# Patient Record
Sex: MALE | Race: WHITE | NOT HISPANIC OR LATINO | Employment: OTHER | URBAN - NONMETROPOLITAN AREA
[De-identification: names, ages, dates, MRNs, and addresses within clinical notes are randomized per-mention and may not be internally consistent; named-entity substitution may affect disease eponyms.]

---

## 2017-01-09 ENCOUNTER — COMMUNICATION - GICH (OUTPATIENT)
Dept: FAMILY MEDICINE | Facility: OTHER | Age: 82
End: 2017-01-09

## 2017-01-09 DIAGNOSIS — Z86.711 PERSONAL HISTORY OF PULMONARY EMBOLISM: ICD-10-CM

## 2017-01-09 DIAGNOSIS — Z79.01 LONG TERM CURRENT USE OF ANTICOAGULANT: ICD-10-CM

## 2017-02-06 ENCOUNTER — TELEPHONE (OUTPATIENT)
Dept: ANTICOAGULATION | Facility: CLINIC | Age: 82
End: 2017-02-06

## 2017-02-06 NOTE — TELEPHONE ENCOUNTER
Pt overdue for INR check. Attempted to contract him, no answer. Unable to leave . Will need to try again to contact him to get him in for INR check  Ashtyn Ashby RN

## 2017-03-22 ENCOUNTER — ANTICOAGULATION THERAPY VISIT (OUTPATIENT)
Dept: ANTICOAGULATION | Facility: CLINIC | Age: 82
End: 2017-03-22

## 2017-03-22 DIAGNOSIS — Z86.718 PERSONAL HISTORY OF DVT (DEEP VEIN THROMBOSIS): ICD-10-CM

## 2017-03-22 DIAGNOSIS — Z79.01 LONG-TERM (CURRENT) USE OF ANTICOAGULANTS: ICD-10-CM

## 2017-03-22 DIAGNOSIS — Z79.01 ANTICOAGULATION MONITORING, INR RANGE 2-3: ICD-10-CM

## 2017-03-22 DIAGNOSIS — Z86.711 HISTORY OF PULMONARY EMBOLISM: ICD-10-CM

## 2017-04-03 ENCOUNTER — ANTICOAGULATION - GICH (OUTPATIENT)
Dept: INTERNAL MEDICINE | Facility: OTHER | Age: 82
End: 2017-04-03

## 2017-04-03 DIAGNOSIS — Z86.711 PERSONAL HISTORY OF PULMONARY EMBOLISM: ICD-10-CM

## 2017-04-03 DIAGNOSIS — Z79.01 LONG TERM CURRENT USE OF ANTICOAGULANT: ICD-10-CM

## 2017-04-06 ENCOUNTER — AMBULATORY - GICH (OUTPATIENT)
Dept: UROLOGY | Facility: OTHER | Age: 82
End: 2017-04-06

## 2017-04-06 ENCOUNTER — HISTORY (OUTPATIENT)
Dept: UROLOGY | Facility: OTHER | Age: 82
End: 2017-04-06

## 2017-04-06 DIAGNOSIS — R97.20 ELEVATED PROSTATE SPECIFIC ANTIGEN (PSA): ICD-10-CM

## 2017-04-06 DIAGNOSIS — R31.9 HEMATURIA: ICD-10-CM

## 2017-04-06 LAB
BACTERIA URINE: NORMAL BACTERIA/HPF
BILIRUB UR QL: NEGATIVE
CLARITY, URINE: CLEAR CLARITY
COLOR UR: YELLOW COLOR
EPITHELIAL CELLS: NORMAL EPI/HPF
GLUCOSE URINE: NEGATIVE MG/DL
KETONES UR QL: NEGATIVE MG/DL
LEUKOCYTE ESTERASE URINE: NEGATIVE
NITRITE UR QL STRIP: NEGATIVE
OCCULT BLOOD,URINE - HISTORICAL: ABNORMAL
PH UR: 5 [PH]
PROTEIN QUALITATIVE,URINE - HISTORICAL: NEGATIVE MG/DL
PSA TOTAL (DIAGNOSTIC) - HISTORICAL: 6.07 NG/ML
RBC - HISTORICAL: NORMAL /HPF
SP GR UR STRIP: 1.01
UROBILINOGEN,QUALITATIVE - HISTORICAL: NORMAL EU/DL
WBC - HISTORICAL: NORMAL /HPF

## 2017-04-17 ENCOUNTER — AMBULATORY - GICH (OUTPATIENT)
Dept: LAB | Facility: OTHER | Age: 82
End: 2017-04-17

## 2017-04-17 ENCOUNTER — ANTICOAGULATION - GICH (OUTPATIENT)
Dept: INTERNAL MEDICINE | Facility: OTHER | Age: 82
End: 2017-04-17

## 2017-04-17 ENCOUNTER — HOSPITAL ENCOUNTER (OUTPATIENT)
Dept: RADIOLOGY | Facility: OTHER | Age: 82
End: 2017-04-17
Attending: UROLOGY

## 2017-04-17 ENCOUNTER — AMBULATORY - GICH (OUTPATIENT)
Dept: UROLOGY | Facility: OTHER | Age: 82
End: 2017-04-17

## 2017-04-17 ENCOUNTER — HISTORY (OUTPATIENT)
Dept: UROLOGY | Facility: OTHER | Age: 82
End: 2017-04-17

## 2017-04-17 DIAGNOSIS — R31.9 HEMATURIA: ICD-10-CM

## 2017-04-17 DIAGNOSIS — Z79.01 LONG TERM CURRENT USE OF ANTICOAGULANT: ICD-10-CM

## 2017-04-17 DIAGNOSIS — M79.605 PAIN OF LEFT LEG: ICD-10-CM

## 2017-04-17 DIAGNOSIS — G61.0 GUILLAIN-BARRE SYNDROME (H): ICD-10-CM

## 2017-04-17 DIAGNOSIS — M79.604 PAIN OF RIGHT LEG: ICD-10-CM

## 2017-04-17 LAB
A/G RATIO - HISTORICAL: 1.1 (ref 1–2)
ABSOLUTE BASOPHILS - HISTORICAL: 0.1 THOU/CU MM
ABSOLUTE EOSINOPHILS - HISTORICAL: 0.2 THOU/CU MM
ABSOLUTE LYMPHOCYTES - HISTORICAL: 1.8 THOU/CU MM (ref 0.9–2.9)
ABSOLUTE MONOCYTES - HISTORICAL: 0.6 THOU/CU MM
ABSOLUTE NEUTROPHILS - HISTORICAL: 3.4 THOU/CU MM (ref 1.7–7)
ALBUMIN SERPL-MCNC: 3.7 G/DL (ref 3.5–5.7)
ALP SERPL-CCNC: 69 IU/L (ref 34–104)
ALT (SGPT) - HISTORICAL: 9 IU/L (ref 7–52)
ANION GAP - HISTORICAL: 5 (ref 5–18)
AST SERPL-CCNC: 16 IU/L (ref 13–39)
BASOPHILS # BLD AUTO: 1.9 %
BILIRUB SERPL-MCNC: 0.9 MG/DL (ref 0.3–1)
BUN SERPL-MCNC: 15 MG/DL (ref 7–25)
BUN/CREAT RATIO - HISTORICAL: 14
CALCIUM SERPL-MCNC: 9.2 MG/DL (ref 8.6–10.3)
CHLORIDE SERPLBLD-SCNC: 107 MMOL/L (ref 98–107)
CK SERPL-CCNC: 64 IU/L (ref 30–223)
CO2 SERPL-SCNC: 24 MMOL/L (ref 21–31)
CREAT SERPL-MCNC: 1.05 MG/DL (ref 0.7–1.3)
EOSINOPHIL NFR BLD AUTO: 3.3 %
ERYTHROCYTE [DISTWIDTH] IN BLOOD BY AUTOMATED COUNT: 14 % (ref 11.5–15.5)
ERYTHROCYTE [SEDIMENTATION RATE] IN BLOOD: 12 MM/HR
GFR IF NOT AFRICAN AMERICAN - HISTORICAL: >60 ML/MIN/1.73M2
GLOBULIN - HISTORICAL: 3.3 G/DL (ref 2–3.7)
GLUCOSE SERPL-MCNC: 114 MG/DL (ref 70–105)
HCT VFR BLD AUTO: 46.2 % (ref 37–53)
HEMOGLOBIN: 14.9 G/DL (ref 13.5–17.5)
INR - HISTORICAL: 2.3
LYMPHOCYTES NFR BLD AUTO: 29.6 % (ref 20–44)
MCH RBC QN AUTO: 28.6 PG (ref 26–34)
MCHC RBC AUTO-ENTMCNC: 32.2 G/DL (ref 32–36)
MCV RBC AUTO: 89 FL (ref 80–100)
MONOCYTES NFR BLD AUTO: 9.7 %
NEUTROPHILS NFR BLD AUTO: 55.4 % (ref 42–72)
PLATELET # BLD AUTO: 244 THOU/CU MM (ref 140–440)
PMV BLD: 6.7 FL (ref 6.5–11)
POTASSIUM SERPL-SCNC: 3.9 MMOL/L (ref 3.5–5.1)
PROT SERPL-MCNC: 7 G/DL (ref 6.4–8.9)
RED BLOOD COUNT - HISTORICAL: 5.21 MIL/CU MM (ref 4.3–5.9)
SODIUM SERPL-SCNC: 136 MMOL/L (ref 133–143)
WHITE BLOOD COUNT - HISTORICAL: 6.1 THOU/CU MM (ref 4.5–11)

## 2017-04-24 ENCOUNTER — AMBULATORY - GICH (OUTPATIENT)
Dept: SCHEDULING | Facility: OTHER | Age: 82
End: 2017-04-24

## 2017-04-25 ENCOUNTER — OFFICE VISIT - GICH (OUTPATIENT)
Dept: PEDIATRICS | Facility: OTHER | Age: 82
End: 2017-04-25

## 2017-04-25 ENCOUNTER — HISTORY (OUTPATIENT)
Dept: PEDIATRICS | Facility: OTHER | Age: 82
End: 2017-04-25

## 2017-04-25 DIAGNOSIS — M51.9 THORACIC, THORACOLUMBAR AND LUMBOSACRAL INTERVERTEBRAL DISC DISORDER: ICD-10-CM

## 2017-04-25 DIAGNOSIS — Z86.69 PERSONAL HISTORY OF OTHER DISEASES OF THE NERVOUS SYSTEM AND SENSE ORGANS: ICD-10-CM

## 2017-04-25 DIAGNOSIS — M19.041 PRIMARY OSTEOARTHRITIS OF RIGHT HAND: ICD-10-CM

## 2017-04-25 DIAGNOSIS — R49.0 DYSPHONIA: ICD-10-CM

## 2017-04-25 DIAGNOSIS — M48.061 SPINAL STENOSIS OF LUMBAR REGION: ICD-10-CM

## 2017-04-25 DIAGNOSIS — K21.9 GASTRO-ESOPHAGEAL REFLUX DISEASE WITHOUT ESOPHAGITIS: ICD-10-CM

## 2017-05-02 ENCOUNTER — HOSPITAL ENCOUNTER (OUTPATIENT)
Dept: PHYSICAL THERAPY | Facility: OTHER | Age: 82
Setting detail: THERAPIES SERIES
End: 2017-05-02
Attending: INTERNAL MEDICINE

## 2017-05-02 DIAGNOSIS — M48.061 SPINAL STENOSIS OF LUMBAR REGION: ICD-10-CM

## 2017-05-02 DIAGNOSIS — M51.9 THORACIC, THORACOLUMBAR AND LUMBOSACRAL INTERVERTEBRAL DISC DISORDER: ICD-10-CM

## 2017-05-08 ENCOUNTER — AMBULATORY - GICH (OUTPATIENT)
Dept: SCHEDULING | Facility: OTHER | Age: 82
End: 2017-05-08

## 2017-05-09 ENCOUNTER — COMMUNICATION - GICH (OUTPATIENT)
Dept: FAMILY MEDICINE | Facility: OTHER | Age: 82
End: 2017-05-09

## 2017-05-09 DIAGNOSIS — Z86.711 PERSONAL HISTORY OF PULMONARY EMBOLISM: ICD-10-CM

## 2017-05-09 DIAGNOSIS — Z79.01 LONG TERM CURRENT USE OF ANTICOAGULANT: ICD-10-CM

## 2017-05-18 ENCOUNTER — ANTICOAGULATION - GICH (OUTPATIENT)
Dept: INTERNAL MEDICINE | Facility: OTHER | Age: 82
End: 2017-05-18

## 2017-05-18 DIAGNOSIS — Z79.01 LONG TERM CURRENT USE OF ANTICOAGULANT: ICD-10-CM

## 2017-05-18 DIAGNOSIS — Z86.711 PERSONAL HISTORY OF PULMONARY EMBOLISM: ICD-10-CM

## 2017-05-18 LAB — INR - HISTORICAL: 3.3

## 2017-06-08 ENCOUNTER — ANTICOAGULATION - GICH (OUTPATIENT)
Dept: INTERNAL MEDICINE | Facility: OTHER | Age: 82
End: 2017-06-08

## 2017-06-08 DIAGNOSIS — Z79.01 LONG TERM CURRENT USE OF ANTICOAGULANT: ICD-10-CM

## 2017-06-08 LAB — INR - HISTORICAL: 2.2

## 2017-06-28 ENCOUNTER — AMBULATORY - GICH (OUTPATIENT)
Dept: SCHEDULING | Facility: OTHER | Age: 82
End: 2017-06-28

## 2017-07-11 ENCOUNTER — ANTICOAGULATION - GICH (OUTPATIENT)
Dept: INTERNAL MEDICINE | Facility: OTHER | Age: 82
End: 2017-07-11

## 2017-07-11 DIAGNOSIS — Z79.01 LONG TERM CURRENT USE OF ANTICOAGULANT: ICD-10-CM

## 2017-07-11 LAB — INR - HISTORICAL: 3

## 2017-07-19 ENCOUNTER — COMMUNICATION - GICH (OUTPATIENT)
Dept: FAMILY MEDICINE | Facility: OTHER | Age: 82
End: 2017-07-19

## 2017-07-19 DIAGNOSIS — Z79.01 LONG TERM CURRENT USE OF ANTICOAGULANT: ICD-10-CM

## 2017-07-19 DIAGNOSIS — Z86.711 PERSONAL HISTORY OF PULMONARY EMBOLISM: ICD-10-CM

## 2017-08-07 ENCOUNTER — HISTORY (OUTPATIENT)
Dept: FAMILY MEDICINE | Facility: OTHER | Age: 82
End: 2017-08-07

## 2017-08-07 ENCOUNTER — OFFICE VISIT - GICH (OUTPATIENT)
Dept: FAMILY MEDICINE | Facility: OTHER | Age: 82
End: 2017-08-07

## 2017-08-07 ENCOUNTER — ANTICOAGULATION - GICH (OUTPATIENT)
Dept: INTERNAL MEDICINE | Facility: OTHER | Age: 82
End: 2017-08-07

## 2017-08-07 DIAGNOSIS — H26.9 CATARACT: ICD-10-CM

## 2017-08-07 DIAGNOSIS — G61.0 GUILLAIN-BARRE SYNDROME (H): ICD-10-CM

## 2017-08-07 DIAGNOSIS — Z95.1 PRESENCE OF AORTOCORONARY BYPASS GRAFT: ICD-10-CM

## 2017-08-07 DIAGNOSIS — M48.061 SPINAL STENOSIS OF LUMBAR REGION: ICD-10-CM

## 2017-08-07 DIAGNOSIS — Z79.01 LONG TERM CURRENT USE OF ANTICOAGULANT: ICD-10-CM

## 2017-08-07 DIAGNOSIS — E78.2 MIXED HYPERLIPIDEMIA: ICD-10-CM

## 2017-08-07 LAB
ABSOLUTE BASOPHILS - HISTORICAL: 0.1 THOU/CU MM
ABSOLUTE EOSINOPHILS - HISTORICAL: 0.2 THOU/CU MM
ABSOLUTE IMMATURE GRANULOCYTES(METAS,MYELOS,PROS) - HISTORICAL: 0 THOU/CU MM
ABSOLUTE LYMPHOCYTES - HISTORICAL: 1.2 THOU/CU MM (ref 0.9–2.9)
ABSOLUTE MONOCYTES - HISTORICAL: 0.5 THOU/CU MM
ABSOLUTE NEUTROPHILS - HISTORICAL: 2.7 THOU/CU MM (ref 1.7–7)
ANION GAP - HISTORICAL: 9 (ref 5–18)
BASOPHILS # BLD AUTO: 1.1 %
BUN SERPL-MCNC: 15 MG/DL (ref 7–25)
BUN/CREAT RATIO - HISTORICAL: 16
CALCIUM SERPL-MCNC: 9.4 MG/DL (ref 8.6–10.3)
CHLORIDE SERPLBLD-SCNC: 102 MMOL/L (ref 98–107)
CHOL/HDL RATIO - HISTORICAL: 5.73
CHOLESTEROL TOTAL: 229 MG/DL
CO2 SERPL-SCNC: 25 MMOL/L (ref 21–31)
CREAT SERPL-MCNC: 0.94 MG/DL (ref 0.7–1.3)
EOSINOPHIL NFR BLD AUTO: 3.7 %
ERYTHROCYTE [DISTWIDTH] IN BLOOD BY AUTOMATED COUNT: 14.2 % (ref 11.5–15.5)
GFR IF NOT AFRICAN AMERICAN - HISTORICAL: >60 ML/MIN/1.73M2
GLUCOSE SERPL-MCNC: 117 MG/DL (ref 70–105)
HCT VFR BLD AUTO: 45.3 % (ref 37–53)
HDLC SERPL-MCNC: 40 MG/DL (ref 23–92)
HEMOGLOBIN: 15.4 G/DL (ref 13.5–17.5)
IMMATURE GRANULOCYTES(METAS,MYELOS,PROS) - HISTORICAL: 0.4 %
INR - HISTORICAL: 2.2
LDLC SERPL CALC-MCNC: 150 MG/DL
LYMPHOCYTES NFR BLD AUTO: 26.5 % (ref 20–44)
MCH RBC QN AUTO: 29.9 PG (ref 26–34)
MCHC RBC AUTO-ENTMCNC: 34 G/DL (ref 32–36)
MCV RBC AUTO: 88 FL (ref 80–100)
MONOCYTES NFR BLD AUTO: 10.2 %
NEUTROPHILS NFR BLD AUTO: 58.1 % (ref 42–72)
NON-HDL CHOLESTEROL - HISTORICAL: 189 MG/DL
PATIENT STATUS - HISTORICAL: ABNORMAL
PLATELET # BLD AUTO: 226 THOU/CU MM (ref 140–440)
PMV BLD: 9.2 FL (ref 6.5–11)
POTASSIUM SERPL-SCNC: 4.1 MMOL/L (ref 3.5–5.1)
RED BLOOD COUNT - HISTORICAL: 5.15 MIL/CU MM (ref 4.3–5.9)
SODIUM SERPL-SCNC: 136 MMOL/L (ref 133–143)
TRIGL SERPL-MCNC: 193 MG/DL
WHITE BLOOD COUNT - HISTORICAL: 4.6 THOU/CU MM (ref 4.5–11)

## 2017-08-14 ENCOUNTER — COMMUNICATION - GICH (OUTPATIENT)
Dept: FAMILY MEDICINE | Facility: OTHER | Age: 82
End: 2017-08-14

## 2017-08-14 DIAGNOSIS — E78.2 MIXED HYPERLIPIDEMIA: ICD-10-CM

## 2017-08-22 ENCOUNTER — COMMUNICATION - GICH (OUTPATIENT)
Dept: FAMILY MEDICINE | Facility: OTHER | Age: 82
End: 2017-08-22

## 2017-08-24 ENCOUNTER — COMMUNICATION - GICH (OUTPATIENT)
Dept: FAMILY MEDICINE | Facility: OTHER | Age: 82
End: 2017-08-24

## 2017-08-24 DIAGNOSIS — Z95.1 PRESENCE OF AORTOCORONARY BYPASS GRAFT: ICD-10-CM

## 2017-09-11 ENCOUNTER — ANTICOAGULATION - GICH (OUTPATIENT)
Dept: INTERNAL MEDICINE | Facility: OTHER | Age: 82
End: 2017-09-11

## 2017-09-11 DIAGNOSIS — Z79.01 LONG TERM CURRENT USE OF ANTICOAGULANT: ICD-10-CM

## 2017-09-11 LAB — INR - HISTORICAL: 2.8

## 2017-09-15 ENCOUNTER — COMMUNICATION - GICH (OUTPATIENT)
Dept: FAMILY MEDICINE | Facility: OTHER | Age: 82
End: 2017-09-15

## 2017-09-15 DIAGNOSIS — Z86.711 PERSONAL HISTORY OF PULMONARY EMBOLISM: ICD-10-CM

## 2017-09-15 DIAGNOSIS — Z79.01 LONG TERM CURRENT USE OF ANTICOAGULANT: ICD-10-CM

## 2017-09-25 ENCOUNTER — COMMUNICATION - GICH (OUTPATIENT)
Dept: INTERNAL MEDICINE | Facility: OTHER | Age: 82
End: 2017-09-25

## 2017-09-25 DIAGNOSIS — R49.0 DYSPHONIA: ICD-10-CM

## 2017-09-25 DIAGNOSIS — K21.9 GASTRO-ESOPHAGEAL REFLUX DISEASE WITHOUT ESOPHAGITIS: ICD-10-CM

## 2017-10-09 ENCOUNTER — ANTICOAGULATION - GICH (OUTPATIENT)
Dept: INTERNAL MEDICINE | Facility: OTHER | Age: 82
End: 2017-10-09

## 2017-10-09 DIAGNOSIS — Z79.01 LONG TERM CURRENT USE OF ANTICOAGULANT: ICD-10-CM

## 2017-10-09 LAB — INR - HISTORICAL: 2.5

## 2017-11-30 ENCOUNTER — COMMUNICATION - GICH (OUTPATIENT)
Dept: FAMILY MEDICINE | Facility: OTHER | Age: 82
End: 2017-11-30

## 2017-11-30 DIAGNOSIS — Z79.01 LONG TERM CURRENT USE OF ANTICOAGULANT: ICD-10-CM

## 2017-11-30 DIAGNOSIS — Z86.711 PERSONAL HISTORY OF PULMONARY EMBOLISM: ICD-10-CM

## 2017-12-27 NOTE — PROGRESS NOTES
Patient Information     Patient Name MRN Sex Michael Pope [id number] Male 1935      Progress Notes signed by Shaq Del Castillo MD at 2017  1:48 PM      Author:  Shaq Del Castillo MD Service:  (none) Author Type:  Physician     Filed:  2017  1:48 PM Encounter Date:  2017 Status:  Signed     :  Shaq Del Castillo MD (Physician)            -  2017        MICHAEL MARTINEZ  [address] [place] [address]      RE:  MICHAEL MARTINEZ  MR#:  [id number]  :  1935    Dear Mr. Martinez:    I am enclosing a copy of your cholesterol profile. As you can see, the cholesterol is elevated. Of note is that the LDL cholesterol, which is the bad component, is also elevated at 150.     I would encourage you to consider going back on the statin. I see that when you were discharged from the hospital last fall, you were discharged on pravastatin, which I think would be a good idea to try again. If you would like to do this, please let me know, and I will send in a new prescription for you.     Otherwise, your lab work was normal.     Sincerely yours,       SHAQ DEL CASTILLO MD    WR/cp  Voice Job ID: [id number]  Text Job ID:  [id number]    cc:Enclosure: Lipid panel done 2017.     2017  RE:  MICHAEL MARTINEZ  MR#:  [id number]  Page 1

## 2017-12-28 NOTE — TELEPHONE ENCOUNTER
Patient Information     Patient Name MRN Michael Camacho [id number] Male 1935      Telephone Encounter by Donal Koch at 2017 10:25 AM     Author:  Donal Koch Service:  (none) Author Type:  (none)     Filed:  2017 10:31 AM Encounter Date:  2017 Status:  Signed     :  Donal Koch            After birth date was verified, spoke with Michael and he stated that he does not think the Gabapentin 800 mg, twice per day is helping him. He stated that he would like to go off the medication and will talk to his Doctor in [place] when he returns in November. For now, patient stated he will continue to take the Gabapentin as prescribed. No further questions or concerns.    Donal Koch ....................  2017   10:30 AM

## 2017-12-28 NOTE — TELEPHONE ENCOUNTER
Patient Information     Patient Name MRN Michael Camacho [id number] Male 1935      Telephone Encounter by Charlene Barlow at 2017  2:56 PM     Author:  Charlene Barlow Service:  (none) Author Type:  (none)     Filed:  2017  2:56 PM Encounter Date:  2017 Status:  Signed     :  Charlene Barlow            Patient notifed of below.   Charlene Barlow ....................  2017   2:56 PM

## 2017-12-28 NOTE — TELEPHONE ENCOUNTER
Patient Information     Patient Name MRN Sex Michael Pope [id number] Male 1935      Telephone Encounter by Maria Del Rosario Trujillo RN at 2017  9:59 AM     Author:  Maria Del Rosario Trujillo RN Service:  (none) Author Type:  NURS- Registered Nurse     Filed:  2017 10:05 AM Encounter Date:  2017 Status:  Signed     :  Maria Del Rosario Trujillo RN (NURS- Registered Nurse)            Anticoagulant    Office visit in the past 12 months.    Last visit with Dr. Martinez was 17.  Lab tests:  PT/INR at least monthly    INR (no units)    Date Value   2017 3.0 (H)     HEMOGLOBIN                (g/dL)    Date Value   2017 14.9     Prescription refilled per RN Medication Refill Policy.................... MARIA DEL ROSARIO TRUJILLO RN ....................  2017   10:01 AM

## 2017-12-28 NOTE — TELEPHONE ENCOUNTER
Patient Information     Patient Name MRN Michael Camacho [id number] Male 1935      Telephone Encounter by Charlene Barlow at 2017  2:19 PM     Author:  Charlene Barlow Service:  (none) Author Type:  (none)     Filed:  2017  2:23 PM Encounter Date:  2017 Status:  Signed     :  Charlene Barlow            Patient is having cataract surgery on . He is wondering if should hold his coumadin at all? He also received letter would like to start a Statin.    Charlene Barlow ....................  2017   2:20 PM

## 2017-12-28 NOTE — TELEPHONE ENCOUNTER
Patient Information     Patient Name MRN Michael Camacho [id number] Male 1935      Telephone Encounter by Dc Vargas MD at 2017  2:49 PM     Author:  Dc Vargas MD Service:  (none) Author Type:  Physician     Filed:  2017  2:52 PM Encounter Date:  2017 Status:  Signed     :  Dc Vargas MD (Physician)            He can stop the gabapentin, no need to taper. He can break the Toprol in half which would be 12.5 mg once a day. We should see him 3 or 4 weeks after he decreases the dose, to be sure blood pressure is adequately controlled.  Dc Vargas MD ....................  2017   2:52 PM

## 2017-12-28 NOTE — PATIENT INSTRUCTIONS
Patient Information     Patient Name MRN Michael Camacho [id number] Male 1935      Patient Instructions by Naya Diallo RN at 2017  9:45 AM     Author:  Naya Diallo RN Service:  (none) Author Type:  NURS- Registered Nurse     Filed:  2017  9:30 AM Encounter Date:  2017 Status:  Signed     :  Naya Diallo RN (NURS- Registered Nurse)            2017 Details    Sun Mon Tue Wed Thu Fri Sat           1                 2               3               4               5               6               7               8                 9               10               11      7.5 mg   See details      12      7.5 mg         13      5 mg         14      7.5 mg         15      7.5 mg           16      7.5 mg         17      7.5 mg         18      7.5 mg         19      7.5 mg         20      5 mg         21      7.5 mg         22      7.5 mg           23      7.5 mg         24      7.5 mg         25      7.5 mg         26      7.5 mg         27      5 mg         28      7.5 mg         29      7.5 mg           30      7.5 mg         31      7.5 mg               Date Details    This INR check               How to take your warfarin dose     To take:  5 mg Take one of the 5 mg tablets.    To take:  7.5 mg Take one of the 7.5 mg tablets.           2017 Details    Sun Mon Tue Wed Thu Fri Sat       1      7.5 mg         2      7.5 mg         3      5 mg         4      7.5 mg         5      7.5 mg           6      7.5 mg         7      7.5 mg         8      7.5 mg         9               10               11               12                 13               14               15               16               17               18               19                 20               21               22               23               24               25               26                 27               28               29               30               31                   Date Details   No additional details    Date of next INR:  8/8/2017         How to take your warfarin dose     To take:  5 mg Take one of the 5 mg tablets.    To take:  7.5 mg Take one of the 7.5 mg tablets.             Description          Continue same Warfarin dose and recheck in 1 month.  Naya Diallo RN    7/11/2017  9:30 AM                    Anticoagulation Summary as of 7/11/2017     INR goal 2.0-3.0    Today's INR 3.0    Next INR check 8/8/2017          Call your Anticoagulation Clinic at Dept: [phone]   if:   1. Any medications are started, stopped, or there is a change in dose.  2. You experience any bleeding that is not easily stopped or if it is recurrent.  3. You notice an increase in bruising or any bruising that does not heal.  4. You are scheduled for surgery, colonoscopy, dental extraction or any other procedure where you may need to stop your Coumadin (warfarin).

## 2017-12-28 NOTE — TELEPHONE ENCOUNTER
Patient Information     Patient Name MRN Michael Camacho [id number] Male 1935      Telephone Encounter by Paula Velasquez at 2017  3:21 PM     Author:  Paula Velasquez Service:  (none) Author Type:  (none)     Filed:  2017  3:21 PM Encounter Date:  2017 Status:  Signed     :  Paula Velasquez            Read [facility] message to him,  Paula Velasquez ....................  2017   3:21 PM

## 2017-12-28 NOTE — TELEPHONE ENCOUNTER
Patient Information     Patient Name MRN Michael Camacho [id number] Male 1935      Telephone Encounter by Adore Baker RN at 2017 12:20 PM     Author:  Adore Baker RN Service:  (none) Author Type:  NURS- Registered Nurse     Filed:  2017 12:39 PM Encounter Date:  2017 Status:  Signed     :  Adore Baker RN (NURS- Registered Nurse)            Depression-in adults 18 and over  Serotonin/Norepinephrine Reuptake Inhibitors    Office visit in the past 12 months or as indicated in chart.  Should have clinic visit 1-2 months after initial prescription.    Last visit with  was on: 17 with Dc Vargas MD  Next visit with DEEDEE YUNG is on: No future appointment listed with this provider  Next visit with Internal Medicine is on: 10/09/2017 in [facility]    Max refills 12 months from last office visit or per providers notes.    Prescription refilled per RN Medication Refill Policy.................... Adore Baker RN ....................  2017   12:33 PM

## 2017-12-28 NOTE — TELEPHONE ENCOUNTER
Patient Information     Patient Name MRN Michael Camacho [id number] Male 1935      Telephone Encounter by Cindy Powell RN at 2017  1:59 PM     Author:  Cindy Powell RN Service:  (none) Author Type:  NURS- Registered Nurse     Filed:  2017  2:04 PM Encounter Date:  2017 Status:  Signed     :  Cindy Powell RN (NURS- Registered Nurse)            Tried to call pt again.  Phone number [phone] has been disconnected.  Called [phone], it just kept ringing.  Cindy Powell RN ....................  2017   2:04 PM

## 2017-12-28 NOTE — TELEPHONE ENCOUNTER
Patient Information     Patient Name MRN Michael Camacho [id number] Male 1935      Telephone Encounter by Dc Vargas MD at 2017  2:50 PM     Author:  Dc Vargas MD Service:  (none) Author Type:  Physician     Filed:  2017  2:53 PM Encounter Date:  2017 Status:  Signed     :  Dc Vargas MD (Physician)            Generally there is no need to hold Coumadin for cataract surgery but he should check with his eye doctor to be sure. Statin will be ordered.  Dc Vargas MD ....................  2017   2:51 PM

## 2017-12-28 NOTE — TELEPHONE ENCOUNTER
Patient Information     Patient Name MRN Sex Michael Pope [id number] Male 1935      Telephone Encounter by Kandy Ford RN at 2017  3:04 PM     Author:  Kandy Ford RN Service:  (none) Author Type:  NURS- Registered Nurse     Filed:  2017  3:06 PM Encounter Date:  2017 Status:  Signed     :  Kandy Ford RN (NURS- Registered Nurse)            Anticoagulant    Office visit in the past 12 months.    Last visit with SHAQ DEL CASTILLO was on: 2017 in [facility]  Next visit with SHAQ DEL CASTILLO is on: No future appointment listed with this provider  Next visit with Family Practice is on: No future appointment listed in this department    Lab tests:  PT/INR at least monthly    INR (no units)    Date Value   10/09/2017 2.5 (H)     HEMOGLOBIN                (g/dL)    Date Value   2017 15.4     No components found for: CBC      Max refills 6 months.    Prescription refilled per RN Medication Refill Policy.................... Kandy Ford RN ....................  2017   3:05 PM

## 2017-12-28 NOTE — TELEPHONE ENCOUNTER
Patient Information     Patient Name MRN Michael Camacho [id number] Male 1935      Telephone Encounter by Teresa Stephens at 2017  1:47 PM     Author:  Teresa Stephens Service:  (none) Author Type:  (none)     Filed:  2017  1:48 PM Encounter Date:  2017 Status:  Signed     :  Teresa Stephens            [facility]  Wants to start on statin.  Also calling re coumadin.

## 2017-12-28 NOTE — PROGRESS NOTES
Patient Information     Patient Name MRN Sex Michael Pope [id number] Male 1935      Progress Notes by Dc Vargas MD at 2017  8:45 AM     Author:  Dc Vargas MD Service:  (none) Author Type:  Physician     Filed:  2017  1:27 PM Encounter Date:  2017 Status:  Signed     :  Dc Vargas MD (Physician)            .

## 2017-12-28 NOTE — PATIENT INSTRUCTIONS
Patient Information     Patient Name MRN Michael Camacho [id number] Male 1935      Patient Instructions by Naya Diallo RN at 10/9/2017  9:00 AM     Author:  Naya Diallo RN Service:  (none) Author Type:  NURS- Registered Nurse     Filed:  10/9/2017  9:03 AM Encounter Date:  10/9/2017 Status:  Signed     :  Naya Diallo RN (NURS- Registered Nurse)            2017 Details    Sun Mon Tue Wed Thu Fri Sat     1               2               3               4               5               6               7                 8               9      7.5 mg   See details      10      7.5 mg         11      5 mg         12      7.5 mg         13      7.5 mg         14      7.5 mg           15      7.5 mg         16      7.5 mg         17      7.5 mg         18      5 mg         19      7.5 mg         20      7.5 mg         21      7.5 mg           22      7.5 mg         23      7.5 mg         24      7.5 mg         25      5 mg         26      7.5 mg         27      7.5 mg         28      7.5 mg           29      7.5 mg         30      7.5 mg         31      7.5 mg              Date Details   10/09 This INR check               How to take your warfarin dose     To take:  5 mg Take one of the 5 mg tablets.    To take:  7.5 mg Take one of the 7.5 mg tablets.           2017 Details    Sun Mon Tue Wed Thu Fri Sat        1      5 mg         2      7.5 mg         3      7.5 mg         4      7.5 mg           5      7.5 mg         6      7.5 mg         7               8               9               10               11                 12               13               14               15               16               17               18                 19               20               21               22               23               24               25                 26               27               28               29               30                  Date Details   No  additional details    Date of next INR:  11/6/2017         How to take your warfarin dose     To take:  5 mg Take one of the 5 mg tablets.    To take:  7.5 mg Take one of the 7.5 mg tablets.             Description          Continue same Warfarin dose and recheck in 1 month.  Naya Diallo RN    10/9/2017  9:02 AM                          Anticoagulation Summary as of 10/9/2017     INR goal 2.0-3.0    Today's INR 2.5    Next INR check 11/6/2017          Call your Anticoagulation Clinic at Dept: [phone]   if:   1. Any medications are started, stopped, or there is a change in dose.  2. You experience any bleeding that is not easily stopped or if it is recurrent.  3. You notice an increase in bruising or any bruising that does not heal.  4. You are scheduled for surgery, colonoscopy, dental extraction or any other procedure where you may need to stop your Coumadin (warfarin).

## 2017-12-28 NOTE — TELEPHONE ENCOUNTER
Patient Information     Patient Name MRN Sex Michael Pope [id number] Male 1935      Telephone Encounter by Naya Diallo RN at 2017  3:42 PM     Author:  Naya Diallo RN Service:  (none) Author Type:  NURS- Registered Nurse     Filed:  2017  3:51 PM Encounter Date:  9/15/2017 Status:  Signed     :  Naya Diallo RN (NURS- Registered Nurse)            Anticoagulant    Office visit in the past 12 months.    Last visit with SHAQ DEL CASTILLO was on: 2017 in [facility]  Next visit with SHAQ DEL CASTILLO is on: No future appointment listed with this provider  Next visit with Family Practice is on: No future appointment listed in this department    Lab tests:  PT/INR at least monthly    INR (no units)    Date Value   2017 2.8 (H)     HEMOGLOBIN                (g/dL)    Date Value   2017 15.4         Prescription refilled per RN Medication Refill Policy.................... Naya Diallo RN ....................  2017   3:42 PM

## 2017-12-28 NOTE — TELEPHONE ENCOUNTER
Patient Information     Patient Name MRN Michael Camacho [id number] Male 1935      Telephone Encounter by Charlene Barlow at 2017  2:07 PM     Author:  Charlene Barlow Service:  (none) Author Type:  (none)     Filed:  2017  2:12 PM Encounter Date:  2017 Status:  Signed     :  Charlene Barlow            Patient states at [facility], they put him on gabapentin 800 mg BID. He states they are not doing anything for him. He wondering if this could be lowered, or stopped? He states he also talked to [facility] about lower his metoprolol 25 mg daily, to 1/2 tablet daily, he is wondering if this change could be made as well?    Charlene Barlow ....................  2017   2:11 PM

## 2017-12-29 NOTE — DISCHARGE SUMMARY
Patient Information     Patient Name MRN Sex Michael Pope [id number] Male 1935      Discharge Summaries by Edwin Waldrop PT at 2017  1:48 PM     Author:  Edwin Waldrop PT Service:  (none) Author Type:  PT- Physical Therapist     Filed:  2017  1:51 PM Date of Service:  2017  1:48 PM Status:  Signed     :  Edwin Waldrop PT (PT- Physical Therapist)            [facility]  Outpatient PT - Discharge Summary    Patient Name: Michael Martinez   YOB: 1935   Referring MD/Provider: Dr. Isma Martinez  Medical and Treatment Diagnosis: Lumbar spinal stenosis, lumbar disc disease  PT Treatment Diagnosis: Pain, core weakness, lumbar radiculopathy,   Insurance: [employer]  Start of Service: 17  Certification Dates: Start of Service: 17                     Medicare/[place] Re-Cert Due: 17    Date of discharge: 2017     Dates of Service: 17  to  17  Number of visits: 1          Reason for Discharge:  No additional visits were scheduled after initial evaluation.  Patient was able to proceed with an independent HEP.     Progress from Initial to Discharge (if applicable):    Comments:  See initial evaluation on 17 for status at the time of final visit.     Discharge G codes not completed due to an unplanned discharge.     Further Recommendations:    Patient will continue with established home exercise program      Patient is discharged from Physical Therapy    Thank you for your referral to [facility].  Please call with any questions, concerns or comments.  [phone]

## 2017-12-29 NOTE — H&P
Patient Information     Patient Name MRN Michael Camacho [id number] Male 1935      H&P by Dc Vargas MD at 2017  8:45 AM     Author:  Dc Vargas MD  Service:  (none) Author Type:  Physician     Filed:  2017  1:29 PM  Encounter Date:  2017 Status:  Addendum     :  Dc Vargas MD (Physician)        Related Notes: Original Note by Dc Vargas MD (Physician) filed at 2017  1:27 PM            ----------------- PREOPERATIVE EXAM ------------------  2017    SUBJECTIVE:  Michael Martinez is a 81 y.o. male here for preoperative optimization.    I was asked to see Michael Martinez by Dr. Leslie for  preoperative evaluation due to  History of coronary disease, and a recent history of Guillain-Barré syndrome    Date of Surgery: 2017  Type of Surgery: right eye cataract  Surgeon: Dr. Leslie  Hospital:  [facility]    HPI:  Patient is an 81-year-old man who is undergoing cataract surgery later this month. He has had his left cataract done in the past.    He has a history of coronary disease and also suffered a pulmonary embolus after his surgery. He is on lifelong warfarin. His INRs have been stable.    He developed Guillain-Barré syndrome last fall presumably after a flu vaccine. He spent 2 months in the hospital in the [facility] and at a rehabilitation [place]. He returned home, went to physical therapy, and now is doing exercises at home 3 times a week which take him about an hour. He is regaining the strength in his legs and his arms, but it's very slow going. He still has some difficulty with walking.    He's had no cardiopulmonary problems or any other associated problems. He is a former smoker, no longer smokes.      Fever/Chills or other infectious symptoms in past month:  (NO)   >10lb weight loss in past two months:  (NO)     Health Care Directive/Code status: Full code status  Hx of blood transfusions:   (NO)  "unsure  History of VRE/MRSA:  (NO)    Preoperative Evaluation: Obstructive Sleep Apnea screening    S: Snore -  Do you snore loudly? (louder than talking or loud enough to be heard through closed doors)(NO)  T: Tired - Do you often feel tired, fatigued, or sleepy during the daytime?(YES)  O: Observed - Has anyone ever observed you stop breathing during your sleep?(NO)  P: Pressure - Do you have or are you being treated for high blood pressure?(NO)  B: BMI - BMI greater than 35kg/m2?(NO)  A: Age - Age over 50 years old?(YES)  N: Neck - Neck circumference greater than 40 cm?(NO)  G: Gender - Gender: Male?(YES)    Total number of \"YES\" responses:  4    Scoring: Low risk of BRINA 0-2  At Risk of BRINA: >3 High Risk of BRINA: 5-8        Patient Active Problem List       Diagnosis  Date Noted     Spinal stenosis, lumbar  04/25/2017     Lumbar disc disease  04/25/2017     Primary osteoarthritis of right hand  04/25/2017     Arthralgia       Guillain Barré syndrome (HC)       History of pulmonary embolism  10/14/2016     Abdominal aortic aneurysm (AAA) without rupture (HC)  10/05/2016     Last imaged 11/15: 3.3 cm (when discussed with radiology, not reflected in the report). Brandy Hill MD         DJD (degenerative joint disease) of cervical spine  07/22/2015     Anticoagulation monitoring, INR range 2-3  04/21/2015     S/P CABG x 4  04/22/2014     S/P TURP  04/22/2014     ULNAR NEUROPATHY, LEFT  05/21/2012     HERNIA - HIATAL WITH REFLUX  01/06/2004     OSTEOARTHRITIS, LEFT KNEE, SEVERE  10/15/2003     CATARACT  08/19/2002     Hyperlipidemia         Past Medical History:     Diagnosis  Date     Acute deep vein thrombosis (DVT) of tibial vein of right lower extremity (HC) 10/29/2016     LINDSAY LOC HYPERPLASIA PROS W/UR OBST \T\ OTH LUTS 8/4/2011     CVA (cerebral infarction) 6/2014    left cerebellum--seen on MRI      Edema 11/4/2003    Edema & cramps legs, ? secondary to Norvasc(cnk540.3)      Edema 11/4/2003    Edema & cramps " legs, ? secondary to Norvasc(ufc558.3) symptom, edema-pedal (icd 782.3)      Ischemic cardiomyopathy 7/9/2014     OSTEOARTHROSIS NOS, UNSPECIFIED SITE 9/5/2001     Other ill-defined and unknown causes of morbidity and mortality     see prob list      UTI (urinary tract infection)        Past Surgical History:      Procedure  Laterality Date     (IA) AK INJ PROC SELECTIVE CORONARY ANGIOGRAPHY  December 2013    LAD-95% rdcgytti-70-35% distal stenosis.  Ramus-80% stenosis.  Right coronary-high-grade 90% stenosis.  EF-45%       BIOPSY PROSTATE       COLONOSCOPY SCREENING  10/31/2013    diverticulosis-no fu needed d/t age       CORONARY ARTERY BYPASS GRAFT  December of 2013    four-vessel       ROTATOR CUFF REPAIR  1996     TURP  January 2014    done in [place] -- excellent result         Current Outpatient Prescriptions       Medication  Sig Dispense Refill     acetaminophen (TYLENOL) 325 mg tablet Take 2 tablets by mouth 4 times daily. Max acetaminophen dose: 4000mg in 24 hrs.  0     aspirin (ECOTRIN) 81 mg enteric coated tablet Take 81 mg by mouth once daily with a meal.       gabapentin (NEURONTIN) 800 mg tablet Take 1 tablet by mouth 2 times daily.       metoprolol succinate (TOPROL XL) 25 mg Sustained-Release tablet Take 1 tablet by mouth once daily. 90 tablet 3     ranitidine (ZANTAC) 150 mg tablet Take 1 tablet by mouth 2 times daily. 180 tablet 1     warfarin (COUMADIN) 5 mg tablet Take 7.5 mg x six days/week and 5 mg x one day/week (Thursdays). 120 tablet 0     warfarin (COUMADIN) 7.5 mg tablet Take 7.5 mg x 6 days and 5 mg x 1 day/week 100 tablet 0     No current facility-administered medications for this visit.      Medications have been reviewed by me and are current to the best of my knowledge and ability.    Recent use of: Aspirin and warfarin which she takes on a daily basis.    Allergies:  No Known Allergies  Latex allergy  no  Immunizations:  Immunization History     Administered  Date(s) Administered  "    Influenza Virus, Unspecified 2008, 10/21/2010     Influenza, IIV3 (Age >=3 years) 1997, 10/13/1999, 10/14/1999, 10/18/2002, 10/15/2003, 10/09/2013, 2016     Influenza, IIV4 (Age >= 3 Years) 10/14/2015     Pneumococcal Poly,23-Valent (Pneumovax) 1999, 10/18/2002     Pneumococcal conj 13-Valent (Prevnar 13) 2016     Td (Age >=7 Years) 1999     Td, Preservative Free (age >= 7 Years) 2008       Family History       Problem   Relation Age of Onset     Heart Disease  Mother      No Known Problems  Father      Cancer-breast  Sister      Otherwise healthy       Other  Brother      BPH otherwise healthy at 72       Arthritis  Brother      Osteoarthritis, otherwise healthy at 66       Cancer-colon  Brother      Cancer-breast  Sister 59              Denies family hx of bleeding tendencies, anesthesia complications, or other problems with surgery.    Social History        Substance Use Topics          Smoking status:   Former Smoker      Packs/day:  0.90      Years:  9.00      Types:  Cigarettes      Smokeless tobacco:   Never Used      Alcohol use   0.0 oz/week     0 Standard drinks or equivalent per week        Comment: Alcoholic Drinks/day: 1 beer every 2 weeks         ROS:    Surgical:  patient denies previous complications from prior surgeries including but not limited to prolonged bleeding, anesthesia complications, dysrhythmias, surgical wound infections, or prolonged hospital stay.  Cardiorespiratory: denies chest pain, palpitations, shortness of breath, cough. Most exertion in the past 2 weeks was fairly minimal because of his muscle weakness from Guillain-Barré syndrome  Complete ROS otherwise negative except as noted in HPI.     -------------------------------------------------------------    PHYSICAL EXAM:  /80  Pulse 58  Temp 97.7  F (36.5  C) (Tympanic)  Ht 1.778 m (5' 10\")  Wt 79.8 kg (176 lb)  SpO2 98%  BMI 25.25 kg/m2    EXAM:  General " Appearance: Pleasant, alert, appropriate appearance for age. No acute distress  Head Exam: Normal. Normocephalic, atraumatic.  Eyes: PERRL, EOMI  Ears: Normal TM's bilaterally.   OroPharynx: Mucosa pink and moist. Dentition in good repair.  Neck: Supple, no masses or nodes, no lymphadenopathy.  No thyromegaly.  Lungs: Normal chest wall and respirations. Clear to auscultation, no wheezes or crackles.  Cardiovascular: Regular rate and rhythm. S1, S2, no murmurs.  Gastrointestinal: Soft, nontender, no abnormal masses or organomegaly. BS normal   Musculoskeletal: No edema. No warm or erythematous joints. Mild bilateral lower extremity and upper extremity weakness but no atrophy  Skin: no concerning or new rashes.  Neurologic Exam: CN 2-12 grossly intact.  Normal gait.  Symmetric DTRs, normal gross motor movement, tone, and coordination. No tremor.  Psychiatric Exam: Alert and oriented, appropriate affect.      EKG:  Sinus bradycardia, first-degree AV block, small Q waves in leads 3 and aVF from his previous infarct.  CBC and basic metabolic panel are normal. Lipids are pending  ---------------------------------------------------------------    ASSESSEMENT AND PLAN: Patient appears quite stable and there are no contraindications found to proceeding with his surgery for his cataract.    Michael was seen today for preoperative exam.    Diagnoses and all orders for this visit:    Guillain Barré syndrome (HC)  -     LIPID PANEL; Future  -     CBC WITH DIFFERENTIAL; Future  -     BASIC METABOLIC PANEL; Future  -     LIPID PANEL  -     CBC WITH DIFFERENTIAL  -     BASIC METABOLIC PANEL  -     CBC WITH AUTO DIFFERENTIAL    S/P CABG x 4  -     metoprolol succinate (TOPROL XL) 25 mg Sustained-Release tablet; Take 1 tablet by mouth once daily.  -     EKG 12 LEAD UNIT PERFORMED    Spinal stenosis, lumbar    Cataract, unspecified cataract type, unspecified laterality    Mixed hyperlipidemia  -     EKG 12 LEAD UNIT PERFORMED  -      LIPID PANEL; Future  -     CBC WITH DIFFERENTIAL; Future  -     BASIC METABOLIC PANEL; Future  -     LIPID PANEL  -     CBC WITH DIFFERENTIAL  -     BASIC METABOLIC PANEL  -     CBC WITH AUTO DIFFERENTIAL        PRE OP RECOMMENDATIONS:    No one family history of problems with bleeding or anesthesia. Patient is able to tolerate greater than 4 METs of activity without any cardiopulmonary symptoms. ASA PS class 1 . No cardiopulmonary workup is neccessary for the current procedure. Please contact the office with any questions or concerns.    Patient is on chronic pain medications (NO);   Patient is on antiplatlet/anticoagulation (YES)  Other medications that need adjustment perioperatively (NO)    Other:  Patient was advised to call our office and the surgical services with any change in condition or new symptoms if they were to develop between today and their surgical date; especially any cardiopulmonary symptoms or symptoms concerning for an infection.    Recommendations: Patient may proceed with cataract surgery as scheduled.  I discussed with him his sinus bradycardia, which is asymptomatic, but if he comes lightheaded will reevaluate and consider cutting back on his beta blocker.  Dc Vargas MD ....................  8/7/2017   1:24 PM

## 2017-12-29 NOTE — PATIENT INSTRUCTIONS
Patient Information     Patient Name MRN Michael Camacho [id number] Male 1935      Patient Instructions by Naya Diallo RN at 2017  3:00 PM     Author:  Naya Diallo RN Service:  (none) Author Type:  NURS- Registered Nurse     Filed:  2017  2:47 PM Encounter Date:  2017 Status:  Signed     :  Naya Diallo RN (NURS- Registered Nurse)            2017 Details    Sun Mon Tue Wed Thu Fri Sat          1               2                 3               4               5               6               7               8               9                 10               11      7.5 mg   See details      12      7.5 mg         13      5 mg         14      7.5 mg         15      7.5 mg         16      7.5 mg           17      7.5 mg         18      7.5 mg         19      7.5 mg         20      5 mg         21      7.5 mg         22      7.5 mg         23      7.5 mg           24      7.5 mg         25      7.5 mg         26      7.5 mg         27      5 mg         28      7.5 mg         29      7.5 mg         30      7.5 mg          Date Details    This INR check               How to take your warfarin dose     To take:  5 mg Take one of the 5 mg tablets.    To take:  7.5 mg Take one of the 7.5 mg tablets.           2017 Details    Sun Mon Tue Wed Thu Fri Sat     1      7.5 mg         2      7.5 mg         3      7.5 mg         4      5 mg         5      7.5 mg         6      7.5 mg         7      7.5 mg           8      7.5 mg         9      7.5 mg         10               11               12               13               14                 15               16               17               18               19               20               21                 22               23               24               25               26               27               28                 29               30               31                    Date Details   No  additional details    Date of next INR:  10/9/2017         How to take your warfarin dose     To take:  5 mg Take one of the 5 mg tablets.    To take:  7.5 mg Take one of the 7.5 mg tablets.             Description          Continue same Warfarin dose and recheck in 1 month.  Naya Diallo RN    9/11/2017  2:46 PM                        Anticoagulation Summary as of 9/11/2017     INR goal 2.0-3.0    Today's INR 2.8    Next INR check 10/9/2017          Call your Anticoagulation Clinic at Dept: [phone]   if:   1. Any medications are started, stopped, or there is a change in dose.  2. You experience any bleeding that is not easily stopped or if it is recurrent.  3. You notice an increase in bruising or any bruising that does not heal.  4. You are scheduled for surgery, colonoscopy, dental extraction or any other procedure where you may need to stop your Coumadin (warfarin).

## 2017-12-29 NOTE — PATIENT INSTRUCTIONS
Patient Information     Patient Name MRN Michael Camacho [id number] Male 1935      Patient Instructions by Naya Diallo RN at 2017 11:30 AM     Author:  Naya Diallo RN Service:  (none) Author Type:  NURS- Registered Nurse     Filed:  2017 10:48 AM Encounter Date:  2017 Status:  Signed     :  Naya Diallo RN (NURS- Registered Nurse)            2017 Details    Sun Mon Tue Wed Thu Fri Sat       1               2               3               4               5                 6               7      7.5 mg   See details      8      7.5 mg         9      5 mg         10      7.5 mg         11      7.5 mg         12      7.5 mg           13      7.5 mg         14      7.5 mg         15      7.5 mg         16      5 mg         17      7.5 mg         18      7.5 mg         19      7.5 mg           20      7.5 mg         21      7.5 mg         22      7.5 mg         23      5 mg         24      7.5 mg         25      7.5 mg         26      7.5 mg           27      7.5 mg         28      7.5 mg         29      7.5 mg         30      5 mg         31      7.5 mg            Date Details    This INR check               How to take your warfarin dose     To take:  5 mg Take one of the 5 mg tablets.    To take:  7.5 mg Take one of the 7.5 mg tablets.           2017 Details    Sun Mon Tue Wed Thu Fri Sat          1      7.5 mg         2      7.5 mg           3      7.5 mg         4      7.5 mg         5      7.5 mg         6      5 mg         7      7.5 mg         8      7.5 mg         9      7.5 mg           10      7.5 mg         11      7.5 mg         12               13               14               15               16                 17               18               19               20               21               22               23                 24               25               26               27               28               29               30                 Date Details   No additional details    Date of next INR:  9/11/2017         How to take your warfarin dose     To take:  5 mg Take one of the 5 mg tablets.    To take:  7.5 mg Take one of the 7.5 mg tablets.             Description          Continue same Warfarin dose and recheck in 1 month.  Naya Diallo RN    8/7/2017  10:48 AM                      Anticoagulation Summary as of 8/7/2017     INR goal 2.0-3.0    Today's INR 2.2    Next INR check 9/11/2017          Call your Anticoagulation Clinic at Dept: [phone]   if:   1. Any medications are started, stopped, or there is a change in dose.  2. You experience any bleeding that is not easily stopped or if it is recurrent.  3. You notice an increase in bruising or any bruising that does not heal.  4. You are scheduled for surgery, colonoscopy, dental extraction or any other procedure where you may need to stop your Coumadin (warfarin).

## 2017-12-29 NOTE — PATIENT INSTRUCTIONS
Patient Information     Patient Name MRN Michael Camacho [id number] Male 1935      Patient Instructions by Maria Del Rosario Trujillo RN at 2017  9:15 AM     Author:  Maria Del Rosario Trujillo RN  Service:  (none) Author Type:  NURS- Registered Nurse     Filed:  2017  9:22 AM  Encounter Date:  2017 Status:  Addendum     :  Maria Del Rosario Trujillo RN (NURS- Registered Nurse)        Related Notes: Original Note by Maria Del Rosario Trujillo RN (NURS- Registered Nurse) filed at 2017  9:22 AM            2017 Details    Sun  Fri Sat         1               2               3                 4               5               6               7               8      5 mg   See details      9      7.5 mg         10      7.5 mg           11      7.5 mg         12      7.5 mg         13      7.5 mg         14      7.5 mg         15      5 mg         16      7.5 mg         17      7.5 mg           18      7.5 mg         19      7.5 mg         20      7.5 mg         21      7.5 mg         22      5 mg         23      7.5 mg         24      7.5 mg           25      7.5 mg         26      7.5 mg         27      7.5 mg         28      7.5 mg         29      5 mg         30      7.5 mg           Date Details    This INR check               How to take your warfarin dose     To take:  5 mg Take one of the 5 mg tablets.    To take:  7.5 mg Take one of the 7.5 mg tablets.           2017 Details    Sun  Fri Sat           1      7.5 mg           2      7.5 mg         3      7.5 mg         4      7.5 mg         5      7.5 mg         6      5 mg         7      7.5 mg         8      7.5 mg           9      7.5 mg         10      7.5 mg         11      7.5 mg         12      7.5 mg         13      5 mg         14               15                 16               17               18               19               20               21               22                 23               24                25               26               27               28               29                 30               31                     Date Details   No additional details    Date of next INR:  7/13/2017         How to take your warfarin dose     To take:  5 mg Take one of the 5 mg tablets.    To take:  7.5 mg Take one of the 7.5 mg tablets.             Description          Decrease dose and recheck in 3 weeks.  ..............Naya Diallo RN.............  5/18/2017    1:17 PM                  Anticoagulation Summary as of 6/8/2017     INR goal 2.0-3.0    Today's INR 2.2    Next INR check 7/13/2017          Call your Anticoagulation Clinic at Dept: [phone]   if:   1. Any medications are started, stopped, or there is a change in dose.  2. You experience any bleeding that is not easily stopped or if it is recurrent.  3. You notice an increase in bruising or any bruising that does not heal.  4. You are scheduled for surgery, colonoscopy, dental extraction or any other procedure where you may need to stop your Coumadin (warfarin).

## 2017-12-30 NOTE — NURSING NOTE
Patient Information     Patient Name MRN Michael Camacho [id number] Male 1935      Nursing Note by Charlene Barlow at 2017  8:45 AM     Author:  Charlene Barlow Service:  (none) Author Type:  (none)     Filed:  2017  9:23 AM Encounter Date:  2017 Status:  Signed     :  Charlene Barlow            This patient presents today for a Preoperative exam for this procedure: Right cataract   Date of Surgery:    Surgeon:  Anuj  Facility:  [facility]    Charlene Barlow ....................  2017   9:01 AM

## 2018-01-02 NOTE — TELEPHONE ENCOUNTER
Patient Information     Patient Name MRN Michael Camacho [id number] Male 1935      Telephone Encounter by Mable Howard RN at 1/10/2017  9:40 AM     Author:  Mable Howard RN Service:  (none) Author Type:  (none)     Filed:  1/10/2017  9:43 AM Encounter Date:  2017 Status:  Signed     :  Mable Howard RN (NURS- Registered Nurse)            Depression-in adults 18 and over  SSRI    Office visit in the past 12 months or as indicated in chart.  Should have clinic visit 1-2 months after initial prescription.    Last visit with [facility] DEL CASTILLO was on: 2016 in [facility]  Next visit with SHAQ DEL CASTILLO is on: No future appointment listed with this provider  Next visit with Family Practice is on: No future appointment listed in this department    Max refills 12 months from last office visit or per providers notes.      PHQ Depression Screening 10/14/2016 10/18/2016   Date of PHQ exam (doc flow) 10/14/2016 10/18/2016   1. Lack of interest/pleasure 3 - Nearly every day -   2. Feeling down/depressed 2 - More than half the days -   PHQ-2 TOTAL SCORE 5 -   3. Trouble sleeping 3 - Nearly every day -   4. Decreased energy 3 - Nearly every day -   5. Appetite change 1 - Several days -   6. Feelings of failure 1 - Several days -   7. Trouble concentrating 3 - Nearly every day -   8. Activity level 3 - Nearly every day -   9. Hurting yourself 0 - Not at all -   PHQ-9 TOTAL SCORE 19 -   PHQ-9 Severity Level moderately severe -   Functional Impairment somewhat difficult -       Statins    Office visit in the past 12 months.    Last visit with [facility] DEL CASTILLO was on: 2016 in [facility]  Next visit with SHAQ DEL CASTILLO is on: No future appointment listed with this provider  Next visit with Family Practice is on: No future appointment listed in this department      Last Lipids:  Chol: 263    2016  T    2016  HDL:    42    9/28/2016  LDL:  185    9/28/2016  LDL DIRECT:  No results found in past 5 years    .      Max refills 12 months from last office visit.      Both prescriptions refused - not currently on medication list. Must be seen to discuss restarting medications.    Celexa was discontinued 9/28/2015. Lipitor was discontinued 9/27/2016.    Unable to complete prescription refill per RN Medication Refill Policy.................... Mable Howard ....................  1/10/2017   9:42 AM

## 2018-01-03 NOTE — TELEPHONE ENCOUNTER
Patient Information     Patient Name MRN Michael Camacho [id number] Male 1935      Telephone Encounter by Constance Chandler RN at 1/10/2017  9:57 AM     Author:  Constance Chandler RN Service:  (none) Author Type:  NURS- Registered Nurse     Filed:  1/10/2017 10:00 AM Encounter Date:  2017 Status:  Signed     :  Constance Chandler RN (NURS- Registered Nurse)            Anticoagulant    Office visit in the past 12 months.    Last visit with SHAQ DEL CASTILLO was on: 2016 in [facility]  Next visit with SHAQ DEL CASTILLO is on: No future appointment listed with this provider  Next visit with Family Practice is on: No future appointment listed in this department    Lab tests:  PT/INR at least monthly    INR (no units)    Date Value   2016 2.1 (H)     HEMOGLOBIN                (g/dL)    Date Value   2016 10.3 (L)     No components found for: CBC      Max refills 6 months.    Prescription refilled per RN Medication Refill Policy.................... CONSTANCE CHANDLER RN ....................  1/10/2017   9:57 AM

## 2018-01-04 NOTE — NURSING NOTE
Patient Information     Patient Name MRN Michael Camacho [id number] Male 1935      Nursing Note by Lucille Collins at 2017 10:00 AM     Author:  Lucille Collins Service:  (none) Author Type:  (none)     Filed:  2017  9:59 AM Encounter Date:  2017 Status:  Signed     :  Lucille Collins            Here for Hematuria one time.  Review of Systems:    Weight loss:    No     Recent fever/chills:  No   Night sweats:   No  Current skin rash:  No   Recent hair loss:  Yes  Heat intolerance:  No   Cold intolerance:  No  Chest pain:   No   Palpitations:   No  Shortness of breath:  No   Wheezing:   No  Constipation:    No   Diarrhea:   No   Nausea:   No   Vomiting:   No   Kidney/side pain:  No   Back pain:   Yes  Frequent headaches:  No   Dizziness:     Yes  Leg swelling:   No   Calf pain:    Yes    Lucille Collins LPN  2017  9:55 AM

## 2018-01-04 NOTE — PATIENT INSTRUCTIONS
Patient Information     Patient Name MRN Michael Camacho [id number] Male 1935      Patient Instructions by Maria Del Rosario Trujillo RN at 4/3/2017 10:45 AM     Author:  Maria Del Rosario Trujillo RN Service:  (none) Author Type:  NURS- Registered Nurse     Filed:  4/3/2017 10:37 AM Encounter Date:  4/3/2017 Status:  Signed     :  Maria Del Rosario Trujillo RN (NURS- Registered Nurse)            2017 Details    Sun Mon e Wed Thu Fri Sat           1                 2               3      7.5 mg   See details      4      7.5 mg         5      7.5 mg         6      7.5 mg         7      7.5 mg         8      7.5 mg           9      7.5 mg         10      7.5 mg         11      7.5 mg         12      7.5 mg         13      7.5 mg         14      7.5 mg         15      7.5 mg           16      7.5 mg         17      7.5 mg         18               19               20               21               22                 23               24               25               26               27               28               29                 30                      Date Details    This INR check       Date of next INR:  2017         How to take your warfarin dose     To take:  7.5 mg Take one of the 7.5 mg tablets.             Description          Returned from [place], had been taking 10.5 mg x two days, 7.5 mg five days/week.  Decrease Warfarin/Coumadin and recheck INR in 2 weeks.  MARIA DEL ROSARIO TRUJILLO RN ....................  4/3/2017   10:36 AM              Anticoagulation Summary as of 4/3/2017     INR goal 2.0-3.0    Today's INR     Next INR check 2017          Call your Anticoagulation Clinic at Dept: [phone]   if:   1. Any medications are started, stopped, or there is a change in dose.  2. You experience any bleeding that is not easily stopped or if it is recurrent.  3. You notice an increase in bruising or any bruising that does not heal.  You are scheduled for surgery, colonoscopy, dental  extraction or any other procedure where you may need to stop your Coumadin (warfarin).

## 2018-01-04 NOTE — PROGRESS NOTES
Patient Information     Patient Name MRN Sex Michael Pope [id number] Male 1935      Progress Notes by Isma Martinez MD at 2017  3:00 PM     Author:  Isma Martinez MD Service:  (none) Author Type:  Physician     Filed:  2017  3:58 PM Encounter Date:  2017 Status:  Signed     :  Isma Martinez MD (Physician)            Subjective  Nursing Notes:   Yeison Albarran LPN  2017  2:44 PM  Signed  Patient presents to the clinic for his 6 month follow up from the neurologist.  Yeison Albarran LPN ..............2017 2:44 PM      Michael Martinez is a 81 y.o. male who presents for multiple questions. 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 Plascencia at the [facility]. He had been having increasing pain on the front of his hips radiating down towards the knees. 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 Worse with walking and standing up straight better with rest or lying down. He had imaging of the back as well as laboratory data that were obtained given the potential concern for inflammatory diseases. Ultimately the lab work was normal, see below but CT scan revealed spinal stenosis and he was referred to physical therapy. He's not yet started physical therapy because his therapist Ira Winston is on jury duty. He did have some pain in the shoulders bilaterally when he was in [place] but the shoulder pain has improved. He has chronic pain in the right wrist. His hand was very swollen when he was in the hospital last year. He saw Elmo Mckeon at [facility], those notes are not available for my review. He's been wearing a wrist brace which has been helping significantly to keep the swelling down. He was told no surgery or injection will help this. Specific diagnosis is unclear but may be arthritis. He's had more of a raspy voice. He heard Guillain-Barré syndrome can move into your  "voice and wants to know if that's the cause of this. He doesn't cough but sometimes he feels like he needs to drink some liquids to swallow better. He has some heartburn and takes Tums every other night or so.    Problem List/PMH: reviewed in EMR, and made relevant updates today.  Medications: reviewed in EMR, and made relevant updates today.  Allergies: reviewed in EMR, and made relevant updates today.    Social Hx:  Social History        Substance Use Topics          Smoking status:   Former Smoker      Packs/day:  0.90      Years:  9.00      Types:  Cigarettes      Smokeless tobacco:   Never Used      Alcohol use   0.0 oz/week     0 Standard drinks or equivalent per week        Comment: Alcoholic Drinks/day: 1 beer every 2 weeks       Social History Narrative     2007 (sudden cardiac death). Three children, retired in  as a .  Spends his donis in [place]      I reviewed social history and made relevant updates today.    Family Hx:   Family History       Problem   Relation Age of Onset     Heart Disease  Mother      No Known Problems  Father      Cancer-breast  Sister      Otherwise healthy       Other  Brother      BPH otherwise healthy at 72       Arthritis  Brother      Osteoarthritis, otherwise healthy at 66       Cancer-breast  Sister 59              Objective  Vitals: reviewed in EMR.  /70  Pulse 76  Ht 1.778 m (5' 10\")  Wt 81.1 kg (178 lb 12.8 oz)  BMI 25.66 kg/m2    Gen: Pleasant male, NAD.  HEENT: MMM  Neck: Supple  Pulm: Breathing easily  Neuro: Grossly intact  Skin: No concerning lesions.  Psychiatric: Normal affect and insight. Does not appear anxious or depressed.    External records reviewed from [facility] including CBC with white count 6.1, hemoglobin 14.9, platelets 244, sedimentation rate 12, lumbar MRI revealing severe spinal stenosis at L2-3.      Assessment    ICD-10-CM    1. Spinal stenosis, lumbar M48.06 AMB CONSULT TO PHYSICAL THERAPY "   2. Lumbar disc disease M51.9 AMB CONSULT TO PHYSICAL THERAPY   3. 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 Gastroesophageal reflux disease, esophagitis presence not specified K21.9 ranitidine (ZANTAC) 150 mg tablet   6. Hoarseness R49.0 ranitidine (ZANTAC) 150 mg tablet       I think his hoarseness is likely secondary to acid reflux and would be highly unusual to be isolated for Guillain-Barré syndrome. Differential diagnosis also includes vocal cord polyp, dysphasia, oral cancer, others. If his symptoms persist despite treating for heartburn would recommend repeat evaluation as he may warrant a laryngoscopy. Strongly encouraged physical therapy to try and improve his low back and thigh pain related to severe spinal stenosis. An injection is unlikely to be effective. If physical therapy is not helping his symptoms would consider expert consultation.    Plan   -- Expected clinical course discussed   -- Medications and their side effects discussed  Patient Instructions    -- Start PT with Ira Winston or other   -- If low back is not improving, would refer to [facility] vs  [facility]     -- If wrist is not improving, would recommend back to Elmo Mckeon vs Dr. Duarte     -- Start ranitidine (Zantac) twice daily for raspy voice and heart burn   -- Okay to use tums too   -- Avoid triggers, eg caffeine, alcohol, spicy foods   -- Return if not improving         Index   Lumbar Stenosis   ________________________________________________________________________  KEY POINTS    Lumbar stenosis is a narrowing in the lower part of your spine that surrounds and protects your spinal cord. The narrowing can irritate or damage the spinal cord.    Treatment may include physical therapy, medicines, or surgery.    To put less strain on your back, you can exercise daily, practice good posture, lift properly, and lose weight if you are  overweight.  ________________________________________________________________________  What is lumbar stenosis?   Lumbar stenosis is a narrowing in the lower part of the spinal canal. The spinal canal is the hollow space in the bones of your spine that surrounds and protects your spinal cord. Your spinal cord is a bundle of nerves that sends messages back and forth between your brain and the rest of your body. The narrowing can squeeze your spinal cord, causing irritation or damage to the spinal cord.  What is the cause?  The most common cause of lumbar stenosis is arthritis of the spine. Over time, the disks that cushion the bones of the spine dry out, causing part of the disk to push out of place and press on the spinal cord. The bones respond to the stress by building extra bone called spurs. The bones and ligaments of the spine shift out of place, which makes the space for the spinal cord smaller. This puts pressure on the nerves that branch off the spinal cord.  Other possible causes are:    Other bone diseases    Cancer in the spine    Fracture of the spine    A very narrow spinal canal at birth  What are the symptoms?   Symptoms may include:    Back pain    Pain in one or both legs    Numbness or tingling in the legs or feet    Weakness when you stand or walk    Problems with bowel or bladder control  How is it diagnosed?   Your healthcare provider will ask about your symptoms and medical history and examine you. Tests of the spine may include:    X-rays    CT scan, which uses X-rays and a computer to show detailed pictures of the spinal cord and the tissues around it    MRI, which uses a strong magnetic field and radio waves to show detailed pictures of the spinal cord and tissues around it  How is it treated?  Depending on the severity of the lumbar stenosis, there are different treatment options. Physical therapy is one of the first choices for treatment. A program of exercises can help strengthen the  muscles of your lower back. Your provider may prescribe:     Medicine    A shot of steroid medicine to lessen swelling and irritation in your back    A brace  If these treatments are not helpful, you may need surgery. The most common surgery is called a lumbar laminectomy. The surgery removes tissue that is causing the narrowing. This relieves pressure on the nerves.  How can I take care of myself?   Follow the full course of treatment prescribed by your healthcare provider. In addition:    Try sleeping on a firmer mattress.    Put an ice pack, gel pack, or package of frozen vegetables, wrapped in a cloth on the painful area every 3 to 4 hours for up to 20 minutes at a time.    Take an anti-inflammatory medicine, such as ibuprofen, or other medicine as directed by your healthcare provider. Nonsteroidal anti-inflammatory medicines (NSAIDs), such as ibuprofen, may cause stomach bleeding and other problems. These risks increase with age. Read the label and take as directed. Unless recommended by your healthcare provider, you should not take this medicine for more than 10 days.    Put moist heat on the sore area for 10 to 15 minutes before you do warm-up and stretching exercises. Moist heat may help relax your muscles. Moist heat includes heat patches or moist heating pads that you can buy at most drugstores, a warm wet washcloth, or a hot shower. Put a hot water bottle or electric heating pad on your back. Cover the hot water bottle with a towel or set the heating pad on low. To prevent burns to your skin, follow directions on the package and do not lie on any type of hot pad. Don t use heat if you have swelling.  Ask your healthcare provider:    How and when you will get your test results    How long it will take to recover from this condition    If there are activities you should avoid and when you can return to your normal activities    How to take care of yourself at home    What symptoms or problems you should  watch for and what to do if you have them  Make sure you know when you should come back for a checkup. Keep all appointments for provider visits or tests.  How can I help prevent lumbar stenosis?   Here are some of the things you can do so there is less strain on your back:    Keep your abdominal and back muscles strong. Get some exercise every day and include stretching and warm-up exercises suggested by your provider or physical therapist. Practice good posture.    Stand with your head up, shoulders straight, chest forward, weight balanced evenly on both feet, and pelvis tucked in.    Whenever you sit, sit in a straight-backed chair and hold your spine against the back of the chair.    Use a footrest for one foot when you stand or sit in one spot for a long time. This keeps your back straight.    Protect your back.    When you need to move a heavy object, don't face the object and push with your arms. Turn around and use your back to push backwards so the strain is taken by your legs.    When you lift a heavy object, bend your knees and hips and keep your back straight. If you do a lot of heavy lifting, wear a belt designed to support your back. Avoid lifting heavy objects higher than your waist.    Carry packages close to your body, with your arms bent.    Lie on your side with your knees bent when you sleep or rest. It may help to put a pillow between your knees. Put a pillow under your knees when you sleep on your back. You may need to avoid sleeping on your stomach.    Lose weight if you are overweight. This takes pressure off your spine.  Developed by [employer]  Adult Advisor 2016.3 published by [employer]  Last modified: 2016-07-15  Last reviewed: 2016-07-15  This content is reviewed periodically and is subject to change as new health information becomes available. The information is intended to inform and educate and is not a replacement for medical evaluation, advice, diagnosis or treatment by a  healthcare professional.  References   Adult Advisor 2016.3 Index    Copyright   2016 [employer], a division of [employer] All rights reserved.           No Follow-up on file.    Signed, Isma Martinez MD  Internal Medicine & Pediatrics

## 2018-01-04 NOTE — PATIENT INSTRUCTIONS
Patient Information     Patient Name MRN Michael Camacho [id number] Male 1935      Patient Instructions by Alyssia Sahni RN at 2017 10:15 AM     Author:  Alyssia Sahni RN Service:  (none) Author Type:  NURS- Registered Nurse     Filed:  2017 10:27 AM Encounter Date:  2017 Status:  Signed     :  Alyssia Sahni RN (NURS- Registered Nurse)            Home Care after Cystoscopy  Follow these guidelines for your care after your procedure.    Activity  No limitations    Bathing or showering  No limitations    Symptoms  You may notice some burning with urination but this usually resolves after 1-2 days.  You may also notice small amounts of blood in your urine.  Please increase water intake for the next few days to help with these symptoms.    Contacts  General Questions: [phone]  Appointments:  [phone]  Emergencies:  [phone]    When to call the clinic  If you develop any of the following symptoms please call the clinic immediately.  If the clinic is closed please be seen at an urgent care clinic or the Emergency Department.  - Burning with urination that worsens after 2 days  - Unable to urinate causing severe pelvic pain  - Fevers of greater than 101 degrees F  - Flank pain that is not responding to pain medication    Follow up  Please follow up as discussed at the appointment.

## 2018-01-04 NOTE — PATIENT INSTRUCTIONS
Patient Information     Patient Name MRN Michael Camacho [id number] Male 1935      Patient Instructions by Naya Diallo RN at 2017 10:00 AM     Author:  Naya Diallo RN Service:  (none) Author Type:  NURS- Registered Nurse     Filed:  2017  9:00 AM Encounter Date:  2017 Status:  Signed     :  Naya Diallo RN (NURS- Registered Nurse)            2017 Details    Sun Mon Tue Wed Thu Fri Sat           1                 2               3               4               5               6               7               8                 9               10               11               12               13               14               15                 16               17      7.5 mg   See details      18      7.5 mg         19      7.5 mg         20      7.5 mg         21      7.5 mg         22      7.5 mg           23      7.5 mg         24      7.5 mg         25      7.5 mg         26      7.5 mg         27      7.5 mg         28      7.5 mg         29      7.5 mg           30      7.5 mg                Date Details    This INR check               How to take your warfarin dose     To take:  7.5 mg Take one of the 7.5 mg tablets.           May 2017 Details    Sun Mon Tue Wed Thu Fri Sat      1      7.5 mg         2      7.5 mg         3      7.5 mg         4      7.5 mg         5      7.5 mg         6      7.5 mg           7      7.5 mg         8      7.5 mg         9      7.5 mg         10      7.5 mg         11      7.5 mg         12      7.5 mg         13      7.5 mg           14      7.5 mg         15      7.5 mg         16               17               18               19               20                 21               22               23               24               25               26               27                 28               29               30               31                   Date Details   No additional details    Date of next INR:   5/15/2017         How to take your warfarin dose     To take:  7.5 mg Take one of the 7.5 mg tablets.             Description          Continue same Warfarin dose and recheck in 1 month.  Naya Diallo RN   4/17/2017    8:59 AM                Anticoagulation Summary as of 4/17/2017     INR goal 2.0-3.0    Today's INR 2.3    Next INR check 5/15/2017          Call your Anticoagulation Clinic at Dept: [phone]   if:   1. Any medications are started, stopped, or there is a change in dose.  2. You experience any bleeding that is not easily stopped or if it is recurrent.  3. You notice an increase in bruising or any bruising that does not heal.  4. You are scheduled for surgery, colonoscopy, dental extraction or any other procedure where you may need to stop your Coumadin (warfarin).

## 2018-01-04 NOTE — INITIAL ASSESSMENTS
Patient Information     Patient Name MRN Sex Michael Pope [id number] Male 1935      Initial Assessments by Edwin Waldrop PT at 2017  8:56 AM     Author:  Edwin Waldrop PT Service:  (none) Author Type:  PT- Physical Therapist     Filed:  2017 11:20 AM Date of Service:  2017  8:56 AM Status:  Signed     :  Edwin Waldrop PT (PT- Physical Therapist)            [facility]  Outpatient PT - Initial Evaluation  Spine Eval    Date of Service: 2017     Visit 1/10    Patient Name: Michael Martinez   YOB: 1935   Referring MD/Provider: Dr. Isma Martinez  Medical and Treatment Diagnosis: Lumbar spinal stenosis, lumbar disc disease  PT Treatment Diagnosis: Pain, core weakness, lumbar radiculopathy,   Insurance: [employer]  Start of Service: 17  Certification Dates: Start of Service: 17  Medicare/MA Re-Cert Due: 17    Precautions:  History of Gullian-Galva  Cognition:  Oriented to Person, Place, and Time.     Were cultural / age or other special adaptations needed? No      Patient is a vulnerable adult: No      Patient is aware of diagnosis: Yes      Risks and benefits explained: Yes      Subjective      History:  Patient reports experiencing an onset of bilateral anterior thigh pain the first week of 2017.  He was initially concerned that he was having an exacerbation of Gullian-Galva symptoms. When he returned from [place], in March, he scheduled a follow up with his Neurologist in [place].  Lab work was negative for an exacerbation of Gullian-Galva, but an MRI of the lumbar spine revealed spinal stenosis.      Patient has a recent history of Gullian-Galva which started in 2016.  He was discharged for [facility] rehab in early 2016.  During his rehab at [facility] he received exercises for the low back from his treating PT.   He started working in these exercises again about 2 weeks ago.  States  symptoms have improved 60% over the past  2 weeks.  He has been performing the exercises daily for the lumbar region.  He also rides a stationary bike.  He is pleased with his current progress.     Rates pain: pain at rest is 4-5/10.  Pain was 8/10 a week ago  Describes pain: dull pain  Locates pain: anterior thigh region     Prior Level:  Minimal to no difficulty completing the above functional activities.     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 secondary to Norvasc(sei340.3)      Edema 11/4/2003    Edema & cramps legs, ? secondary to Norvasc(fom131.3) symptom, edema-pedal (icd 782.3)      OSTEOARTHROSIS NOS, UNSPECIFIED SITE 9/5/2001     Other ill-defined and unknown causes of morbidity and mortality     see prob list      UTI (urinary tract infection)      Past Surgical History:      Procedure  Laterality Date     (IA) VA INJ PROC SELECTIVE CORONARY ANGIOGRAPHY  December 2013    LAD-95% gplokxwg-19-30% distal stenosis.  Ramus-80% stenosis.  Right coronary-high-grade 90% stenosis.  EF-45%       BIOPSY PROSTATE       COLONOSCOPY SCREENING  10/31/2013    diverticulosis-no fu needed d/t age       CORONARY ARTERY BYPASS GRAFT  December of 2013    four-vessel       ROTATOR CUFF REPAIR  1996     TURP  January 2014    done in [place] -- excellent result           Occupation:  Retired     Previous Treatment:    Pain Meds / Anti-inflammatory Meds - Yes   Physical Therapy - he is performing the HEP develop by the PT at [facility]   Injections - No  Surgery - No   Pain Clinic - No    Diagnostics:  Reviewed (see chart)   Current Medications:  Reviewed (see chart)    Drug Allergies:  Reviewed (see chart)  ?   Latex Allergy:  No        Objective    Items left blank indicate that the test was inappropriate or not meaningful at the time of  evaluation and therefore not performed.    Fall Risk Screening: No risk factors identified    ROM/Strength:     Cerv Thor Lumbar Myotomes    L    R     L    R   Flexion    C4 Shoulder Elev.   L2 Hip Flexion 4 5   Extension    C5 Shoulder Abd.   L3 Knee Ext.      4+ 4+   Left Lat. Flex    C6 Elbow Flexion   L4 Ankle DF 5 5   Right Lat. Flex    C7 Elbow Ext.   L5 1st MTP Ext.     Left Rotation    C8 Finger Flex   S1 Ankle P.F. 5 5   Right Rotation    T1 Finger Abd.   S2 Knee Flexion 4- 4+          Hip Abduction            Ext. Rotation       Observation:  Standing with slightly forward flexed posture.     Gait:  Slightly for flexed posture with gait.       Today's Intervention:    Reviewed and performed HEP:  -SLR  -Single knee to chest  -Supine lumbar rotation   -Supine HS stretching in 90/90 position  -Supine shoulder flexion  -Standing wall slide    Discussed current symptoms and the recent progress he has made with his HEP.  Patient will continue with these exercises.  If additional PT visits are required, he will schedule once insurance authorization received.       Home Exercise Program:    -SLR  -Single knee to chest  -Supine lumbar rotation   -Supine HS stretching in 90/90 position  -Supine shoulder flexion  -Standing wall slide          Assessment    Therapist Assessment / Clinical Impression:  Signs and symptoms consistent with lumbar spinal stenosis and lumbar disc disease.  The patient is appropriate for physical therapy to address their pain, functional limitations, and physical impairments.      Functional Impairment(s): See subjective on initial evaluation and Functional Assessment / Summary Report from [facility].    Physical Impairment(s):        G codes and Modifier based on patient's presentation and clinical judgement:   Current Primary G Code and Modifier:    Per the Patient's intake and/or assessment the Primary G Code is: Mobility .   The Patient's Impairment, Limitation or Restriction Modifier  would be best described as: CJ - 20% - 40% Impairment.     Goal Primary G Code and Modifier:    The Patient's G Code Goal would be: Mobility    The Patient's Impairment, Limitation or Restriction Modifier goal would be best described as: CI - 1% - 20% Impairment.         Goals:  Functional Short Term Goals (4 weeks):   Report 75% reduction in low back and leg symptoms at rest  Report minimal difficulty performing shopping tasks    Functional Long Term Goals (8 weeks):   Develop independent management.  Report 90% or greater reduction in lumbar and leg symptoms at rest.  Report no difficulty completing shopping tasks        Patient participated in goal selection and understand(s) the plan of care: Yes   Patient Potential for Achieving Desired Outcome: Good      Response to Intervention:  Good response to treatment.    Plan    Treatment Plan:      Frequency:   8 visits    Duration of Treatment: 8 weeks    Planned Interventions:    Home Exercise Program development  Therapeutic Exercise (ROM & Strengthening)  Therapeutic Activities  Neuromuscular Re-education  Manual Therapy  Gait Training  Hot Packs / Cold Pack Modalities  Vasopneumatic Compression with Cold Therapy ( Game Ready )  Mechanical Traction    Plan for next visit:  Progress exercises as symptoms allowed and as indicated.  Manual therapy and modalities as indicated.     Thank you for your referral to [facility].  Please call with any questions, concerns or comments.  [phone]    The signature, of the referring medical provider, on this document indicates certification of the above prescribed plan of care and is medically necessary.

## 2018-01-04 NOTE — PATIENT INSTRUCTIONS
Patient Information     Patient Name MRN Michael Camacho [id number] Male 1935      Patient Instructions by Isma Martinez MD at 2017  3:00 PM     Author:  Isma Martinez MD  Service:  (none) Author Type:  Physician     Filed:  2017  3:17 PM  Encounter Date:  2017 Status:  Addendum     :  Isma Martinez MD (Physician)        Related Notes: Original Note by Isma Martinez MD (Physician) filed at 2017  3:14 PM             -- Start PT with Ira Winston or other   -- If low back is not improving, would refer to PM&R vs  Spine Center     -- If wrist is not improving, would recommend back to [facility] vs Dr. Duarte     -- Start ranitidine (Zantac) twice daily for raspy voice and heart burn   -- Okay to use tums too   -- Avoid triggers, eg caffeine, alcohol, spicy foods   -- Return if not improving         Index   Lumbar Stenosis   ________________________________________________________________________  KEY POINTS    Lumbar stenosis is a narrowing in the lower part of your spine that surrounds and protects your spinal cord. The narrowing can irritate or damage the spinal cord.    Treatment may include physical therapy, medicines, or surgery.    To put less strain on your back, you can exercise daily, practice good posture, lift properly, and lose weight if you are overweight.  ________________________________________________________________________  What is lumbar stenosis?   Lumbar stenosis is a narrowing in the lower part of the spinal canal. The spinal canal is the hollow space in the bones of your spine that surrounds and protects your spinal cord. Your spinal cord is a bundle of nerves that sends messages back and forth between your brain and the rest of your body. The narrowing can squeeze your spinal cord, causing irritation or damage to the spinal cord.  What is the cause?  The most common cause of lumbar stenosis is arthritis of the spine. Over time, the  disks that cushion the bones of the spine dry out, causing part of the disk to push out of place and press on the spinal cord. The bones respond to the stress by building extra bone called spurs. The bones and ligaments of the spine shift out of place, which makes the space for the spinal cord smaller. This puts pressure on the nerves that branch off the spinal cord.  Other possible causes are:    Other bone diseases    Cancer in the spine    Fracture of the spine    A very narrow spinal canal at birth  What are the symptoms?   Symptoms may include:    Back pain    Pain in one or both legs    Numbness or tingling in the legs or feet    Weakness when you stand or walk    Problems with bowel or bladder control  How is it diagnosed?   Your healthcare provider will ask about your symptoms and medical history and examine you. Tests of the spine may include:    X-rays    CT scan, which uses X-rays and a computer to show detailed pictures of the spinal cord and the tissues around it    MRI, which uses a strong magnetic field and radio waves to show detailed pictures of the spinal cord and tissues around it  How is it treated?  Depending on the severity of the lumbar stenosis, there are different treatment options. Physical therapy is one of the first choices for treatment. A program of exercises can help strengthen the muscles of your lower back. Your provider may prescribe:     Medicine    A shot of steroid medicine to lessen swelling and irritation in your back    A brace  If these treatments are not helpful, you may need surgery. The most common surgery is called a lumbar laminectomy. The surgery removes tissue that is causing the narrowing. This relieves pressure on the nerves.  How can I take care of myself?   Follow the full course of treatment prescribed by your healthcare provider. In addition:    Try sleeping on a firmer mattress.    Put an ice pack, gel pack, or package of frozen vegetables, wrapped in a cloth on  the painful area every 3 to 4 hours for up to 20 minutes at a time.    Take an anti-inflammatory medicine, such as ibuprofen, or other medicine as directed by your healthcare provider. Nonsteroidal anti-inflammatory medicines (NSAIDs), such as ibuprofen, may cause stomach bleeding and other problems. These risks increase with age. Read the label and take as directed. Unless recommended by your healthcare provider, you should not take this medicine for more than 10 days.    Put moist heat on the sore area for 10 to 15 minutes before you do warm-up and stretching exercises. Moist heat may help relax your muscles. Moist heat includes heat patches or moist heating pads that you can buy at most drugstores, a warm wet washcloth, or a hot shower. Put a hot water bottle or electric heating pad on your back. Cover the hot water bottle with a towel or set the heating pad on low. To prevent burns to your skin, follow directions on the package and do not lie on any type of hot pad. Don t use heat if you have swelling.  Ask your healthcare provider:    How and when you will get your test results    How long it will take to recover from this condition    If there are activities you should avoid and when you can return to your normal activities    How to take care of yourself at home    What symptoms or problems you should watch for and what to do if you have them  Make sure you know when you should come back for a checkup. Keep all appointments for provider visits or tests.  How can I help prevent lumbar stenosis?   Here are some of the things you can do so there is less strain on your back:    Keep your abdominal and back muscles strong. Get some exercise every day and include stretching and warm-up exercises suggested by your provider or physical therapist. Practice good posture.    Stand with your head up, shoulders straight, chest forward, weight balanced evenly on both feet, and pelvis tucked in.    Whenever you sit, sit in  a straight-backed chair and hold your spine against the back of the chair.    Use a footrest for one foot when you stand or sit in one spot for a long time. This keeps your back straight.    Protect your back.    When you need to move a heavy object, don't face the object and push with your arms. Turn around and use your back to push backwards so the strain is taken by your legs.    When you lift a heavy object, bend your knees and hips and keep your back straight. If you do a lot of heavy lifting, wear a belt designed to support your back. Avoid lifting heavy objects higher than your waist.    Carry packages close to your body, with your arms bent.    Lie on your side with your knees bent when you sleep or rest. It may help to put a pillow between your knees. Put a pillow under your knees when you sleep on your back. You may need to avoid sleeping on your stomach.    Lose weight if you are overweight. This takes pressure off your spine.  Developed by [employer].  Adult Advisor 2016.3 published by [employer].  Last modified: 2016-07-15  Last reviewed: 2016-07-15  This content is reviewed periodically and is subject to change as new health information becomes available. The information is intended to inform and educate and is not a replacement for medical evaluation, advice, diagnosis or treatment by a healthcare professional.  References   Adult Advisor 2016.3 Index    Copyright   2016 [employer], a division of [employer] All rights reserved.

## 2018-01-04 NOTE — NURSING NOTE
Patient Information     Patient Name MRN Sex Michael Pope [id number] Male 1935      Nursing Note by Yeison Albarran LPN at 2017  3:00 PM     Author:  Yeison Albarran LPN Service:  (none) Author Type:  NURS- Licensed Practical Nurse     Filed:  2017  2:44 PM Encounter Date:  2017 Status:  Signed     :  Yeison Albarran LPN (NURS- Licensed Practical Nurse)            Patient presents to the clinic for his 6 month follow up from the neurologist.  Yeison Albarran LPN ..............2017 2:44 PM

## 2018-01-04 NOTE — PROGRESS NOTES
Patient Information     Patient Name MRN Michael Camcaho [id number] Male 1935      Progress Notes by Suleman Travis MD at 2017 10:00 AM     Author:  Suleman Travis MD Service:  (none) Author Type:  Physician     Filed:  2017 10:50 AM Encounter Date:  2017 Status:  Signed     :  Suleman Travis MD (Physician)            Type of Visit  Established    Chief Complaint  Clot retention    HPI  Mr. Martinez is a 81 y.o. male on warfarin who follows up with recurrent clot retention.  He underwent a work up almost 2 years ago which was negative.  He has a history of clot retention requiring hospitalization and clot evac as well.  Also a history of Greenlight PVP in .    He had a repeat experience.  Sudden onset of painless gross hematuria about 5 days ago.  He denies fevers or chills.  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  No dysuria.      Review of Systems  I reviewed the ROS the patient today.    Nursing Notes:   Lucille Collins  2017  9:59 AM  Signed  Here for Hematuria one time.  Review of Systems:    Weight loss:    No     Recent fever/chills:  No   Night sweats:   No  Current skin rash:  No   Recent hair loss:  Yes  Heat intolerance:  No   Cold intolerance:  No  Chest pain:   No   Palpitations:   No  Shortness of breath:  No   Wheezing:   No  Constipation:    No   Diarrhea:   No   Nausea:   No   Vomiting:   No   Kidney/side pain:  No   Back pain:   Yes  Frequent headaches:  No   Dizziness:     Yes  Leg swelling:   No   Calf pain:    Yes    Lucille Collins LPN  2017  9:55 AM            Family History  Family History       Problem   Relation Age of Onset     Cancer-breast  Sister      Otherwise healthy       Other  Brother      BPH otherwise healthy at 72       Arthritis  Brother      Osteoarthritis, otherwise healthy at 66       Cancer-breast  Sister               Physical Exam  Vitals:     17 0953   BP: 130/80   Resp: 14   Weight: 81.2 kg (179 lb)    Constitutional: NAD, WDWN.  Cardiovascular: Regular rate.  Pulmonary/Chest: Respirations are even and non-labored bilaterally.  Abdominal: Soft. No distension, tenderness, masses or guarding. No CVA tenderness.  Extremities: PEGGY x 4, Warm. No clubbing.  No cyanosis.    Skin: Pink, warm and dry.  No rashes noted.  Genitourinary: nonpalpable bladder    Labs  Results for NUBIA GARRISON (MRN [id number]) as of 10/30/2015 09:02   8/24/2012 11:10 9/4/2012 08:50 4/15/2013 13:55 10/15/2013 09:30   PSA TOTAL (DIAGNOSTIC) 9.26 (H) 7.89 (H) 8.25 (H) 5.49 (H)     Results for NUBIA GARRISON (MRN [id number]) as of 4/6/2017 10:50   4/6/2017 09:50   COLOR                     Yellow   CLARITY                   Clear   SPECIFIC GRAVITY,URINE    1.015   PH,URINE                  5.0 (A)   UROBILINOGEN,QUALITATIVE  Normal   PROTEIN, URINE Negative   GLUCOSE, URINE Negative   KETONES,URINE             Negative   BILIRUBIN,URINE           Negative   OCCULT BLOOD,URINE        Small (A)   NITRITE                   Negative   LEUKOCYTE ESTERASE        Negative   RBC 0-2   WBC None Seen   BACTERIA None Seen   EPITHELIAL CELLS Few       Radiographic Studies  CT Urogram  11/3/2015  IMPRESSION:    1. There is a markedly enlarged prostate.  2. There is an asymmetry in the posterior left side of the prostate which is different in 2011 and the left ureter appears deviated as it goes into the bladder in this area. Possibility of a bladder mass cannot be excluded. This area measures 2.7 x 2.5 cm. Endoscopic correlation recommended.  3. Moderate diverticulosis without diverticulitis.    ^^^^^^^^^^^^^^^^^^^^^^^^^^^^^^^^^^^^^^^^    10/22/2015  CT PE  IMPRESSION: No acute pulmonary emboli to the subsegmental level.  Cardiomegaly. Advanced coronary disease. 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    Assessment  Mr. Garrison is a 81 y.o. male on warfarin who follows up with recurrent gross hematuria.  He has had recurrent issues with this and has  had a previous negative workup however this was over one year ago.  Discussed options of observation with the risk of missing a possible cancer versus repeating a modified workup (avoiding CT scan and IV contrast) with a renal ultrasound and cystoscopy.  He prefers this approach.    No UTI    Plan  PSA today  Cysto and TURNER at next visit

## 2018-01-04 NOTE — PROGRESS NOTES
Patient Information     Patient Name MRN Sex Michael Pope [id number] Male 1935      Progress Notes by Rosanne Mckenzie R.T. ([facility]) at 2017  9:05 AM     Author:  [facility] Service:  (none) Author Type:  [employer] - Registered Radiologic Technologist     Filed:  2017  9:05 AM Date of Service:  2017  9:05 AM Status:  Signed     :  Rosanne Mckenzie R.T. (ARRT) ([facility] - Registered Radiologic Technologist)            Falls Risk Criteria:    Age 65 and older or under age 4        Sensory deficits    Poor vision    Use of ambulatory aides    Impaired judgment    Unable to walk independently    Meets High Risk criteria for falls:  Yes               1.  Do you have dizziness or vertigo?    no                    2.  Do you need help standing or walking?   no                 3.  Have you fallen within the last 6 months?    no           4.  Has the patient been fasting?      yes       If any risks are marked Yes, the following interventions are utilized:    Do not leave patient unattended     Assist patient in the dressing room and bathroom    Have ambulatory aides available throughout procedure    Involve patient s family if available

## 2018-01-04 NOTE — NURSING NOTE
Patient Information     Patient Name MRN Sex Michael Pope [id number] Male 1935      Nursing Note by Alyssia Sahni RN at 2017 10:15 AM     Author:  Alyssia Sahni RN Service:  (none) Author Type:  NURS- Registered Nurse     Filed:  2017 10:49 AM Encounter Date:  2017 Status:  Signed     :  Alyssia Sahni RN (NURS- Registered Nurse)            Patient positioned in supine position, perineum area prepped with chlorhexidene Gluconate and patient draped per sterile technique. Per verbal order read back by Suleman Travis MD, Urojet 10mL 2% lidocaine jelly to be instilled into urethra.  Urojet- 10ml 2% Lidocaine jelly instilled into the urethra.    Urojet 2%  Lot#: [id number]  Expiration date:   : [employer]  NDC: [address]-4    [place] Protocol    A. Pre-procedure verification complete yes  1-relevant information / documentation available, reviewed and properly matched to the patient; 2-consent accurate and complete, 3-equipment and supplies available    B. Site marking complete N/A  Site marked if not in continuous attendance with patient    C. TIME OUT completed yes  Time Out was conducted just prior to starting procedure to verify the eight required elements: 1-patient identity, 2-consent accurate and complete, 3-position, 4-correct side/site marked (if applicable), 5-procedure, 6-relevant images / results properly labeled and displayed (if applicable), 7-antibiotics / irrigation fluids (if applicable), 8-safety precautions.    After procedure perineum area rinsed. Discharge instructions reviewed with patient. Patient verbalized understanding of discharge instructions and discharged ambulatory.

## 2018-01-04 NOTE — PROGRESS NOTES
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 male undergoing a flexible cystoscopy for the above mentioned indications.    Findings  Cystoscopic findings included a normal anterior urethra.    Previous resection scar apparent.  Prosthetic urethra fairly patent however there is a medium-sized piece of necrotic tissue that is adherent to the urothelium of the prostatic urethra causing partial obstruction.  The bladder appeared to be normal capacity.    There were no tumors, stones or foreign bodies.    The orifices were slit-shaped and in their normal location.    Procedure  The patient was placed in supine position and prepped and draped in sterile fashion with lidocaine jelly per urethra for anesthesia.    I passed a lubricated 14F flexible cystoscope through the penile urethra and into the bladder and the bladder was completely visualized.  The cystoscope was retroflexed and the bladder neck and prostate visualized.    The cystoscope was slowly withdrawn while visualizing the urethra and the procedure terminated.    The patient tolerated the procedure well.      Imaging  2017  PROCEDURE: US RENAL AND BLADDER COMPLETE  HISTORY: Hematuria.  TECHNIQUE:  Ultrasound of the kidneys and bladder was performed.  COMPARISON:  10/03/2015  MEASUREMENTS:  Right renal length: 11.1 cm  Left renal length: 10.0 cm  RENAL FINDINGS: The kidneys  are normal in size and echogenicity. A small dromedary hump is present on the left, a normal variant. There is no hydronephrosis. No shadowing stones are seen.  BLADDER: The bladder is only mildly distended, grossly unremarkable. The prostate is enlarged and irregular, with mass effect on the base the bladder.   IMPRESSION:    No evidence of hydronephrosis or shadowing stone. Prostatomegaly.    ^^^^^^^^^^^^^^^^^^^^^^^^^^^^^^^^^^^^^^^^^^^^^^  11/3/2015  CT Urogram  IMPRESSION:    1. There is a markedly enlarged prostate.  2. There is an asymmetry in the posterior left side of the prostate which is different in 2011 and the left ureter appears deviated as it goes into the bladder in this area. Possibility of a bladder mass cannot be excluded. This area measures 2.7 x 2.5 cm. Endoscopic correlation recommended.  3. Moderate diverticulosis without diverticulitis.    Plan  Reassurance- follow up if urinary symptoms worsen to resect the necrotic tissue in the prostate

## 2018-01-05 NOTE — PATIENT INSTRUCTIONS
Patient Information     Patient Name MRN Michael Camacho [id number] Male 1935      Patient Instructions by Naya Diallo RN at 2017  1:30 PM     Author:  Naya Diallo RN Service:  (none) Author Type:  NURS- Registered Nurse     Filed:  2017  1:18 PM Encounter Date:  2017 Status:  Signed     :  Naya Diallo RN (NURS- Registered Nurse)            May 2017 Details    Sun Mon Tue Wed Thu Fri Sat      1               2               3               4               5               6                 7               8               9               10               11               12               13                 14               15               16               17               18      5 mg   See details      19      7.5 mg         20      7.5 mg           21      7.5 mg         22      7.5 mg         23      7.5 mg         24      7.5 mg         25      5 mg         26      7.5 mg         27      7.5 mg           28      7.5 mg         29      7.5 mg         30      7.5 mg         31      7.5 mg             Date Details    This INR check               How to take your warfarin dose     To take:  5 mg Take one of the 5 mg tablets.    To take:  7.5 mg Take one of the 7.5 mg tablets.           2017 Details    Sun Mon Tue Wed Thu Fri Sat         1      5 mg         2      7.5 mg         3      7.5 mg           4      7.5 mg         5      7.5 mg         6      7.5 mg         7      7.5 mg         8      5 mg         9               10                 11               12               13               14               15               16               17                 18               19               20               21               22               23               24                 25               26               27               28               29               30                 Date Details   No additional details    Date of next INR:  2017          How to take your warfarin dose     To take:  5 mg Take one of the 5 mg tablets.    To take:  7.5 mg Take one of the 7.5 mg tablets.             Description          Decrease dose and recheck in 3 weeks.  ..............Naya Diallo RN.............  5/18/2017    1:17 PM                  Anticoagulation Summary as of 5/18/2017     INR goal 2.0-3.0    Today's INR 3.3    Next INR check 6/8/2017          Call your Anticoagulation Clinic at Dept: [phone]   if:   1. Any medications are started, stopped, or there is a change in dose.  2. You experience any bleeding that is not easily stopped or if it is recurrent.  3. You notice an increase in bruising or any bruising that does not heal.  4. You are scheduled for surgery, colonoscopy, dental extraction or any other procedure where you may need to stop your Coumadin (warfarin).

## 2018-01-05 NOTE — TELEPHONE ENCOUNTER
Patient Information     Patient Name MRN Sex Michael Pope [id number] Male 1935      Telephone Encounter by Naya Diallo RN at 5/10/2017 11:03 AM     Author:  Naya Diallo RN Service:  (none) Author Type:  NURS- Registered Nurse     Filed:  5/10/2017 11:04 AM Encounter Date:  2017 Status:  Signed     :  Naya Diallo RN (NURS- Registered Nurse)            Anticoagulant    Office visit in the past 12 months.    Last visit with SHAQ DEL CASTILLO was on: 2016 in [employer] [facility]  Next visit with SHAQ DEL CASTILLO is on: No future appointment listed with this provider  Next visit with Family Practice is on: No future appointment listed in this department    Lab tests:  PT/INR at least monthly    INR (no units)    Date Value   2017 2.3 (H)     HEMOGLOBIN                (g/dL)    Date Value   2017 14.9         Prescription refilled per RN Medication Refill Policy.................... Naya Diallo RN ....................  5/10/2017   11:03 AM

## 2018-01-23 ENCOUNTER — DOCUMENTATION ONLY (OUTPATIENT)
Dept: FAMILY MEDICINE | Facility: OTHER | Age: 83
End: 2018-01-23

## 2018-01-23 PROBLEM — M19.041 PRIMARY OSTEOARTHRITIS OF RIGHT HAND: Status: ACTIVE | Noted: 2017-04-25

## 2018-01-23 PROBLEM — E78.5 HYPERLIPIDEMIA: Status: ACTIVE | Noted: 2018-01-23

## 2018-01-23 PROBLEM — M51.9 LUMBAR DISC DISEASE: Status: ACTIVE | Noted: 2017-04-25

## 2018-01-23 PROBLEM — M48.061 SPINAL STENOSIS, LUMBAR: Status: ACTIVE | Noted: 2017-04-25

## 2018-01-23 PROBLEM — M25.50 PAIN IN JOINT: Status: ACTIVE | Noted: 2018-01-23

## 2018-01-23 PROBLEM — G61.0 GUILLAIN BARRÉ SYNDROME (H): Status: ACTIVE | Noted: 2018-01-23

## 2018-01-23 RX ORDER — WARFARIN SODIUM 7.5 MG/1
TABLET ORAL
COMMUNITY
Start: 2017-11-30 | End: 2018-09-27

## 2018-01-23 RX ORDER — PRAVASTATIN SODIUM 20 MG
20 TABLET ORAL AT BEDTIME
COMMUNITY
Start: 2017-08-14 | End: 2018-06-21

## 2018-01-23 RX ORDER — METOPROLOL SUCCINATE 25 MG/1
12.5 TABLET, EXTENDED RELEASE ORAL DAILY
COMMUNITY
Start: 2017-08-24 | End: 2018-08-31

## 2018-01-23 RX ORDER — WARFARIN SODIUM 5 MG/1
TABLET ORAL
COMMUNITY
Start: 2017-07-19 | End: 2018-09-27

## 2018-01-25 VITALS
BODY MASS INDEX: 25.6 KG/M2 | WEIGHT: 176 LBS | DIASTOLIC BLOOD PRESSURE: 80 MMHG | HEART RATE: 58 BPM | WEIGHT: 178.8 LBS | HEIGHT: 70 IN | SYSTOLIC BLOOD PRESSURE: 122 MMHG | TEMPERATURE: 97.7 F | HEART RATE: 72 BPM | HEIGHT: 70 IN | BODY MASS INDEX: 25.2 KG/M2 | OXYGEN SATURATION: 98 % | RESPIRATION RATE: 12 BRPM

## 2018-01-25 VITALS
RESPIRATION RATE: 14 BRPM | BODY MASS INDEX: 25.68 KG/M2 | BODY MASS INDEX: 25.6 KG/M2 | DIASTOLIC BLOOD PRESSURE: 70 MMHG | DIASTOLIC BLOOD PRESSURE: 80 MMHG | WEIGHT: 179 LBS | HEIGHT: 70 IN | SYSTOLIC BLOOD PRESSURE: 120 MMHG | SYSTOLIC BLOOD PRESSURE: 130 MMHG | HEART RATE: 76 BPM | WEIGHT: 178.8 LBS

## 2018-01-29 ENCOUNTER — COMMUNICATION - GICH (OUTPATIENT)
Dept: FAMILY MEDICINE | Facility: OTHER | Age: 83
End: 2018-01-29

## 2018-01-29 DIAGNOSIS — Z86.711 PERSONAL HISTORY OF PULMONARY EMBOLISM: ICD-10-CM

## 2018-01-29 DIAGNOSIS — Z79.01 LONG TERM CURRENT USE OF ANTICOAGULANT: ICD-10-CM

## 2018-02-11 PROBLEM — I26.99 OTHER PULMONARY EMBOLISM WITHOUT ACUTE COR PULMONALE: Status: ACTIVE | Noted: 2018-02-11

## 2018-02-11 PROBLEM — Z79.01 LONG TERM (CURRENT) USE OF ANTICOAGULANTS: Status: ACTIVE | Noted: 2018-02-11

## 2018-02-13 NOTE — TELEPHONE ENCOUNTER
Patient Information     Patient Name MRN Sex Michael Pope [id number] Male 1935      Telephone Encounter by Raya Trujillo RN at 2018  1:55 PM     Author:  Raya Trujillo RN Service:  (none) Author Type:  NURS- Registered Nurse     Filed:  2018  1:58 PM Encounter Date:  2018 Status:  Signed     :  Raya Trujillo RN (NURS- Registered Nurse)            Anticoagulant    Office visit in the past 12 months.    Last visit with SHAQ DEL CASTILLO was on: 2017 in [facility]  Next visit with SHAQ DEL CASTILLO is on: No future appointment listed with this provider  Next visit with Family Practice is on: No future appointment listed in this department    Lab tests:  PT/INR at least monthly    INR (no units)    Date Value   10/09/2017 2.5 (H)     HEMOGLOBIN                (g/dL)    Date Value   2017 15.4     [place], checks INR where he is wintering. Will return to [facility] in the spring.  Prescription refilled per RN Medication Refill Policy.................... RAYA TRUJILLO RN ....................  2018   1:56 PM

## 2018-04-03 ENCOUNTER — ANTICOAGULATION THERAPY VISIT (OUTPATIENT)
Dept: ANTICOAGULATION | Facility: OTHER | Age: 83
End: 2018-04-03
Attending: PHYSICAL MEDICINE & REHABILITATION
Payer: COMMERCIAL

## 2018-04-03 DIAGNOSIS — I26.99 OTHER PULMONARY EMBOLISM WITHOUT ACUTE COR PULMONALE (H): Primary | ICD-10-CM

## 2018-04-03 DIAGNOSIS — I26.09 OTHER PULMONARY EMBOLISM WITH ACUTE COR PULMONALE, UNSPECIFIED CHRONICITY (H): ICD-10-CM

## 2018-04-03 LAB — INR POINT OF CARE: 2.3 (ref 2–3)

## 2018-04-03 PROCEDURE — 36416 COLLJ CAPILLARY BLOOD SPEC: CPT | Mod: ZL

## 2018-04-03 PROCEDURE — 85610 PROTHROMBIN TIME: CPT | Mod: QW,ZL

## 2018-04-03 NOTE — MR AVS SNAPSHOT
Michael Martinez   4/3/2018 9:30 AM   Anticoagulation Therapy Visit    Description:  82 year old male   Provider:  [facility]   Department:  [facility]           INR as of 4/3/2018     Today's INR 2.3      Anticoagulation Summary as of 4/3/2018     INR goal 2.0-3.0   Today's INR 2.3   Full instructions 7.5 mg on Mon, Wed, Fri; 5 mg all other days   Next INR check 5/1/2018    Indications   Other pulmonary embolism without acute cor pulmonale [I26.99]  Long term (current) use of anticoagulants [Z79.01]         Description     Continue same Warfarin dose and recheck in 1 month.  Naya Diallo RN   4/3/2018    9:34 AM        April 2018 Details    Sun Mon Tue Wed Thu Fri Sat     1               2               3      5 mg   See details      4      7.5 mg         5      5 mg         6      7.5 mg         7      5 mg           8      5 mg         9      7.5 mg         10      5 mg         11      7.5 mg         12      5 mg         13      7.5 mg         14      5 mg           15      5 mg         16      7.5 mg         17      5 mg         18      7.5 mg         19      5 mg         20      7.5 mg         21      5 mg           22      5 mg         23      7.5 mg         24      5 mg         25      7.5 mg         26      5 mg         27      7.5 mg         28      5 mg           29      5 mg         30      7.5 mg               Date Details   04/03 This INR check               How to take your warfarin dose     To take:  5 mg Take 1 of the 5 mg tablets.    To take:  7.5 mg Take 1 of the 7.5 mg tablets.           May 2018 Details    Sun Mon Tue Wed Thu Fri Sat       1            2               3               4               5                 6               7               8               9               10               11               12                 13               14               15               16               17               18               19                 20               21                22               23               24               25               26                 27               28               29               30               31                  Date Details   No additional details    Date of next INR:  5/1/2018         How to take your warfarin dose     To take:  5 mg Take 1 of the 5 mg tablets.

## 2018-04-03 NOTE — PROGRESS NOTES
ANTICOAGULATION FOLLOW-UP CLINIC VISIT    Patient Name:  Michael Martinez  Date:  4/3/2018  Contact Type:  Face to Face    SUBJECTIVE:     Patient Findings     Positives Other complaints (BACK FROM [place] TAKING A DIFFERNET DOSE THAN BEFORE HE LEFT.)           OBJECTIVE    INR Protime   Date Value Ref Range Status   04/03/2018 2.3 2.0 - 3.0 Final       ASSESSMENT / PLAN  INR assessment THER    Recheck INR In: 4 WEEKS    INR Location Clinic      Anticoagulation Summary as of 4/3/2018     INR goal 2.0-3.0   Today's INR 2.3   Maintenance plan 7.5 mg (7.5 mg x 1) on Mon, Wed, Fri; 5 mg (5 mg x 1) all other days   Full instructions 7.5 mg on Mon, Wed, Fri; 5 mg all other days   Weekly total 42.5 mg   Plan last modified Naya Diallo, SAAD (4/3/2018)   Next INR check 5/1/2018   Target end date Indefinite    Indications   Other pulmonary embolism without acute cor pulmonale [I26.99]  Long term (current) use of anticoagulants [Z79.01]         Anticoagulation Episode Summary     INR check location     Preferred lab     Send INR reminders to  INR    Comments       Anticoagulation Care Providers     Provider Role Specialty Phone number    Dc Vargas MD [facility] [phone]            See the Encounter Report to view Anticoagulation Flowsheet and Dosing Calendar (Go to Encounters tab in chart review, and find the Anticoagulation Therapy Visit)        Naya Diallo RN

## 2018-05-01 ENCOUNTER — ANTICOAGULATION THERAPY VISIT (OUTPATIENT)
Dept: ANTICOAGULATION | Facility: OTHER | Age: 83
End: 2018-05-01
Attending: FAMILY MEDICINE
Payer: COMMERCIAL

## 2018-05-01 LAB — INR POINT OF CARE: 1.9 (ref 2–3)

## 2018-05-01 PROCEDURE — 85610 PROTHROMBIN TIME: CPT | Mod: QW

## 2018-05-01 PROCEDURE — 36416 COLLJ CAPILLARY BLOOD SPEC: CPT

## 2018-05-01 PROCEDURE — 99207 ZZC NO CHARGE NURSE ONLY: CPT

## 2018-05-01 NOTE — PROGRESS NOTES
ANTICOAGULATION FOLLOW-UP CLINIC VISIT    Patient Name:  Michael Martinez  Date:  5/1/2018  Contact Type:  Face to Face    SUBJECTIVE:     Patient Findings     Positives No Problem Findings           OBJECTIVE    INR Protime   Date Value Ref Range Status   05/01/2018 1.9 (A) 2.0 - 3.0 Final       ASSESSMENT / PLAN  INR assessment SUB    Recheck INR In: 4 WEEKS    INR Location Clinic      Anticoagulation Summary as of 5/1/2018     INR goal 2.0-3.0   Today's INR 1.9!   Maintenance plan 5 mg (5 mg x 1) on Mon, Wed, Fri; 7.5 mg (7.5 mg x 1) all other days   Full instructions 5 mg on Mon, Wed, Fri; 7.5 mg all other days   Weekly total 45 mg   Plan last modified Naya Diallo, SAAD (5/1/2018)   Next INR check 5/29/2018   Target end date Indefinite    Indications   Other pulmonary embolism without acute cor pulmonale [I26.99]  Long term (current) use of anticoagulants [Z79.01]         Anticoagulation Episode Summary     INR check location     Preferred lab     Send INR reminders to  INR    Comments       Anticoagulation Care Providers     Provider Role Specialty Phone number    Dc Vargas MD [facility] [phone]            See the Encounter Report to view Anticoagulation Flowsheet and Dosing Calendar (Go to Encounters tab in chart review, and find the Anticoagulation Therapy Visit)        Naya Diallo, RN

## 2018-05-01 NOTE — MR AVS SNAPSHOT
Michael Martinez   5/1/2018 8:15 AM   Anticoagulation Therapy Visit    Description:  82 year old male   Provider:  [facility]   Department:  [facility]           INR as of 5/1/2018     Today's INR 1.9!      Anticoagulation Summary as of 5/1/2018     INR goal 2.0-3.0   Today's INR 1.9!   Full instructions 5 mg on Mon, Wed, Fri; 7.5 mg all other days   Next INR check 5/29/2018    Indications   Other pulmonary embolism without acute cor pulmonale [I26.99]  Long term (current) use of anticoagulants [Z79.01]         Description     Increase dose and recheck in  4 weeks. .............Naya Diallo RN.......... 5/1/2018  8:26 AM        May 2018 Details    Sun Mon Tue Wed Thu Fri Sat       1      7.5 mg   See details      2      5 mg         3      7.5 mg         4      5 mg         5      7.5 mg           6      7.5 mg         7      5 mg         8      7.5 mg         9      5 mg         10      7.5 mg         11      5 mg         12      7.5 mg           13      7.5 mg         14      5 mg         15      7.5 mg         16      5 mg         17      7.5 mg         18      5 mg         19      7.5 mg           20      7.5 mg         21      5 mg         22      7.5 mg         23      5 mg         24      7.5 mg         25      5 mg         26      7.5 mg           27      7.5 mg         28      5 mg         29            30               31                  Date Details   05/01 This INR check       Date of next INR:  5/29/2018         How to take your warfarin dose     To take:  5 mg Take 1 of the 5 mg tablets.    To take:  7.5 mg Take 1 of the 7.5 mg tablets.

## 2018-05-30 ENCOUNTER — ANTICOAGULATION THERAPY VISIT (OUTPATIENT)
Dept: ANTICOAGULATION | Facility: OTHER | Age: 83
End: 2018-05-30
Attending: FAMILY MEDICINE
Payer: COMMERCIAL

## 2018-05-30 LAB — INR POINT OF CARE: 2.5 (ref 0.86–1.14)

## 2018-05-30 PROCEDURE — 85610 PROTHROMBIN TIME: CPT | Mod: QW

## 2018-05-30 PROCEDURE — 99207 ZZC NO CHARGE NURSE ONLY: CPT

## 2018-05-30 PROCEDURE — 36416 COLLJ CAPILLARY BLOOD SPEC: CPT

## 2018-05-30 NOTE — MR AVS SNAPSHOT
Michael Martinez   5/30/2018 4:00 PM   Anticoagulation Therapy Visit    Description:  82 year old male   Provider:  [facility]   Department:  [facility]           INR as of 5/30/2018     Today's INR 2.5      Anticoagulation Summary as of 5/30/2018     INR goal 2.0-3.0   Today's INR 2.5   Full warfarin instructions 5 mg on Mon, Wed, Fri; 7.5 mg all other days   Next INR check 6/27/2018    Indications   Other pulmonary embolism without acute cor pulmonale [I26.99]  Long term (current) use of anticoagulants [Z79.01]         Description     Continue same Warfarin dose and recheck in 1 month.  Naya Diallo RN   5/30/2018    3:44 PM        Your next Anticoagulation Clinic appointment(s)     May 30, 2018  4:00 PM CDT   Anticoagulation Visit with [facility]   [facility]    [address]  [facility] [address]   [phone]              May 2018 Details    Sun Mon Tue Wed Thu Fri Sat       1               2               3               4               5                 6               7               8               9               10               11               12                 13               14               15               16               17               18               19                 20               21               22               23               24               25               26                 27               28               29               30      5 mg   See details      31      7.5 mg            Date Details   05/30 This INR check               How to take your warfarin dose     To take:  5 mg Take 1 of the 5 mg tablets.    To take:  7.5 mg Take 1 of the 7.5 mg tablets.           June 2018 Details    Sun Mon Tue Wed Thu Fri Sat          1      5 mg         2      7.5 mg           3      7.5 mg         4      5 mg         5      7.5 mg         6      5 mg         7      7.5 mg         8      5 mg         9       7.5 mg           10      7.5 mg         11      5 mg         12      7.5 mg         13      5 mg         14      7.5 mg         15      5 mg         16      7.5 mg           17      7.5 mg         18      5 mg         19      7.5 mg         20      5 mg         21      7.5 mg         22      5 mg         23      7.5 mg           24      7.5 mg         25      5 mg         26      7.5 mg         27            28               29               30                Date Details   No additional details    Date of next INR:  6/27/2018         How to take your warfarin dose     To take:  5 mg Take 1 of the 5 mg tablets.    To take:  7.5 mg Take 1 of the 7.5 mg tablets.

## 2018-05-30 NOTE — PROGRESS NOTES
ANTICOAGULATION FOLLOW-UP CLINIC VISIT    Patient Name:  Michael Martinez  Date:  5/30/2018  Contact Type:  Face to Face    SUBJECTIVE:     Patient Findings     Positives No Problem Findings           OBJECTIVE    INR Protime   Date Value Ref Range Status   05/30/2018 2.5 (A) 0.86 - 1.14 Final       ASSESSMENT / PLAN  INR assessment THER    Recheck INR In: 4 WEEKS    INR Location Clinic      Anticoagulation Summary as of 5/30/2018     INR goal 2.0-3.0   Today's INR 2.5   Warfarin maintenance plan 5 mg (5 mg x 1) on Mon, Wed, Fri; 7.5 mg (7.5 mg x 1) all other days   Full warfarin instructions 5 mg on Mon, Wed, Fri; 7.5 mg all other days   Weekly warfarin total 45 mg   No change documented Naya Diallo RN   Plan last modified Naya Diallo RN (5/1/2018)   Next INR check 6/27/2018   Target end date Indefinite    Indications   Other pulmonary embolism without acute cor pulmonale [I26.99]  Long term (current) use of anticoagulants [Z79.01]         Anticoagulation Episode Summary     INR check location     Preferred lab     Send INR reminders to  INR    Comments       Anticoagulation Care Providers     Provider Role Specialty Phone number    Dc Vargas MD [facility] [phone]            See the Encounter Report to view Anticoagulation Flowsheet and Dosing Calendar (Go to Encounters tab in chart review, and find the Anticoagulation Therapy Visit)        Naya Diallo RN

## 2018-06-04 ENCOUNTER — OFFICE VISIT (OUTPATIENT)
Dept: PEDIATRICS | Facility: OTHER | Age: 83
End: 2018-06-04
Attending: INTERNAL MEDICINE
Payer: COMMERCIAL

## 2018-06-04 VITALS
HEART RATE: 62 BPM | TEMPERATURE: 96.2 F | DIASTOLIC BLOOD PRESSURE: 82 MMHG | SYSTOLIC BLOOD PRESSURE: 134 MMHG | HEIGHT: 70 IN | BODY MASS INDEX: 24.81 KG/M2 | WEIGHT: 173.3 LBS

## 2018-06-04 DIAGNOSIS — Z86.711 HISTORY OF PULMONARY EMBOLISM: ICD-10-CM

## 2018-06-04 DIAGNOSIS — Z86.718 PERSONAL HISTORY OF DVT (DEEP VEIN THROMBOSIS): ICD-10-CM

## 2018-06-04 DIAGNOSIS — Z86.69 HISTORY OF GUILLAIN-BARRE SYNDROME: ICD-10-CM

## 2018-06-04 DIAGNOSIS — K21.9 GASTROESOPHAGEAL REFLUX DISEASE, ESOPHAGITIS PRESENCE NOT SPECIFIED: ICD-10-CM

## 2018-06-04 DIAGNOSIS — M54.81 OCCIPITAL NEURALGIA OF LEFT SIDE: Primary | ICD-10-CM

## 2018-06-04 DIAGNOSIS — M17.12 PRIMARY OSTEOARTHRITIS OF LEFT KNEE: ICD-10-CM

## 2018-06-04 PROBLEM — G61.0 GUILLAIN BARRÉ SYNDROME (H): Status: RESOLVED | Noted: 2018-01-23 | Resolved: 2018-06-04

## 2018-06-04 PROBLEM — Z79.01 LONG TERM (CURRENT) USE OF ANTICOAGULANTS: Status: RESOLVED | Noted: 2018-02-11 | Resolved: 2018-06-04

## 2018-06-04 PROBLEM — M25.50 PAIN IN JOINT: Status: RESOLVED | Noted: 2018-01-23 | Resolved: 2018-06-04

## 2018-06-04 PROCEDURE — 99214 OFFICE O/P EST MOD 30 MIN: CPT | Performed by: INTERNAL MEDICINE

## 2018-06-04 ASSESSMENT — ANXIETY QUESTIONNAIRES
5. BEING SO RESTLESS THAT IT IS HARD TO SIT STILL: NOT AT ALL
IF YOU CHECKED OFF ANY PROBLEMS ON THIS QUESTIONNAIRE, HOW DIFFICULT HAVE THESE PROBLEMS MADE IT FOR YOU TO DO YOUR WORK, TAKE CARE OF THINGS AT HOME, OR GET ALONG WITH OTHER PEOPLE: NOT DIFFICULT AT ALL
7. FEELING AFRAID AS IF SOMETHING AWFUL MIGHT HAPPEN: NOT AT ALL
1. FEELING NERVOUS, ANXIOUS, OR ON EDGE: NEARLY EVERY DAY
3. WORRYING TOO MUCH ABOUT DIFFERENT THINGS: NEARLY EVERY DAY
GAD7 TOTAL SCORE: 9
6. BECOMING EASILY ANNOYED OR IRRITABLE: NOT AT ALL
2. NOT BEING ABLE TO STOP OR CONTROL WORRYING: NEARLY EVERY DAY

## 2018-06-04 ASSESSMENT — PAIN SCALES - GENERAL: PAINLEVEL: SEVERE PAIN (6)

## 2018-06-04 ASSESSMENT — PATIENT HEALTH QUESTIONNAIRE - PHQ9: 5. POOR APPETITE OR OVEREATING: NOT AT ALL

## 2018-06-04 NOTE — PATIENT INSTRUCTIONS
-- Schedule PT   -- Heat/ice   -- Try topical eg aspercreme, biofreeze, etc   -- Save ibuprofen for rare use   -- Okay to use acetaminophen   -- See if you can stop omeprazole   -- Okay to switch back to ranitidine as needed

## 2018-06-04 NOTE — PROGRESS NOTES
"Subjective  Michael Martinez is a 82 year old male who presents for multiple concerns.  He feels like he was going downhill for 3 weeks.  He lost some weight.  Now he starting to gain it back.  He thinks things are improving now.  He has been suffering from pain in the neck.  Present more on the left.  Associated with headache radiating up the left side of the head and a left-sided earache.  Describes the pain as sharp.  Occasionally sounds \"like a steam engine.\"  He feels like his balance is off a little bit.  He has a history of Guillain-Barré syndrome and is basically back to normal except for right hand pain, improved with wearing a brace.  He has been having some pain behind the left knee.  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  No swelling of the knee.  That has also improved with rest.  He has been having some heartburn.  [place]  She recommended starting omeprazole and stopping ranitidine.  He has now been able to wean himself down to omeprazole every other day.  He is wondering if he should continue doing this or not.  He has had no heartburn.    Problem List/PMH: reviewed in EMR, and made relevant updates today.  Medications: reviewed in EMR, and made relevant updates today.  Allergies: reviewed in EMR, and made relevant updates today.    Social Hx:  Social History   Substance Use Topics     Smoking status: Former Smoker     Packs/day: 0.90     Years: 9.00     Types: Cigarettes     Smokeless tobacco: Never Used     Alcohol use 0.0 oz/week      Comment: Alcoholic Drinks/day: Alcoholic Drinks/day: 1 beer every 2 weeks     Social History     Social History Narrative     February 23, 2007 (sudden cardiac death). Three children, retired in 1998 as a .  Spends his donis in [place]     I reviewed social history and made relevant updates today.    Family Hx:   Family History   Problem Relation Age of Onset     " "HEART DISEASE Mother      Heart Disease     Other - See Comments Father      No Known Problems     Breast Cancer Sister      Cancer-breast,Otherwise healthy     Other - See Comments Brother      BPH otherwise healthy at 72     Arthritis Brother      Arthritis,Osteoarthritis, otherwise healthy at 66     Colon Cancer Brother      Cancer-colon     Breast Cancer Sister 59     Cancer-breast,       Objective  Vitals: reviewed in EMR.  /82 (BP Location: Right arm, Patient Position: Sitting, Cuff Size: Adult Large)  Pulse 62  Temp 96.2  F (35.7  C) (Tympanic)  Ht 5' 10\" (1.778 m)  Wt 173 lb 4.8 oz (78.6 kg)  BMI 24.87 kg/m2    Gen: Pleasant male, NAD.  HEENT: MMM, tympanic membranes are normal.  Neck: Supple, range of motion.  Pain elicited with looking downward.  No midline posterior tenderness to palpation.  Mild tenderness over the left occiput.  No crepitus.  Pulm: Breathing easily  Neuro: Grossly intact  Skin: No concerning lesions.  Psychiatric: Normal affect and insight. Does not appear anxious or depressed.  MSK: No left knee joint line tenderness to palpation.  Posterior calf tenderness.    Results for orders placed or performed in visit on 18   INR point of care   Result Value Ref Range    INR Protime 2.5 (A) 0.86 - 1.14     Assessment    ICD-10-CM    1. Occipital neuralgia of left side M54.81 PHYSICAL THERAPY REFERRAL   2. History of Guillain-Pierson syndrome Z86.69 PHYSICAL THERAPY REFERRAL   3. Gastroesophageal reflux disease, esophagitis presence not specified K21.9 ranitidine (ZANTAC) 150 MG tablet   4. Personal history of DVT (deep vein thrombosis) Z86.718    5. History of pulmonary embolism Z86.711    6. Primary osteoarthritis of left knee M17.12      Orders Placed This Encounter   Procedures     PHYSICAL THERAPY REFERRAL       Plan   -- Expected clinical course discussed   -- Medications and their side effects discussed  Patient Instructions    -- Schedule PT   -- Heat/ice   -- Try " topical eg aspercreme, biofreeze, etc   -- Save ibuprofen for rare use   -- Okay to use acetaminophen   -- See if you can stop omeprazole   -- Okay to switch back to ranitidine as needed      Return if symptoms worsen or fail to improve.    Signed, Isma Martinez MD  Internal Medicine & Pediatrics

## 2018-06-04 NOTE — NURSING NOTE
Patient presents to clinic for balance issues that have been going on the last 3 weeks.  Also has left shoulder pain that goes up into his head.  States he also has had a left ear ache on/off for the last few weeks.  Diane Erickson LPN ....................  6/4/2018   8:43 AM

## 2018-06-04 NOTE — MR AVS SNAPSHOT
After Visit Summary   6/4/2018    Michael Martinez    MRN: [id number]           Patient Information     Date Of Birth          1935        Visit Information        Provider Department      6/4/2018 8:45 AM Isma Martinez MD [facility]        Today's Diagnoses     Occipital neuralgia of left side    -  1    History of Guillain-[place] syndrome        Gastroesophageal reflux disease, esophagitis presence not specified        Personal history of DVT (deep vein thrombosis)        History of pulmonary embolism        Primary osteoarthritis of left knee          Care Instructions     -- Schedule PT   -- Heat/ice   -- Try topical eg aspercreme, biofreeze, etc   -- Save ibuprofen for rare use   -- Okay to use acetaminophen   -- See if you can stop omeprazole   -- Okay to switch back to ranitidine as needed          Follow-ups after your visit        Additional Services     PHYSICAL THERAPY REFERRAL                 Follow-up notes from your care team     Return if symptoms worsen or fail to improve.      Your next 10 appointments already scheduled     Jun 27, 2018 10:15 AM CDT   Anticoagulation Visit with  ANTI COAG 1   [facility] ([facility]    [address]  [place] [address] [phone]              Who to contact     If you have questions or need follow up information about today's clinic visit or your schedule please contact [facility] directly at [phone].  Normal or non-critical lab and imaging results will be communicated to you by MyChart, letter or phone within 4 business days after the clinic has received the results. If you do not hear from us within 7 days, please contact the clinic through MyChart or phone. If you have a critical or abnormal lab result, we will notify you by phone as soon as possible.  Submit refill requests through elmenus or call your pharmacy and they  "will forward the refill request to us. Please allow 3 business days for your refill to be completed.          Additional Information About Your Visit        TAKOharRiiid Information     myJambi lets you send messages to your doctor, view your test results, renew your prescriptions, schedule appointments and more. To sign up, go to [email or web address] . Click on \"Log in\" on the left side of the screen, which will take you to the Welcome page. Then click on \"Sign up Now\" on the right side of the page.     You will be asked to enter the access code listed below, as well as some personal information. Please follow the directions to create your username and password.     Your access code is: [email or web address]  Expires: 2018  9:11 AM     Your access code will  in 90 days. If you need help or a new code, please call your [facility] clinic or [phone].        Care EveryWhere ID     This is your Care EveryWhere ID. This could be used by other organizations to access your [facility] medical records  [id number]        Your Vitals Were     Pulse Temperature Height BMI (Body Mass Index)          62 96.2  F (35.7  C) (Tympanic) 5' 10\" (1.778 m) 24.87 kg/m2         Blood Pressure from Last 3 Encounters:   18 134/82   17 122/80   17 120/70    Weight from Last 3 Encounters:   18 173 lb 4.8 oz (78.6 kg)   17 176 lb (79.8 kg)   17 178 lb 12.8 oz (81.1 kg)              We Performed the Following     PHYSICAL THERAPY REFERRAL          Today's Medication Changes          These changes are accurate as of 18  9:11 AM.  If you have any questions, ask your nurse or doctor.               These medicines have changed or have updated prescriptions.        Dose/Directions    ranitidine 150 MG tablet   Commonly known as:  ZANTAC   This may have changed:    - when to take this  - reasons to take this   Used for:  Gastroesophageal reflux disease, esophagitis presence not specified   Changed by:  Juan, " Isma Reilly MD        Dose:  150 mg   Take 1 tablet (150 mg) by mouth 2 times daily as needed for heartburn   Quantity:  180 tablet   Refills:  4            Where to get your medicines      Some of these will need a paper prescription and others can be bought over the counter.  Ask your nurse if you have questions.     You don't need a prescription for these medications     ranitidine 150 MG tablet                Primary Care Provider Office Phone # Fax #    Dc LARSEN MD Alicia [phone]       [address] [facility] [address]        Equal Access to Services     [facility]: Hadii aad ku hadasho Soomaali, waaxda luqadaha, qaybta kaalmada adenicolasyanarda, alfredo jiang . So [facility] [phone].    ATENCIÓN: Si habla español, tiene a salinas disposición servicios gratuitos de asistencia lingüística. [facility] [phone].    We comply with applicable federal civil rights laws and [place] laws. We do not discriminate on the basis of race, color, national origin, age, disability, sex, sexual orientation, or gender identity.            Thank you!     Thank you for choosing [facility] [place] [facility]  for your care. Our goal is always to provide you with excellent care. Hearing back from our patients is one way we can continue to improve our services. Please take a few minutes to complete the written survey that you may receive in the mail after your visit with us. Thank you!             Your Updated Medication List - Protect others around you: Learn how to safely use, store and throw away your medicines at www.disposemymeds.org.          This list is accurate as of 6/4/18  9:11 AM.  Always use your most recent med list.                   Brand Name Dispense Instructions for use Diagnosis    aspirin 81 MG EC tablet      Take 81 mg by mouth        metoprolol succinate 25 MG 24 hr tablet    TOPROL-XL     Take 12.5 mg by mouth daily        omeprazole 20 MG CR capsule     priLOSEC     Take 20 mg by mouth Take every other day.        pravastatin 20 MG tablet    PRAVACHOL     Take 20 mg by mouth At Bedtime        ranitidine 150 MG tablet    ZANTAC    180 tablet    Take 1 tablet (150 mg) by mouth 2 times daily as needed for heartburn    Gastroesophageal reflux disease, esophagitis presence not specified       * warfarin 5 MG tablet    COUMADIN     Take 5 mg x 3 days and 7.5 mg x 4 days/week or as directed by [facility] clinic        * warfarin 7.5 MG tablet    COUMADIN     Take 5 mg x 3 days and 7.5 mg x 4 days/week or as directed by [facility] clinic        * Notice:  This list has 2 medication(s) that are the same as other medications prescribed for you. Read the directions carefully, and ask your doctor or other care provider to review them with you.

## 2018-06-05 ENCOUNTER — TRANSFERRED RECORDS (OUTPATIENT)
Dept: PEDIATRICS | Facility: OTHER | Age: 83
End: 2018-06-05

## 2018-06-05 ASSESSMENT — PATIENT HEALTH QUESTIONNAIRE - PHQ9: SUM OF ALL RESPONSES TO PHQ QUESTIONS 1-9: 9

## 2018-06-05 ASSESSMENT — ANXIETY QUESTIONNAIRES: GAD7 TOTAL SCORE: 9

## 2018-06-11 ENCOUNTER — HOSPITAL ENCOUNTER (OUTPATIENT)
Dept: PHYSICAL THERAPY | Facility: OTHER | Age: 83
Setting detail: THERAPIES SERIES
End: 2018-06-11
Attending: INTERNAL MEDICINE
Payer: COMMERCIAL

## 2018-06-11 PROCEDURE — 97110 THERAPEUTIC EXERCISES: CPT | Mod: GP

## 2018-06-11 PROCEDURE — 97140 MANUAL THERAPY 1/> REGIONS: CPT | Mod: GP

## 2018-06-11 PROCEDURE — G8981 BODY POS CURRENT STATUS: HCPCS | Mod: GP,CK

## 2018-06-11 PROCEDURE — G8982 BODY POS GOAL STATUS: HCPCS | Mod: GP,CI

## 2018-06-11 PROCEDURE — 97161 PT EVAL LOW COMPLEX 20 MIN: CPT | Mod: GP

## 2018-06-12 ENCOUNTER — HOSPITAL ENCOUNTER (EMERGENCY)
Facility: OTHER | Age: 83
Discharge: HOME | End: 2018-06-12
Attending: PHYSICIAN ASSISTANT | Admitting: PHYSICIAN ASSISTANT
Payer: COMMERCIAL

## 2018-06-12 ENCOUNTER — APPOINTMENT (OUTPATIENT)
Dept: ULTRASOUND IMAGING | Facility: OTHER | Age: 83
End: 2018-06-12
Attending: PHYSICIAN ASSISTANT
Payer: COMMERCIAL

## 2018-06-12 VITALS
BODY MASS INDEX: 24.34 KG/M2 | TEMPERATURE: 96.9 F | HEIGHT: 70 IN | HEART RATE: 65 BPM | DIASTOLIC BLOOD PRESSURE: 78 MMHG | WEIGHT: 170 LBS | OXYGEN SATURATION: 96 % | SYSTOLIC BLOOD PRESSURE: 155 MMHG | RESPIRATION RATE: 18 BRPM

## 2018-06-12 DIAGNOSIS — M25.562 ACUTE PAIN OF LEFT KNEE: ICD-10-CM

## 2018-06-12 DIAGNOSIS — M71.22 BAKER'S CYST OF KNEE, LEFT: ICD-10-CM

## 2018-06-12 LAB — INR PPP: 1.93 (ref 0–1.3)

## 2018-06-12 PROCEDURE — 85610 PROTHROMBIN TIME: CPT | Performed by: PHYSICIAN ASSISTANT

## 2018-06-12 PROCEDURE — 99284 EMERGENCY DEPT VISIT MOD MDM: CPT | Mod: 25 | Performed by: PHYSICIAN ASSISTANT

## 2018-06-12 PROCEDURE — 76882 US LMTD JT/FCL EVL NVASC XTR: CPT | Mod: LT

## 2018-06-12 PROCEDURE — 99284 EMERGENCY DEPT VISIT MOD MDM: CPT | Mod: Z6 | Performed by: PHYSICIAN ASSISTANT

## 2018-06-12 PROCEDURE — 36415 COLL VENOUS BLD VENIPUNCTURE: CPT | Performed by: PHYSICIAN ASSISTANT

## 2018-06-12 ASSESSMENT — ENCOUNTER SYMPTOMS
HEADACHES: 0
COLOR CHANGE: 0
NECK STIFFNESS: 0
CONFUSION: 0
DIFFICULTY URINATING: 0
EYE REDNESS: 0
ARTHRALGIAS: 0
FEVER: 0
ABDOMINAL PAIN: 0
SHORTNESS OF BREATH: 0

## 2018-06-12 NOTE — ED AVS SNAPSHOT
[facility]    [address]    [place] [address]    Phone:  [phone]    Fax:  [phone]                                       Michael Martinez   MRN: [id number]    Department:  [facility]   Date of Visit:  6/12/2018           Patient Information     Date Of Birth          1935        Your diagnoses for this visit were:     Baker's cyst of knee, left     Acute pain of left knee        You were seen by Edwin Martinez PA-C.      Follow-up Information     Schedule an appointment as soon as possible for a visit with Dc Vargas MD.    Specialty:  Family Practice    Why:  As needed, If symptoms worsen    Contact information:    [address]  [place] [id number] [phone]          Discharge Instructions         Arthralgia    Arthralgia is the term for pain in or around the joint. It is a symptom, not a disease. This pain may involve one or more joints. In some cases, the pain moves from joint to joint.  There are many causes for joint pain. These include:    Injury    Osteoarthritis (wearing out of the joint surface)    Gout (inflammation of the joint due to crystals in the joint fluid)    Infection inside the joint      Bursitis (inflammation of the fluid-filled sacs around the joint)    Autoimmune disorders such as rheumatoid arthritis or lupus    Tendonitis (inflammation of chords that attach muscle to bone)  Home care    Rest the involved joint(s) until your symptoms improve.     You may be prescribed pain medicine. If none is prescribed, you may use acetaminophen or ibuprofen to control pain and inflammation.  Follow-up care  Follow up with your healthcare provider or as advised.  When to seek medical advice  Contact your healthcare provider right away if any of the following occurs:    Pain, swelling, or redness of joint increases    Pain worsens or recurs after a period of improvement    Pain moves to other  joints    You cannot bear weight on the affected joint     You cannot move the affected joint    Joint appears deformed    New rash appears    Fever of 100.4 F (38 C) or higher, or as directed by your healthcare provider  Date Last Reviewed: 3/1/2017    [id number] [employer]. [address] [place] [address]. All rights reserved. This information is not intended as a substitute for professional medical care. Always follow your healthcare professional's instructions.          Discharge References/Attachments     BAKER'S CYST (POPLITEAL CYST), TREATMENT FOR (ENGLISH)    BAKER'S CYST (POPLITEAL CYST), UNDERSTANDING (ENGLISH)      Your next 10 appointments already scheduled     Jun 13, 2018  9:15 AM CDT   Treatment with Dontae NIMO Alvarengan   [facility] ([facility])    [address] [address]   [phone]            Jun 18, 2018  1:00 PM CDT   Treatment with Dontae NIMO Holly   [facility] ([facility])    [address] [address]   [phone]            Jun 20, 2018 11:15 AM CDT   Treatment with Dontae NIMO Alvarengan   [facility] ([facility])    [address] [address]   [phone]            Jun 27, 2018 10:15 AM CDT   Anticoagulation Visit with  ANTI COAG 1   [facility] ([facility])    [address]  [facility] [address]   [phone]              24 Hour Appointment Hotline     To schedule an appointment at [facility], please call [phone]. If you don't have a family doctor or clinic, we will help you find one. [facility] are conveniently located to serve the needs of you and your family.           Review of your medicines      Our records show that you are taking the medicines listed below. If these are incorrect, please call your family  doctor or clinic.        Dose / Directions Last dose taken    aspirin 81 MG EC tablet   Dose:  81 mg        Take 81 mg by mouth   Refills:  0        metoprolol succinate 25 MG 24 hr tablet   Commonly known as:  TOPROL-XL   Dose:  12.5 mg        Take 12.5 mg by mouth daily   Refills:  0        omeprazole 20 MG CR capsule   Commonly known as:  priLOSEC   Dose:  20 mg        Take 20 mg by mouth Take every other day.   Refills:  0        pravastatin 20 MG tablet   Commonly known as:  PRAVACHOL   Dose:  20 mg        Take 20 mg by mouth At Bedtime   Refills:  0        ranitidine 150 MG tablet   Commonly known as:  ZANTAC   Dose:  150 mg   Quantity:  180 tablet        Take 1 tablet (150 mg) by mouth 2 times daily as needed for heartburn   Refills:  4        * warfarin 5 MG tablet   Commonly known as:  COUMADIN        Take 5 mg x 3 days and 7.5 mg x 4 days/week or as directed by [facility] clinic   Refills:  0        * warfarin 7.5 MG tablet   Commonly known as:  COUMADIN        Take 5 mg x 3 days and 7.5 mg x 4 days/week or as directed by [facility] clinic   Refills:  0        * Notice:  This list has 2 medication(s) that are the same as other medications prescribed for you. Read the directions carefully, and ask your doctor or other care provider to review them with you.            Procedures and tests performed during your visit     INR    US Extremity Non Vascular Left      Orders Needing Specimen Collection     None      Pending Results     No orders found from 6/10/2018 to 6/13/2018.            Pending Culture Results     No orders found from 6/10/2018 to 6/13/2018.            Pending Results Instructions     If you had any lab results that were not finalized at the time of your Discharge, you can call the ED Lab Result RN at [phone]. You will be contacted by this team for any positive Lab results or changes in treatment. The nurses are available 7 days a week from 10A to 6:30P.  You can leave a message 24 hours per  "day and they will return your call.        Thank you for choosing [facility]       Thank you for choosing [facility] for your care. Our goal is always to provide you with excellent care. Hearing back from our patients is one way we can continue to improve our services. Please take a few minutes to complete the written survey that you may receive in the mail after you visit with us. Thank you!        EducabiliahargDine Information     SmallRivers lets you send messages to your doctor, view your test results, renew your prescriptions, schedule appointments and more. To sign up, go to [email or web address] . Click on \"Log in\" on the left side of the screen, which will take you to the Welcome page. Then click on \"Sign up Now\" on the right side of the page.     You will be asked to enter the access code listed below, as well as some personal information. Please follow the directions to create your username and password.     Your access code is: [email or web address]  Expires: 2018  9:11 AM     Your access code will  in 90 days. If you need help or a new code, please call your [facility] clinic or [phone].        Care EveryWhere ID     This is your Care EveryWhere ID. This could be used by other organizations to access your [facility] medical records  [id number]        Equal Access to Services     NELLY SMYTH : Jazzy Danielle, waaxda luqadaha, qaybta kaalmada eduardoyanarda, alfredo taylor. So [facility] [phone].    ATENCIÓN: Si habla español, tiene a salinas disposición servicios gratuitos de asistencia lingüística. Llame al [phone].    We comply with applicable federal civil rights laws and [place] laws. We do not discriminate on the basis of race, color, national origin, age, disability, sex, sexual orientation, or gender identity.            After Visit Summary       This is your record. Keep this with you and show to your community pharmacist(s) and doctor(s) at your next visit.             "

## 2018-06-12 NOTE — ED TRIAGE NOTES
Patient states that he is having left back calf pain since last night.  Patient has a history of DVTs.  Patient takes warfarin daily

## 2018-06-12 NOTE — PROGRESS NOTES
[facility]          OUTPATIENT PHYSICAL THERAPY ORTHOPEDIC EVALUATION  PLAN OF TREATMENT FOR OUTPATIENT REHABILITATION  (COMPLETE FOR INITIAL CLAIMS ONLY)  Patient's Last Name, First Name, M.I.  YOB: 1935  Michael Martinez    Provider s Name:  [facility]   Medical Record No.  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, cervical OA   Visits from SOC:  1      _________________________________________________________________________________  Plan of Treatment/Functional Goals:  joint mobilization, manual therapy, ROM, strengthening, stretching     Cryotherapy, Electrical stimulation, Hot packs, Ultrasound     Goals  Goal Identifier: Pain  Goal Description: Patient will report a decrease in neck and left posterior shoulder pain to 2/10 or less with rare incidence of headaches allowing completion of all ADLs as well as home maintenance tasks without pain in 8 weeks  Target Date: 08/11/18    Goal Identifier: Cervical ROM  Goal Description: Patient will demo cervical rotatio mobility to at least 60 degrees, side flexion to 35 deg and extension to 60 deg allowing all tasks such as driving without limitation in 8 weeks  Target Date: 08/11/18    Goal Identifier: Strength  Goal Description: Patient will demo an improvement of 1/2 to 1 full MMT in  B shoulder, upper back and scapular stabilizer strength to develop improved posture and decreased cervical muscle strain.in 8 weeks  Target Date: 08/11/18                                                           Therapy Frequency:  2 times/Week  Predicted Duration of Therapy Intervention:  8 weeks    Dontae Barrera                 I CERTIFY THE NEED FOR THESE SERVICES FURNISHED UNDER        THIS PLAN OF  TREATMENT AND WHILE UNDER MY CARE     (Physician co-signature of this document indicates review and certification of the therapy plan).                       Certification Date From:  06/11/18   Certification Date To:  09/10/18    Referring Provider:  Dr. Isma Martinez    Initial Assessment        See Epic Evaluation Start of Care Date: 06/11/18

## 2018-06-12 NOTE — PROGRESS NOTES
06/11/18 1300   General Information   Type of Visit Initial OP Ortho PT Evaluation   Start of Care Date 06/11/18   Referring Physician Dr. Isma Martinez   Orders Evaluate and Treat   Date of Order 06/04/18   Insurance Type Medicare   Medical Diagnosis Occipital Neuralgia; Hx of Guillian - [place]ver[place] syndrome   Surgical/Medical history reviewed Yes   Precautions/Limitations no known precautions/limitations   General Information Comments Hx of DVT and PE   Body Part(s)   Body Part(s) Cervical Spine   Presentation and Etiology   Pertinent history of current problem (include personal factors and/or comorbidities that impact the POC) 83 y/o male patient c/o Patient is a 83 y/o male whom c/o intermittent headaches and left sided neck pain of 1-2 months duration. The pain is in the left upper and posterior shoulder and lower neck. He reports intermittent episodes of short duration sharper pain into the left side of his head.. He does have a history of Guillian [place]ver[place] syndrome and alsp PE, DVT. He today also c/o left posterior knee painn as well.. Patient previously was a very active individual. He has experienced difficulty with activity since dianne Guillian Montver[place] syndrome. He has most full recovered from this but remains weak and experiences right sided wrist weakness and constant pain. He has been treated succesfully for left upper scapular and neck pain previously with massage therapy. He remains busy with his home and family.    Impairments A. Pain;D. Decreased ROM;E. Decreased flexibility;N. Headaches   Functional Limitations perform activities of daily living;perform desired leisure / sports activities   Symptom Location Left posterior upper back and neck   How/Where did it occur From insidious onset   Onset date of current episode/exacerbation 04/18/18   Chronicity Recurrent   Pain rating (0-10 point scale) Best (/10);Worst (/10)   Best (/10) 1-2   Worst (/10) 5-8   Pain quality C. Aching;A. Sharp;E. Shooting    Frequency of pain/symptoms B. Intermittent   Pain/symptoms are: Worse in the morning   Pain/symptoms exacerbated by M. Other   Pain exacerbation comment Tension/stress   Pain/symptoms eased by C. Rest;G. Heat;H. Cold;K. Other   Pain eased by comment Massage   Progression of symptoms since onset: Unchanged   Current / Previous Interventions   Diagnostic Tests: Other;X-ray   X-ray Results Results   X-ray results Completed in 2015. Significant Multi level cervical disc disease OA   Prior Level of Function   Prior Level of Function-Mobility Limited due to Guillian [place]   Prior Level of Function-ADLs As above   Current Level of Function   Current Community Support Family/friend caregiver   Patient role/employment history F. Retired   Living environment [facility]   Fall Risk Screen   Fall screen completed by PT   Have you fallen 2 or more times in the past year? No   Have you fallen and had an injury in the past year? No   Is patient a fall risk? No   Cervical Spine   Observation Patient has difficulty with turning his head to the left   Posture Forward head and round shoulder position. Patient is able to improve his posturewith verbal and tactile cues   Cervical Flexion ROM 58   Cervical Extension ROM 56   Cervical Right Side Bending ROM 24   Cervical Left Side Bending ROM 20   Cervical Right Rotation ROM 51   Cervical Left Rotation ROM 45   Thoracic Flexion ROM WFL   Thoracic Extension ROM WFL   Thoracic Right Side Bending ROM WFL   Thoracic Left Sidebending ROM  WFL   Thoracic Right Rotation WFL   Thoracic Left Rotation WFL   Shoulder Shrug (C2-C4) Strength 4   Shoulder Abd (C5) Strength 4-   Shoulder Add (C7) Strength 4-   Shoulder ER (C5, C6) Strength 4-   Shoulder IR (C5, C6) Strength 4   Elbow Flexion (C5, C6) Strength 5   Elbow Extension (C7) Strength 5   Wrist Extension (C6) Strength NT left 5/5 R   Shoulder/Wrist/Hand Strength Comments Global UE weakness due to recent Guillian [place] syndrome   Upper  Trapezius Flexibility B tightness   Levator Scapula Flexibility B tightness   Scalene Flexibility B Tightness   Pectoralis Minor Flexibility B tightness   Cervical Flexibility Comments Limited cervical mobility bilaterally through all motions   Spurling Test Not indicated   Cervical/Shoulder Special Tests Comments No Indicated   Segmental Mobility-Cervical Limited C4-5, C5-6, C7-T1 mobility   Segmental Mobility-Thoracic Limited upper thoraci extension mobility   Palpation Noted upper back/shoulder MFR. 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, cervical OA   Functional limitations due to impairments Difficulty with driving, all other activities requiring neck mobility   Clinical Presentation Stable/Uncomplicated   Clinical Decision Making (Complexity) Low complexity   Therapy Frequency 2 times/Week   Predicted Duration of Therapy Intervention (days/wks) 8 weeks   Risk & Benefits of therapy have been explained Yes   Clinical Impression Comments Patient appears to be motivated to complete an indicated HEP as well as actively participate in PT treatment   Education Assessment   Preferred Learning Style Listening;Reading;Demonstration;Pictures/video   Barriers to Learning No barriers   ORTHO GOALS   PT Ortho Eval Goals 1;2;3   Ortho Goal 1   Goal Identifier Pain   Goal Description Patient will report a decrease in neck and left posterior shoulder pain  to 2/10 or less with rare incidence of headaches allowing completion of all ADLs as well as home maintenance tasks without pain in 8 weeks   Target Date 08/11/18   Ortho Goal 2   Goal Identifier Cervical ROM   Goal Description Patient will demo cervical rotatio mobility to at least 60 degrees, side flexion to 35 deg and extension to 60 deg allowing all tasks such as driving without limitation in 8 weeks   Target Date 08/11/18   Ortho Goal 3   Goal Identifier Strength   Goal Description Patient will demo an improvement of 1/2 to 1 full MMT in  B shoulder, upper back and scapular stabilizer strength to develop improved posture and decreased cervical muscle strain.in 8 weeks   Target Date 08/11/18   Total Evaluation Time   Total Evaluation Time 35   Therapy Certification   Certification date from 06/11/18   Certification date to 09/10/18   Medical Diagnosis Occipital Neuralgia; Hx of guillian [place] syndrome

## 2018-06-12 NOTE — ED AVS SNAPSHOT
"    [facility]: [phone]                                              INTERAGENCY TRANSFER FORM - LAB / IMAGING / EKG / EMG RESULTS   2018                    Hospital Admission Date: 2018  NUBIA GARRISON   : 1935  Sex: Male        Attending Provider: Edwin Martinez PA-C     Allergies:  Influenza Vac Split [Flu Virus Vaccine]    Infection:  None   Service:  EMERGENCY ME    Ht:  1.778 m (5' 10\")   Wt:  77.1 kg (170 lb)   Admission Wt:  77.1 kg (170 lb)    BMI:  24.39 kg/m 2   BSA:  1.95 m 2            Patient PCP Information     Provider PCP Type    SHAQ DEL CASTILLO MD General         Lab Results - 3 Days      INR [[id number]] (Abnormal)  Resulted: 18 1539, Result status: Final result    Ordering provider: Edwin Martinez PA-C  18 1514 Resulting lab: [facility]    Specimen Information    Type Source Collected On   Blood  18 1525          Components       Value Reference Range Flag Lab   INR 1.93 0 - 1.3 H [facility]            Testing Performed By     Lab - Abbreviation Name Director Address Valid Date Range    1743 - [facility] Unknown [address]  [facility] [address] 08/07/15 0948 - Present            Unresulted Labs     None         Imaging Results - 3 Days      US Extremity Non Vascular Left [[id number]]  Resulted: 18 1551, Result status: Final result    Ordering provider: Edwin Martinez PA-C  18 1514 Resulted by: Eric Shrestha MD    Performed: 18 1514 - 18 1548 Resulting lab: RADIOLOGY RESULTS    Narrative:       Procedure: US EXTREMITY NON VASCULAR LEFT    HISTORY:  possible bakers cyst;.    TECHNIQUE: Targeted ultrasound of the left popliteal fossa.    COMPARISON: None.    FINDINGS:    Interrogation of the popliteal vein demonstrates normal grayscale  appearance, compressibility and augmentable color Doppler flow. There  is a 2.1 cm " Baker cyst or ganglion cyst along the medial popliteal  fossa.      Impression:       IMPRESSION:     2.1 cm probable Baker cyst. Consider follow-up knee radiographs.      JASON GARCIA MD      Testing Performed By     Lab - Abbreviation Name Director Address Valid Date Range    104 - [facility] RADIOLOGY RESULTS Unknown Unknown 02/16/05 1553 - Present            Encounter-Level Documents:     There are no encounter-level documents.      Order-Level Documents:     There are no order-level documents.

## 2018-06-12 NOTE — DISCHARGE INSTRUCTIONS

## 2018-06-12 NOTE — ED PROVIDER NOTES
History     Chief Complaint   Patient presents with     Leg Pain     back of knee and calf     HPI Comments: This is a 81 yo male who is currently on coumadin for previous DVT and PE.  Today he has noticed a lump behind his The patient left the office before the visit was finished. Knee and he is concerned that he may have another blood clot.  Discussed with the patient that being on the coumadin actually covers for a DVT at this point.  Most likely a bakers cyst.  He would like this examined further    The history is provided by the patient.         Problem List:    Patient Active Problem List    Diagnosis Date Noted     History of Guillain-Mcnary syndrome 06/04/2018     Priority: Medium     Gastroesophageal reflux disease, esophagitis presence not specified 06/04/2018     Priority: Medium     Primary osteoarthritis of left knee 06/04/2018     Priority: Medium     Other pulmonary embolism without acute cor pulmonale 02/11/2018     Priority: Medium     Hyperlipidemia 01/23/2018     Priority: Medium     Lumbar disc disease 04/25/2017     Priority: Medium     Primary osteoarthritis of right hand 04/25/2017     Priority: Medium     Spinal stenosis, lumbar 04/25/2017     Priority: Medium     Long-term (current) use of anticoagulants [Z79.01] 11/18/2016     Priority: Medium     History of pulmonary embolism 11/16/2016     Priority: Medium     Abdominal aortic aneurysm without rupture (H) 11/16/2016     Priority: Medium     Last imaged 11/15: 3.3cm 9 when discussed with radiology, not reflected in report) Brandy Tucker MD        Anticoagulation monitoring, INR range 2-3 11/16/2016     Priority: Medium     Personal history of DVT (deep vein thrombosis) 10/29/2016     Priority: Medium     10/29/16       Bladder outlet obstruction 10/20/2015     Priority: Medium     DJD (degenerative joint disease) of cervical spine 07/22/2015     Priority: Medium     Ischemic cardiomyopathy 07/09/2014     Priority: Medium     History of  four vessel coronary artery bypass graft 04/22/2014     Priority: Medium     H/O transurethral resection of prostate 04/22/2014     Priority: Medium     S/P CABG x 4 04/22/2014     Priority: Medium     S/P TURP 04/22/2014     Priority: Medium     Lesion of ulnar nerve 05/21/2012     Priority: Medium     Diaphragmatic hernia 01/06/2004     Priority: Medium     Osteoarthrosis involving lower leg 10/15/2003     Priority: Medium     Cataract 08/19/2002     Priority: Medium        Past Medical History:    Past Medical History:   Diagnosis Date     Cerebral infarction (H)      Edema      Edema      Embolism and thrombosis of right tibial vein (H)      Enlarged prostate with lower urinary tract symptoms (LUTS)      Ischemic cardiomyopathy      Osteoarthritis      Other ill-defined and unknown causes of morbidity and mortality      Urinary tract infection        Past Surgical History:    Past Surgical History:   Procedure Laterality Date     ARTHROSCOPY SHOULDER ROTATOR CUFF REPAIR      1996     BIOPSY PROSTATE TRANSRECTAL      No Comments Provided     BYPASS GRAFT ARTERY CORONARY      December of 2013,four-vessel     COLONOSCOPY      10/31/2013,diverticulosis-no fu needed d/t age     CYSTOSCOPY, TRANSURETHRAL RESECTION (TUR) PROSTATE, COMBINED      January 2014,done in [place] -- excellent result     OTHER SURGICAL HISTORY      December 2013,09653.0,([place]) [facility] INJ PROC SELECTIVE CORONARY ANGIOGRAPHY,LAD-95% spjdauyw-51-41% distal stenosis.  [place]amus-80% stenosis.  Right coronary-high-grade 90% stenosis.  EF-45%       Family History:    Family History   Problem Relation Age of Onset     HEART DISEASE Mother      Heart Disease     Other - See Comments Father      No Known Problems     Breast Cancer Sister      Cancer-breast,Otherwise healthy     Other - See Comments Brother      BPH otherwise healthy at 72     Arthritis Brother      Arthritis,Osteoarthritis, otherwise healthy at 66     Colon Cancer Brother      Cancer-colon      "Breast Cancer Sister 59     Cancer-breast,       Social History:  Marital Status:  Single [1]  Social History   Substance Use Topics     Smoking status: Former Smoker     Packs/day: 0.90     Years: 9.00     Types: Cigarettes     Smokeless tobacco: Never Used     Alcohol use 0.0 oz/week      Comment: Alcoholic Drinks/day: Alcoholic Drinks/day: 1 beer every 2 weeks        Medications:      aspirin EC 81 MG tablet   metoprolol succinate (TOPROL-XL) 25 MG 24 hr tablet   omeprazole (PRILOSEC) 20 MG CR capsule   pravastatin (PRAVACHOL) 20 MG tablet   ranitidine (ZANTAC) 150 MG tablet   warfarin (COUMADIN) 5 MG tablet   warfarin (COUMADIN) 7.5 MG tablet         Review of Systems   Constitutional: Negative for fever.   HENT: Negative for congestion.    Eyes: Negative for redness.   Respiratory: Negative for shortness of breath.    Cardiovascular: Negative for chest pain.   Gastrointestinal: Negative for abdominal pain.   Genitourinary: Negative for difficulty urinating.   Musculoskeletal: Negative for arthralgias and neck stiffness.        Left knee posterior lump and pain   Skin: Negative for color change.   Neurological: Negative for headaches.   Psychiatric/Behavioral: Negative for confusion.       Physical Exam   BP: 155/78  Pulse: 65  Temp: 96.9  F (36.1  C)  Resp: 18  Height: 177.8 cm (5' 10\")  Weight: 77.1 kg (170 lb)  SpO2: 96 %      Physical Exam   Constitutional: He is oriented to person, place, and time. No distress.   HENT:   Head: Atraumatic.   Mouth/Throat: Oropharynx is clear and moist. No oropharyngeal exudate.   Eyes: Pupils are equal, round, and reactive to light. No scleral icterus.   Cardiovascular: Normal heart sounds and intact distal pulses.    Pulmonary/Chest: Breath sounds normal. No respiratory distress.   Abdominal: Soft. Bowel sounds are normal. There is no tenderness.   Musculoskeletal: Normal range of motion. He exhibits tenderness. He exhibits no edema.   Left posterior knee minimal " pain and a small swelling or lump but this is SNT.  Full AROM  CMSx4   Neurological: He is alert and oriented to person, place, and time.   Skin: Skin is warm. No rash noted. He is not diaphoretic.       ED Course     ED Course     Procedures              Results for orders placed or performed during the hospital encounter of 06/12/18 (from the past 24 hour(s))   INR   Result Value Ref Range    INR 1.93 (H) 0 - 1.3   US Extremity Non Vascular Left    Narrative    Procedure: US EXTREMITY NON VASCULAR LEFT    HISTORY:  possible bakers cyst;.    TECHNIQUE: Targeted ultrasound of the left popliteal fossa.    COMPARISON: None.    FINDINGS:    Interrogation of the popliteal vein demonstrates normal grayscale  appearance, compressibility and augmentable color Doppler flow. There  is a 2.1 cm Baker cyst or ganglion cyst along the medial popliteal  fossa.      Impression    IMPRESSION:     2.1 cm probable Baker cyst. Consider follow-up knee radiographs.      JASON GARCIA MD       Medications - No data to display    Assessments & Plan (with Medical Decision Making)     I have reviewed the nursing notes.    I have reviewed the findings, diagnosis, plan and need for follow up with the patient.      New Prescriptions    No medications on file       Final diagnoses:   Baker's cyst of knee, left   Acute pain of left knee     Afebrile.  VSS.  Left posterior knee pain.  Concerns for DVT but explained the the patient he is already on coumadin and should be covered.  Possibly a bakers cyst.  INR returns slightly non therapeutic at 1.93, but he is scheduled to take a 7.5 mg coumadin tonight.  US left knee shows a  2.1 cm probable Baker cyst. Consider follow-up knee radiographs.  Discussed with the patient this is a sign of arthritis and follow up with his PCP for further evaluation should pain persist.  Reassurance given .  He will use tylenol as needed for pain relief.   6/12/2018     [facility]      Edwin Martinez PA-C  06/12/18 [id number]

## 2018-06-12 NOTE — ED AVS SNAPSHOT
[facility]    [address]    [place] [address]    Phone:  [phone]    Fax:  [phone]                                       Michael Martinez   MRN: [id number]    Department:  [facility]   Date of Visit:  6/12/2018           After Visit Summary Signature Page     I have received my discharge instructions, and my questions have been answered. I have discussed any challenges I see with this plan with the nurse or doctor.    ..........................................................................................................................................  Patient/Patient Representative Signature      ..........................................................................................................................................  Patient Representative Print Name and Relationship to Patient    ..................................................               ................................................  Date                                            Time    ..........................................................................................................................................  Reviewed by Signature/Title    ...................................................              ..............................................  Date                                                            Time

## 2018-06-13 ENCOUNTER — HOSPITAL ENCOUNTER (OUTPATIENT)
Dept: PHYSICAL THERAPY | Facility: OTHER | Age: 83
Setting detail: THERAPIES SERIES
End: 2018-06-13
Attending: INTERNAL MEDICINE
Payer: COMMERCIAL

## 2018-06-13 PROCEDURE — 97035 APP MDLTY 1+ULTRASOUND EA 15: CPT | Mod: GP

## 2018-06-13 PROCEDURE — 97140 MANUAL THERAPY 1/> REGIONS: CPT | Mod: GP

## 2018-06-18 ENCOUNTER — HOSPITAL ENCOUNTER (OUTPATIENT)
Dept: PHYSICAL THERAPY | Facility: OTHER | Age: 83
Setting detail: THERAPIES SERIES
End: 2018-06-18
Attending: INTERNAL MEDICINE
Payer: COMMERCIAL

## 2018-06-18 PROCEDURE — 97140 MANUAL THERAPY 1/> REGIONS: CPT | Mod: GP

## 2018-06-19 ENCOUNTER — TELEPHONE (OUTPATIENT)
Dept: PEDIATRICS | Facility: OTHER | Age: 83
End: 2018-06-19

## 2018-06-19 NOTE — TELEPHONE ENCOUNTER
Left message for patient to return call. Received fax from Insurance company regarding the PAD testing that was completed through the [employer].  If pt would like to go over these results needs to come in for OV with Isma Martinez MD to do so.  Diane Erickson LPN ....................  6/19/2018   3:21 PM

## 2018-06-20 ENCOUNTER — HOSPITAL ENCOUNTER (OUTPATIENT)
Dept: PHYSICAL THERAPY | Facility: OTHER | Age: 83
Setting detail: THERAPIES SERIES
End: 2018-06-20
Attending: INTERNAL MEDICINE
Payer: COMMERCIAL

## 2018-06-20 PROCEDURE — 97140 MANUAL THERAPY 1/> REGIONS: CPT | Mod: GP

## 2018-06-21 ENCOUNTER — OFFICE VISIT (OUTPATIENT)
Dept: PEDIATRICS | Facility: OTHER | Age: 83
End: 2018-06-21
Attending: INTERNAL MEDICINE
Payer: COMMERCIAL

## 2018-06-21 VITALS
WEIGHT: 172 LBS | HEART RATE: 66 BPM | BODY MASS INDEX: 24.68 KG/M2 | SYSTOLIC BLOOD PRESSURE: 120 MMHG | DIASTOLIC BLOOD PRESSURE: 76 MMHG

## 2018-06-21 DIAGNOSIS — I73.9 PERIPHERAL ARTERIAL DISEASE (H): Primary | ICD-10-CM

## 2018-06-21 DIAGNOSIS — E78.2 MIXED HYPERLIPIDEMIA: ICD-10-CM

## 2018-06-21 PROCEDURE — 99213 OFFICE O/P EST LOW 20 MIN: CPT | Performed by: INTERNAL MEDICINE

## 2018-06-21 RX ORDER — PRAVASTATIN SODIUM 20 MG
TABLET ORAL
Qty: 30 TABLET | COMMUNITY
Start: 2018-06-21 | End: 2019-04-19 | Stop reason: ALTCHOICE

## 2018-06-21 RX ORDER — PRAVASTATIN SODIUM 20 MG
10 TABLET ORAL DAILY
COMMUNITY
End: 2018-06-21

## 2018-06-21 ASSESSMENT — PAIN SCALES - GENERAL: PAINLEVEL: MODERATE PAIN (5)

## 2018-06-21 NOTE — PROGRESS NOTES
Subjective  Michael Martinez is a 82 year old male who presents for discuss testing.  He recently underwent peripheral arterial testing and had it sent to me.  Does not know why it was obtained.  He thinks he thought it was because he thought he had a blood clot in his leg.  He went to the ER and was diagnosed with a Baker's cyst.  He is able to do quite a bit of exercise including biking and walking without cramping in the calf muscles.    Problem List/PMH: reviewed in EMR, and made relevant updates today.  Medications: reviewed in EMR, and made relevant updates today.  Allergies: reviewed in EMR, and made relevant updates today.    Social Hx:  Social History   Substance Use Topics     Smoking status: Former Smoker     Packs/day: 0.90     Years: 9.00     Types: Cigarettes     Smokeless tobacco: Never Used     Alcohol use 0.0 oz/week      Comment: Alcoholic Drinks/day: Alcoholic Drinks/day: 1 beer every 2 weeks     Social History     Social History Narrative     2007 (sudden cardiac death). Three children, retired in  as a .  Spends his donis in [place]     I reviewed social history and made relevant updates today.    Family Hx:   Family History   Problem Relation Age of Onset     HEART DISEASE Mother      Heart Disease     Other - See Comments Father      No Known Problems     Breast Cancer Sister      Cancer-breast,Otherwise healthy     Other - See Comments Brother      BPH otherwise healthy at 72     Arthritis Brother      Arthritis,Osteoarthritis, otherwise healthy at 66     Colon Cancer Brother      Cancer-colon     Breast Cancer Sister 59     Cancer-breast,       Objective  Vitals: reviewed in EMR.  /76 (BP Location: Right arm, Patient Position: Sitting, Cuff Size: Adult Large)  Pulse 66  Wt 172 lb (78 kg)  BMI 24.68 kg/m2    Gen: Pleasant male, NAD.  HEENT: MMM  Neck: Supple  Pulm: Breathing easily  Neuro: Grossly intact  Skin: No concerning  lesions.  Psychiatric: Normal affect and insight. Does not appear anxious or depressed.    Labs:  Lab Results   Component Value Date    HGB 15.4 08/07/2017    HCT 45.3 08/07/2017     08/07/2017    TRIG 193 (H) 08/07/2017    HDL 40 08/07/2017     08/07/2017    BUN 15 08/07/2017    CO2 25 08/07/2017    INR 1.93 (H) 06/12/2018     Outside screening was personally reviewed, see scanned documents      Assessment    ICD-10-CM    1. Peripheral arterial disease (H) I73.9    2. Mixed hyperlipidemia E78.2 pravastatin (PRAVACHOL) 20 MG tablet     Peripheral arterial screening reveals moderate reduction of flow however clinically he has no symptoms consistent with peripheral arterial disease.  We discussed modifiable risk factors including tobacco, hypertension, diabetes, hyperlipidemia, physical exercise.    Plan   -- Expected clinical course discussed   -- Medications and their side effects discussed  Patient Instructions      -- See if you can increase pravastatin to daily.   -- If the cramping returns, go back to every other day   -- Daily exercise (cardio)   -- Don't smoke   -- Continue aspirin   -- Consider vascular surgery consult if you develop cramping in calves with exertion      Peripheral Artery Disease (PAD)   Peripheral artery disease (PAD) occurs when the arteries that carry blood to the limbs are narrowed or blocked. This is usually due to a buildup of a fatty substance called plaque in the walls of the arteries.  PAD most often affects the arteries in the legs. When these arteries are narrowed or blocked, blood flow to the legs is reduced. This can cause leg and foot pain and other symptoms. If severe enough, reduced blood flow to the legs can lead to tissue death (gangrene) and the loss of a toe, foot, or leg. Having PAD also makes it more likely that arteries in other body areas are blocked. For instance, arteries that carry blood to the heart or brain may be affected. This raises the chances of  heart attack and stroke.  Risk factors  Certain factors can make PAD more likely. They include:    Smoking    Diabetes    High blood pressure    Unhealthy cholesterol levels    Obesity    Inactive lifestyle    Older age    Family history of PAD  Symptoms  Many people with PAD have no symptoms. If symptoms do occur, they can include:    Pain in the muscles of the calves, thighs or hips that gets worse with activity and better with rest (intermittent claudication)    Achy, tired, or heavy feeling in the legs    Weakness, numbness, tingling, or loss of feeling in the legs    Changes in skin color of the legs    Sores on the legs and feet    Cold leg, feet, or toes    Pain the feet or toes even when lying down (rest pain)  Home care  PAD is a chronic (lifelong) condition. Treatment is focused on managing your condition and lowering your health risks. This may include doing the following:    If you smoke, quit. This helps prevent further damage to your arteries and lowers your health risks. Ask your provider about medicines or products that can help you quit smoking. Also consider joining a stop-smoking program or support group.    Be more active. This helps you lose weight and manage problems such as high blood pressure and unhealthy cholesterol levels. Start a walking program if advised to by your provider. Your provider may also help you form a safe exercise program that is right for your needs.    Make healthy eating changes. This includes eating less fat, salt, and sugar.    Take medicines for high blood pressure, unhealthy cholesterol levels, and diabetes as directed.    Have your blood pressure and cholesterol levels checked as often as directed.    If you have diabetes, try to keep your blood sugar well controlled. Test your blood sugar as directed.    If you are overweight, talk to your provider about forming a weight-loss plan.    Watch for cuts, scrapes, or open sores on your feet. Poor blood flow to the feet  may slow healing and increase the risk of infection from these problems.   Follow-up care  Follow up with your healthcare provider, or as advised. If imaging tests such as ultrasound were done, they will be reviewed by a doctor. You will be told the results and any new findings that may affect your care.  When to seek medical advice   Call your healthcare provider right away if any of these occur:    Sudden severe pain in the legs or feet    Sudden cold, pale or blue color in the legs or feet    Weakness or numbness in the legs or feet that worsens    Any sore or wound in the legs or feet that won t heal    Weak pulse in your legs or feet  Know the signs of heart attack and stroke  People with PAD are at high risk for heart attack and stroke. Knowing the signs of these problems can help you protect your health and get help when you need it. Call 911 right away if you have any of the following:  Signs of a heart attack    Chest discomfort, such as pain, aching, tightness, or pressure that lasts more than a few minutes, or that comes and goes    Pain or discomfort in the arms, back, shoulders, neck, or jaw    Shortness of breath    Sweating (often a cold, clammy sweat)    Nausea    Lightheadedness  Signs of a stroke    Sudden numbness or weakness of the face, arms, or legs, especially on one side    Sudden confusion or trouble speaking or understanding    Sudden trouble seeing in one or both eyes    Sudden trouble walking, dizziness, or loss of balance    Sudden, severe headache with no known cause   Date Last Reviewed: 9/21/2015 2000-2017 [employer]. [address] [place] [address]. All rights reserved. This information is not intended as a substitute for professional medical care. Always follow your healthcare professional's instructions.            No Follow-up on file.    Signed, Isma Martinez MD  Internal Medicine & Pediatrics

## 2018-06-21 NOTE — TELEPHONE ENCOUNTER
Spoke with patient who said that he would come in. Transferred him to scheduling.  Forest Mullen LPN...... 9:12 AM 6/21/2018

## 2018-06-21 NOTE — MR AVS SNAPSHOT
After Visit Summary   6/21/2018    Michael Martinez    MRN: [id number]           Patient Information     Date Of Birth          1935        Visit Information        Provider Department      6/21/2018 3:30 PM Isma Martinez MD [facility]        Today's Diagnoses     Peripheral arterial disease (H)    -  1    Mixed hyperlipidemia          Care Instructions     -- See if you can increase pravastatin to daily.   -- If the cramping returns, go back to every other day   -- Daily exercise (cardio)   -- Don't smoke   -- Continue aspirin   -- Consider vascular surgery consult if you develop cramping in calves with exertion      Peripheral Artery Disease (PAD)   Peripheral artery disease (PAD) occurs when the arteries that carry blood to the limbs are narrowed or blocked. This is usually due to a buildup of a fatty substance called plaque in the walls of the arteries.  PAD most often affects the arteries in the legs. When these arteries are narrowed or blocked, blood flow to the legs is reduced. This can cause leg and foot pain and other symptoms. If severe enough, reduced blood flow to the legs can lead to tissue death (gangrene) and the loss of a toe, foot, or leg. Having PAD also makes it more likely that arteries in other body areas are blocked. For instance, arteries that carry blood to the heart or brain may be affected. This raises the chances of heart attack and stroke.  Risk factors  Certain factors can make PAD more likely. They include:    Smoking    Diabetes    High blood pressure    Unhealthy cholesterol levels    Obesity    Inactive lifestyle    Older age    Family history of PAD  Symptoms  Many people with PAD have no symptoms. If symptoms do occur, they can include:    Pain in the muscles of the calves, thighs or hips that gets worse with activity and better with rest (intermittent claudication)    Achy, tired, or heavy feeling in the legs    Weakness,  numbness, tingling, or loss of feeling in the legs    Changes in skin color of the legs    Sores on the legs and feet    Cold leg, feet, or toes    Pain the feet or toes even when lying down (rest pain)  Home care  PAD is a chronic (lifelong) condition. Treatment is focused on managing your condition and lowering your health risks. This may include doing the following:    If you smoke, quit. This helps prevent further damage to your arteries and lowers your health risks. Ask your provider about medicines or products that can help you quit smoking. Also consider joining a stop-smoking program or support group.    Be more active. This helps you lose weight and manage problems such as high blood pressure and unhealthy cholesterol levels. Start a walking program if advised to by your provider. Your provider may also help you form a safe exercise program that is right for your needs.    Make healthy eating changes. This includes eating less fat, salt, and sugar.    Take medicines for high blood pressure, unhealthy cholesterol levels, and diabetes as directed.    Have your blood pressure and cholesterol levels checked as often as directed.    If you have diabetes, try to keep your blood sugar well controlled. Test your blood sugar as directed.    If you are overweight, talk to your provider about forming a weight-loss plan.    Watch for cuts, scrapes, or open sores on your feet. Poor blood flow to the feet may slow healing and increase the risk of infection from these problems.   Follow-up care  Follow up with your healthcare provider, or as advised. If imaging tests such as ultrasound were done, they will be reviewed by a doctor. You will be told the results and any new findings that may affect your care.  When to seek medical advice   Call your healthcare provider right away if any of these occur:    Sudden severe pain in the legs or feet    Sudden cold, pale or blue color in the legs or feet    Weakness or numbness in  the legs or feet that worsens    Any sore or wound in the legs or feet that won t heal    Weak pulse in your legs or feet  Know the signs of heart attack and stroke  People with PAD are at high risk for heart attack and stroke. Knowing the signs of these problems can help you protect your health and get help when you need it. Call 911 right away if you have any of the following:  Signs of a heart attack    Chest discomfort, such as pain, aching, tightness, or pressure that lasts more than a few minutes, or that comes and goes    Pain or discomfort in the arms, back, shoulders, neck, or jaw    Shortness of breath    Sweating (often a cold, clammy sweat)    Nausea    Lightheadedness  Signs of a stroke    Sudden numbness or weakness of the face, arms, or legs, especially on one side    Sudden confusion or trouble speaking or understanding    Sudden trouble seeing in one or both eyes    Sudden trouble walking, dizziness, or loss of balance    Sudden, severe headache with no known cause   Date Last Reviewed: 9/21/2015 2000-2017 [employer]. [address] [place] [address]. All rights reserved. This information is not intended as a substitute for professional medical care. Always follow your healthcare professional's instructions.                Follow-ups after your visit        Your next 10 appointments already scheduled     Jun 27, 2018 10:15 AM CDT   Anticoagulation Visit with [facility]   [facility]    [address]  [place] [id number] [phone]              Who to contact     If you have questions or need follow up information about today's clinic visit or your schedule please contact [facility] directly at [phone].  Normal or non-critical lab and imaging results will be communicated to you by MyChart, letter or phone within 4 business days after the clinic has received the results.  "If you do not hear from us within 7 days, please contact the clinic through ManyWho or phone. If you have a critical or abnormal lab result, we will notify you by phone as soon as possible.  Submit refill requests through ManyWho or call your pharmacy and they will forward the refill request to us. Please allow 3 business days for your refill to be completed.          Additional Information About Your Visit        [email or web address] ([email or web address]) Information     ManyWho lets you send messages to your doctor, view your test results, renew your prescriptions, schedule appointments and more. To sign up, go to [email or web address] . Click on \"Log in\" on the left side of the screen, which will take you to the Welcome page. Then click on \"Sign up Now\" on the right side of the page.     You will be asked to enter the access code listed below, as well as some personal information. Please follow the directions to create your username and password.     Your access code is: [email or web address]  Expires: 2018  9:11 AM     Your access code will  in 90 days. If you need help or a new code, please call your [facility] clinic or [phone].        Care EveryWhere ID     This is your Care EveryWhere ID. This could be used by other organizations to access your [facility] medical records  [id number]        Your Vitals Were     Pulse BMI (Body Mass Index)                66 24.68 kg/m2           Blood Pressure from Last 3 Encounters:   18 120/76   18 155/78   18 134/82    Weight from Last 3 Encounters:   18 172 lb (78 kg)   18 170 lb (77.1 kg)   18 173 lb 4.8 oz (78.6 kg)              Today, you had the following     No orders found for display         Today's Medication Changes          These changes are accurate as of 18  3:52 PM.  If you have any questions, ask your nurse or doctor.               These medicines have changed or have updated prescriptions.        Dose/Directions    pravastatin 20 MG tablet "   Commonly known as:  PRAVACHOL   This may have changed:    - how much to take  - how to take this  - when to take this  - additional instructions   Used for:  Mixed hyperlipidemia   Changed by:  Isma Martinez MD        20 mg every other day   Quantity:  30 tablet   Refills:  0                Primary Care Provider Office Phone # Fax #    Dc LARSEN MD Alicia [phone]       [address] [facility] [address]        Equal Access to Services     [facility]: Hadii aad ku hadasho Soomaali, waaxda luqadaha, qaybta kaalmada adeegyada, waxay idiin hayaan adeeg kharash la'kiara . So [facility] [phone].    ATENCIÓN: Si habla español, tiene a salinas disposición servicios gratuitos de asistencia lingüística. [facility] [phone].    We comply with applicable federal civil rights laws and [place] laws. We do not discriminate on the basis of race, color, national origin, age, disability, sex, sexual orientation, or gender identity.            Thank you!     Thank you for choosing [facility] AND \[facility]\""  for your care. Our goal is always to provide you with excellent care. Hearing back from our patients is one way we can continue to improve our services. Please take a few minutes to complete the written survey that you may receive in the mail after your visit with us. Thank you!             Your Updated Medication List - Protect others around you: Learn how to safely use, store and throw away your medicines at www.disposemymeds.org.          This list is accurate as of 6/21/18  3:52 PM.  Always use your most recent med list.                   Brand Name Dispense Instructions for use Diagnosis    aspirin 81 MG EC tablet      Take 81 mg by mouth        metoprolol succinate 25 MG 24 hr tablet    TOPROL-XL     Take 12.5 mg by mouth daily        omeprazole 20 MG CR capsule    priLOSEC     Take 20 mg by mouth Take every other day.        pravastatin 20 MG tablet    PRAVACHOL    30 tablet     20 mg every other day    Mixed hyperlipidemia       ranitidine 150 MG tablet    ZANTAC    180 tablet    Take 1 tablet (150 mg) by mouth 2 times daily as needed for heartburn    Gastroesophageal reflux disease, esophagitis presence not specified       * warfarin 5 MG tablet    COUMADIN     Take 5 mg x 3 days and 7.5 mg x 4 days/week or as directed by [facility]time clinic        * warfarin 7.5 MG tablet    COUMADIN     Take 5 mg x 3 days and 7.5 mg x 4 days/week or as directed by protime clinic        * Notice:  This list has 2 medication(s) that are the same as other medications prescribed for you. Read the directions carefully, and ask your doctor or other care provider to review them with you.

## 2018-06-21 NOTE — NURSING NOTE
Patient presents to clinic to F/U on PAD testing that was done by Radha at his request.  Diane Erickson LPN ....................  6/21/2018   3:18 PM

## 2018-06-21 NOTE — PATIENT INSTRUCTIONS
-- See if you can increase pravastatin to daily.   -- If the cramping returns, go back to every other day   -- Daily exercise (cardio)   -- Don't smoke   -- Continue aspirin   -- Consider vascular surgery consult if you develop cramping in calves with exertion      Peripheral Artery Disease (PAD)   Peripheral artery disease (PAD) occurs when the arteries that carry blood to the limbs are narrowed or blocked. This is usually due to a buildup of a fatty substance called plaque in the walls of the arteries.  PAD most often affects the arteries in the legs. When these arteries are narrowed or blocked, blood flow to the legs is reduced. This can cause leg and foot pain and other symptoms. If severe enough, reduced blood flow to the legs can lead to tissue death (gangrene) and the loss of a toe, foot, or leg. Having PAD also makes it more likely that arteries in other body areas are blocked. For instance, arteries that carry blood to the heart or brain may be affected. This raises the chances of heart attack and stroke.  Risk factors  Certain factors can make PAD more likely. They include:    Smoking    Diabetes    High blood pressure    Unhealthy cholesterol levels    Obesity    Inactive lifestyle    Older age    Family history of PAD  Symptoms  Many people with PAD have no symptoms. If symptoms do occur, they can include:    Pain in the muscles of the calves, thighs or hips that gets worse with activity and better with rest (intermittent claudication)    Achy, tired, or heavy feeling in the legs    Weakness, numbness, tingling, or loss of feeling in the legs    Changes in skin color of the legs    Sores on the legs and feet    Cold leg, feet, or toes    Pain the feet or toes even when lying down (rest pain)  Home care  PAD is a chronic (lifelong) condition. Treatment is focused on managing your condition and lowering your health risks. This may include doing the following:    If you smoke, quit. This helps prevent  further damage to your arteries and lowers your health risks. Ask your provider about medicines or products that can help you quit smoking. Also consider joining a stop-smoking program or support group.    Be more active. This helps you lose weight and manage problems such as high blood pressure and unhealthy cholesterol levels. Start a walking program if advised to by your provider. Your provider may also help you form a safe exercise program that is right for your needs.    Make healthy eating changes. This includes eating less fat, salt, and sugar.    Take medicines for high blood pressure, unhealthy cholesterol levels, and diabetes as directed.    Have your blood pressure and cholesterol levels checked as often as directed.    If you have diabetes, try to keep your blood sugar well controlled. Test your blood sugar as directed.    If you are overweight, talk to your provider about forming a weight-loss plan.    Watch for cuts, scrapes, or open sores on your feet. Poor blood flow to the feet may slow healing and increase the risk of infection from these problems.   Follow-up care  Follow up with your healthcare provider, or as advised. If imaging tests such as ultrasound were done, they will be reviewed by a doctor. You will be told the results and any new findings that may affect your care.  When to seek medical advice   Call your healthcare provider right away if any of these occur:    Sudden severe pain in the legs or feet    Sudden cold, pale or blue color in the legs or feet    Weakness or numbness in the legs or feet that worsens    Any sore or wound in the legs or feet that won t heal    Weak pulse in your legs or feet  Know the signs of heart attack and stroke  People with PAD are at high risk for heart attack and stroke. Knowing the signs of these problems can help you protect your health and get help when you need it. Call 911 right away if you have any of the following:  Signs of a heart attack     Chest discomfort, such as pain, aching, tightness, or pressure that lasts more than a few minutes, or that comes and goes    Pain or discomfort in the arms, back, shoulders, neck, or jaw    Shortness of breath    Sweating (often a cold, clammy sweat)    Nausea    Lightheadedness  Signs of a stroke    Sudden numbness or weakness of the face, arms, or legs, especially on one side    Sudden confusion or trouble speaking or understanding    Sudden trouble seeing in one or both eyes    Sudden trouble walking, dizziness, or loss of balance    Sudden, severe headache with no known cause   Date Last Reviewed: 9/21/2015 2000-2017 [employer]. [address] [place] [address]. All rights reserved. This information is not intended as a substitute for professional medical care. Always follow your healthcare professional's instructions.

## 2018-06-22 ASSESSMENT — PATIENT HEALTH QUESTIONNAIRE - PHQ9: SUM OF ALL RESPONSES TO PHQ QUESTIONS 1-9: 4

## 2018-06-27 ENCOUNTER — ANTICOAGULATION THERAPY VISIT (OUTPATIENT)
Dept: ANTICOAGULATION | Facility: OTHER | Age: 83
End: 2018-06-27
Attending: FAMILY MEDICINE
Payer: COMMERCIAL

## 2018-06-27 DIAGNOSIS — Z79.01 ANTICOAGULATION MONITORING, INR RANGE 2-3: ICD-10-CM

## 2018-06-27 DIAGNOSIS — Z86.711 HISTORY OF PULMONARY EMBOLISM: ICD-10-CM

## 2018-06-27 DIAGNOSIS — Z86.718 PERSONAL HISTORY OF DVT (DEEP VEIN THROMBOSIS): ICD-10-CM

## 2018-06-27 DIAGNOSIS — Z79.01 LONG-TERM (CURRENT) USE OF ANTICOAGULANTS: ICD-10-CM

## 2018-06-27 LAB — INR POINT OF CARE: 2.5 (ref 0.86–1.14)

## 2018-06-27 PROCEDURE — 99207 ZZC NO CHARGE NURSE ONLY: CPT

## 2018-06-27 PROCEDURE — 85610 PROTHROMBIN TIME: CPT | Mod: QW

## 2018-06-27 PROCEDURE — 36416 COLLJ CAPILLARY BLOOD SPEC: CPT

## 2018-06-27 NOTE — MR AVS SNAPSHOT
Michael Martinez   6/27/2018 10:15 AM   Anticoagulation Therapy Visit    Description:  82 year old male   Provider:  [facility] ANTI COAG 1   Department:  [facility]           INR as of 6/27/2018     Today's INR 2.5      Anticoagulation Summary as of 6/27/2018     INR goal 2.0-3.0   Today's INR 2.5   Full warfarin instructions 6 mg every day   Next INR check 7/25/2018    Indications   Long-term (current) use of anticoagulants [Z79.01] [Z79.01]  Anticoagulation monitoring  INR range 2-3 [Z79.01]  History of pulmonary embolism [Z86.711]  Personal history of DVT (deep vein thrombosis) [Z86.718]         Description     Continue same Warfarin dose and recheck in 1 month.  Naya Diallo RN   6/27/2018    10:09 AM        Your next Anticoagulation Clinic appointment(s)     Jun 27, 2018 10:15 AM CDT   Anticoagulation Visit with GH ANTI COAG 1   [facility]    [address]  [facility] [address]   [phone]              June 2018 Details    Sun Mon Tue Wed Thu Fri Sat          1               2                 3               4               5               6               7               8               9                 10               11               12               13               14               15               16                 17               18               19               20               21               22               23                 24               25               26               27      6 mg   See details      28      6 mg         29      6 mg         30      6 mg          Date Details   06/27 This INR check               How to take your warfarin dose     To take:  6 mg Take 1 of the 6 mg tablets.           July 2018 Details    Sun Mon Tue Wed Thu Fri Sat     1      6 mg         2      6 mg         3      6 mg         4      6 mg         5      6 mg         6      6 mg         7      6 mg           8      6 mg         9      6  mg         10      6 mg         11      6 mg         12      6 mg         13      6 mg         14      6 mg           15      6 mg         16      6 mg         17      6 mg         18      6 mg         19      6 mg         20      6 mg         21      6 mg           22      6 mg         23      6 mg         24      6 mg         25            26               27               28                 29               30               31                    Date Details   No additional details    Date of next INR:  7/25/2018         How to take your warfarin dose     To take:  6 mg Take 1 of the 6 mg tablets.

## 2018-06-27 NOTE — PROGRESS NOTES
ANTICOAGULATION FOLLOW-UP CLINIC VISIT    Patient Name:  Michael Martinez  Date:  6/27/2018  Contact Type:  Face to Face    SUBJECTIVE:     Patient Findings     Positives No Problem Findings           OBJECTIVE    INR Protime   Date Value Ref Range Status   06/27/2018 2.5 (A) 0.86 - 1.14 Final       ASSESSMENT / PLAN  INR assessment THER    Recheck INR In: 4 WEEKS    INR Location Clinic      Anticoagulation Summary as of 6/27/2018     INR goal 2.0-3.0   Today's INR 2.5   Warfarin maintenance plan 6 mg (6 mg x 1) every day   Full warfarin instructions 6 mg every day   Weekly warfarin total 42 mg   No change documented Naya Diallo, RN   Plan last modified Adrian Rae RN (11/18/2016)   Next INR check 7/25/2018   Target end date Indefinite    Indications   Long-term (current) use of anticoagulants [Z79.01] [Z79.01]  Anticoagulation monitoring  INR range 2-3 [Z79.01]  History of pulmonary embolism [Z86.711]  Personal history of DVT (deep vein thrombosis) [Z86.718]         Anticoagulation Episode Summary     INR check location     Preferred lab     Send INR reminders to  INR    Comments       Anticoagulation Care Providers     Provider Role Specialty Phone number    Dc Vargas MD [facility] [phone]            See the Encounter Report to view Anticoagulation Flowsheet and Dosing Calendar (Go to Encounters tab in chart review, and find the Anticoagulation Therapy Visit)        Naya Diallo, RN

## 2018-08-01 ENCOUNTER — ANTICOAGULATION THERAPY VISIT (OUTPATIENT)
Dept: ANTICOAGULATION | Facility: OTHER | Age: 83
End: 2018-08-01
Attending: FAMILY MEDICINE
Payer: COMMERCIAL

## 2018-08-01 DIAGNOSIS — Z79.01 ANTICOAGULATION MONITORING, INR RANGE 2-3: ICD-10-CM

## 2018-08-01 DIAGNOSIS — Z86.711 HISTORY OF PULMONARY EMBOLISM: ICD-10-CM

## 2018-08-01 DIAGNOSIS — Z86.718 PERSONAL HISTORY OF DVT (DEEP VEIN THROMBOSIS): ICD-10-CM

## 2018-08-01 DIAGNOSIS — Z79.01 LONG-TERM (CURRENT) USE OF ANTICOAGULANTS: ICD-10-CM

## 2018-08-01 LAB — INR POINT OF CARE: 3 (ref 0.86–1.14)

## 2018-08-01 PROCEDURE — 85610 PROTHROMBIN TIME: CPT | Mod: QW

## 2018-08-01 PROCEDURE — 36416 COLLJ CAPILLARY BLOOD SPEC: CPT

## 2018-08-01 NOTE — MR AVS SNAPSHOT
Michael Martinez   8/1/2018 12:00 PM   Anticoagulation Therapy Visit    Description:  82 year old male   Provider:  SUSIE ANTI COAG 2   Department:  [facility]           INR as of 8/1/2018     Today's INR 3.0      Anticoagulation Summary as of 8/1/2018     INR goal 2.0-3.0   Today's INR 3.0   Full warfarin instructions 5 mg on Mon, Wed, Fri; 7.5 mg all other days   Next INR check 8/29/2018    Indications   Long-term (current) use of anticoagulants [Z79.01] [Z79.01]  Anticoagulation monitoring  INR range 2-3 [Z79.01]  History of pulmonary embolism [Z86.711]  Personal history of DVT (deep vein thrombosis) [Z86.718]         Description     Continue the dose you have been taking and recheck in 4 weeks (8/29/2018).  Kandy Ford RN  ....................  8/1/2018   12:12 PM        August 2018 Details    Sun Mon Tue Wed Thu Fri Sat        1      5 mg   See details      2      7.5 mg         3      5 mg         4      7.5 mg           5      7.5 mg         6      5 mg         7      7.5 mg         8      5 mg         9      7.5 mg         10      5 mg         11      7.5 mg           12      7.5 mg         13      5 mg         14      7.5 mg         15      5 mg         16      7.5 mg         17      5 mg         18      7.5 mg           19      7.5 mg         20      5 mg         21      7.5 mg         22      5 mg         23      7.5 mg         24      5 mg         25      7.5 mg           26      7.5 mg         27      5 mg         28      7.5 mg         29            30               31                 Date Details   08/01 This INR check       Date of next INR:  8/29/2018         How to take your warfarin dose     To take:  5 mg Take 1 of the 5 mg tablets.    To take:  7.5 mg Take 1 of the 7.5 mg tablets.

## 2018-08-01 NOTE — PROGRESS NOTES
ANTICOAGULATION FOLLOW-UP CLINIC VISIT    Patient Name:  Michael Martinez  Date:  8/1/2018  Contact Type:  Face to Face    SUBJECTIVE:     Patient Findings     Positives No Problem Findings    Comments Patient states they have been taking a different dose for a long time and perhaps it was not reflected in chart.             OBJECTIVE    INR Protime   Date Value Ref Range Status   08/01/2018 3.0 (A) 0.86 - 1.14 Final       ASSESSMENT / PLAN  INR assessment THER    Recheck INR In: 4 WEEKS    INR Location Clinic      Anticoagulation Summary as of 8/1/2018     INR goal 2.0-3.0   Today's INR 3.0   Warfarin maintenance plan 5 mg (5 mg x 1) on Mon, Wed, Fri; 7.5 mg (7.5 mg x 1) all other days   Full warfarin instructions 5 mg on Mon, Wed, Fri; 7.5 mg all other days   Weekly warfarin total 45 mg   Plan last modified Kandy Ford RN (8/1/2018)   Next INR check 8/29/2018   Target end date Indefinite    Indications   Long-term (current) use of anticoagulants [Z79.01] [Z79.01]  Anticoagulation monitoring  INR range 2-3 [Z79.01]  History of pulmonary embolism [Z86.711]  Personal history of DVT (deep vein thrombosis) [Z86.718]         Anticoagulation Episode Summary     INR check location     Preferred lab     Send INR reminders to  INR    Comments       Anticoagulation Care Providers     Provider Role Specialty Phone number    Dc Vargas MD [facility] [phone]            See the Encounter Report to view Anticoagulation Flowsheet and Dosing Calendar (Go to Encounters tab in chart review, and find the Anticoagulation Therapy Visit)        Kandy Ford, RN

## 2018-08-20 ENCOUNTER — OFFICE VISIT (OUTPATIENT)
Dept: PEDIATRICS | Facility: OTHER | Age: 83
End: 2018-08-20
Attending: INTERNAL MEDICINE
Payer: COMMERCIAL

## 2018-08-20 VITALS
WEIGHT: 170.25 LBS | HEART RATE: 60 BPM | SYSTOLIC BLOOD PRESSURE: 138 MMHG | OXYGEN SATURATION: 98 % | DIASTOLIC BLOOD PRESSURE: 74 MMHG | TEMPERATURE: 97.6 F | BODY MASS INDEX: 25.21 KG/M2 | HEIGHT: 69 IN

## 2018-08-20 DIAGNOSIS — M54.81 OCCIPITAL NEURALGIA OF RIGHT SIDE: Primary | ICD-10-CM

## 2018-08-20 DIAGNOSIS — F39 MOOD DISORDER (H): ICD-10-CM

## 2018-08-20 DIAGNOSIS — G47.00 INSOMNIA, UNSPECIFIED TYPE: ICD-10-CM

## 2018-08-20 PROCEDURE — 99214 OFFICE O/P EST MOD 30 MIN: CPT | Performed by: INTERNAL MEDICINE

## 2018-08-20 RX ORDER — ATORVASTATIN CALCIUM 10 MG/1
10 TABLET, FILM COATED ORAL DAILY
COMMUNITY
End: 2020-05-11

## 2018-08-20 RX ORDER — CHOLECALCIFEROL (VITAMIN D3) 25 MCG
1 CAPSULE ORAL DAILY
COMMUNITY

## 2018-08-20 RX ORDER — CITALOPRAM HYDROBROMIDE 10 MG/1
10 TABLET ORAL DAILY
Qty: 90 TABLET | Refills: 1 | Status: SHIPPED | OUTPATIENT
Start: 2018-08-20 | End: 2018-10-29

## 2018-08-20 ASSESSMENT — PAIN SCALES - GENERAL: PAINLEVEL: NO PAIN (0)

## 2018-08-20 NOTE — PATIENT INSTRUCTIONS
-- See a counselor   -- Start citalopram   -- Get a protime checked in 2 weeks   -- 211 for mental health emergency     -- Exercise daily   -- No naps   -- No caffeine after 3pm   -- No screens the hour before bed (eg TV, cell phone, tablets, E-readers, laptops, etc)   -- No TVs in bedroom   -- Keep the same bed time and wake time 7 days a week   -- Take your sleep aid 30-60 minutes before bedtime   -- Relaxing routine before bed   -- Lay down in bed when tired, and go to sleep      [place] counselors/therapists   Telephone Hours Kids? Address   [facility]  (Many counselors) [phone] M-Th 8-5  F 8-12 Yes [address] [place] [address]   [email or web address]://[email or web address]   [facility]  (Many counselors) [phone]  Yes [address]   http://www.[email or web address]   [facility]  (Many counselors) [phone]  Yes [address]  http://[email or web address]/   [facility]  Baltahalina Gill [phone]  Yes [address] [facility]   [address]  [email or web address]   [facility]  Pj Urrutia [phone]  Yes [address]   Rena Craig [phone]   [address]  [email or web address]   Sandra [phone]   [address]   Juani Mcdaniel [phone]   [address] [place] [address]   Lorena Chandler [phone]  Yes [address]   Allison Owens [phone]   [address]    Michel Rae [phone]   [address] [place] [address]   Rosie Aureliano [phone]   10   [address] [place] [address]   [email or web address]://[email or web address]   Joaquinasaima Loza [phone]   [address] [place] [address] [address]  ([facility])  [email or web address]   [facility]  Elmo Cuello [phone]   [address]  Michele Rinne Cindy Thomas [phone]      [facility]  Dada Ramesh [phone]  Yes [address] [place] [address], [place] [address]  [email or web address]   [facility]  Lefty Norris CNP [phone] M-F 8-5 Yes [address] [place], [place]  [email or web address]   [facility]: Isabel [phone]  Yes OSITO Hall  [facility] [address]  [email or web address]   [facility]: [place] [phone]  Yes OSITO Denise  [address] [place] [address]  [email or web address]

## 2018-08-20 NOTE — PROGRESS NOTES
Subjective  Michael Martinez is a 82 year old male who presents for multiple concerns.  He developed a pain at the back of the right neck about a week ago.  Occasionally radiates up to the top.  He cannot understand it because he really likes his pillow.  He has been feeling very sad.  He can cry at the drop of a hat.  He was at a friend's house and met someone who had the same type of job that he did as a  and he started crying about that.  He cannot sleep at night.  He has a hard time falling and staying asleep.  He thinks everything has been worse since he had Guillain-Barré syndrome a couple of years ago.  No suicidal ideation.  He owns multiple guns.    Problem List/PMH: reviewed in EMR, and made relevant updates today.  Medications: reviewed in EMR, and made relevant updates today.  Allergies: reviewed in EMR, and made relevant updates today.    Social Hx:  Social History   Substance Use Topics     Smoking status: Former Smoker     Packs/day: 0.90     Years: 9.00     Types: Cigarettes     Smokeless tobacco: Never Used     Alcohol use 0.0 oz/week      Comment: Alcoholic Drinks/day: Alcoholic Drinks/day: 1 beer every 2 weeks     Social History     Social History Narrative     2007 (sudden cardiac death). Three children, retired in  as a .  Spends his donis in [place]     I reviewed social history and made relevant updates today.    Family Hx:   Family History   Problem Relation Age of Onset     HEART DISEASE Mother      Heart Disease     Other - See Comments Father      No Known Problems     Breast Cancer Sister      Cancer-breast,Otherwise healthy     Other - See Comments Brother      BPH otherwise healthy at 72     Arthritis Brother      Arthritis,Osteoarthritis, otherwise healthy at 66     Colon Cancer Brother      Cancer-colon     Breast Cancer Sister 59     Cancer-breast,       Objective  Vitals: reviewed in EMR.  /74  Pulse 60  Temp 97.6  F (36.4  " C) (Tympanic)  Ht 5' 9\" (1.753 m)  Wt 170 lb 4 oz (77.2 kg)  SpO2 98%  BMI 25.14 kg/m2    Gen: Pleasant male, NAD.  HEENT: MMM area mild tenderness to palpation right occipital prominence without crepitus.  Neck: Supple  Pulm: Breathing easily  Neuro: Grossly intact  Skin: No concerning lesions.  Psychiatric: Normal affect and insight.  Seems sad occasionally tearful through exam    PHQ-9 SCORE 6/4/2018 6/21/2018 8/20/2018   Total Score 9 4 11     OLIVIA-7 SCORE 6/4/2018   Total Score 9       Assessment    ICD-10-CM    1. Occipital neuralgia of right side M54.81    2. Mood disorder (H) F39 citalopram (CELEXA) 10 MG tablet   3. Insomnia, unspecified type G47.00          Plan   -- Expected clinical course discussed   -- Medications and their side effects discussed  Patient Instructions      -- See a counselor   -- Start citalopram   -- Get a protime checked in 2 weeks   -- 211 for mental health emergency     -- Exercise daily   -- No naps   -- No caffeine after 3pm   -- No screens the hour before bed (eg TV, cell phone, tablets, E-readers, laptops, etc)   -- No TVs in bedroom   -- Keep the same bed time and wake time 7 days a week   -- Take your sleep aid 30-60 minutes before bedtime   -- Relaxing routine before bed   -- Lay down in bed when tired, and go to sleep      [place] counselors/therapists   Telephone Hours Kids? Address   [facility]  (Many counselors) [phone] M-Th 8-5  F 8-12 Yes [address] [place] [address]   [email or web address]://[email or web address]   [facility]  (Many counselors) [phone]  Yes [address]   http://[email or web address].[facility].[email or web address]   [facility]  (Many counselors) [phone]  Yes [address] [place]  http://www.[facility].[email or web address]/   [facility]  Balta Gill [phone]  Yes [address] [facility]   [address]  [email or web address]   [facility]  Pj Urrutia [phone]  Yes [address]   Rena Craig [phone]   [address]  [email or web address]   Sandra [phone]   [address]   Juani Mcdaniel [phone]   [address] [facility] [address]   Lorena Chandler [phone]  Yes [address]   Allison Owens [phone]   [address] [place]    Michel Rae [phone]   [address] [facility] [address]   Rosie Bedoya [phone]   10   [address] [place] [address]   [email or web address]   Joaquina Loza [phone]   [address] [place] [address] [address]  ([facility])  [email or web address]   [facility]  Elmo Cuello [phone]   [address] [place] [address] Counseling  Michele Rinne Cindy Thomas [phone]      [facility]  Dada Chandler [phone]  Yes [address]  [email or web address]   [facility]  Lefty Norris, CNP [phone] M-F 8-5 Yes [address] [place], [place]  [email or web address]   [facility]: Isabel [phone]  Yes OSITO Hall  [address]  http://[email or web address]   [facility]: [place] [phone]  Yes OSITO Denise  [address] [facility]  http://www.[facility].[email or web address]/           Return in about 6 weeks (around 10/1/2018) for depression.    SignedIsma MD  Internal Medicine & Pediatrics    Total time 35 minutes, over half spent counseling and coordinating care regarding depression.

## 2018-08-20 NOTE — MR AVS SNAPSHOT
After Visit Summary   8/20/2018    Michael Martinez    MRN: [id number]           Patient Information     Date Of Birth          1935        Visit Information        Provider Department      8/20/2018 9:30 AM Isma Martinez MD [facility]        Today's Diagnoses     Occipital neuralgia of right side    -  1    Mood disorder (H)        Insomnia, unspecified type          Care Instructions     -- See a counselor   -- Start citalopram   -- Get a protime checked in 2 weeks   -- 211 for mental health emergency     -- Exercise daily   -- No naps   -- No caffeine after 3pm   -- No screens the hour before bed (eg TV, cell phone, tablets, E-readers, laptops, etc)   -- No TVs in bedroom   -- Keep the same bed time and wake time 7 days a week   -- Take your sleep aid 30-60 minutes before bedtime   -- Relaxing routine before bed   -- Lay down in bed when tired, and go to sleep      [place] counselors/therapists   Telephone Hours Kids? Address   [facility]  (Many counselors) [phone] M-Th 8-5  F 8-12 Yes [address] [place] [address]   [email or web address]://[email or web address].[facility]   [facility]  (Many counselors) [phone]  Yes [address]   http://[email or web address].[email or web address]   [facility]  (Many counselors) [phone]  Yes [facility] [place]  http://www.[email or web address]/   [facility]  Balta Gill [phone]  Yes [address] .   [address]  [email or web address]   [facility]  Pj Urrutia [phone]  Yes [address]   Rena Craig [phone]   [address]  [email or web address]   Sandra [phone]   [address]   Juani Mcdaniel [phone]   [address] [place] [address]   Lorena Chandler [phone]  Yes [address]   Allison Owens [phone]   [address] [place]    Michel Rae [phone]   [address] [place] [address]   Rosie Bedoya [phone]   [address]   [address]   [email or web address]   Joaquina Loza [phone]   [address] [facility] [address]  ([facility])  [email or web address]   [facility]  Elmo Cuate [phone]   [address] [facility]  Michele Rinne Cindy Thomas [phone]      [facility]  Dada Chandler [phone]  Yes [address] [place] [address], [address]  [email or web address]   [facility]  Lefty Norris CNP [phone] M-F 8-5 Yes [address] [place], [place]  [email or web address]   [facility]: Isabel [phone]  Yes OSITO Hall  [address]  http://www.[email or web address]   [facility]: [place] [phone]  Yes OSITO Denise  [address] [facility]  http://[email or web address]               Follow-ups after your visit        Follow-up notes from your care team     Return in about 6 weeks (around 10/1/2018) for depression.      Who to contact     If you have questions or need follow up information about today's clinic visit or your schedule please contact [facility] directly at [phone].  Normal or non-critical lab and imaging results will be communicated to you by MyChart, letter or phone within 4 business days after the clinic has received the results. If you do not hear from us within 7 days, please contact the clinic through MyChart or phone. If you have a critical or abnormal lab result, we will notify you by phone as soon as possible.  Submit refill requests through [employer] or call your pharmacy and they will forward the refill request to us. Please allow 3 business days for your refill to be completed.          Additional Information About Your Visit        Care EveryWhere ID     This is your Care EveryWhere ID. This could be used by other organizations to access your [place] medical records  [id number]        Your Vitals Were     Pulse Temperature Height Pulse  "Oximetry BMI (Body Mass Index)       60 97.6  F (36.4  C) (Tympanic) 5' 9\" (1.753 m) 98% 25.14 kg/m2        Blood Pressure from Last 3 Encounters:   08/20/18 138/74   06/21/18 120/76   06/12/18 155/78    Weight from Last 3 Encounters:   08/20/18 170 lb 4 oz (77.2 kg)   06/21/18 172 lb (78 kg)   06/12/18 170 lb (77.1 kg)              Today, you had the following     No orders found for display         Today's Medication Changes          These changes are accurate as of 8/20/18 10:04 AM.  If you have any questions, ask your nurse or doctor.               Start taking these medicines.        Dose/Directions    citalopram 10 MG tablet   Commonly known as:  celeXA   Used for:  Mood disorder (H)   Started by:  Isma Martinez MD        Dose:  10 mg   Take 1 tablet (10 mg) by mouth daily   Quantity:  90 tablet   Refills:  1            Where to get your medicines      These medications were sent to [facility] - [place], [place] - [address] OF  [address]  [address], [facility] [address]     Phone:  [phone]     citalopram 10 MG tablet                Primary Care Provider Office Phone # Fax #    Dc Vargas [phone]       [address] [facility] [address]        Equal Access to Services     [facility] AH: Hadii jeb wellero Soyohan, waaxda luqadaha, qaybta kaalmada eduardoyada, alfredo taylor. So [facility] [phone].    ATENCIÓN: Si habla español, tiene a salinas disposición servicios gratuitos de asistencia lingüística. Llame al [phone].    We comply with applicable federal civil rights laws and [place] laws. We do not discriminate on the basis of race, color, national origin, age, disability, sex, sexual orientation, or gender identity.            Thank you!     Thank you for choosing [facility]  for your care. Our goal is always to provide you with excellent care. Hearing back from " our patients is one way we can continue to improve our services. Please take a few minutes to complete the written survey that you may receive in the mail after your visit with us. Thank you!             Your Updated Medication List - Protect others around you: Learn how to safely use, store and throw away your medicines at www.disposemymeds.org.          This list is accurate as of 8/20/18 10:04 AM.  Always use your most recent med list.                   Brand Name Dispense Instructions for use Diagnosis    aspirin 81 MG EC tablet      Take 81 mg by mouth        atorvastatin 10 MG tablet    LIPITOR     Take 10 mg by mouth Alternate with Pravastatin        citalopram 10 MG tablet    celeXA    90 tablet    Take 1 tablet (10 mg) by mouth daily    Mood disorder (H)       metoprolol succinate 25 MG 24 hr tablet    TOPROL-XL     Take 12.5 mg by mouth daily        omeprazole 20 MG CR capsule    priLOSEC     Take 20 mg by mouth Take every other day.        pravastatin 20 MG tablet    PRAVACHOL    30 tablet    20 mg every other day    Mixed hyperlipidemia       ranitidine 150 MG tablet    ZANTAC    180 tablet    Take 1 tablet (150 mg) by mouth 2 times daily as needed for heartburn    Gastroesophageal reflux disease, esophagitis presence not specified       Vitamin D-3 1000 units Caps      Take 1 capsule by mouth daily        * warfarin 5 MG tablet    COUMADIN     Take 5 mg x 3 days and 7.5 mg x 4 days/week or as directed by protime clinic        * warfarin 7.5 MG tablet    COUMADIN     Take 5 mg x 3 days and 7.5 mg x 4 days/week or as directed by protime clinic        * Notice:  This list has 2 medication(s) that are the same as other medications prescribed for you. Read the directions carefully, and ask your doctor or other care provider to review them with you.

## 2018-08-21 ASSESSMENT — PATIENT HEALTH QUESTIONNAIRE - PHQ9: SUM OF ALL RESPONSES TO PHQ QUESTIONS 1-9: 11

## 2018-08-29 ENCOUNTER — ANTICOAGULATION THERAPY VISIT (OUTPATIENT)
Dept: ANTICOAGULATION | Facility: OTHER | Age: 83
End: 2018-08-29
Attending: FAMILY MEDICINE
Payer: COMMERCIAL

## 2018-08-29 DIAGNOSIS — Z79.01 ANTICOAGULATION MONITORING, INR RANGE 2-3: ICD-10-CM

## 2018-08-29 DIAGNOSIS — Z86.711 HISTORY OF PULMONARY EMBOLISM: ICD-10-CM

## 2018-08-29 DIAGNOSIS — Z86.718 PERSONAL HISTORY OF DVT (DEEP VEIN THROMBOSIS): ICD-10-CM

## 2018-08-29 DIAGNOSIS — Z79.01 LONG-TERM (CURRENT) USE OF ANTICOAGULANTS: ICD-10-CM

## 2018-08-29 LAB — INR POINT OF CARE: 1.6 (ref 2–3)

## 2018-08-29 PROCEDURE — 85610 PROTHROMBIN TIME: CPT | Mod: QW

## 2018-08-29 PROCEDURE — 36416 COLLJ CAPILLARY BLOOD SPEC: CPT

## 2018-08-29 NOTE — PROGRESS NOTES
ANTICOAGULATION FOLLOW-UP CLINIC VISIT    Patient Name:  Michael Martinez  Date:  8/29/2018  Contact Type:  Face to Face    SUBJECTIVE:     Patient Findings     Positives Change in medications (started Celexa)           OBJECTIVE    INR Protime   Date Value Ref Range Status   08/29/2018 1.6 (A) 2.0 - 3.0 Final       ASSESSMENT / PLAN  INR assessment SUB    Recheck INR In: 2 WEEKS    INR Location Clinic      Anticoagulation Summary as of 8/29/2018     INR goal 2.0-3.0   Today's INR 1.6!   Warfarin maintenance plan 5 mg (5 mg x 1) on Mon, Fri; 7.5 mg (7.5 mg x 1) all other days   Full warfarin instructions 5 mg on Mon, Fri; 7.5 mg all other days   Weekly warfarin total 47.5 mg   Plan last modified Maria Del Rosario Trujillo RN (8/29/2018)   Next INR check 9/12/2018   Target end date Indefinite    Indications   Long-term (current) use of anticoagulants [Z79.01] [Z79.01]  Anticoagulation monitoring  INR range 2-3 [Z79.01]  History of pulmonary embolism [Z86.711]  Personal history of DVT (deep vein thrombosis) [Z86.718]         Anticoagulation Episode Summary     INR check location     Preferred lab     Send INR reminders to  INR    Comments       Anticoagulation Care Providers     Provider Role Specialty Phone number    Dc Vargas MD [facility] [phone]            See the Encounter Report to view Anticoagulation Flowsheet and Dosing Calendar (Go to Encounters tab in chart review, and find the Anticoagulation Therapy Visit)        Maria Del Rosario Trujillo RN

## 2018-08-29 NOTE — MR AVS SNAPSHOT
Michael Martinez   8/29/2018 8:45 AM   Anticoagulation Therapy Visit    Description:  82 year old male   Provider:  S[facility] ANTI COAG 2   Department:  [facility]           INR as of 8/29/2018     Today's INR 1.6!      Anticoagulation Summary as of 8/29/2018     INR goal 2.0-3.0   Today's INR 1.6!   Full warfarin instructions 5 mg on Mon, Fri; 7.5 mg all other days   Next INR check 9/12/2018    Indications   Long-term (current) use of anticoagulants [Z79.01] [Z79.01]  Anticoagulation monitoring  INR range 2-3 [Z79.01]  History of pulmonary embolism [Z86.711]  Personal history of DVT (deep vein thrombosis) [Z86.718]         Description     Increase Warfarin/Coumadin and recheck INR in 2 weeks.  Maria Del Rosario Trujillo ....................  8/29/2018   8:44 AM          Your next Anticoagulation Clinic appointment(s)     Aug 29, 2018  8:45 AM CDT   Anticoagulation Visit with SUS[facility] ANTI [facility]AG 2   [facility] ([facility])    [address]  [place] [address]   [phone]              August 2018 Details    Sun Mon Tue Wed Thu Fri Sat        1               2               3               4                 5               6               7               8               9               10               11                 12               13               14               15               16               17               18                 19               20               21               22               23               24               25                 26               27               28               29      7.5 mg   See details      30      7.5 mg         31      5 mg           Date Details   08/29 This INR check               How to take your warfarin dose     To take:  5 mg Take 1 of the 5 mg tablets.    To take:  7.5 mg Take 1 of the 7.5 mg tablets.           September 2018 Details    Sun Mon Tue Wed Thu Fri Sat           1      7.5 mg           2      7.5 mg          3      5 mg         4      7.5 mg         5      7.5 mg         6      7.5 mg         7      5 mg         8      7.5 mg           9      7.5 mg         10      5 mg         11      7.5 mg         12            13               14               15                 16               17               18               19               20               21               22                 23               24               25               26               27               28               29                 30                      Date Details   No additional details    Date of next INR:  9/12/2018         How to take your warfarin dose     To take:  5 mg Take 1 of the 5 mg tablets.    To take:  7.5 mg Take 1 of the 7.5 mg tablets.

## 2018-08-31 DIAGNOSIS — Z95.1 S/P CABG X 4: Primary | ICD-10-CM

## 2018-08-31 RX ORDER — METOPROLOL SUCCINATE 25 MG/1
12.5 TABLET, EXTENDED RELEASE ORAL DAILY
Qty: 45 TABLET | Refills: 2 | Status: SHIPPED | OUTPATIENT
Start: 2018-08-31 | End: 2019-04-15

## 2018-08-31 NOTE — TELEPHONE ENCOUNTER
"Refill request from [facility] for  metoprolol succinate (TOPROL-XL) 25 MG 24 hr tablet    LOV 8/20/2018    LOV with PCP 8/7/2017    Upcoming Appt with Dr. Martinez 10/1/2018 and has expressed desire to establish care with him since Dr. Mock will be leaving.     Contacted patient and they share that they are still taking medication at 1/2 of a 25 mg tablet once daily and states he will be on medication for life.  Pt leaving to [place] October 2018 for 5 months.    Note added to appt 10/1/2018    Medication refilled.    Requested Prescriptions   Pending Prescriptions Disp Refills     metoprolol succinate (TOPROL-XL) 25 MG 24 hr tablet 30 tablet      Sig: Take 0.5 tablets (12.5 mg) by mouth daily    Beta-Blockers Protocol Passed    8/31/2018  3:30 PM       Passed - Blood pressure under 140/90 in past 12 months    BP Readings from Last 3 Encounters:   08/20/18 138/74   06/21/18 120/76   06/12/18 155/78          Passed - Patient is age 6 or older       Passed - Recent (12 mo) or future (30 days) visit within the authorizing provider's specialty    Patient had office visit in the last 12 months or has a visit in the next 30 days with authorizing provider or within the authorizing provider's specialty.  See \"Patient Info\" tab in inbasket, or \"Choose Columns\" in Meds & Orders section of the refill encounter.          Kandy Ford RN  ....................  8/31/2018   3:35 PM        "

## 2018-09-12 ENCOUNTER — ANTICOAGULATION THERAPY VISIT (OUTPATIENT)
Dept: ANTICOAGULATION | Facility: OTHER | Age: 83
End: 2018-09-12
Attending: FAMILY MEDICINE
Payer: COMMERCIAL

## 2018-09-12 DIAGNOSIS — Z79.01 ANTICOAGULATION MONITORING, INR RANGE 2-3: ICD-10-CM

## 2018-09-12 DIAGNOSIS — Z86.718 PERSONAL HISTORY OF DVT (DEEP VEIN THROMBOSIS): ICD-10-CM

## 2018-09-12 DIAGNOSIS — Z79.01 LONG-TERM (CURRENT) USE OF ANTICOAGULANTS: ICD-10-CM

## 2018-09-12 DIAGNOSIS — Z86.711 HISTORY OF PULMONARY EMBOLISM: ICD-10-CM

## 2018-09-12 LAB — INR POINT OF CARE: 2.6 (ref 0.86–1.14)

## 2018-09-12 PROCEDURE — 85610 PROTHROMBIN TIME: CPT | Mod: QW

## 2018-09-12 PROCEDURE — 36416 COLLJ CAPILLARY BLOOD SPEC: CPT

## 2018-09-12 NOTE — MR AVS SNAPSHOT
Michael Martinez   9/12/2018 8:45 AM   Anticoagulation Therapy Visit    Description:  82 year old male   Provider:  SUSIE ANTI COAG 1   Department:  [facility]           INR as of 9/12/2018     Today's INR 2.6      Anticoagulation Summary as of 9/12/2018     INR goal 2.0-3.0   Today's INR 2.6   Full warfarin instructions 5 mg on Mon, Fri; 7.5 mg all other days   Next INR check 10/3/2018    Indications   Long-term (current) use of anticoagulants [Z79.01] [Z79.01]  Anticoagulation monitoring  INR range 2-3 [Z79.01]  History of pulmonary embolism [Z86.711]  Personal history of DVT (deep vein thrombosis) [Z86.718]         Description     Continue same dose and recheck in 3 weeks. ...............Radha Schwab RN on 9/12/2018 at 8:34 AM          Your next Anticoagulation Clinic appointment(s)     Sep 12, 2018  8:45 AM CDT   Anticoagulation Visit with SUSIE ANTI ISRAELG 1   [facility] ([facility])    [address]  [facility] [address]   [phone]              September 2018 Details    Sun Mon Tue Wed Thu Fri Sat           1                 2               3               4               5               6               7               8                 9               10               11               12      7.5 mg   See details      13      7.5 mg         14      5 mg         15      7.5 mg           16      7.5 mg         17      5 mg         18      7.5 mg         19      7.5 mg         20      7.5 mg         21      5 mg         22      7.5 mg           23      7.5 mg         24      5 mg         25      7.5 mg         26      7.5 mg         27      7.5 mg         28      5 mg         29      7.5 mg           30      7.5 mg                Date Details   09/12 This INR check               How to take your warfarin dose     To take:  5 mg Take 1 of the 5 mg tablets.    To take:  7.5 mg Take 1 of the 7.5 mg tablets.           October 2018 Details    [place] Mon  Tue Wed Thu Fri Sat      1      5 mg         2      7.5 mg         3            4               5               6                 7               8               9               10               11               12               13                 14               15               16               17               18               19               20                 21               22               23               24               25               26               27                 28               29               30               31                   Date Details   No additional details    Date of next INR:  10/3/2018         How to take your warfarin dose     To take:  5 mg Take 1 of the 5 mg tablets.    To take:  7.5 mg Take 1 of the 7.5 mg tablets.

## 2018-09-27 DIAGNOSIS — Z86.718 PERSONAL HISTORY OF DVT (DEEP VEIN THROMBOSIS): Primary | ICD-10-CM

## 2018-09-27 RX ORDER — WARFARIN SODIUM 5 MG/1
TABLET ORAL
Qty: 30 TABLET | Refills: 1 | Status: SHIPPED | OUTPATIENT
Start: 2018-09-27 | End: 2019-02-23

## 2018-09-27 RX ORDER — WARFARIN SODIUM 7.5 MG/1
TABLET ORAL
Qty: 90 TABLET | Refills: 1 | Status: SHIPPED | OUTPATIENT
Start: 2018-09-27 | End: 2019-06-25

## 2018-09-27 NOTE — TELEPHONE ENCOUNTER
Prescription approved per [facility] Refill Protocol.  Naya Diallo RN    9/27/2018  9:52 AM

## 2018-10-03 ENCOUNTER — ANTICOAGULATION THERAPY VISIT (OUTPATIENT)
Dept: ANTICOAGULATION | Facility: OTHER | Age: 83
End: 2018-10-03
Attending: FAMILY MEDICINE
Payer: COMMERCIAL

## 2018-10-03 DIAGNOSIS — Z86.711 HISTORY OF PULMONARY EMBOLISM: ICD-10-CM

## 2018-10-03 DIAGNOSIS — Z86.718 PERSONAL HISTORY OF DVT (DEEP VEIN THROMBOSIS): ICD-10-CM

## 2018-10-03 DIAGNOSIS — Z79.01 ANTICOAGULATION MONITORING, INR RANGE 2-3: ICD-10-CM

## 2018-10-03 LAB — INR POINT OF CARE: 1.9 (ref 2–3)

## 2018-10-03 PROCEDURE — 36416 COLLJ CAPILLARY BLOOD SPEC: CPT

## 2018-10-03 PROCEDURE — 85610 PROTHROMBIN TIME: CPT | Mod: QW

## 2018-10-03 NOTE — MR AVS SNAPSHOT
Michael Martinez   10/3/2018 8:45 AM   Anticoagulation Therapy Visit    Description:  82 year old male   Provider:  SUSIE ANTI COAG 1   Department:  [facility]           INR as of 10/3/2018     Today's INR 1.9!      Anticoagulation Summary as of 10/3/2018     INR goal 2.0-3.0   Today's INR 1.9!   Full warfarin instructions 5 mg on Mon, Fri; 7.5 mg all other days   Next INR check 10/29/2018    Indications   Long-term (current) use of anticoagulants [Z79.01] [Z79.01]  Anticoagulation monitoring  INR range 2-3 [Z79.01]  History of pulmonary embolism [Z86.711]  Personal history of DVT (deep vein thrombosis) [Z86.718]         Description     Continue current dose and recheck (10/29/2018).  Naya Diallo RN on 10/3/2018 at 8:55 AM        October 2018 Details    Sun Mon Tue Wed Thu Fri Sat      1               2               3      7.5 mg   See details      4      7.5 mg         5      5 mg         6      7.5 mg           7      7.5 mg         8      5 mg         9      7.5 mg         10      7.5 mg         11      7.5 mg         12      5 mg         13      7.5 mg           14      7.5 mg         15      5 mg         16      7.5 mg         17      7.5 mg         18      7.5 mg         19      5 mg         20      7.5 mg           21      7.5 mg         22      5 mg         23      7.5 mg         24      7.5 mg         25      7.5 mg         26      5 mg         27      7.5 mg           28      7.5 mg         29            30               31                   Date Details   10/03 This INR check       Date of next INR:  10/29/2018         How to take your warfarin dose     To take:  5 mg Take 1 of the 5 mg tablets.    To take:  7.5 mg Take 1 of the 7.5 mg tablets.

## 2018-10-03 NOTE — PROGRESS NOTES
ANTICOAGULATION FOLLOW-UP CLINIC VISIT    Patient Name:  Michael Martinez  Date:  10/3/2018  Contact Type:  Face to Face    SUBJECTIVE:     Patient Findings     Positives Unexplained INR or factor level change           OBJECTIVE    INR Protime   Date Value Ref Range Status   10/03/2018 1.9 (A) 2.0 - 3.0 Final       ASSESSMENT / PLAN  INR assessment SUB    Recheck INR In: 4 WEEKS 10/29/2018   INR Location Clinic      Anticoagulation Summary as of 10/3/2018     INR goal 2.0-3.0   Today's INR 1.9!   Warfarin maintenance plan 5 mg (5 mg x 1) on Mon, Fri; 7.5 mg (7.5 mg x 1) all other days   Full warfarin instructions 5 mg on Mon, Fri; 7.5 mg all other days   Weekly warfarin total 47.5 mg   No change documented Naya Diallo RN   Plan last modified Maria Del Rosario Trujillo RN (8/29/2018)   Next INR check 10/29/2018   Target end date Indefinite    Indications   Long-term (current) use of anticoagulants [Z79.01] [Z79.01]  Anticoagulation monitoring  INR range 2-3 [Z79.01]  History of pulmonary embolism [Z86.711]  Personal history of DVT (deep vein thrombosis) [Z86.718]         Anticoagulation Episode Summary     INR check location     Preferred lab     Send INR reminders to  INR    Comments       Anticoagulation Care Providers     Provider Role Specialty Phone number    Dc Vargas MD [facility] [phone]            See the Encounter Report to view Anticoagulation Flowsheet and Dosing Calendar (Go to Encounters tab in chart review, and find the Anticoagulation Therapy Visit)        Naya Diallo, RN

## 2018-10-29 ENCOUNTER — ANTICOAGULATION THERAPY VISIT (OUTPATIENT)
Dept: ANTICOAGULATION | Facility: OTHER | Age: 83
End: 2018-10-29
Attending: INTERNAL MEDICINE
Payer: COMMERCIAL

## 2018-10-29 DIAGNOSIS — Z86.711 HISTORY OF PULMONARY EMBOLISM: ICD-10-CM

## 2018-10-29 DIAGNOSIS — Z86.718 PERSONAL HISTORY OF DVT (DEEP VEIN THROMBOSIS): ICD-10-CM

## 2018-10-29 DIAGNOSIS — Z79.01 ANTICOAGULATION MONITORING, INR RANGE 2-3: ICD-10-CM

## 2018-10-29 LAB — INR POINT OF CARE: 2.3 (ref 0.86–1.14)

## 2018-10-29 PROCEDURE — 36416 COLLJ CAPILLARY BLOOD SPEC: CPT

## 2018-10-29 PROCEDURE — 85610 PROTHROMBIN TIME: CPT | Mod: QW

## 2018-10-29 NOTE — PROGRESS NOTES
ANTICOAGULATION FOLLOW-UP CLINIC VISIT    Patient Name:  Michael Martinez  Date:  10/29/2018  Contact Type:  Face to Face    SUBJECTIVE:     Patient Findings     Positives No Problem Findings           OBJECTIVE    INR Protime   Date Value Ref Range Status   10/29/2018 2.3 (A) 0.86 - 1.14 Final       ASSESSMENT / PLAN  INR assessment THER    Recheck INR In: 4 WEEKS    INR Location Clinic      Anticoagulation Summary as of 10/29/2018     INR goal 2.0-3.0   Today's INR 2.3   Warfarin maintenance plan 5 mg (5 mg x 1) on Mon, Fri; 7.5 mg (7.5 mg x 1) all other days   Full warfarin instructions 5 mg on Mon, Fri; 7.5 mg all other days   Weekly warfarin total 47.5 mg   No change documented Naya Diallo RN   Plan last modified Maria Del Rosario Trujillo RN (8/29/2018)   Next INR check 4/5/2019   Priority INR   Target end date Indefinite    Indications   Long-term (current) use of anticoagulants [Z79.01] [Z79.01]  Anticoagulation monitoring  INR range 2-3 [Z79.01]  History of pulmonary embolism [Z86.711]  Personal history of DVT (deep vein thrombosis) [Z86.718]         Anticoagulation Episode Summary     INR check location     Preferred lab     Send INR reminders to  INR    Comments       Anticoagulation Care Providers     Provider Role Specialty Phone number    Dc Vargas MD [facility] [phone]            See the Encounter Report to view Anticoagulation Flowsheet and Dosing Calendar (Go to Encounters tab in chart review, and find the Anticoagulation Therapy Visit)        Naya Diallo, RN

## 2018-10-29 NOTE — MR AVS SNAPSHOT
Michael Martinez   10/29/2018 8:45 AM   Anticoagulation Therapy Visit    Description:  82 year old male   Provider:  SUSIE ANTI COAG 2   Department:  [facility]           INR as of 10/29/2018     Today's INR 2.3      Anticoagulation Summary as of 10/29/2018     INR goal 2.0-3.0   Today's INR 2.3   Full warfarin instructions 5 mg on Mon, Fri; 7.5 mg all other days   Next INR check 11/26/2018    Indications   Long-term (current) use of anticoagulants [Z79.01] [Z79.01]  Anticoagulation monitoring  INR range 2-3 [Z79.01]  History of pulmonary embolism [Z86.711]  Personal history of DVT (deep vein thrombosis) [Z86.718]         Description     Continue same Warfarin dose and recheck in 1 month.  Naya Diallo RN   10/29/2018    8:51 AM        October 2018 Details    Sun Mon Tue Wed Thu Fri Sat      1               2               3               4               5               6                 7               8               9               10               11               12               13                 14               15               16               17               18               19               20                 21               22               23               24               25               26               27                 28               29      5 mg   See details      30      7.5 mg         31      7.5 mg             Date Details   10/29 This INR check               How to take your warfarin dose     To take:  5 mg Take 1 of the 5 mg tablets.    To take:  7.5 mg Take 1 of the 7.5 mg tablets.           November 2018 Details    Sun Mon Tue Wed Thu Fri Sat         1      7.5 mg         2      5 mg         3      7.5 mg           4      7.5 mg         5      5 mg         6      7.5 mg         7      7.5 mg         8      7.5 mg         9      5 mg         10      7.5 mg           11      7.5 mg         12      5 mg         13      7.5 mg         14      7.5 mg         15      7.5 mg         16       5 mg         17      7.5 mg           18      7.5 mg         19      5 mg         20      7.5 mg         21      7.5 mg         22      7.5 mg         23      5 mg         24      7.5 mg           25      7.5 mg         26            27               28               29               30                 Date Details   No additional details    Date of next INR:  11/26/2018         How to take your warfarin dose     To take:  5 mg Take 1 of the 5 mg tablets.    To take:  7.5 mg Take 1 of the 7.5 mg tablets.

## 2018-11-06 DIAGNOSIS — E78.2 MIXED HYPERLIPIDEMIA: ICD-10-CM

## 2018-11-09 NOTE — TELEPHONE ENCOUNTER
"[facility] sent Rx request for the following:     pravastatin (PRAVACHOL) 20 MG tablet  20 mg every other day  Last Written Prescription Date:  8/14/17  Last Fill Quantity: 90,   # refills: 4    Last Office Visit: 8/20/18  Future Office visit: None. Pt was due to f/u with [facility] around 10/1, for depression. Patient was \"no show\" for appointment with [facility], on 10/1. Pt has no upcoming appointments.    Statins Protocol Mevryj21/9 1:21 PM   LDL on file in past 12 months    Recent (12 mo) or future (30 days) visit within the authorizing provider's specialty     omeprazole (PRILOSEC) 20 MG CR capsule  Take 1 capsule (20 mg) by mouth Take every other day.  PPI Protocol Ztgkrk47/20 2:51 PM   Recent (12 mo) or future (30 days) visit within the authorizing provider's specialty   Listed as historical.    atorvastatin (LIPITOR) 10 MG tablet  Take 1 tablet (10 mg) by mouth Alternate with Pravastatinse:  Statins Protocol Hoqyug55/20 2:51 PM   LDL on file in past 12 months    Recent (12 mo) or future (30 days) visit within the authorizing provider's specialty   Listed as historical.    Called and spoke to Patient after verifying last name and date of birth. Pt states that he is in [place] until March 28th. He reports that he went to a couple of consults for his depression stating, \"I didn't get much out of it, not for my situation, anyway. I'm not in that group of people, but did see some people, who needed help.\" When asked, Pt agreed that he has a good support system in [place].    He adds that he recently saw his PCP down there and sees cardiologist Monday. They discussed taking him off of atorvastatin, which he alternates every other day with pravastatin. He will call back next week with his lab results and any changes in orders. Adore Baker RN .............. 11/9/2018  1:59 PM    "

## 2018-11-20 RX ORDER — ATORVASTATIN CALCIUM 10 MG/1
10 TABLET, FILM COATED ORAL
OUTPATIENT
Start: 2018-11-20

## 2018-11-20 RX ORDER — PRAVASTATIN SODIUM 20 MG
TABLET ORAL
Qty: 47 TABLET | OUTPATIENT
Start: 2018-11-20

## 2018-11-20 NOTE — TELEPHONE ENCOUNTER
Patient failed to call back with changes in medications and with updated labs. Unable to reach Patient. Refill requests refused at this time, with note to pharmacy. Unable to complete prescription refill per RN Medication Refill Policy. Adore Baker RN .............. 11/20/2018  2:56 PM

## 2019-02-23 DIAGNOSIS — Z86.718 PERSONAL HISTORY OF DVT (DEEP VEIN THROMBOSIS): ICD-10-CM

## 2019-02-25 RX ORDER — WARFARIN SODIUM 5 MG/1
TABLET ORAL
Qty: 30 TABLET | Refills: 1 | Status: SHIPPED | OUTPATIENT
Start: 2019-02-25 | End: 2019-12-31

## 2019-02-25 NOTE — TELEPHONE ENCOUNTER
"Requested Prescriptions   Pending Prescriptions Disp Refills     warfarin (COUMADIN) 5 MG tablet [Pharmacy Med Name: WARFARIN SODIUM 5 MG Tablet] 30 tablet 1     Sig: TAKE 1 TABLET 2 DAYS PER WEEK OR AS DIRECTED BY PROTIME CLINIC (TAKE 7.5MG TABLET 5 DAYS PER WEEK)    Vitamin K Antagonists Failed - 2/23/2019  8:49 AM       Failed - INR is within goal in the past 6 weeks    Confirm INR is within goal in the past 6 weeks.     Recent Labs   Lab Test 10/29/18   INR 2.3*                      Passed - Recent (12 mo) or future (30 days) visit within the authorizing provider's specialty    Patient had office visit in the last 12 months or has a visit in the next 30 days with authorizing provider or within the authorizing provider's specialty.  See \"Patient Info\" tab in inbasket, or \"Choose Columns\" in Meds & Orders section of the refill encounter.             Passed - Medication is active on med list       Passed - Patient is 18 years of age or older        Patient is wintering in [place], has INRs checked there.  Will be returning next month and will recheck INR at [facility] at that time.  "

## 2019-03-06 ENCOUNTER — TRANSFERRED RECORDS (OUTPATIENT)
Dept: HEALTH INFORMATION MANAGEMENT | Facility: OTHER | Age: 84
End: 2019-03-06

## 2019-03-25 ENCOUNTER — TRANSFERRED RECORDS (OUTPATIENT)
Dept: HEALTH INFORMATION MANAGEMENT | Facility: OTHER | Age: 84
End: 2019-03-25

## 2019-04-10 ENCOUNTER — ANTICOAGULATION THERAPY VISIT (OUTPATIENT)
Dept: ANTICOAGULATION | Facility: OTHER | Age: 84
End: 2019-04-10
Payer: COMMERCIAL

## 2019-04-10 DIAGNOSIS — Z86.711 HISTORY OF PULMONARY EMBOLISM: ICD-10-CM

## 2019-04-10 DIAGNOSIS — Z79.01 ANTICOAGULATION MONITORING, INR RANGE 2-3: ICD-10-CM

## 2019-04-10 DIAGNOSIS — Z86.718 PERSONAL HISTORY OF DVT (DEEP VEIN THROMBOSIS): Primary | ICD-10-CM

## 2019-04-10 LAB — INR POINT OF CARE: 2 (ref 0.86–1.14)

## 2019-04-10 PROCEDURE — 85610 PROTHROMBIN TIME: CPT | Mod: QW

## 2019-04-10 PROCEDURE — 36416 COLLJ CAPILLARY BLOOD SPEC: CPT

## 2019-04-10 NOTE — PROGRESS NOTES
"ANTICOAGULATION FOLLOW-UP CLINIC VISIT    Patient Name:  Michael Martinez  Date:  4/10/2019  Contact Type:  Face to Face    SUBJECTIVE:     Patient Findings     Positives:   Change in medications (switched statins, started anxiety and heartburn medication in Nov.)    Comments:   Returned from [place], dose was decreased there \"a while ago\"           OBJECTIVE    INR Protime   Date Value Ref Range Status   04/10/2019 2.0 (A) 0.86 - 1.14 Final       ASSESSMENT / PLAN  INR assessment THER    Recheck INR In: 3 WEEKS    INR Location Clinic      Anticoagulation Summary  As of 4/10/2019    INR goal:   2.0-3.0   TTR:   92.8 % (2.4 y)   INR used for dosin.0 (4/10/2019)   Warfarin maintenance plan:   5 mg (5 mg x 1) every Mon, Fri; 7.5 mg (7.5 mg x 1) all other days   Full warfarin instructions:   5 mg every Mon, Fri; 7.5 mg all other days   Weekly warfarin total:   47.5 mg   Plan last modified:   Maria Del Rosario Trujillo RN (4/10/2019)   Next INR check:   2019   Priority:   INR   Target end date:   Indefinite    Indications    Long-term (current) use of anticoagulants [Z79.01] [Z79.01]  Anticoagulation monitoring  INR range 2-3 [Z79.01]  History of pulmonary embolism [Z86.711]  Personal history of DVT (deep vein thrombosis) [Z86.718]             Anticoagulation Episode Summary     INR check location:   Anticoagulation Clinic    Preferred lab:   [facility]    Send INR reminders to:    INR    Comments:         Anticoagulation Care Providers     Provider Role Specialty Phone number    Isma Martinez MD [facility] [phone]            See the Encounter Report to view Anticoagulation Flowsheet and Dosing Calendar (Go to Encounters tab in chart review, and find the Anticoagulation Therapy Visit)        Maria Del Rosario Trujillo RN                 "

## 2019-04-15 DIAGNOSIS — Z95.1 S/P CABG X 4: ICD-10-CM

## 2019-04-15 RX ORDER — METOPROLOL SUCCINATE 25 MG/1
12.5 TABLET, EXTENDED RELEASE ORAL DAILY
Qty: 45 TABLET | Refills: 1 | Status: SHIPPED | OUTPATIENT
Start: 2019-04-15 | End: 2019-12-06

## 2019-04-15 NOTE — TELEPHONE ENCOUNTER
"Requested Prescriptions   Pending Prescriptions Disp Refills     metoprolol succinate ER (TOPROL-XL) 25 MG 24 hr tablet 45 tablet 2     Sig: Take 0.5 tablets (12.5 mg) by mouth daily       Beta-Blockers Protocol Passed - 4/15/2019 11:32 AM        Passed - Blood pressure under 140/90 in past 12 months     BP Readings from Last 3 Encounters:   08/20/18 138/74   06/21/18 120/76   06/12/18 155/78                 Passed - Patient is age 6 or older        Passed - Recent (12 mo) or future (30 days) visit within the authorizing provider's specialty     Patient had office visit in the last 12 months or has a visit in the next 30 days with authorizing provider or within the authorizing provider's specialty.  See \"Patient Info\" tab in inbasket, or \"Choose Columns\" in Meds & Orders section of the refill encounter.              Passed - Medication is active on med list        LOV 08/20/2018  Prescription approved per [facility] Refill Protocol.    "

## 2019-04-17 ENCOUNTER — TELEPHONE (OUTPATIENT)
Dept: PEDIATRICS | Facility: OTHER | Age: 84
End: 2019-04-17

## 2019-04-17 NOTE — TELEPHONE ENCOUNTER
Patient called in for an appointment for a consultation regarding a reaction to  his anti-depression medication.  He is having upset stomach, muscle pains, headache, sweating..    He took the first available of 04/29/2019 at 11:00 but would like to be seen sooner if possible    Please call to schedule    Thank you

## 2019-04-18 NOTE — TELEPHONE ENCOUNTER
Writer notes message as per scheduling staff about a work in request. Call placed to patient to discuss as he could see any provider in the interim to discuss his concerns as noted with relation to a reaction to his anti-depressant.     Patient was not available at this time and writer was not able to leave a message for patient. PCP with same day slots open tomorrow and PCP's primary LPN is working today.     Would PCP be willing to see patient in a same day slot for tomorrow?    Writer will route call to PCP's primary nurse for her consideration of use of a same day slot for tomorrow.    David Arcos, RN on 4/18/2019 at 11:37 AM

## 2019-04-19 ENCOUNTER — OFFICE VISIT (OUTPATIENT)
Dept: PEDIATRICS | Facility: OTHER | Age: 84
End: 2019-04-19
Attending: INTERNAL MEDICINE
Payer: COMMERCIAL

## 2019-04-19 VITALS
HEIGHT: 69 IN | TEMPERATURE: 98.6 F | WEIGHT: 174.25 LBS | DIASTOLIC BLOOD PRESSURE: 72 MMHG | RESPIRATION RATE: 20 BRPM | BODY MASS INDEX: 25.81 KG/M2 | SYSTOLIC BLOOD PRESSURE: 130 MMHG | HEART RATE: 64 BPM

## 2019-04-19 DIAGNOSIS — K21.9 GASTROESOPHAGEAL REFLUX DISEASE, ESOPHAGITIS PRESENCE NOT SPECIFIED: ICD-10-CM

## 2019-04-19 DIAGNOSIS — F41.1 GAD (GENERALIZED ANXIETY DISORDER): Primary | ICD-10-CM

## 2019-04-19 DIAGNOSIS — Z86.69 HISTORY OF GUILLAIN-BARRE SYNDROME: Chronic | ICD-10-CM

## 2019-04-19 PROCEDURE — 99214 OFFICE O/P EST MOD 30 MIN: CPT | Performed by: INTERNAL MEDICINE

## 2019-04-19 ASSESSMENT — ANXIETY QUESTIONNAIRES
5. BEING SO RESTLESS THAT IT IS HARD TO SIT STILL: NEARLY EVERY DAY
1. FEELING NERVOUS, ANXIOUS, OR ON EDGE: NEARLY EVERY DAY
7. FEELING AFRAID AS IF SOMETHING AWFUL MIGHT HAPPEN: NOT AT ALL
IF YOU CHECKED OFF ANY PROBLEMS ON THIS QUESTIONNAIRE, HOW DIFFICULT HAVE THESE PROBLEMS MADE IT FOR YOU TO DO YOUR WORK, TAKE CARE OF THINGS AT HOME, OR GET ALONG WITH OTHER PEOPLE: NOT DIFFICULT AT ALL
2. NOT BEING ABLE TO STOP OR CONTROL WORRYING: SEVERAL DAYS
3. WORRYING TOO MUCH ABOUT DIFFERENT THINGS: MORE THAN HALF THE DAYS
GAD7 TOTAL SCORE: 12
6. BECOMING EASILY ANNOYED OR IRRITABLE: NOT AT ALL

## 2019-04-19 ASSESSMENT — PATIENT HEALTH QUESTIONNAIRE - PHQ9
5. POOR APPETITE OR OVEREATING: NEARLY EVERY DAY
SUM OF ALL RESPONSES TO PHQ QUESTIONS 1-9: 2

## 2019-04-19 ASSESSMENT — PAIN SCALES - GENERAL: PAINLEVEL: NO PAIN (0)

## 2019-04-19 ASSESSMENT — MIFFLIN-ST. JEOR: SCORE: 1480.77

## 2019-04-19 NOTE — PATIENT INSTRUCTIONS
-- Stop citalopram   -- Establish with a new counselor (maybe Pj)   -- Daily exercise   -- Healthy foods   -- Follow-up if you want to add medicine    [place] counselors/therapists   Telephone Hours Kids? Address   [facility]  (Many counselors) [phone] M-Th 8-5  F 8-12 Yes [address]   [email or web address]   [facility]  (Many counselors) [phone]  Yes [address]   [email or web address]   [facility]  (Many counselors) [phone]  Yes [address]  [email or web address]   [facility]  Baltahalina Gill [phone]  Yes [address]   [address]  [email or web address]   [facility]  Pj Urrutia [phone]  Yes [address]   Rena Craig [phone]   [address]  [email or web address]   Jocelynn Dobbs [phone]   [address]   Juani Mcdaniel [phone]   [address]   Lorena Ramesh [phone]  Yes [address]   Allison Owens [phone]   [address] [facility]   Michel Butch [phone]   [address]   Rosie Bedoya [phone]   [address] [place] [address]   [email or web address]   Joaquina Loza [phone]   [address] [place] [address] [address]  ([facility])  [email or web address]   [facility]  Elmo Cuello [phone]   [address] [facility]  Michele Rinne Cindy Thomas [phone]      [facility]  Lefty Nroris CNP [phone] M-F 8-5 Yes [address] [place], [place]  [email or web address]   [facility]: Isabel [phone]  Yes OSITO Hall  [address]  [email or web address]   [facility]: [place] [phone]  Yes [place] [address] [facility]  [email or web address]

## 2019-04-19 NOTE — NURSING NOTE
"Chief Complaint   Patient presents with     Medication Problem     thinks he is having side effects to a medication.     Just had a Medicare wellness visit in [place] first week in March. Also has lab work at that time.    Initial /72   Pulse 64   Temp 98.6  F (37  C) (Tympanic)   Resp 20   Ht 1.753 m (5' 9\")   Wt 79 kg (174 lb 4 oz)   BMI 25.73 kg/m   Estimated body mass index is 25.73 kg/m  as calculated from the following:    Height as of this encounter: 1.753 m (5' 9\").    Weight as of this encounter: 79 kg (174 lb 4 oz).    Medication Reconciliation: complete      Norma J. Gosselin, LPN  "

## 2019-04-19 NOTE — PROGRESS NOTES
Subjective  Michael Martinez is a 83 year old male who presents for medication side effects.  He recently returned from [place].  He had been feeling an increase in anxiety.  He saw his physician there Dr. Verona Ricks.  She had restarted citalopram, which previously worked when he was under the care of Dr. Vargas.  She prescribed 10 mg daily.  He was taking it every day but over the last several days he cut down to every other day.  Last dose was yesterday.  He thinks the medication is causing upset stomach and loose stools.  He feels like his stress is more situational based including when he has to plan on traveling, during traveling, interacting with his daughter.  History of gastroesophageal reflux disease which is well controlled with omeprazole.  He previously saw the counselor and had 2 sessions but did not get along with her because she had too many tattoos.  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  He continues to have neuropathic pain in his right hand with slight weakness which she attributes to GBS.    Problem List/PMH: reviewed in EMR, and made relevant updates today.  Medications: reviewed in EMR, and made relevant updates today.  Allergies: reviewed in EMR, and made relevant updates today.    Social Hx:  Social History     Tobacco Use     Smoking status: Former Smoker     Packs/day: 0.90     Years: 9.00     Pack years: 8.10     Types: Cigarettes     Smokeless tobacco: Never Used   Substance Use Topics     Alcohol use: Yes     Alcohol/week: 0.0 oz     Comment: Alcoholic Drinks/day: Alcoholic Drinks/day: 1 beer every 2 weeks     Drug use: Never     Social History     Social History Narrative     February 23, 2007 (sudden cardiac death). Three children, retired in 1998 as a .  Spends his donis in [place]     I reviewed social history and made relevant updates today.    Family Hx:   Family History   Problem Relation Age of Onset     Heart Disease Mother        "  Heart Disease     Other - See Comments Father         No Known Problems     Breast Cancer Sister         Cancer-breast,Otherwise healthy     Other - See Comments Brother         BPH otherwise healthy at 72     Arthritis Brother         Arthritis,Osteoarthritis, otherwise healthy at 66     Colon Cancer Brother         Cancer-colon     Breast Cancer Sister 59        Cancer-breast,       Objective  Vitals: reviewed in EMR.  /72   Pulse 64   Temp 98.6  F (37  C) (Tympanic)   Resp 20   Ht 1.753 m (5' 9\")   Wt 79 kg (174 lb 4 oz)   BMI 25.73 kg/m      Gen: Pleasant male, NAD.  HEENT: MMM  Neck: Supple  Pulm: Breathing easily  Neuro: Grossly intact  Skin: No concerning lesions.  Psychiatric: Normal affect and insight. Seems anxious.      PHQ-9 SCORE 2018   PHQ-9 Total Score 4 11 2     OLIVIA-7 SCORE 2018   Total Score 9 12       Assessment    ICD-10-CM    1. OLIVIA (generalized anxiety disorder) F41.1    2. 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 Gastroesophageal reflux disease, esophagitis presence not specified K21.9      We discussed the use of SSRIs at length today.  I think his side effects are likely from the initiation of the medication and would likely improve over time.  He is also not repeating any benefits at this point in time.  We discussed options including continuing citalopram, changing to an alternative agent, others.  He prefers to be off medication so I believe his wean was adequate at this low dose a prolonged wean is not necessary.  Strongly encouraged establishing with a new therapist.    Plan   -- Expected clinical course discussed   -- Medications and their side effects discussed  Patient Instructions      -- Stop citalopram   -- Establish with a new counselor (maybe [facility])   -- Daily exercise   -- Healthy foods   -- Follow-up if you want to add medicine    [place] counselors/therapists   Telephone Hours Kids? Address   [facility] " [facility]  (Many counselors) [phone] M-Th 8-5  F 8-12 Yes [address]   [email or web address]   [facility]  (Many counselors) [phone]  Yes [address]   [email or web address]   [facility]  (Many counselors) [phone]  Yes [address]  [email or web address]   [facility]  Balta Funmi Gill [phone]  Yes [address]   [address]  [email or web address]   [facility]  Pj Urrutia [phone]  Yes [address]   Rena Craig [phone]   [address]  [email or web address]   Jocelynn Dobbs [phone]   [address]   Juani Mcdaniel [phone]   [address]   Lorena Chandler [phone]  Yes [address]   Allison Owens [phone]   [address] [place]    Michel Butch [phone]   [address]   Rosie Bedoya [phone]   [address]   [email or web address]   Joaquina Loza [phone]   [address]  ([facility])  [email or web address] [email or web address]   [facility]  Elmo Cuello [phone]   [address] [facility]  Michele Rinne Cindy Thomas [phone]      [facility]  Lefty Norris, TRACEY [phone] M-F 8-5 Yes [address], [place]  [email or web address]   [facility]: [place] [phone]  Yes OSITO Hall  [address]  [email or web address]   [facility]: [place] [phone]  Yes OSITO Denise  [address]  [email or web address]           Return if symptoms worsen or fail to improve.    Signed, Isma Martinez MD  Internal Medicine & Pediatrics

## 2019-04-19 NOTE — TELEPHONE ENCOUNTER
Writer contacted patient. Advised him of same day slot available with PCP for 1430 today and offered him that appointment. Patient states he would like that appointment time and provider. He was transferred to scheduling staff for appointment as noted above. Writer will close this encounter at this time.    David Arcos RN on 4/19/2019 at 7:49 AM

## 2019-04-20 ASSESSMENT — ANXIETY QUESTIONNAIRES: GAD7 TOTAL SCORE: 12

## 2019-04-29 ENCOUNTER — OFFICE VISIT (OUTPATIENT)
Dept: PEDIATRICS | Facility: OTHER | Age: 84
End: 2019-04-29
Attending: INTERNAL MEDICINE
Payer: COMMERCIAL

## 2019-04-29 VITALS
DIASTOLIC BLOOD PRESSURE: 76 MMHG | SYSTOLIC BLOOD PRESSURE: 122 MMHG | WEIGHT: 177.1 LBS | TEMPERATURE: 97.4 F | HEIGHT: 69 IN | BODY MASS INDEX: 26.23 KG/M2 | HEART RATE: 58 BPM | RESPIRATION RATE: 16 BRPM

## 2019-04-29 DIAGNOSIS — F41.1 GAD (GENERALIZED ANXIETY DISORDER): Primary | ICD-10-CM

## 2019-04-29 ASSESSMENT — ANXIETY QUESTIONNAIRES
3. WORRYING TOO MUCH ABOUT DIFFERENT THINGS: NEARLY EVERY DAY
6. BECOMING EASILY ANNOYED OR IRRITABLE: NOT AT ALL
1. FEELING NERVOUS, ANXIOUS, OR ON EDGE: NEARLY EVERY DAY
GAD7 TOTAL SCORE: 15
5. BEING SO RESTLESS THAT IT IS HARD TO SIT STILL: NEARLY EVERY DAY
2. NOT BEING ABLE TO STOP OR CONTROL WORRYING: NEARLY EVERY DAY
7. FEELING AFRAID AS IF SOMETHING AWFUL MIGHT HAPPEN: NOT AT ALL
IF YOU CHECKED OFF ANY PROBLEMS ON THIS QUESTIONNAIRE, HOW DIFFICULT HAVE THESE PROBLEMS MADE IT FOR YOU TO DO YOUR WORK, TAKE CARE OF THINGS AT HOME, OR GET ALONG WITH OTHER PEOPLE: SOMEWHAT DIFFICULT

## 2019-04-29 ASSESSMENT — PAIN SCALES - GENERAL: PAINLEVEL: NO PAIN (0)

## 2019-04-29 ASSESSMENT — MIFFLIN-ST. JEOR: SCORE: 1493.7

## 2019-04-29 ASSESSMENT — PATIENT HEALTH QUESTIONNAIRE - PHQ9
5. POOR APPETITE OR OVEREATING: NEARLY EVERY DAY
SUM OF ALL RESPONSES TO PHQ QUESTIONS 1-9: 7

## 2019-04-29 NOTE — PROGRESS NOTES
He's not sure why he's here.  He had all his questions answered at our last visit.    Signed, Isma Martinez MD  Internal Medicine & Pediatrics

## 2019-04-29 NOTE — NURSING NOTE
Patient presents to clinic for medication review and F/U on lab work that was done last week.  Diane Erickson LPN ....................  4/29/2019   11:04 AM      No LMP for male patient.  Medication Reconciliation: complete    Diane Erickson LPN  4/29/2019 11:05 AM

## 2019-04-30 ASSESSMENT — ANXIETY QUESTIONNAIRES: GAD7 TOTAL SCORE: 15

## 2019-05-01 ENCOUNTER — ANTICOAGULATION THERAPY VISIT (OUTPATIENT)
Dept: ANTICOAGULATION | Facility: OTHER | Age: 84
End: 2019-05-01
Payer: COMMERCIAL

## 2019-05-01 DIAGNOSIS — Z86.718 PERSONAL HISTORY OF DVT (DEEP VEIN THROMBOSIS): ICD-10-CM

## 2019-05-01 DIAGNOSIS — Z86.711 HISTORY OF PULMONARY EMBOLISM: ICD-10-CM

## 2019-05-01 DIAGNOSIS — Z79.01 ANTICOAGULATION MONITORING, INR RANGE 2-3: ICD-10-CM

## 2019-05-01 LAB — INR POINT OF CARE: 2.1 (ref 0.86–1.14)

## 2019-05-01 PROCEDURE — 85610 PROTHROMBIN TIME: CPT | Mod: QW

## 2019-05-01 PROCEDURE — 36416 COLLJ CAPILLARY BLOOD SPEC: CPT

## 2019-05-01 NOTE — PROGRESS NOTES
ANTICOAGULATION FOLLOW-UP CLINIC VISIT    Patient Name:  Michael Martinez  Date:  2019  Contact Type:  Face to Face    SUBJECTIVE:     Patient Findings     Comments:   The patient was assessed for diet, medication, and activity level changes, missed or extra doses, bruising or bleeding, with no problem findings.           OBJECTIVE    INR Protime   Date Value Ref Range Status   2019 2.1 (A) 0.86 - 1.14 Final       ASSESSMENT / PLAN  INR assessment THER    Recheck INR In: 3 WEEKS    INR Location Clinic      Anticoagulation Summary  As of 2019    INR goal:   2.0-3.0   TTR:   93.0 % (2.4 y)   INR used for dosin.1 (2019)   Warfarin maintenance plan:   5 mg (5 mg x 1) every Mon; 7.5 mg (7.5 mg x 1) all other days   Full warfarin instructions:   5 mg every Mon; 7.5 mg all other days   Weekly warfarin total:   50 mg   Plan last modified:   Lakesha Tang RN (2019)   Next INR check:   2019   Priority:   INR   Target end date:   Indefinite    Indications    Long-term (current) use of anticoagulants [Z79.01] [Z79.01]  Anticoagulation monitoring  INR range 2-3 [Z79.01]  History of pulmonary embolism [Z86.711]  Personal history of DVT (deep vein thrombosis) [Z86.718]             Anticoagulation Episode Summary     INR check location:   Anticoagulation Clinic    Preferred lab:   [facility]    Send INR reminders to:    INR    Comments:         Anticoagulation Care Providers     Provider Role Specialty Phone number    Isma Martinez MD [facility] [phone]            See the Encounter Report to view Anticoagulation Flowsheet and Dosing Calendar (Go to Encounters tab in chart review, and find the Anticoagulation Therapy Visit)      Lakesha Tang RN on 2019 at 8:35 AM

## 2019-05-15 ENCOUNTER — OFFICE VISIT (OUTPATIENT)
Dept: PEDIATRICS | Facility: OTHER | Age: 84
End: 2019-05-15
Attending: INTERNAL MEDICINE
Payer: COMMERCIAL

## 2019-05-15 VITALS
SYSTOLIC BLOOD PRESSURE: 122 MMHG | RESPIRATION RATE: 12 BRPM | DIASTOLIC BLOOD PRESSURE: 81 MMHG | WEIGHT: 174.7 LBS | TEMPERATURE: 97.9 F | HEIGHT: 69 IN | HEART RATE: 68 BPM | BODY MASS INDEX: 25.87 KG/M2

## 2019-05-15 DIAGNOSIS — S29.012A STRAIN OF RHOMBOID MUSCLE, INITIAL ENCOUNTER: Primary | ICD-10-CM

## 2019-05-15 PROCEDURE — 99213 OFFICE O/P EST LOW 20 MIN: CPT | Performed by: INTERNAL MEDICINE

## 2019-05-15 ASSESSMENT — MIFFLIN-ST. JEOR: SCORE: 1482.81

## 2019-05-15 ASSESSMENT — PAIN SCALES - GENERAL: PAINLEVEL: NO PAIN (0)

## 2019-05-15 NOTE — NURSING NOTE
"Chief Complaint   Patient presents with     Back Pain     worse x2days   Pain on left side is very bad when lying down. Cannot lie on back, too painful. Does exercises at home and felt pain on Monday.    Initial /81 (BP Location: Left arm, Patient Position: Sitting, Cuff Size: Adult Regular)   Pulse 68   Temp 97.9  F (36.6  C) (Tympanic)   Resp 12   Ht 1.753 m (5' 9\")   Wt 79.2 kg (174 lb 11.2 oz)   BMI 25.80 kg/m   Estimated body mass index is 25.8 kg/m  as calculated from the following:    Height as of this encounter: 1.753 m (5' 9\").    Weight as of this encounter: 79.2 kg (174 lb 11.2 oz).  Medication Reconciliation: complete    Juana Coffey LPN    "

## 2019-05-15 NOTE — PATIENT INSTRUCTIONS
-- Rest   -- Ice/heat   -- Topicals (eg aspercreme)   -- Stretch out with a tennis ball   -- Would refer to PT if worse    Patient Education     Back Sprain or Strain    Injury to the muscles (strain) or ligaments (sprain) around the spine can be troubling. Injury may occur after a sudden forceful twisting or bending force such as in a car accident, after a simple awkward movement, or after lifting something heavy with poor body positioning. In any case, muscle spasm is often present and adds to the pain.  Thankfully, most people feel better in 1 to 2 weeks, and most of the rest in 1 to 2 months. Most people can remain active. Unless you had a forceful or traumatic physical injury such as a car accident or fall, X-rays may not be ordered for the first evaluation of a back sprain or strain. If pain continues and does not respond to medical treatment, your healthcare provider may then order X-rays and other tests.  Home care  The following guidelines will help you care for your injury at home:    When in bed, try to find a comfortable position. A firm mattress is best. Try lying flat on your back with pillows under your knees. You can also try lying on your side with your knees bent up toward your chest and a pillow between your knees.    Don't sit for long periods. Try not to take long car rides or take other trips that have you sitting for a long time. This puts more stress on the lower back than standing or walking.    During the first 24 to 72 hours after an injury or flare-up, apply an ice pack to the painful area for 20 minutes. Then remove it for 20 minutes. Do this for 60 to 90 minutes, or several times a day. This will reduce swelling and pain. Be sure to wrap the ice pack in a thin towel or plastic to protect your skin.    You can start with ice, then switch to heat. Heat from a hot shower, hot bath, or heating pad reduces pain and works well for muscle spasms. Put heat on the painful area for 20 minutes,  then remove for 20 minutes. Do this for 60 to 90 minutes, or several times a day. Do not use a heating pad while sleeping. It can burn the skin.    You can alternate the ice and heat. Talk with your healthcare provider to find out the best treatment or therapy for your back pain.    Therapeutic massage will help relax the back muscles without stretching them.    Be aware of safe lifting methods. Do not lift anything over 15 pounds until all of the pain is gone.  Medicines  Talk to your healthcare provider before using medicines, especially if you have other health problems or are taking other medicines.    You may use acetaminophen or ibuprofen to control pain, unless another pain medicine was prescribed. If you have chronic conditions like diabetes, liver or kidney disease, stomach ulcers, or gastrointestinal bleeding, or are taking blood-thinner medicines, talk with your doctor before taking any medicines.    Be careful if you are given prescription medicines, narcotics, or medicine for muscle spasm. They can cause drowsiness, and affect your coordination, reflexes, and judgment. Do not drive or operate heavy machinery when taking these types of medicines. Only take pain medicine as prescribed by your healthcare provider.  Follow-up care  Follow up with your healthcare provider, or as advised. You may need physical therapy or more tests if your symptoms get worse.  If you had X-rays your healthcare provider may be checking for any broken bones, breaks, or fractures. Bruises and sprains can sometimes hurt as much as a fracture. These injuries can take time to heal completely. If your symptoms don t improve or they get worse, talk with your healthcare provider. You may need a repeat X-ray or other tests.  Call 911  Call 911 if any of the following occur:    Trouble breathing    Confused    Very drowsy or trouble awakening    Fainting or loss of consciousness    Rapid or very slow heart rate    Loss of bowel or  bladder control  When to seek medical advice  Call your healthcare provider right away if any of the following occur:    Pain gets worse or spreads to your arms or legs    Weakness or numbness in one or both arms or legs    Numbness in the groin or genital area  Date Last Reviewed: 6/1/2016 2000-2018 [employer]. [address] [place] [address]. All rights reserved. This information is not intended as a substitute for professional medical care. Always follow your healthcare professional's instructions.

## 2019-05-16 NOTE — PROGRESS NOTES
Subjective  Michael Martinez is a 83 year old male who presents for back pain.  His grandson has a wedding coming up the Saturday and he really wants to feel better before that.  He was doing some exercises while lying on the floor and lifting some light weights when he experienced a sharp pain on the left side of his back.  He had also been lifting some wood blocks but he did not think they were too heavy.  Since that time the pain has gotten progressively more intense.  It is in the same location where he had his pulmonary embolism pain before and so he is concerned about that.  He has had no lower extremity edema or calf pain.    Problem List/PMH: reviewed in EMR, and made relevant updates today.  Medications: reviewed in EMR, and made relevant updates today.  Allergies: reviewed in EMR, and made relevant updates today.    Social Hx:  Social History     Tobacco Use     Smoking status: Former Smoker     Packs/day: 0.90     Years: 9.00     Pack years: 8.10     Types: Cigarettes     Smokeless tobacco: Never Used   Substance Use Topics     Alcohol use: Yes     Alcohol/week: 0.0 oz     Comment: Alcoholic Drinks/day: Alcoholic Drinks/day: 1 beer every 2 weeks     Drug use: Never     Social History     Social History Narrative     2007 (sudden cardiac death). Three children, retired in  as a .  Spends his donis in [place]     I reviewed social history and made relevant updates today.    Family Hx:   Family History   Problem Relation Age of Onset     Heart Disease Mother         Heart Disease     Other - See Comments Father         No Known Problems     Breast Cancer Sister         Cancer-breast,Otherwise healthy     Other - See Comments Brother         BPH otherwise healthy at 72     Arthritis Brother         Arthritis,Osteoarthritis, otherwise healthy at 66     Colon Cancer Brother         Cancer-colon     Breast Cancer Sister 59        Cancer-breast,       Objective  Vitals:  "reviewed in EMR.  /81 (BP Location: Left arm, Patient Position: Sitting, Cuff Size: Adult Regular)   Pulse 68   Temp 97.9  F (36.6  C) (Tympanic)   Resp 12   Ht 1.753 m (5' 9\")   Wt 79.2 kg (174 lb 11.2 oz)   BMI 25.80 kg/m      Gen: Pleasant male, NAD.  HEENT: MMM, no OP erythema.   Neck: Supple, no JVD, no bruits.  CV: RRR no m/r/g.  Varicose veins are present on the legs  Pulm: CTAB no w/r/r  Back: Mild tenderness to palpation over the rhomboid muscle on the left.  Neuro: Grossly intact  Msk: No lower extremity edema.  No posterior calf tenderness to palpation.  No palpable cords.  Skin: No concerning lesions.  Psychiatric: Normal affect and insight. Does not appear anxious or depressed.      Assessment    ICD-10-CM    1. Strain of rhomboid muscle, initial encounter S29.012A          Plan   -- Expected clinical course discussed   -- Medications and their side effects discussed  Patient Instructions    -- Rest   -- Ice/heat   -- Topicals (eg aspercreme)   -- Stretch out with a tennis ball   -- Would refer to PT if worse    Patient Education     Back Sprain or Strain    Injury to the muscles (strain) or ligaments (sprain) around the spine can be troubling. Injury may occur after a sudden forceful twisting or bending force such as in a car accident, after a simple awkward movement, or after lifting something heavy with poor body positioning. In any case, muscle spasm is often present and adds to the pain.  Thankfully, most people feel better in 1 to 2 weeks, and most of the rest in 1 to 2 months. Most people can remain active. Unless you had a forceful or traumatic physical injury such as a car accident or fall, X-rays may not be ordered for the first evaluation of a back sprain or strain. If pain continues and does not respond to medical treatment, your healthcare provider may then order X-rays and other tests.  Home care  The following guidelines will help you care for your injury at home:    When in " bed, try to find a comfortable position. A firm mattress is best. Try lying flat on your back with pillows under your knees. You can also try lying on your side with your knees bent up toward your chest and a pillow between your knees.    Don't sit for long periods. Try not to take long car rides or take other trips that have you sitting for a long time. This puts more stress on the lower back than standing or walking.    During the first 24 to 72 hours after an injury or flare-up, apply an ice pack to the painful area for 20 minutes. Then remove it for 20 minutes. Do this for 60 to 90 minutes, or several times a day. This will reduce swelling and pain. Be sure to wrap the ice pack in a thin towel or plastic to protect your skin.    You can start with ice, then switch to heat. Heat from a hot shower, hot bath, or heating pad reduces pain and works well for muscle spasms. Put heat on the painful area for 20 minutes, then remove for 20 minutes. Do this for 60 to 90 minutes, or several times a day. Do not use a heating pad while sleeping. It can burn the skin.    You can alternate the ice and heat. Talk with your healthcare provider to find out the best treatment or therapy for your back pain.    Therapeutic massage will help relax the back muscles without stretching them.    Be aware of safe lifting methods. Do not lift anything over 15 pounds until all of the pain is gone.  Medicines  Talk to your healthcare provider before using medicines, especially if you have other health problems or are taking other medicines.    You may use acetaminophen or ibuprofen to control pain, unless another pain medicine was prescribed. If you have chronic conditions like diabetes, liver or kidney disease, stomach ulcers, or gastrointestinal bleeding, or are taking blood-thinner medicines, talk with your doctor before taking any medicines.    Be careful if you are given prescription medicines, narcotics, or medicine for muscle spasm.  They can cause drowsiness, and affect your coordination, reflexes, and judgment. Do not drive or operate heavy machinery when taking these types of medicines. Only take pain medicine as prescribed by your healthcare provider.  Follow-up care  Follow up with your healthcare provider, or as advised. You may need physical therapy or more tests if your symptoms get worse.  If you had X-rays your healthcare provider may be checking for any broken bones, breaks, or fractures. Bruises and sprains can sometimes hurt as much as a fracture. These injuries can take time to heal completely. If your symptoms don t improve or they get worse, talk with your healthcare provider. You may need a repeat X-ray or other tests.  Call 911  Call 911 if any of the following occur:    Trouble breathing    Confused    Very drowsy or trouble awakening    Fainting or loss of consciousness    Rapid or very slow heart rate    Loss of bowel or bladder control  When to seek medical advice  Call your healthcare provider right away if any of the following occur:    Pain gets worse or spreads to your arms or legs    Weakness or numbness in one or both arms or legs    Numbness in the groin or genital area  Date Last Reviewed: 6/1/2016 2000-2018 [employer]. [address] [place] [address]. All rights reserved. This information is not intended as a substitute for professional medical care. Always follow your healthcare professional's instructions.               Return if symptoms worsen or fail to improve.    SignedIsma MD  Internal Medicine & Pediatrics

## 2019-05-21 ENCOUNTER — ANTICOAGULATION THERAPY VISIT (OUTPATIENT)
Dept: ANTICOAGULATION | Facility: OTHER | Age: 84
End: 2019-05-21
Attending: INTERNAL MEDICINE
Payer: COMMERCIAL

## 2019-05-21 DIAGNOSIS — Z79.01 ANTICOAGULATION MONITORING, INR RANGE 2-3: ICD-10-CM

## 2019-05-21 DIAGNOSIS — Z86.711 HISTORY OF PULMONARY EMBOLISM: ICD-10-CM

## 2019-05-21 DIAGNOSIS — Z86.718 PERSONAL HISTORY OF DVT (DEEP VEIN THROMBOSIS): ICD-10-CM

## 2019-05-21 LAB — INR POINT OF CARE: 3 (ref 0.86–1.14)

## 2019-05-21 PROCEDURE — 36416 COLLJ CAPILLARY BLOOD SPEC: CPT

## 2019-05-21 PROCEDURE — 85610 PROTHROMBIN TIME: CPT | Mod: QW

## 2019-06-18 ENCOUNTER — ANTICOAGULATION THERAPY VISIT (OUTPATIENT)
Dept: ANTICOAGULATION | Facility: OTHER | Age: 84
End: 2019-06-18
Attending: INTERNAL MEDICINE
Payer: COMMERCIAL

## 2019-06-18 DIAGNOSIS — Z86.711 HISTORY OF PULMONARY EMBOLISM: ICD-10-CM

## 2019-06-18 DIAGNOSIS — Z79.01 LONG TERM CURRENT USE OF ANTICOAGULANT THERAPY: ICD-10-CM

## 2019-06-18 DIAGNOSIS — Z86.718 PERSONAL HISTORY OF DVT (DEEP VEIN THROMBOSIS): ICD-10-CM

## 2019-06-18 DIAGNOSIS — Z79.01 ANTICOAGULATION MONITORING, INR RANGE 2-3: ICD-10-CM

## 2019-06-18 LAB — INR POINT OF CARE: 2.1 (ref 0.86–1.14)

## 2019-06-18 PROCEDURE — 36416 COLLJ CAPILLARY BLOOD SPEC: CPT

## 2019-06-18 PROCEDURE — 85610 PROTHROMBIN TIME: CPT | Mod: QW

## 2019-06-18 NOTE — PROGRESS NOTES
ANTICOAGULATION FOLLOW-UP CLINIC VISIT    Patient Name:  Michael Martinez  Date:  2019  Contact Type:  Face to Face    SUBJECTIVE:  Patient Findings     Comments:   The patient was assessed for diet, medication, and activity level changes, missed or extra doses, bruising or bleeding, with no problem findings.          Clinical Outcomes     Negatives:   Major bleeding event, Thromboembolic event, Anticoagulation-related hospital admission, Anticoagulation-related ED visit, Anticoagulation-related fatality    Comments:   The patient was assessed for diet, medication, and activity level changes, missed or extra doses, bruising or bleeding, with no problem findings.             OBJECTIVE    INR Protime   Date Value Ref Range Status   2019 2.1 (A) 0.86 - 1.14 Final       ASSESSMENT / PLAN  INR assessment THER    Recheck INR In: 4 WEEKS    INR Location Clinic      Anticoagulation Summary  As of 2019    INR goal:   2.0-3.0   TTR:   93.4 % (2.6 y)   INR used for dosin.1 (2019)   Warfarin maintenance plan:   5 mg (5 mg x 1) every Mon, Fri; 7.5 mg (7.5 mg x 1) all other days   Full warfarin instructions:   5 mg every Mon, Fri; 7.5 mg all other days   Weekly warfarin total:   47.5 mg   No change documented:   Janeen Arias RN   Plan last modified:   Naya Diallo RN (2019)   Next INR check:   2019   Priority:   INR   Target end date:   Indefinite    Indications    Long-term (current) use of anticoagulants [Z79.01] [Z79.01]  Anticoagulation monitoring  INR range 2-3 [Z79.01]  History of pulmonary embolism [Z86.711]  Personal history of DVT (deep vein thrombosis) [Z86.718]             Anticoagulation Episode Summary     INR check location:   Anticoagulation Clinic    Preferred lab:   [facility]    Send INR reminders to:    INR    Comments:         Anticoagulation Care Providers     Provider Role Specialty Phone number    Isma Martinez MD Responsible  Pediatrics [phone]            See the Encounter Report to view Anticoagulation Flowsheet and Dosing Calendar (Go to Encounters tab in chart review, and find the Anticoagulation Therapy Visit)        Janeen Arias RN

## 2019-06-25 DIAGNOSIS — Z86.718 PERSONAL HISTORY OF DVT (DEEP VEIN THROMBOSIS): ICD-10-CM

## 2019-06-27 RX ORDER — WARFARIN SODIUM 7.5 MG/1
TABLET ORAL
Qty: 90 TABLET | Refills: 1 | Status: SHIPPED | OUTPATIENT
Start: 2019-06-27 | End: 2020-04-20

## 2019-06-27 NOTE — TELEPHONE ENCOUNTER
"Requested Prescriptions   Pending Prescriptions Disp Refills     warfarin (COUMADIN) 7.5 MG tablet [Pharmacy Med Name: WARFARIN SODIUM 7.5 MG Tablet] 90 tablet 1     Sig: TAKE 1 TABLET 5 DAYS PER WEEK OR AS DIRECTED BY PROTIME CLINIC (TAKE 5MG TABLET 2 DAYS PER WEEK)       Vitamin K Antagonists Failed - 6/25/2019  2:27 PM        Failed - INR is within goal in the past 6 weeks     Confirm INR is within goal in the past 6 weeks.     Recent Labs   Lab Test 06/18/19   INR 2.1*                       Passed - Recent (12 mo) or future (30 days) visit within the authorizing provider's specialty     Patient had office visit in the last 12 months or has a visit in the next 30 days with authorizing provider or within the authorizing provider's specialty.  See \"Patient Info\" tab in inbasket, or \"Choose Columns\" in Meds & Orders section of the refill encounter.              Passed - Medication is active on med list        Passed - Patient is 18 years of age or older        Prescription approved per [facility] Refill Protocol.    "

## 2019-07-22 ENCOUNTER — ANTICOAGULATION THERAPY VISIT (OUTPATIENT)
Dept: ANTICOAGULATION | Facility: OTHER | Age: 84
End: 2019-07-22
Attending: INTERNAL MEDICINE
Payer: COMMERCIAL

## 2019-07-22 DIAGNOSIS — Z79.01 LONG TERM CURRENT USE OF ANTICOAGULANT THERAPY: ICD-10-CM

## 2019-07-22 DIAGNOSIS — Z79.01 ANTICOAGULATION MONITORING, INR RANGE 2-3: ICD-10-CM

## 2019-07-22 DIAGNOSIS — Z86.718 PERSONAL HISTORY OF DVT (DEEP VEIN THROMBOSIS): ICD-10-CM

## 2019-07-22 DIAGNOSIS — Z86.711 HISTORY OF PULMONARY EMBOLISM: ICD-10-CM

## 2019-07-22 LAB — INR POINT OF CARE: 3 (ref 0.86–1.14)

## 2019-07-22 PROCEDURE — 85610 PROTHROMBIN TIME: CPT | Mod: QW

## 2019-07-22 PROCEDURE — 36416 COLLJ CAPILLARY BLOOD SPEC: CPT

## 2019-07-22 NOTE — PROGRESS NOTES
ANTICOAGULATION FOLLOW-UP CLINIC VISIT    Patient Name:  Michael Martinez  Date:  7/22/2019  Contact Type:  Face to Face    SUBJECTIVE:  Patient Findings     Comments:   The patient was assessed for diet, medication, and activity level changes, missed or extra doses, bruising or bleeding, with no problem findings.          Clinical Outcomes     Negatives:   Major bleeding event, Thromboembolic event, Anticoagulation-related hospital admission, Anticoagulation-related ED visit, Anticoagulation-related fatality    Comments:   The patient was assessed for diet, medication, and activity level changes, missed or extra doses, bruising or bleeding, with no problem findings.             OBJECTIVE    INR Protime   Date Value Ref Range Status   07/22/2019 3.0 (A) 0.86 - 1.14 Final       ASSESSMENT / PLAN  INR assessment THER    Recheck INR In: 4 WEEKS    INR Location Clinic      Anticoagulation Summary  As of 7/22/2019    INR goal:   2.0-3.0   TTR:   93.6 % (2.6 y)   INR used for dosing:   3.0 (7/22/2019)   Warfarin maintenance plan:   5 mg (5 mg x 1) every Mon, Fri; 7.5 mg (7.5 mg x 1) all other days   Full warfarin instructions:   5 mg every Mon, Fri; 7.5 mg all other days   Weekly warfarin total:   47.5 mg   Plan last modified:   Naya Diallo RN (5/21/2019)   Next INR check:   8/19/2019   Priority:   INR   Target end date:   Indefinite    Indications    Long-term (current) use of anticoagulants [Z79.01] [Z79.01]  Anticoagulation monitoring  INR range 2-3 [Z79.01]  History of pulmonary embolism [Z86.711]  Personal history of DVT (deep vein thrombosis) [Z86.718]             Anticoagulation Episode Summary     INR check location:   Anticoagulation Clinic    Preferred lab:   [facility]    Send INR reminders to:    INR    Comments:         Anticoagulation Care Providers     Provider Role Specialty Phone number    Isma Martinez MD [facility] [phone]            See the  Encounter Report to view Anticoagulation Flowsheet and Dosing Calendar (Go to Encounters tab in chart review, and find the Anticoagulation Therapy Visit)      Lakesha Tang RN

## 2019-08-06 ENCOUNTER — OFFICE VISIT (OUTPATIENT)
Dept: INTERNAL MEDICINE | Facility: OTHER | Age: 84
End: 2019-08-06
Attending: NURSE PRACTITIONER
Payer: COMMERCIAL

## 2019-08-06 VITALS
RESPIRATION RATE: 16 BRPM | DIASTOLIC BLOOD PRESSURE: 72 MMHG | TEMPERATURE: 96.2 F | HEART RATE: 61 BPM | SYSTOLIC BLOOD PRESSURE: 132 MMHG | WEIGHT: 171.8 LBS | OXYGEN SATURATION: 97 % | BODY MASS INDEX: 25.37 KG/M2

## 2019-08-06 DIAGNOSIS — R26.89 IMBALANCE: ICD-10-CM

## 2019-08-06 DIAGNOSIS — M47.812 CERVICAL ARTHRITIS: ICD-10-CM

## 2019-08-06 DIAGNOSIS — Z86.69 HISTORY OF GUILLAIN-BARRE SYNDROME: Chronic | ICD-10-CM

## 2019-08-06 DIAGNOSIS — K08.89 TOOTH PAIN WITH CHEWING: Primary | ICD-10-CM

## 2019-08-06 PROCEDURE — 99214 OFFICE O/P EST MOD 30 MIN: CPT | Performed by: NURSE PRACTITIONER

## 2019-08-06 SDOH — HEALTH STABILITY: MENTAL HEALTH: HOW OFTEN DO YOU HAVE A DRINK CONTAINING ALCOHOL?: MONTHLY OR LESS

## 2019-08-06 ASSESSMENT — ENCOUNTER SYMPTOMS
SPEECH DIFFICULTY: 0
FACIAL SWELLING: 0
LIGHT-HEADEDNESS: 0
ARTHRALGIAS: 1
NECK PAIN: 1
DIZZINESS: 0
FEVER: 0
CHILLS: 0
WEAKNESS: 0
NECK STIFFNESS: 1

## 2019-08-06 ASSESSMENT — ANXIETY QUESTIONNAIRES
GAD7 TOTAL SCORE: 7
IF YOU CHECKED OFF ANY PROBLEMS ON THIS QUESTIONNAIRE, HOW DIFFICULT HAVE THESE PROBLEMS MADE IT FOR YOU TO DO YOUR WORK, TAKE CARE OF THINGS AT HOME, OR GET ALONG WITH OTHER PEOPLE: NOT DIFFICULT AT ALL
5. BEING SO RESTLESS THAT IT IS HARD TO SIT STILL: SEVERAL DAYS
7. FEELING AFRAID AS IF SOMETHING AWFUL MIGHT HAPPEN: NOT AT ALL
6. BECOMING EASILY ANNOYED OR IRRITABLE: NOT AT ALL
3. WORRYING TOO MUCH ABOUT DIFFERENT THINGS: SEVERAL DAYS
2. NOT BEING ABLE TO STOP OR CONTROL WORRYING: SEVERAL DAYS
1. FEELING NERVOUS, ANXIOUS, OR ON EDGE: NEARLY EVERY DAY

## 2019-08-06 ASSESSMENT — PAIN SCALES - GENERAL: PAINLEVEL: SEVERE PAIN (6)

## 2019-08-06 ASSESSMENT — PATIENT HEALTH QUESTIONNAIRE - PHQ9: 5. POOR APPETITE OR OVEREATING: SEVERAL DAYS

## 2019-08-06 NOTE — PROGRESS NOTES
Subjective:  He is here today reporting balance issues.  This is been occurring since 2014.  He is also noticed more pain in the back of his lower head and upper neck.  That waxes and wanes and is been occurring for many years.  Seem to have gotten worse a couple weeks ago but is now better.  He is also had blurred vision for a couple of years now.  He had a recent eye exam with no cause for the blurred vision.  He states a couple weeks ago this seemed to have gotten worse but now is also getting better.  He started having tooth pain at the left molar on Friday when he returned back from a wedding.  He had been wearing his partial.  He remove the partial and the tooth pain is slowly been getting better.  Is now only painful if he chews on that side.  He is getting a dental appointment set up.  He is wondering if maybe he needs an MRI.  He also was to have his ear checked to make sure this is not a cause for his imbalance.  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    Patient Active Problem List   Diagnosis     Personal history of DVT (deep vein thrombosis)     History of pulmonary embolism     Abdominal aortic aneurysm without rupture (H)     Bladder outlet obstruction     Ischemic cardiomyopathy     History of four vessel coronary artery bypass graft     H/O transurethral resection of prostate     Anticoagulation monitoring, INR range 2-3     Long-term (current) use of anticoagulants [Z79.01]     Cataract     DJD (degenerative joint disease) of cervical spine     Diaphragmatic hernia     Hyperlipidemia     Lumbar disc disease     Osteoarthrosis involving lower leg     Primary osteoarthritis of right hand     S/P CABG x 4     S/P TURP     Spinal stenosis, lumbar     Lesion of ulnar nerve     Other pulmonary embolism without acute cor pulmonale     History of Guillain-[place] syndrome      Gastroesophageal reflux disease, esophagitis presence not specified     Primary osteoarthritis of left knee     Mixed hyperlipidemia     Peripheral arterial disease (H)     OLIVIA (generalized anxiety disorder)     Cervical arthritis     Past Medical History:   Diagnosis Date     Cerebral infarction (H)     6/2014,left cerebellum--seen on MRI     Edema     11/4/2003,Edema & cramps legs, ? secondary to Norvasc(hdk185.3)     Edema     11/4/2003,Edema & cramps legs, ? 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)     10/29/2016     Enlarged prostate with lower urinary tract symptoms (LUTS)     8/4/2011     Ischemic cardiomyopathy     7/9/2014     Osteoarthritis     9/5/2001     Other ill-defined and unknown causes of morbidity and mortality     see prob list     Urinary tract infection     No Comments Provided     Past Surgical History:   Procedure Laterality Date     ARTHROSCOPY SHOULDER ROTATOR CUFF REPAIR      1996     BIOPSY PROSTATE TRANSRECTAL      No Comments Provided     BYPASS GRAFT ARTERY CORONARY      December of 2013,four-vessel     COLONOSCOPY      10/31/2013,diverticulosis-no fu needed d/t age     CYSTOSCOPY, TRANSURETHRAL RESECTION (TUR) PROSTATE, COMBINED      January 2014,done in [place] -- excellent result     OTHER SURGICAL HISTORY      December 2013,28385.0,([place]) [place] INJ PROC SELECTIVE CORONARY ANGIOGRAPHY,LAD-95% ciftwrzf-85-41% distal stenosis.  Ramus-80% stenosis.  Right coronary-high-grade 90% stenosis.  EF-45%     Social History     Socioeconomic History     Marital status: Single     Spouse name: Not on file     Number of children: Not on file     Years of education: Not on file     Highest education level: Not on file   Occupational History     Not on file   Social Needs     Financial resource strain: Not on file     Food insecurity:     Worry: Not on file     Inability: Not on file     Transportation needs:     Medical: Not on  file     Non-medical: Not on file   Tobacco Use     Smoking status: Former Smoker     Packs/day: 0.90     Years: 9.00     Pack years: 8.10     Types: Cigarettes     Smokeless tobacco: Never Used   Substance and Sexual Activity     Alcohol use: Yes     Alcohol/week: 0.0 oz     Frequency: Monthly or less     Drug use: Never     Sexual activity: Not Currently   Lifestyle     Physical activity:     Days per week: Not on file     Minutes per session: Not on file     Stress: Not on file   Relationships     Social connections:     Talks on phone: Not on file     Gets together: Not on file     Attends [employer] service: Not on file     Active member of club or organization: Not on file     Attends meetings of clubs or organizations: Not on file     Relationship status: Not on file     Intimate partner violence:     Fear of current or ex partner: Not on file     Emotionally abused: Not on file     Physically abused: Not on file     Forced sexual activity: Not on file   Other Topics Concern     Parent/sibling w/ CABG, MI or angioplasty before 65F 55M? Not Asked   Social History Narrative     2007 (sudden cardiac death). Three children, retired in  as a .  Spends his donis in [place]     Family History   Problem Relation Age of Onset     Heart Disease Mother         Heart Disease     Other - See Comments Father         No Known Problems     Breast Cancer Sister         Cancer-breast,Otherwise healthy     Other - See Comments Brother         BPH otherwise healthy at 72     Arthritis Brother         Arthritis,Osteoarthritis, otherwise healthy at 66     Colon Cancer Brother         Cancer-colon     Breast Cancer Sister 59        Cancer-breast,     Current Outpatient Medications   Medication Sig Dispense Refill     aspirin EC 81 MG tablet Take 81 mg by mouth       atorvastatin (LIPITOR) 10 MG tablet Take 10 mg by mouth Alternate with Pravastatin       Cholecalciferol (VITAMIN D-3) 1000  units CAPS Take 1 capsule by mouth daily       metoprolol succinate ER (TOPROL-XL) 25 MG 24 hr tablet Take 0.5 tablets (12.5 mg) by mouth daily 45 tablet 1     omeprazole (PRILOSEC) 20 MG CR capsule Take 20 mg by mouth Take every other day.       warfarin (COUMADIN) 5 MG tablet TAKE 1 TABLET 2 DAYS PER WEEK [facility] (TAKE 7.5MG TABLET 5 DAYS PER WEEK) 30 tablet 1     warfarin (COUMADIN) 7.5 MG tablet TAKE 1 TABLET 5 DAYS PER WEEK OR AS DIRECTED BY PROTIME CLINIC (TAKE 5MG TABLET 2 DAYS PER WEEK) 90 tablet 1     Influenza vac split [flu virus vaccine]      Review of Systems:  Review of Systems   Constitutional: Negative for chills and fever.   HENT: Positive for dental problem. Negative for congestion, ear discharge, ear pain and facial swelling.    Musculoskeletal: Positive for arthralgias, gait problem, neck pain and neck stiffness.   Neurological: Negative for dizziness, syncope, speech difficulty, weakness and light-headedness.       Objective:   /72 (BP Location: Right arm, Patient Position: Sitting, Cuff Size: Adult Regular)   Pulse 61   Temp 96.2  F (35.7  C) (Tympanic)   Resp 16   Wt 77.9 kg (171 lb 12.8 oz)   SpO2 97%   BMI 25.37 kg/m    Physical Exam  Pleasant gentleman in no acute distress.  Affect normal.  Alert and oriented x4.  Sclera nonicteric.  Conjunctiva noninflamed.  TMs clear bilaterally.  No pain with palpation of sinuses.  Oral mucosa pink and moist.  Throat without erythema.  Left lower molar has a gold.  The gum is without erythema and swelling.  There is mild tenderness with pushing down on the tooth.  Neck supple without adenopathy.  Lung fields clear to auscultation.  Cardiovascular regular.  Cranial nerves II-XII intact.  Romberg's negative.  Gait is stable and without ataxia.  Normal range of motion and strength in upper and lower extremities.  No pain with palpation of paraspinal musculature.  No pain with palpation to occiputs however he reports that  the occiputs is where he was having some tenderness.    Assessment:    ICD-10-CM    1. Tooth pain with chewing K08.89    2. Imbalance R26.89    3. 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 Cervical arthritis M47.812        Plan:   1.  Discussed with patient that I do recommend that he be evaluated by his dentist.  I am not seeing any active infection that needs treatment with antibiotic at this time.  He will get appointment set up for this week.  He will leave out as partial as that seems to have irritated the area.  If he develops evidence of abscess to include swollen gums, increased pain, fever, etc. he will be seen back if he has not seen his dentist by then.  2.  Patient has multiple chronic issues since GBS including balance issues, multiple pain areas including neck, wrist, feet, etc., visual difficulties but none of these seem to be new or acute.  He seems to be having more problems a couple weeks ago but now this has improved.  Visual exam by his reports was unremarkable.  I do not feel that he needs an MRI of the brain at this time nor any further evaluation of the cervical spine.  He did have cervical x-rays in 2015 which showed multiple level degenerative arthritis.  He has seen a massage therapist in the past and that did help with his pain.  He may want to follow-up with massage therapy.  I am not seeing any neurologic deficits and especially not any ataxia.  Would recommend that if he develops more problems or concerns that he should follow-up with primary physician to determine if MRI is warranted.  All questions were answered to his satisfaction and he is in agreement with plan of care.    DARLING Mendez   8/6/2019  10:18 AM

## 2019-08-07 ASSESSMENT — ANXIETY QUESTIONNAIRES: GAD7 TOTAL SCORE: 7

## 2019-08-19 ENCOUNTER — ANTICOAGULATION THERAPY VISIT (OUTPATIENT)
Dept: ANTICOAGULATION | Facility: OTHER | Age: 84
End: 2019-08-19
Attending: INTERNAL MEDICINE
Payer: COMMERCIAL

## 2019-08-19 DIAGNOSIS — Z79.01 ANTICOAGULATION MONITORING, INR RANGE 2-3: ICD-10-CM

## 2019-08-19 DIAGNOSIS — Z79.01 LONG TERM CURRENT USE OF ANTICOAGULANT THERAPY: ICD-10-CM

## 2019-08-19 DIAGNOSIS — Z86.711 HISTORY OF PULMONARY EMBOLISM: ICD-10-CM

## 2019-08-19 DIAGNOSIS — Z86.718 PERSONAL HISTORY OF DVT (DEEP VEIN THROMBOSIS): ICD-10-CM

## 2019-08-19 LAB — INR POINT OF CARE: 2.5 (ref 0.86–1.14)

## 2019-08-19 NOTE — PROGRESS NOTES
ANTICOAGULATION FOLLOW-UP CLINIC VISIT    Patient Name:  Michael Martinez  Date:  2019  Contact Type:  Face to Face    SUBJECTIVE:  Patient Findings         Clinical Outcomes     Negatives:   Major bleeding event, Thromboembolic event, Anticoagulation-related hospital admission, Anticoagulation-related ED visit, Anticoagulation-related fatality           OBJECTIVE    INR Protime   Date Value Ref Range Status   2019 2.5 (A) 0.86 - 1.14 Final       ASSESSMENT / PLAN  INR assessment THER    Recheck INR In: 4 WEEKS    INR Location Clinic      Anticoagulation Summary  As of 2019    INR goal:   2.0-3.0   TTR:   93.8 % (2.7 y)   INR used for dosin.5 (2019)   Warfarin maintenance plan:   5 mg (5 mg x 1) every Mon, Fri; 7.5 mg (7.5 mg x 1) all other days   Full warfarin instructions:   5 mg every Mon, Fri; 7.5 mg all other days   Weekly warfarin total:   47.5 mg   Plan last modified:   Naya Diallo RN (2019)   Next INR check:   2019   Priority:   INR   Target end date:   Indefinite    Indications    Long-term (current) use of anticoagulants [Z79.01] [Z79.01]  Anticoagulation monitoring  INR range 2-3 [Z79.01]  History of pulmonary embolism [Z86.711]  Personal history of DVT (deep vein thrombosis) [Z86.718]             Anticoagulation Episode Summary     INR check location:   Anticoagulation Clinic    Preferred lab:   [facility]    Send INR reminders to:    INR    Comments:         Anticoagulation Care Providers     Provider Role Specialty Phone number    Isma Martinez MD [facility] [phone]            See the Encounter Report to view Anticoagulation Flowsheet and Dosing Calendar (Go to Encounters tab in chart review, and find the Anticoagulation Therapy Visit)        Maria Del Rosario Trujillo RN

## 2019-09-12 DIAGNOSIS — Z86.718 PERSONAL HISTORY OF DVT (DEEP VEIN THROMBOSIS): ICD-10-CM

## 2019-09-12 RX ORDER — WARFARIN SODIUM 5 MG/1
TABLET ORAL
Qty: 30 TABLET | Refills: 1 | Status: SHIPPED | OUTPATIENT
Start: 2019-09-12 | End: 2019-10-10

## 2019-09-12 NOTE — TELEPHONE ENCOUNTER
Last INR 2.5:  08/19/19.  Last office visit with Dr. Martinez:  05/15/19  Prescription approved per [facility] Refill Protocol.

## 2019-09-16 ENCOUNTER — ANTICOAGULATION THERAPY VISIT (OUTPATIENT)
Dept: ANTICOAGULATION | Facility: OTHER | Age: 84
End: 2019-09-16
Attending: INTERNAL MEDICINE
Payer: COMMERCIAL

## 2019-09-16 DIAGNOSIS — Z79.01 ANTICOAGULATION MONITORING, INR RANGE 2-3: ICD-10-CM

## 2019-09-16 DIAGNOSIS — Z86.711 HISTORY OF PULMONARY EMBOLISM: ICD-10-CM

## 2019-09-16 DIAGNOSIS — Z79.01 LONG TERM CURRENT USE OF ANTICOAGULANT THERAPY: ICD-10-CM

## 2019-09-16 DIAGNOSIS — Z86.718 PERSONAL HISTORY OF DVT (DEEP VEIN THROMBOSIS): ICD-10-CM

## 2019-09-16 LAB — INR POINT OF CARE: 2.4 (ref 0.86–1.14)

## 2019-09-16 PROCEDURE — 36416 COLLJ CAPILLARY BLOOD SPEC: CPT

## 2019-09-16 PROCEDURE — 99207 ZZC NO CHARGE NURSE ONLY: CPT

## 2019-09-16 NOTE — PROGRESS NOTES
ANTICOAGULATION FOLLOW-UP CLINIC VISIT    Patient Name:  Michael Martinez  Date:  2019  Contact Type:  Face to Face    SUBJECTIVE:  Patient Findings         Clinical Outcomes     Negatives:   Major bleeding event, Thromboembolic event, Anticoagulation-related hospital admission, Anticoagulation-related ED visit, Anticoagulation-related fatality           OBJECTIVE    INR Protime   Date Value Ref Range Status   2019 2.4 (A) 0.86 - 1.14 Final       ASSESSMENT / PLAN  INR assessment THER    Recheck INR In: 4 WEEKS    INR Location Clinic      Anticoagulation Summary  As of 2019    INR goal:   2.0-3.0   TTR:   93.9 % (2.8 y)   INR used for dosin.4 (2019)   Warfarin maintenance plan:   5 mg (5 mg x 1) every Mon, Fri; 7.5 mg (7.5 mg x 1) all other days   Full warfarin instructions:   5 mg every Mon, Fri; 7.5 mg all other days   Weekly warfarin total:   47.5 mg   No change documented:   Maria Del Rosario Trujillo RN   Plan last modified:   Naya Diallo RN (2019)   Next INR check:   3/31/2020   Priority:   INR   Target end date:   Indefinite    Indications    Long-term (current) use of anticoagulants [Z79.01] [Z79.01]  Anticoagulation monitoring  INR range 2-3 [Z79.01]  History of pulmonary embolism [Z86.711]  Personal history of DVT (deep vein thrombosis) [Z86.718]             Anticoagulation Episode Summary     INR check location:   Anticoagulation Clinic    Preferred lab:   [facility]    Send INR reminders to:    INR    Comments:   Leaves for       Anticoagulation Care Providers     Provider Role Specialty Phone number    Isma Martinez MD [facility] [phone]            See the Encounter Report to view Anticoagulation Flowsheet and Dosing Calendar (Go to Encounters tab in chart review, and find the Anticoagulation Therapy Visit)        Maria Del Rosario Trujillo RN

## 2019-10-10 ENCOUNTER — OFFICE VISIT (OUTPATIENT)
Dept: FAMILY MEDICINE | Facility: OTHER | Age: 84
End: 2019-10-10
Attending: NURSE PRACTITIONER
Payer: COMMERCIAL

## 2019-10-10 VITALS
SYSTOLIC BLOOD PRESSURE: 134 MMHG | TEMPERATURE: 97.6 F | BODY MASS INDEX: 25.03 KG/M2 | DIASTOLIC BLOOD PRESSURE: 76 MMHG | OXYGEN SATURATION: 96 % | WEIGHT: 169 LBS | HEART RATE: 56 BPM | HEIGHT: 69 IN | RESPIRATION RATE: 16 BRPM

## 2019-10-10 DIAGNOSIS — S39.012A STRAIN OF LUMBAR REGION, INITIAL ENCOUNTER: Primary | ICD-10-CM

## 2019-10-10 PROCEDURE — 99213 OFFICE O/P EST LOW 20 MIN: CPT | Performed by: NURSE PRACTITIONER

## 2019-10-10 ASSESSMENT — MIFFLIN-ST. JEOR: SCORE: 1456.96

## 2019-10-10 ASSESSMENT — PAIN SCALES - GENERAL: PAINLEVEL: SEVERE PAIN (7)

## 2019-10-10 NOTE — PROGRESS NOTES
Subjective     Michael Martinez is a 83 year old male who presents to clinic today for the following health issues:    HPI   Back Pain       Duration: 2 weeks        Specific cause: none    Description:   Location of pain: low back right  Character of pain: achy, numb and constant  Pain radiation:none  New numbness or weakness in legs, not attributed to pain:  no     Intensity: moderate    History:   Pain interferes with job: Not applicable  History of back problems: no prior back problems  Any previous MRI or X-rays: None  Sees a specialist for back pain:  No  Therapies tried without relief: exercise and acetaminophen (Tylenol)    Alleviating factors:   Improved by: nothing      Precipitating factors:  Worsened by: Nothing      Accompanying Signs & Symptoms:  Risk of Fracture:  Age >64  Risk of Cauda Equina:  None  Risk of Infection:  None  Risk of Cancer:  None  Risk of Ankylosing Spondylitis:  Onset at age <35, male, AND morning back stiffness. no      Patient Active Problem List   Diagnosis     Personal history of DVT (deep vein thrombosis)     History of pulmonary embolism     Abdominal aortic aneurysm without rupture (H)     Bladder outlet obstruction     Ischemic cardiomyopathy     History of four vessel coronary artery bypass graft     H/O transurethral resection of prostate     Anticoagulation monitoring, INR range 2-3     Long-term (current) use of anticoagulants [Z79.01]     Cataract     DJD (degenerative joint disease) of cervical spine     Diaphragmatic hernia     Hyperlipidemia     Lumbar disc disease     Osteoarthrosis involving lower leg     Primary osteoarthritis of right hand     S/P CABG x 4     S/P TURP     Spinal stenosis, lumbar     Lesion of ulnar nerve     Other pulmonary embolism without acute cor pulmonale     History of Guillain-Ch[place] syndrome     Gastroesophageal reflux disease, esophagitis presence not specified     Primary osteoarthritis of left knee     Mixed hyperlipidemia      Peripheral arterial disease (H)     OLIVIA (generalized anxiety disorder)     Cervical arthritis     Past Surgical History:   Procedure Laterality Date     ARTHROSCOPY SHOULDER ROTATOR CUFF REPAIR           BIOPSY PROSTATE TRANSRECTAL      No Comments Provided     BYPASS GRAFT ARTERY CORONARY      2013,four-vessel     COLONOSCOPY      10/31/2013,diverticulosis-no fu needed d/t age     CYSTOSCOPY, TRANSURETHRAL RESECTION (TUR) PROSTATE, COMBINED      2014,done in [place] -- excellent result     OTHER SURGICAL HISTORY      2013,08062.0,([place]) [place] IN[place] PROC SELECTIVE CORONARY ANGIOGRAPHY,LAD-95% dbpelhck-94-36% distal stenosis.  Ramus-80% stenosis.  Right coronary-high-grade 90% stenosis.  EF-45%       Social History     Tobacco Use     Smoking status: Former Smoker     Packs/day: 0.90     Years: 9.00     Pack years: 8.10     Types: Cigarettes     Smokeless tobacco: Never Used   Substance Use Topics     Alcohol use: Yes     Alcohol/week: 0.0 standard drinks     Frequency: Monthly or less     Family History   Problem Relation Age of Onset     Heart Disease Mother         Heart Disease     Other - See Comments Father         No Known Problems     Breast Cancer Sister         Cancer-breast,Otherwise healthy     Other - See Comments Brother         BPH otherwise healthy at 72     Arthritis Brother         Arthritis,Osteoarthritis, otherwise healthy at 66     Colon Cancer Brother         Cancer-colon     Breast Cancer Sister 59        Cancer-breast,         Current Outpatient Medications   Medication Sig Dispense Refill     aspirin EC 81 MG tablet Take 81 mg by mouth       atorvastatin (LIPITOR) 10 MG tablet Take 10 mg by mouth Alternate with Pravastatin       Cholecalciferol (VITAMIN D-3) 1000 units CAPS Take 1 capsule by mouth daily       metoprolol succinate ER (TOPROL-XL) 25 MG 24 hr tablet Take 0.5 tablets (12.5 mg) by mouth daily 45 tablet 1     omeprazole (PRILOSEC) 20 MG CR capsule  "Take 20 mg by mouth Take every other day.       warfarin (COUMADIN) 5 MG tablet TAKE 1 TABLET 2 DAYS PER WEEK OR AS DIRECTED BY PROTIME CLINIC (TAKE 7.5MG TABLET 5 DAYS PER WEEK) 30 tablet 1     warfarin (COUMADIN) 7.5 MG tablet TAKE 1 TABLET 5 DAYS PER WEEK OR AS DIRECTED BY [facility]IME CLINIC (TAKE 5MG TABLET 2 DAYS PER WEEK) 90 tablet 1     Allergies   Allergen Reactions     Influenza Vac Split [Flu Virus Vaccine]      guillan barre     Reviewed and updated as needed this visit by Provider  Tobacco  Allergies  Meds  Problems  Med Hx  Surg Hx  Fam Hx  Soc Hx          Review of Systems   ROS COMP: As above      Objective    /76   Pulse 56   Temp 97.6  F (36.4  C) (Temporal)   Resp 16   Ht 1.753 m (5' 9\")   Wt 76.7 kg (169 lb)   SpO2 96%   BMI 24.96 kg/m    Body mass index is 24.96 kg/m .  Physical Exam   GENERAL: healthy, alert and no distress  MS: no gross musculoskeletal defects noted, no edema  SKIN: no suspicious lesions or rashes  NEURO: Normal strength and tone, mentation intact and speech normal  Comprehensive back pain exam:  Tenderness of R SI, Range of motion not limited by pain, Lower extremity strength functional and equal on both sides, Lower extremity reflexes within normal limits bilaterally, Lower extremity sensation normal and equal on both sides and Straight leg raise negative bilaterally  PSYCH: mentation appears normal, affect normal/bright    Diagnostic Test Results:  Labs reviewed in Epic  none         Assessment & Plan     1. Strain of lumbar region, initial encounter  No known cause of pain, though has been ongoing for the last few weeks. Continues to work out with his usual exercise routine and has taken tylenol for pain as needed. Overall has improved, though does remain as a mild pain. Discussed PT, not interested at this time. Cannot take NSAIDs due to health history. May use topical creams/rubs, ice/heat for pain relief. If no improvement, will consider imaging. "       Liz Bolden NP  [facility]

## 2019-10-10 NOTE — NURSING NOTE
"Chief Complaint   Patient presents with     Back Pain     Low back right side pain x 1 week.  Has had CT abd 3 years ago and Kidney U/S 2 years ago.    Initial /76   Pulse 56   Temp 97.6  F (36.4  C) (Temporal)   Resp 16   Ht 1.753 m (5' 9\")   Wt 76.7 kg (169 lb)   SpO2 96%   BMI 24.96 kg/m   Estimated body mass index is 24.96 kg/m  as calculated from the following:    Height as of this encounter: 1.753 m (5' 9\").    Weight as of this encounter: 76.7 kg (169 lb).    Medication Reconciliation: complete      Norma J. Gosselin, LPN  "

## 2019-10-14 ENCOUNTER — ANTICOAGULATION THERAPY VISIT (OUTPATIENT)
Dept: ANTICOAGULATION | Facility: OTHER | Age: 84
End: 2019-10-14
Attending: INTERNAL MEDICINE
Payer: COMMERCIAL

## 2019-10-14 DIAGNOSIS — Z86.718 PERSONAL HISTORY OF DVT (DEEP VEIN THROMBOSIS): ICD-10-CM

## 2019-10-14 DIAGNOSIS — Z79.01 LONG TERM CURRENT USE OF ANTICOAGULANT THERAPY: ICD-10-CM

## 2019-10-14 DIAGNOSIS — Z79.01 ANTICOAGULATION MONITORING, INR RANGE 2-3: ICD-10-CM

## 2019-10-14 DIAGNOSIS — Z86.711 HISTORY OF PULMONARY EMBOLISM: ICD-10-CM

## 2019-10-14 LAB — INR POINT OF CARE: 1.9 (ref 0.86–1.14)

## 2019-10-14 PROCEDURE — 85610 PROTHROMBIN TIME: CPT | Mod: QW

## 2019-10-14 PROCEDURE — 36416 COLLJ CAPILLARY BLOOD SPEC: CPT

## 2019-11-01 ENCOUNTER — APPOINTMENT (RX ONLY)
Dept: URBAN - METROPOLITAN AREA CLINIC 153 | Facility: CLINIC | Age: 84
Setting detail: DERMATOLOGY
End: 2019-11-01

## 2019-11-01 DIAGNOSIS — L40.0 PSORIASIS VULGARIS: ICD-10-CM

## 2019-11-01 DIAGNOSIS — L82.1 OTHER SEBORRHEIC KERATOSIS: ICD-10-CM

## 2019-11-01 PROBLEM — D68.8 OTHER SPECIFIED COAGULATION DEFECTS: Status: ACTIVE | Noted: 2019-11-01

## 2019-11-01 PROBLEM — M12.9 ARTHROPATHY, UNSPECIFIED: Status: ACTIVE | Noted: 2019-11-01

## 2019-11-01 PROCEDURE — 99202 OFFICE O/P NEW SF 15 MIN: CPT

## 2019-11-01 PROCEDURE — ? PRESCRIPTION

## 2019-11-01 PROCEDURE — ? COUNSELING

## 2019-11-01 PROCEDURE — ? TREATMENT REGIMEN

## 2019-11-01 RX ORDER — HALOBETASOL PROPIONATE 0.5 MG/G
QD OINTMENT TOPICAL
Qty: 1 | Refills: 1 | COMMUNITY
Start: 2019-11-01

## 2019-11-01 RX ADMIN — HALOBETASOL PROPIONATE 1: 0.5 OINTMENT TOPICAL at 00:00

## 2019-11-01 ASSESSMENT — LOCATION SIMPLE DESCRIPTION DERM
LOCATION SIMPLE: RIGHT KNEE
LOCATION SIMPLE: LEFT UPPER BACK
LOCATION SIMPLE: LEFT UPPER ARM
LOCATION SIMPLE: RIGHT UPPER BACK

## 2019-11-01 ASSESSMENT — LOCATION ZONE DERM
LOCATION ZONE: TRUNK
LOCATION ZONE: ARM
LOCATION ZONE: LEG

## 2019-11-01 ASSESSMENT — LOCATION DETAILED DESCRIPTION DERM
LOCATION DETAILED: LEFT INFERIOR UPPER BACK
LOCATION DETAILED: RIGHT KNEE
LOCATION DETAILED: LEFT DISTAL POSTERIOR UPPER ARM
LOCATION DETAILED: RIGHT INFERIOR LATERAL UPPER BACK

## 2019-11-01 NOTE — PROCEDURE: TREATMENT REGIMEN
Action 1: Continue
Other Instructions: Patient to contact the office if any problems occur for further evaluation and management
Continue Regimen: Patient to apply Halobetasol cream once a day to right knee.
Detail Level: Detailed

## 2019-11-22 RX ORDER — HALOBETASOL PROPIONATE 0.5 MG/G
1 OINTMENT TOPICAL QD
Qty: 1 | Refills: 1

## 2019-11-25 RX ORDER — HALOBETASOL PROPIONATE 0.5 MG/G
1 OINTMENT TOPICAL QD
Qty: 1 | Refills: 1 | Status: ERX

## 2019-11-27 ENCOUNTER — ANTICOAGULATION THERAPY VISIT (OUTPATIENT)
Dept: ANTICOAGULATION | Facility: OTHER | Age: 84
End: 2019-11-27
Payer: COMMERCIAL

## 2019-11-27 DIAGNOSIS — Z79.01 LONG TERM CURRENT USE OF ANTICOAGULANT THERAPY: ICD-10-CM

## 2019-11-27 DIAGNOSIS — Z86.718 PERSONAL HISTORY OF DVT (DEEP VEIN THROMBOSIS): ICD-10-CM

## 2019-11-27 DIAGNOSIS — Z86.711 HISTORY OF PULMONARY EMBOLISM: ICD-10-CM

## 2019-11-27 DIAGNOSIS — Z79.01 ANTICOAGULATION MONITORING, INR RANGE 2-3: ICD-10-CM

## 2019-11-27 LAB — INR POINT OF CARE: 1.8 (ref 0.86–1.14)

## 2019-11-27 PROCEDURE — 85610 PROTHROMBIN TIME: CPT | Mod: QW

## 2019-11-27 PROCEDURE — 36416 COLLJ CAPILLARY BLOOD SPEC: CPT

## 2019-11-27 NOTE — PROGRESS NOTES
ANTICOAGULATION FOLLOW-UP CLINIC VISIT    Patient Name:  Michael Martinez  Date:  2019  Contact Type:  Face to Face    SUBJECTIVE:  Patient Findings     Positives:   Change in medications (Warfarin chnaged while patient in [place]. )             OBJECTIVE    INR Protime   Date Value Ref Range Status   2019 1.8 (A) 0.86 - 1.14 Final       ASSESSMENT / PLAN  INR assessment SUB    Recheck INR In: 2 WEEKS    INR Location Clinic      Anticoagulation Summary  As of 2019    INR goal:   2.0-3.0   TTR:   78.5 % (7.7 mo)   INR used for dosin.8! (2019)   Warfarin maintenance plan:   5 mg (5 mg x 1) every Mon, Fri; 7.5 mg (7.5 mg x 1) all other days   Full warfarin instructions:   5 mg every Mon, Fri; 7.5 mg all other days   Weekly warfarin total:   47.5 mg   No change documented:   Therese Yanez RN   Plan last modified:   Naya Diallo RN (2019)   Next INR check:   2019   Priority:   INR   Target end date:   Indefinite    Indications    Long-term (current) use of anticoagulants [Z79.01] [Z79.01]  Anticoagulation monitoring  INR range 2-3 [Z79.01]  History of pulmonary embolism [Z86.711]  Personal history of DVT (deep vein thrombosis) [Z86.718]             Anticoagulation Episode Summary     INR check location:   Anticoagulation Clinic    Preferred lab:   [facility]    Send INR reminders to:    INR    Comments:   Leaves for       Anticoagulation Care Providers     Provider Role Specialty Phone number    Isma Martinez MD [facility] [phone]            See the Encounter Report to view Anticoagulation Flowsheet and Dosing Calendar (Go to Encounters tab in chart review, and find the Anticoagulation Therapy Visit)    No dosage change as this was changed to 5 days of 5 mg and 2 days of 7.5 mg while Pt was in [place]. Resume last dosage. Recheck 2 weeks.    Therese Yanez, RN

## 2019-12-06 DIAGNOSIS — Z95.1 S/P CABG X 4: ICD-10-CM

## 2019-12-09 ENCOUNTER — ANTICOAGULATION THERAPY VISIT (OUTPATIENT)
Dept: ANTICOAGULATION | Facility: OTHER | Age: 84
End: 2019-12-09
Attending: INTERNAL MEDICINE
Payer: COMMERCIAL

## 2019-12-09 DIAGNOSIS — Z86.711 HISTORY OF PULMONARY EMBOLISM: ICD-10-CM

## 2019-12-09 DIAGNOSIS — Z86.718 PERSONAL HISTORY OF DVT (DEEP VEIN THROMBOSIS): ICD-10-CM

## 2019-12-09 DIAGNOSIS — Z79.01 ANTICOAGULATION MONITORING, INR RANGE 2-3: ICD-10-CM

## 2019-12-09 DIAGNOSIS — Z79.01 LONG TERM CURRENT USE OF ANTICOAGULANT THERAPY: ICD-10-CM

## 2019-12-09 LAB — INR POINT OF CARE: 2.6 (ref 0.86–1.14)

## 2019-12-09 PROCEDURE — 85610 PROTHROMBIN TIME: CPT | Mod: QW | Performed by: INTERNAL MEDICINE

## 2019-12-09 PROCEDURE — 36416 COLLJ CAPILLARY BLOOD SPEC: CPT | Performed by: INTERNAL MEDICINE

## 2019-12-09 RX ORDER — METOPROLOL SUCCINATE 25 MG/1
TABLET, EXTENDED RELEASE ORAL
Qty: 45 TABLET | Refills: 2 | Status: SHIPPED | OUTPATIENT
Start: 2019-12-09 | End: 2020-08-24

## 2019-12-09 NOTE — TELEPHONE ENCOUNTER
"Requested Prescriptions   Pending Prescriptions Disp Refills     metoprolol succinate ER (TOPROL-XL) 25 MG 24 hr tablet [Pharmacy Med Name: METOPROLOL SUCCINATE ER 25 MG Tablet Extended Release 24 Hour] 45 tablet 0     Sig: TAKE 1/2 TABLET EVERY DAY       Beta-Blockers Protocol Passed - 12/6/2019  4:36 PM        Passed - Blood pressure under 140/90 in past 12 months     BP Readings from Last 3 Encounters:   10/10/19 134/76   08/06/19 132/72   05/15/19 122/81                 Passed - Patient is age 6 or older        Passed - Recent (12 mo) or future (30 days) visit within the authorizing provider's specialty     Patient has had an office visit with the authorizing provider or a provider within the authorizing providers department within the previous 12 mos or has a future within next 30 days. See \"Patient Info\" tab in inbasket, or \"Choose Columns\" in Meds & Orders section of the refill encounter.              Passed - Medication is active on med list        LOV 8/6/19 Prescription approved per [facility].  Constance Chandler RN on 12/9/2019 at 10:49 AM    "

## 2019-12-09 NOTE — PROGRESS NOTES
ANTICOAGULATION FOLLOW-UP CLINIC VISIT    Patient Name:  Michael Martinez  Date:  2019  Contact Type:  Face to Face    SUBJECTIVE:  Patient Findings         Clinical Outcomes     Negatives:   Major bleeding event, Thromboembolic event, Anticoagulation-related hospital admission, Anticoagulation-related ED visit, Anticoagulation-related fatality           OBJECTIVE    INR Protime   Date Value Ref Range Status   2019 2.6 (A) 0.86 - 1.14 Final       ASSESSMENT / PLAN  INR assessment THER    Recheck INR In: 4 WEEKS    INR Location Clinic      Anticoagulation Summary  As of 2019    INR goal:   2.0-3.0   TTR:   78.4 % (8.1 mo)   INR used for dosin.6 (2019)   Warfarin maintenance plan:   5 mg (5 mg x 1) every Mon, Fri; 7.5 mg (7.5 mg x 1) all other days   Full warfarin instructions:   5 mg every Mon, Fri; 7.5 mg all other days   Weekly warfarin total:   47.5 mg   No change documented:   Naya Diallo RN   Plan last modified:   Naya Diallo RN (2019)   Next INR check:   2020   Priority:   Maintenance   Target end date:   Indefinite    Indications    Long-term (current) use of anticoagulants [Z79.01] [Z79.01]  Anticoagulation monitoring  INR range 2-3 [Z79.01]  History of pulmonary embolism [Z86.711]  Personal history of DVT (deep vein thrombosis) [Z86.718]             Anticoagulation Episode Summary     INR check location:   Anticoagulation Clinic    Preferred lab:   [facility]    Send INR reminders to:    INR    Comments:   Leaves for       Anticoagulation Care Providers     Provider Role Specialty Phone number    Isma Martinez MD [facility] [phone]            See the Encounter Report to view Anticoagulation Flowsheet and Dosing Calendar (Go to Encounters tab in chart review, and find the Anticoagulation Therapy Visit)        Naya Diallo RN

## 2019-12-31 DIAGNOSIS — Z86.718 PERSONAL HISTORY OF DVT (DEEP VEIN THROMBOSIS): ICD-10-CM

## 2019-12-31 RX ORDER — WARFARIN SODIUM 5 MG/1
TABLET ORAL
Qty: 30 TABLET | Refills: 1 | Status: SHIPPED | OUTPATIENT
Start: 2019-12-31 | End: 2020-07-06

## 2019-12-31 NOTE — TELEPHONE ENCOUNTER
[facility] pharmacy sent Rx request for the following:      WARFARIN SODIUM 5 MG Tablet  Sig: TAKE 1 TABLET 2 DAYS PER WEEK OR AS DIRECTED BY PROTIME CLINIC (TAKE 7.5MG TABLET 5 DAYS PER WEEK)      Last Prescription Date:   2/25/2019  Last Fill Qty/Refills:         30, R-1    Last Office Visit:              12/9/2019 (INR)   Future Office visit:           none    Prescription refilled per RN Medication Refill Policy.................... Jose Luis Feliciano RN ....................  12/31/2019   4:08 PM

## 2020-04-10 ENCOUNTER — ANTICOAGULATION THERAPY VISIT (OUTPATIENT)
Dept: ANTICOAGULATION | Facility: OTHER | Age: 85
End: 2020-04-10
Attending: INTERNAL MEDICINE
Payer: COMMERCIAL

## 2020-04-10 DIAGNOSIS — Z79.01 ANTICOAGULATION MONITORING, INR RANGE 2-3: ICD-10-CM

## 2020-04-10 DIAGNOSIS — Z79.01 LONG TERM CURRENT USE OF ANTICOAGULANT THERAPY: ICD-10-CM

## 2020-04-10 DIAGNOSIS — Z86.711 HISTORY OF PULMONARY EMBOLISM: ICD-10-CM

## 2020-04-10 DIAGNOSIS — Z86.718 PERSONAL HISTORY OF DVT (DEEP VEIN THROMBOSIS): Primary | ICD-10-CM

## 2020-04-10 LAB — INR POINT OF CARE: 2.7 (ref 0.86–1.14)

## 2020-04-10 PROCEDURE — 85610 PROTHROMBIN TIME: CPT | Mod: QW,ZL

## 2020-04-10 PROCEDURE — 36416 COLLJ CAPILLARY BLOOD SPEC: CPT | Mod: ZL

## 2020-04-10 PROCEDURE — 85610 PROTHROMBIN TIME: CPT | Mod: QW

## 2020-04-10 PROCEDURE — 36416 COLLJ CAPILLARY BLOOD SPEC: CPT

## 2020-04-10 NOTE — PROGRESS NOTES
ANTICOAGULATION FOLLOW-UP CLINIC VISIT    Patient Name:  Michael Martinez  Date:  4/10/2020  Contact Type:  Face to Face    SUBJECTIVE:  Patient Findings         Clinical Outcomes     Negatives:   Major bleeding event, Thromboembolic event, Anticoagulation-related hospital admission, Anticoagulation-related ED visit, Anticoagulation-related fatality           OBJECTIVE    INR Protime   Date Value Ref Range Status   04/10/2020 2.7 (A) 0.86 - 1.14 Final       ASSESSMENT / PLAN  INR assessment THER    Recheck INR In: 2 WEEKS    INR Location Clinic      Anticoagulation Summary  As of 4/10/2020    INR goal:   2.0-3.0   TTR:   78.3 % (8.1 mo)   INR used for dosin.7 (4/10/2020)   Warfarin maintenance plan:   5 mg (5 mg x 1) every Tue, Thu; 7.5 mg (7.5 mg x 1) all other days   Full warfarin instructions:   5 mg every Tue, Thu; 7.5 mg all other days   Weekly warfarin total:   47.5 mg   Plan last modified:   Naya Diallo RN (4/10/2020)   Next INR check:   2020   Priority:   Maintenance   Target end date:   Indefinite    Indications    Long-term (current) use of anticoagulants [Z79.01] [Z79.01]  Anticoagulation monitoring  INR range 2-3 [Z79.01]  History of pulmonary embolism [Z86.711]  Personal history of DVT (deep vein thrombosis) [Z86.718]             Anticoagulation Episode Summary     INR check location:   Anticoagulation Clinic    Preferred lab:   [facility]    Send INR reminders to:    INR    Comments:   Leaves for       Anticoagulation Care Providers     Provider Role Specialty Phone number    Isma Martinez MD Referring Internal Medicine [phone]            See the Encounter Report to view Anticoagulation Flowsheet and Dosing Calendar (Go to Encounters tab in chart review, and find the Anticoagulation Therapy Visit)        Naya Diallo, RN

## 2020-04-18 DIAGNOSIS — Z86.718 PERSONAL HISTORY OF DVT (DEEP VEIN THROMBOSIS): ICD-10-CM

## 2020-04-20 RX ORDER — WARFARIN SODIUM 7.5 MG/1
TABLET ORAL
Qty: 90 TABLET | Refills: 0 | Status: SHIPPED | OUTPATIENT
Start: 2020-04-20 | End: 2020-07-06

## 2020-04-20 NOTE — TELEPHONE ENCOUNTER
Last INR 2.7:  04/10/2020  Prescription approved per [facility].

## 2020-04-22 ENCOUNTER — TELEPHONE (OUTPATIENT)
Dept: PEDIATRICS | Facility: OTHER | Age: 85
End: 2020-04-22

## 2020-04-22 NOTE — TELEPHONE ENCOUNTER
Patient had bloodwork, Ultrasound and CT Scan down in [place] a few weeks ago.  Has not heard results and was wondering if Dr Martinez received these results/records yet.  Please call patient.   Celsa Jeronimo on 4/22/2020 at 10:56 AM

## 2020-04-23 ENCOUNTER — VIRTUAL VISIT (OUTPATIENT)
Dept: PEDIATRICS | Facility: OTHER | Age: 85
End: 2020-04-23
Attending: INTERNAL MEDICINE
Payer: COMMERCIAL

## 2020-04-23 DIAGNOSIS — E78.2 MIXED HYPERLIPIDEMIA: ICD-10-CM

## 2020-04-23 DIAGNOSIS — M15.0 PRIMARY OSTEOARTHRITIS INVOLVING MULTIPLE JOINTS: Primary | ICD-10-CM

## 2020-04-23 DIAGNOSIS — R73.03 PRE-DIABETES: ICD-10-CM

## 2020-04-23 DIAGNOSIS — Z86.69 HISTORY OF GUILLAIN-BARRE SYNDROME: Chronic | ICD-10-CM

## 2020-04-23 PROBLEM — E78.5 HYPERLIPIDEMIA: Status: RESOLVED | Noted: 2018-01-23 | Resolved: 2020-04-23

## 2020-04-23 PROCEDURE — 99213 OFFICE O/P EST LOW 20 MIN: CPT | Mod: 95 | Performed by: INTERNAL MEDICINE

## 2020-04-23 RX ORDER — EZETIMIBE 10 MG/1
10 TABLET ORAL
COMMUNITY
Start: 2019-11-14 | End: 2020-05-21

## 2020-04-23 RX ORDER — PREDNISONE 20 MG/1
20 TABLET ORAL DAILY
Qty: 5 TABLET | Refills: 0 | Status: SHIPPED | OUTPATIENT
Start: 2020-04-23 | End: 2020-05-21

## 2020-04-23 SDOH — HEALTH STABILITY: MENTAL HEALTH: HOW OFTEN DO YOU HAVE A DRINK CONTAINING ALCOHOL?: 2-3 TIMES A WEEK

## 2020-04-23 NOTE — TELEPHONE ENCOUNTER
I see Dr. Ricks's note and lab work. No major changes per my review. If he wants to discuss further, obtain imaging reports and schedule video visit.    SignedIsma MD, FAAP, FACP  Internal Medicine & Pediatrics

## 2020-04-23 NOTE — PROGRESS NOTES
"Michael Mratinez is a 84 year old male who is being evaluated via a billable telephone visit.      The patient has been notified of following:     \"This telephone visit will be conducted via a call between you and your physician/provider. We have found that certain health care needs can be provided without the need for a physical exam.  This service lets us provide the care you need with a short phone conversation.  If a prescription is necessary we can send it directly to your pharmacy.  If lab work is needed we can place an order for that and you can then stop by our lab to have the test done at a later time.    Telephone visits are billed at different rates depending on your insurance coverage. During this emergency period, for some insurers they may be billed the same as an in-person visit.  Please reach out to your insurance provider with any questions.    If during the course of the call the physician/provider feels a telephone visit is not appropriate, you will not be charged for this service.\"    Patient has given verbal consent for Telephone visit?  Yes    How would you like to obtain your AVS? Does not want a copy of AVS     Subjective     Michael Martinez is a 84 year old male who presents to clinic today for the following health issues:    Saw Dr. Ricks   MRI brain, pending  US on neck, pending  Blood work, reviewed in [facility]  Ezetimibe on m/w/f along with atorvastatin    Has had some lightheaded  Returned to [place] 3/25/2020  Has been outside refurbishing a boat (1958 [employer] cruiser)  Has aching of body  Brother had prednisone, and he felt better    Still has wrist pain since GBS.      Reviewed and updated as needed this visit by Provider  Tobacco  Allergies  Meds  Problems  Med Hx  Surg Hx  Fam Hx         Review of Systems   ROS COMP: Constitutional, HEENT, cardiovascular, pulmonary, gi and gu systems are negative, except as otherwise noted.       Objective   Reported vitals:  There were no vitals " taken for this visit.   healthy, alert and no distress  PSYCH: Alert and oriented times 3; coherent speech, normal   rate and volume, able to articulate logical thoughts, able   to abstract reason, no tangential thoughts, no hallucinations   or delusions  His affect is normal  RESP: No cough, no audible wheezing, able to talk in full sentences  Remainder of exam unable to be completed due to telephone visits    Diagnostic Test Results:  Labs reviewed in Epic  [facility]        Assessment/Plan:    ICD-10-CM    1. Primary osteoarthritis involving multiple joints  M15.0 predniSONE (DELTASONE) 20 MG tablet   2. 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 Mixed hyperlipidemia  E78.2    4. Pre-diabetes  R73.03      He swears that his brothers arthritis went away for over a year after taking just several prednisone tablets.  I cannot really connect the pathophysiology there.  Wear-and-tear arthritis would have brief improvement but not major.  Inflammatory arthritides as well.  We discussed options and decided a short course of prednisone would not be significantly detrimental and he really wants to try it.  We will have a low threshold for expert consultation.  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  I do not have access to MRI brain or ultrasound of the neck at this time.     -- Okay to try short course of prednisone   -- If symptoms persist, consider expert consultation      Phone call duration:  13 minutes    Signed, Isma Martinez MD, FAAP, FACP  Internal Medicine & Pediatrics

## 2020-04-23 NOTE — NURSING NOTE
"Chief Complaint   Patient presents with     Results     from [place]      Musculoskeletal Problem     myalgias      Patient states he had lab work done while in [place]. He has not heard any results in regards to the lab work. I did see there was a message routed to the nurse pool stating note and lab work was received and it was noted that there were no major changes. Patient still wants to be contacted to discuss myalgias that he has had for a while. He states his brother was to the point where he could hardly walk and was given Prednisone. He is now out on the golf course and has no pain. Patient is requesting a prescription for Prednisone now after it worked well for his brother.     Initial There were no vitals taken for this visit. Estimated body mass index is 24.96 kg/m  as calculated from the following:    Height as of 10/10/19: 1.753 m (5' 9\").    Weight as of 10/10/19: 76.7 kg (169 lb).  Medication Reconciliation: complete    Tangela Piper MA  "

## 2020-04-24 ENCOUNTER — ANTICOAGULATION THERAPY VISIT (OUTPATIENT)
Dept: ANTICOAGULATION | Facility: OTHER | Age: 85
End: 2020-04-24
Attending: INTERNAL MEDICINE
Payer: COMMERCIAL

## 2020-04-24 DIAGNOSIS — Z86.718 PERSONAL HISTORY OF DVT (DEEP VEIN THROMBOSIS): ICD-10-CM

## 2020-04-24 DIAGNOSIS — Z79.01 ANTICOAGULATION MONITORING, INR RANGE 2-3: ICD-10-CM

## 2020-04-24 DIAGNOSIS — Z86.711 HISTORY OF PULMONARY EMBOLISM: ICD-10-CM

## 2020-04-24 DIAGNOSIS — Z79.01 LONG TERM CURRENT USE OF ANTICOAGULANT THERAPY: ICD-10-CM

## 2020-04-24 LAB — INR POINT OF CARE: 3 (ref 0.86–1.14)

## 2020-04-24 PROCEDURE — 85610 PROTHROMBIN TIME: CPT | Mod: QW,ZL

## 2020-04-24 PROCEDURE — 36416 COLLJ CAPILLARY BLOOD SPEC: CPT | Mod: ZL

## 2020-04-24 PROCEDURE — 85610 PROTHROMBIN TIME: CPT | Mod: QW

## 2020-04-24 PROCEDURE — 36416 COLLJ CAPILLARY BLOOD SPEC: CPT

## 2020-04-24 NOTE — PROGRESS NOTES
ANTICOAGULATION FOLLOW-UP CLINIC VISIT    Patient Name:  Michael Martinez  Date:  4/24/2020  Contact Type:  Face to Face    SUBJECTIVE:  Patient Findings     Positives:   Change in medications (taking prednisone x 5 days)        Clinical Outcomes     Negatives:   Major bleeding event, Thromboembolic event, Anticoagulation-related hospital admission, Anticoagulation-related ED visit, Anticoagulation-related fatality           OBJECTIVE    INR Protime   Date Value Ref Range Status   04/24/2020 3.0 (A) 0.86 - 1.14 Final       ASSESSMENT / PLAN  INR assessment THER    Recheck INR In: 4 WEEKS    INR Location Clinic      Anticoagulation Summary  As of 4/24/2020    INR goal:   2.0-3.0   TTR:   78.3 % (8.1 mo)   INR used for dosing:   3.0 (4/24/2020)   Warfarin maintenance plan:   5 mg (5 mg x 1) every Tue, Thu; 7.5 mg (7.5 mg x 1) all other days   Full warfarin instructions:   5 mg every Tue, Thu; 7.5 mg all other days   Weekly warfarin total:   47.5 mg   No change documented:   Naya Diallo RN   Plan last modified:   Naya Diallo RN (4/10/2020)   Next INR check:   5/22/2020   Priority:   Maintenance   Target end date:   Indefinite    Indications    Long-term (current) use of anticoagulants [Z79.01] [Z79.01]  Anticoagulation monitoring  INR range 2-3 [Z79.01]  History of pulmonary embolism [Z86.711]  Personal history of DVT (deep vein thrombosis) [Z86.718]             Anticoagulation Episode Summary     INR check location:   Anticoagulation Clinic    Preferred lab:   [facility]    Send INR reminders to:    INR    Comments:   Leaves for [place], October      Anticoagulation Care Providers     Provider Role Specialty Phone number    Isma Martinez MD [facility] [phone]            See the Encounter Report to view Anticoagulation Flowsheet and Dosing Calendar (Go to Encounters tab in chart review, and find the Anticoagulation Therapy Visit)        Naya MORGAN  Yoel RN

## 2020-05-11 ENCOUNTER — TELEPHONE (OUTPATIENT)
Dept: PEDIATRICS | Facility: OTHER | Age: 85
End: 2020-05-11

## 2020-05-11 DIAGNOSIS — E78.2 MIXED HYPERLIPIDEMIA: Primary | ICD-10-CM

## 2020-05-11 RX ORDER — ATORVASTATIN CALCIUM 20 MG/1
20 TABLET, FILM COATED ORAL DAILY
Qty: 90 TABLET | Refills: 4 | Status: SHIPPED | OUTPATIENT
Start: 2020-05-11 | End: 2020-05-21

## 2020-05-11 NOTE — TELEPHONE ENCOUNTER
States he is needing a new prescription for medication he has previously taken sent to [facility]. Pt is out.

## 2020-05-11 NOTE — TELEPHONE ENCOUNTER
Patient name and date of birth verified. After verification, patient stated he needs a medication renewal for his Atorvastatin, 20mg. He asks for it b=to be sent to [id number] and gave me the fax number as well ([phone]).   Meseret Chapman LPN on 5/11/2020 at 4:29 PM

## 2020-05-21 ENCOUNTER — OFFICE VISIT (OUTPATIENT)
Dept: PEDIATRICS | Facility: OTHER | Age: 85
End: 2020-05-21
Attending: INTERNAL MEDICINE
Payer: COMMERCIAL

## 2020-05-21 VITALS
OXYGEN SATURATION: 99 % | HEART RATE: 60 BPM | DIASTOLIC BLOOD PRESSURE: 88 MMHG | SYSTOLIC BLOOD PRESSURE: 162 MMHG | TEMPERATURE: 97.2 F | HEIGHT: 70 IN | WEIGHT: 176.4 LBS | RESPIRATION RATE: 16 BRPM | BODY MASS INDEX: 25.25 KG/M2

## 2020-05-21 DIAGNOSIS — R73.03 PRE-DIABETES: ICD-10-CM

## 2020-05-21 DIAGNOSIS — E78.2 MIXED HYPERLIPIDEMIA: Primary | Chronic | ICD-10-CM

## 2020-05-21 DIAGNOSIS — R25.2 CRAMP IN LOWER LEG: ICD-10-CM

## 2020-05-21 DIAGNOSIS — I25.5 ISCHEMIC CARDIOMYOPATHY: ICD-10-CM

## 2020-05-21 PROCEDURE — 99214 OFFICE O/P EST MOD 30 MIN: CPT | Performed by: INTERNAL MEDICINE

## 2020-05-21 RX ORDER — ATORVASTATIN CALCIUM 20 MG/1
10 TABLET, FILM COATED ORAL EVERY OTHER DAY
Qty: 45 TABLET | Refills: 3 | Status: SHIPPED | OUTPATIENT
Start: 2020-05-21 | End: 2021-03-11 | Stop reason: DRUGHIGH

## 2020-05-21 RX ORDER — EZETIMIBE 10 MG/1
10 TABLET ORAL
Qty: 36 TABLET | Refills: 3 | Status: SHIPPED | OUTPATIENT
Start: 2020-05-22 | End: 2021-05-03

## 2020-05-21 ASSESSMENT — MIFFLIN-ST. JEOR: SCORE: 1493.46

## 2020-05-21 ASSESSMENT — PAIN SCALES - GENERAL: PAINLEVEL: MODERATE PAIN (4)

## 2020-05-21 NOTE — PATIENT INSTRUCTIONS
-- Resume regimen that worked for you   -- Daily exercise: eg bike   -- Continue daily stretching   -- Consider trying CoQ10 for cramps associated with atorvastatin   -- Consider trying magnesium for leg cramps   -- Drink a lot of water   -- Reduce salt intake      Patient Education     Leg Cramps  A muscle cramp or spasm is a strong contraction of the muscle fibers. It is also called a charley horse. This may occur in the foot, calf, or thigh at night when the legs are elevated. If the spasm is prolonged, it can become very painful. This may be caused by sleeping in an uncomfortable position, muscle fatigue, poor muscle tone from lack of exercise and stretching, dehydration, electrolyte imbalance, diabetes, alcohol use, and certain medicine.  Home care    Drink plenty of fluids during the day to prevent dehydration.    Stretch your legs before bedtime.    Eat a diet high in potassium. These foods include fresh fruit, such as bananas, oranges, cantaloupe, and honeydew melon. It also includes apple, prune, orange, grape and pineapple juices. Other foods high in potassium are white, red, and tate beans, baked potatoes, raw spinach, cod, flounder, halibut, salmon, and scallops.    Talk with your healthcare provider about taking mineral and vitamin supplements that contain magnesium and vitamin B-12 if you are not already taking these. Other prescription medicines may also be used.    Stay away from stimulants such as caffeine, nicotine, and decongestants.  How to relieve an acute leg cramp    For mild pain, getting out of the bed and walking may help. Some people find relief with heat and massage. You can apply heat with a warm shower, bath, or compress. Some people feel better with a cold packs. You can make an ice pack by filling a plastic bag that seals at the top with ice cubes and then wrapping it with a thin towel. Try both and use the method that feels best for 15 to 20 minutes at a time.    For severe pain,  stretching the muscle that is in spasm may quickly relieve the pain.    When the spasm is in your foot, your toes may curl up or down. To stretch the muscle in spasm, bend your toes in the opposite direction. If the spasm pulls your toes up, bend them down. If the spasm pulls them down, bend them up.    When the spasm is in your calf, bend the ankle so the foot points upward toward your knee.    When the spasm is in your thigh, bend or straighten the knee and hip until you feel relief.  Follow-up care  Follow up with your healthcare provider, or as advised.  When to seek medical advice  Call your healthcare provider right away if any of these occur:    Walking makes your pain worse and rest makes it better    You develop weakness in the affected leg    Pain or frequency of spasms increases and is not controlled by the above measures  Date Last Reviewed: 5/1/2018 2000-2019 [employer]. [address] [place] [address], [place] [address]. All rights reserved. This information is not intended as a substitute for professional medical care. Always follow your healthcare professional's instructions.

## 2020-05-21 NOTE — NURSING NOTE
"Chief Complaint   Patient presents with     Muscle Pain     Aches in bilateral legs- mostly in calves      Patient is here for muscle aches in bilateral legs, mostly calves. It has been going on for 1.5 weeks. It is starting to lighten up.     Initial There were no vitals taken for this visit. Estimated body mass index is 24.96 kg/m  as calculated from the following:    Height as of 10/10/19: 1.753 m (5' 9\").    Weight as of 10/10/19: 76.7 kg (169 lb).  Medication Reconciliation: complete    Tangela Piper MA  "

## 2020-05-21 NOTE — PROGRESS NOTES
Subjective  Michael Martinez is a 84 year old male who presents for discuss aches and medication.  When he was in [place] he was taking atorvastatin half tablet every other day and ezetimibe 1 tablet 3 times a week.  He felt like this was really working well for him.  However when he went up to the full tablet of atorvastatin he got muscle cramping.  He then ran out for several days.  He saw an increase in cramping as well as charley horse in the calf muscles.  He continues on the home exercise program he received from physical therapy.  He is gotten back into biking.  He had some low back pain and saw his chiropractor although that is getting better.    Problem List/PMH: reviewed in EMR, and made relevant updates today.  Medications: reviewed in EMR, and made relevant updates today.  Allergies: reviewed in EMR, and made relevant updates today.    Social Hx:  Social History     Tobacco Use     Smoking status: Former Smoker     Packs/day: 0.90     Years: 9.00     Pack years: 8.10     Types: Cigarettes     Smokeless tobacco: Never Used   Substance Use Topics     Alcohol use: Yes     Alcohol/week: 0.0 standard drinks     Frequency: 2-3 times a week     Comment: beer 3 times per week      Drug use: Never     Social History     Social History Narrative     2007 (sudden cardiac death). Three children, retired in  as a .  Spends his donis in [place]     I reviewed social history and made relevant updates today.    Family Hx:   Family History   Problem Relation Age of Onset     Heart Disease Mother         Heart Disease     Other - See Comments Father         No Known Problems     Breast Cancer Sister         Cancer-breast,Otherwise healthy     Other - See Comments Brother         BPH otherwise healthy at 72     Arthritis Brother         Arthritis,Osteoarthritis, otherwise healthy at 66     Colon Cancer Brother         Cancer-colon     Breast Cancer Sister 59        Cancer-breast,  "      Objective  Vitals: reviewed in EMR.  BP (!) 162/88   Pulse 60   Temp 97.2  F (36.2  C) (Tympanic)   Resp 16   Ht 1.765 m (5' 9.5\")   Wt 80 kg (176 lb 6.4 oz)   SpO2 99%   BMI 25.68 kg/m      Gen: Pleasant male, NAD.  HEENT: MMM  Neck: Supple  Pulm: Breathing easily  Neuro: Grossly intact  Skin: No concerning lesions.  Psychiatric: Normal affect and insight. Does not appear anxious or depressed.      Labs:  Lab Results   Component Value Date    HGB 15.4 08/07/2017    HCT 45.3 08/07/2017     08/07/2017    TRIG 193 (H) 08/07/2017    HDL 40 08/07/2017     08/07/2017    BUN 15 08/07/2017    CO2 25 08/07/2017    INR 3.0 (A) 04/24/2020       Glucose   Date Value Ref Range Status   08/07/2017 117 (H) 70 - 105 mg/dL    04/17/2017 114 (H) 70 - 105 mg/dL      No results found for: A1C  Creatinine   Date Value Ref Range Status   08/07/2017 0.94 0.70 - 1.30 mg/dL    04/17/2017 1.05 0.70 - 1.30 mg/dL      LDL Cholesterol Calculated   Date Value Ref Range Status   08/07/2017 150 (H) <100 mg/dL      No results found for: [facility]            Assessment    ICD-10-CM    1. Mixed hyperlipidemia  E78.2 ezetimibe (ZETIA) 10 MG tablet     atorvastatin (LIPITOR) 20 MG tablet     Lipid Profile   2. Cramp in lower leg  R25.2 Vitamin B12     Iron Binding Panel GH   3. Pre-diabetes  R73.03 Hemoglobin A1c   4. Ischemic cardiomyopathy  I25.5 CBC with platelets     Basic metabolic panel     Orders Placed This Encounter   Procedures     CBC with platelets     Basic metabolic panel     Vitamin B12     Lipid Profile     Iron Binding Panel GH     Hemoglobin A1c     I think his cramping is multifactorial including effect from statin, reduced physical exercise, potential peripheral vascular disease, history of Guyon Barré syndrome, others.  Conservative approach is taken as outlined below.    Plan   -- Expected clinical course discussed   -- Medications and their side effects discussed  Patient Instructions    -- Resume regimen " that worked for you   -- Daily exercise: eg bike   -- Continue daily stretching   -- Consider trying CoQ10 for cramps associated with atorvastatin   -- Consider trying magnesium for leg cramps   -- Drink a lot of water   -- Reduce salt intake      Patient Education     Leg Cramps  A muscle cramp or spasm is a strong contraction of the muscle fibers. It is also called a charley horse. This may occur in the foot, calf, or thigh at night when the legs are elevated. If the spasm is prolonged, it can become very painful. This may be caused by sleeping in an uncomfortable position, muscle fatigue, poor muscle tone from lack of exercise and stretching, dehydration, electrolyte imbalance, diabetes, alcohol use, and certain medicine.  Home care    Drink plenty of fluids during the day to prevent dehydration.    Stretch your legs before bedtime.    Eat a diet high in potassium. These foods include fresh fruit, such as bananas, oranges, cantaloupe, and honeydew melon. It also includes apple, prune, orange, grape and pineapple juices. Other foods high in potassium are white, red, and tate beans, baked potatoes, raw spinach, cod, flounder, halibut, salmon, and scallops.    Talk with your healthcare provider about taking mineral and vitamin supplements that contain magnesium and vitamin B-12 if you are not already taking these. Other prescription medicines may also be used.    Stay away from stimulants such as caffeine, nicotine, and decongestants.  How to relieve an acute leg cramp    For mild pain, getting out of the bed and walking may help. Some people find relief with heat and massage. You can apply heat with a warm shower, bath, or compress. Some people feel better with a cold packs. You can make an ice pack by filling a plastic bag that seals at the top with ice cubes and then wrapping it with a thin towel. Try both and use the method that feels best for 15 to 20 minutes at a time.    For severe pain, stretching the  muscle that is in spasm may quickly relieve the pain.    When the spasm is in your foot, your toes may curl up or down. To stretch the muscle in spasm, bend your toes in the opposite direction. If the spasm pulls your toes up, bend them down. If the spasm pulls them down, bend them up.    When the spasm is in your calf, bend the ankle so the foot points upward toward your knee.    When the spasm is in your thigh, bend or straighten the knee and hip until you feel relief.  Follow-up care  Follow up with your healthcare provider, or as advised.  When to seek medical advice  Call your healthcare provider right away if any of these occur:    Walking makes your pain worse and rest makes it better    You develop weakness in the affected leg    Pain or frequency of spasms increases and is not controlled by the above measures  Date Last Reviewed: 5/1/2018 2000-2019 [employer]. [address] [place] [address]. All rights reserved. This information is not intended as a substitute for professional medical care. Always follow your healthcare professional's instructions.               Return in about 2 months (around 7/21/2020), or if symptoms worsen or fail to improve, for medication management.    Signed, Isma Martinez MD, FAAP, FACP  Internal Medicine & Pediatrics

## 2020-05-22 ENCOUNTER — ANTICOAGULATION THERAPY VISIT (OUTPATIENT)
Dept: ANTICOAGULATION | Facility: OTHER | Age: 85
End: 2020-05-22
Attending: INTERNAL MEDICINE
Payer: COMMERCIAL

## 2020-05-22 DIAGNOSIS — Z79.01 ANTICOAGULATION MONITORING, INR RANGE 2-3: ICD-10-CM

## 2020-05-22 DIAGNOSIS — Z79.01 LONG TERM CURRENT USE OF ANTICOAGULANT THERAPY: ICD-10-CM

## 2020-05-22 DIAGNOSIS — Z86.718 PERSONAL HISTORY OF DVT (DEEP VEIN THROMBOSIS): ICD-10-CM

## 2020-05-22 DIAGNOSIS — Z86.711 HISTORY OF PULMONARY EMBOLISM: ICD-10-CM

## 2020-05-22 LAB — INR POINT OF CARE: 3.8 (ref 0.86–1.14)

## 2020-05-22 PROCEDURE — 36416 COLLJ CAPILLARY BLOOD SPEC: CPT | Mod: ZL

## 2020-05-22 PROCEDURE — 85610 PROTHROMBIN TIME: CPT | Mod: QW

## 2020-05-22 PROCEDURE — 36416 COLLJ CAPILLARY BLOOD SPEC: CPT

## 2020-05-22 PROCEDURE — 85610 PROTHROMBIN TIME: CPT | Mod: QW,ZL

## 2020-05-22 NOTE — PROGRESS NOTES
ANTICOAGULATION FOLLOW-UP CLINIC VISIT    Patient Name:  Michael Martinez  Date:  5/22/2020  Contact Type:  Face to Face    SUBJECTIVE:  Patient Findings     Positives:   Change in medications (he increased tylenol for pain from a fall), Other complaints (had a fall a few days ago)        Clinical Outcomes     Negatives:   Major bleeding event, Thromboembolic event, Anticoagulation-related hospital admission, Anticoagulation-related ED visit, Anticoagulation-related fatality           OBJECTIVE    Recent labs: (last 7 days)     05/22/20   INR 3.8*       ASSESSMENT / PLAN  INR assessment SUPRA    Recheck INR In: 3 WEEKS    INR Location Clinic      Anticoagulation Summary  As of 5/22/2020    INR goal:   2.0-3.0   TTR:   66.7 % (8.1 mo)   INR used for dosing:   3.8! (5/22/2020)   Warfarin maintenance plan:   5 mg (5 mg x 1) every Tue, Thu; 7.5 mg (7.5 mg x 1) all other days   Full warfarin instructions:   5/22: 5 mg; Otherwise 5 mg every Tue, Thu; 7.5 mg all other days   Weekly warfarin total:   47.5 mg   Plan last modified:   Naya Diallo RN (4/10/2020)   Next INR check:   6/12/2020   Priority:   Maintenance   Target end date:   Indefinite    Indications    Long-term (current) use of anticoagulants [Z79.01] [Z79.01]  Anticoagulation monitoring  INR range 2-3 [Z79.01]  History of pulmonary embolism [Z86.711]  Personal history of DVT (deep vein thrombosis) [Z86.718]             Anticoagulation Episode Summary     INR check location:   Anticoagulation Clinic    Preferred lab:   [facility]    Send INR reminders to:   [facility]    Comments:   Leaves for [place], October      Anticoagulation Care Providers     Provider Role Specialty Phone number    Isma Martinez MD [facility] Internal Medicine [phone]            See the Encounter Report to view Anticoagulation Flowsheet and Dosing Calendar (Go to Encounters tab in chart review, and find the Anticoagulation Therapy  Visit)        Naya Diallo, RN

## 2020-06-12 ENCOUNTER — ANTICOAGULATION THERAPY VISIT (OUTPATIENT)
Dept: ANTICOAGULATION | Facility: OTHER | Age: 85
End: 2020-06-12
Attending: INTERNAL MEDICINE
Payer: COMMERCIAL

## 2020-06-12 DIAGNOSIS — Z86.718 PERSONAL HISTORY OF DVT (DEEP VEIN THROMBOSIS): ICD-10-CM

## 2020-06-12 DIAGNOSIS — Z79.01 ANTICOAGULATION MONITORING, INR RANGE 2-3: ICD-10-CM

## 2020-06-12 DIAGNOSIS — Z79.01 LONG TERM CURRENT USE OF ANTICOAGULANT THERAPY: ICD-10-CM

## 2020-06-12 DIAGNOSIS — Z86.711 HISTORY OF PULMONARY EMBOLISM: ICD-10-CM

## 2020-06-12 LAB — INR POINT OF CARE: 1.6 (ref 0.86–1.14)

## 2020-06-12 PROCEDURE — 85610 PROTHROMBIN TIME: CPT | Mod: QW,ZL

## 2020-06-12 PROCEDURE — 36416 COLLJ CAPILLARY BLOOD SPEC: CPT | Mod: ZL

## 2020-06-12 NOTE — PROGRESS NOTES
ANTICOAGULATION FOLLOW-UP CLINIC VISIT    Patient Name:  Michael Martinez  Date:  2020  Contact Type:  Face to Face    SUBJECTIVE:  Patient Findings     Positives:   Missed doses        Clinical Outcomes     Negatives:   Major bleeding event, Thromboembolic event, Anticoagulation-related hospital admission, Anticoagulation-related ED visit, Anticoagulation-related fatality           OBJECTIVE    Recent labs: (last 7 days)     20   INR 1.6*       ASSESSMENT / PLAN  INR assessment THER    Recheck INR In: 3 WEEKS    INR Location Clinic      Anticoagulation Summary  As of 2020    INR goal:   2.0-3.0   TTR:   62.0 % (8.1 mo)   INR used for dosin.6! (2020)   Warfarin maintenance plan:   5 mg (5 mg x 1) every Tue, Fri; 7.5 mg (7.5 mg x 1) all other days   Full warfarin instructions:   : 10 mg; Otherwise 5 mg every Tue, Fri; 7.5 mg all other days   Weekly warfarin total:   47.5 mg   Plan last modified:   Maria Del Rosario Trujillo RN (2020)   Next INR check:   7/3/2020   Priority:   Maintenance   Target end date:   Indefinite    Indications    Long-term (current) use of anticoagulants [Z79.01] [Z79.01]  Anticoagulation monitoring  INR range 2-3 [Z79.01]  History of pulmonary embolism [Z86.711]  Personal history of DVT (deep vein thrombosis) [Z86.718]             Anticoagulation Episode Summary     INR check location:   Anticoagulation Clinic    Preferred lab:   [facility]    Send INR reminders to:   [facility]    Comments:   Leaves for       Anticoagulation Care Providers     Provider Role Specialty Phone number    Isma Martinez MD [facility] Internal Medicine [phone]            See the Encounter Report to view Anticoagulation Flowsheet and Dosing Calendar (Go to Encounters tab in chart review, and find the Anticoagulation Therapy Visit)        Maria Del Rosario Trujillo RN

## 2020-07-06 ENCOUNTER — ANTICOAGULATION THERAPY VISIT (OUTPATIENT)
Dept: ANTICOAGULATION | Facility: OTHER | Age: 85
End: 2020-07-06
Attending: INTERNAL MEDICINE
Payer: COMMERCIAL

## 2020-07-06 DIAGNOSIS — Z86.718 PERSONAL HISTORY OF DVT (DEEP VEIN THROMBOSIS): ICD-10-CM

## 2020-07-06 DIAGNOSIS — Z79.01 LONG TERM CURRENT USE OF ANTICOAGULANT THERAPY: ICD-10-CM

## 2020-07-06 DIAGNOSIS — Z86.711 HISTORY OF PULMONARY EMBOLISM: ICD-10-CM

## 2020-07-06 DIAGNOSIS — Z79.01 ANTICOAGULATION MONITORING, INR RANGE 2-3: ICD-10-CM

## 2020-07-06 LAB — INR POINT OF CARE: 3.7 (ref 0.86–1.14)

## 2020-07-06 PROCEDURE — 85610 PROTHROMBIN TIME: CPT | Mod: QW,ZL

## 2020-07-06 PROCEDURE — 36416 COLLJ CAPILLARY BLOOD SPEC: CPT | Mod: ZL

## 2020-07-06 RX ORDER — WARFARIN SODIUM 7.5 MG/1
TABLET ORAL
Qty: 90 TABLET | Refills: 0
Start: 2020-07-06 | End: 2020-08-17

## 2020-07-06 RX ORDER — WARFARIN SODIUM 5 MG/1
TABLET ORAL
Qty: 30 TABLET | Refills: 1
Start: 2020-07-06 | End: 2020-08-17

## 2020-07-06 NOTE — PROGRESS NOTES
ANTICOAGULATION FOLLOW-UP CLINIC VISIT    Patient Name:  Michael Martinez  Date:  7/6/2020  Contact Type:  Face to Face    SUBJECTIVE:  Patient Findings         Clinical Outcomes     Negatives:   Major bleeding event, Thromboembolic event, Anticoagulation-related hospital admission, Anticoagulation-related ED visit, Anticoagulation-related fatality           OBJECTIVE    Recent labs: (last 7 days)     07/06/20   INR 3.7*       ASSESSMENT / PLAN  INR assessment SUPRA    Recheck INR In: 2 WEEKS    INR Location Clinic      Anticoagulation Summary  As of 7/6/2020    INR goal:   2.0-3.0   TTR:   56.8 % (8.1 mo)   INR used for dosing:   3.7! (7/6/2020)   Warfarin maintenance plan:   5 mg (5 mg x 1) every Mon, Wed, Fri; 7.5 mg (7.5 mg x 1) all other days   Full warfarin instructions:   5 mg every Mon, Wed, Fri; 7.5 mg all other days   Weekly warfarin total:   45 mg   Plan last modified:   Naya Diallo RN (7/6/2020)   Next INR check:   7/20/2020   Priority:   Maintenance   Target end date:   Indefinite    Indications    Long-term (current) use of anticoagulants [Z79.01] [Z79.01]  Anticoagulation monitoring  INR range 2-3 [Z79.01]  History of pulmonary embolism [Z86.711]  Personal history of DVT (deep vein thrombosis) [Z86.718]             Anticoagulation Episode Summary     INR check location:   Anticoagulation Clinic    Preferred lab:   [facility]    Send INR reminders to:   [facility]    Comments:   Leaves for [place], October      Anticoagulation Care Providers     Provider Role Specialty Phone number    Isma Martinez MD [facility] Internal Medicine [phone]            See the Encounter Report to view Anticoagulation Flowsheet and Dosing Calendar (Go to Encounters tab in chart review, and find the Anticoagulation Therapy Visit)        Naya Diallo, RN

## 2020-07-20 ENCOUNTER — ANTICOAGULATION THERAPY VISIT (OUTPATIENT)
Dept: ANTICOAGULATION | Facility: OTHER | Age: 85
End: 2020-07-20
Attending: INTERNAL MEDICINE
Payer: COMMERCIAL

## 2020-07-20 DIAGNOSIS — Z86.711 HISTORY OF PULMONARY EMBOLISM: ICD-10-CM

## 2020-07-20 DIAGNOSIS — Z86.718 PERSONAL HISTORY OF DVT (DEEP VEIN THROMBOSIS): ICD-10-CM

## 2020-07-20 DIAGNOSIS — Z79.01 ANTICOAGULATION MONITORING, INR RANGE 2-3: ICD-10-CM

## 2020-07-20 DIAGNOSIS — Z79.01 LONG TERM CURRENT USE OF ANTICOAGULANT THERAPY: ICD-10-CM

## 2020-07-20 LAB — INR POINT OF CARE: 3.5 (ref 0.86–1.14)

## 2020-07-20 PROCEDURE — 36416 COLLJ CAPILLARY BLOOD SPEC: CPT | Mod: ZL

## 2020-07-20 PROCEDURE — 85610 PROTHROMBIN TIME: CPT | Mod: QW,ZL

## 2020-07-20 NOTE — PROGRESS NOTES
ANTICOAGULATION FOLLOW-UP CLINIC VISIT    Patient Name:  Michael Martinez  Date:  7/20/2020  Contact Type:  Face to Face    SUBJECTIVE:  Patient Findings     Positives:   Extra doses (forgot he dose was changed last time and went back to old dose)        Clinical Outcomes     Negatives:   Major bleeding event, Thromboembolic event, Anticoagulation-related hospital admission, Anticoagulation-related ED visit, Anticoagulation-related fatality           OBJECTIVE    Recent labs: (last 7 days)     07/20/20   INR 3.5*       ASSESSMENT / PLAN  INR assessment SUPRA    Recheck INR In: 2 WEEKS    INR Location Clinic      Anticoagulation Summary  As of 7/20/2020    INR goal:   2.0-3.0   TTR:   51.0 % (8.1 mo)   INR used for dosing:   3.5! (7/20/2020)   Warfarin maintenance plan:   5 mg (5 mg x 1) every Mon, Wed, Fri; 7.5 mg (7.5 mg x 1) all other days   Full warfarin instructions:   5 mg every Mon, Wed, Fri; 7.5 mg all other days   Weekly warfarin total:   45 mg   No change documented:   Naya Diallo RN   Plan last modified:   Naya Diallo RN (7/6/2020)   Next INR check:   8/3/2020   Priority:   Maintenance   Target end date:   Indefinite    Indications    Long-term (current) use of anticoagulants [Z79.01] [Z79.01]  Anticoagulation monitoring  INR range 2-3 [Z79.01]  History of pulmonary embolism [Z86.711]  Personal history of DVT (deep vein thrombosis) [Z86.718]             Anticoagulation Episode Summary     INR check location:   Anticoagulation Clinic    Preferred lab:   [facility]    Send INR reminders to:   [facility]    Comments:   Leaves for [place], October      Anticoagulation Care Providers     Provider Role Specialty Phone number    Isma Martinez MD Responsible Internal Medicine [phone]            See the Encounter Report to view Anticoagulation Flowsheet and Dosing Calendar (Go to Encounters tab in chart review, and find the Anticoagulation Therapy  Visit)        Naya Diallo, RN

## 2020-08-03 ENCOUNTER — ANTICOAGULATION THERAPY VISIT (OUTPATIENT)
Dept: ANTICOAGULATION | Facility: OTHER | Age: 85
End: 2020-08-03
Attending: INTERNAL MEDICINE
Payer: COMMERCIAL

## 2020-08-03 DIAGNOSIS — Z86.718 PERSONAL HISTORY OF DVT (DEEP VEIN THROMBOSIS): ICD-10-CM

## 2020-08-03 DIAGNOSIS — Z86.711 HISTORY OF PULMONARY EMBOLISM: ICD-10-CM

## 2020-08-03 DIAGNOSIS — Z79.01 LONG TERM CURRENT USE OF ANTICOAGULANT THERAPY: ICD-10-CM

## 2020-08-03 DIAGNOSIS — Z79.01 ANTICOAGULATION MONITORING, INR RANGE 2-3: ICD-10-CM

## 2020-08-03 LAB — INR POINT OF CARE: 2.1 (ref 0.86–1.14)

## 2020-08-03 PROCEDURE — 36416 COLLJ CAPILLARY BLOOD SPEC: CPT | Mod: ZL

## 2020-08-03 PROCEDURE — 85610 PROTHROMBIN TIME: CPT | Mod: QW,ZL

## 2020-08-03 NOTE — PROGRESS NOTES
ANTICOAGULATION FOLLOW-UP CLINIC VISIT    Patient Name:  Michael Martinez  Date:  8/3/2020  Contact Type:  Face to Face    SUBJECTIVE:  Patient Findings         Clinical Outcomes     Negatives:   Major bleeding event, Thromboembolic event, Anticoagulation-related hospital admission, Anticoagulation-related ED visit, Anticoagulation-related fatality           OBJECTIVE    Recent labs: (last 7 days)     20   INR 2.1*       ASSESSMENT / PLAN  INR assessment THER    Recheck INR In: 2 WEEKS    INR Location Clinic      Anticoagulation Summary  As of 8/3/2020    INR goal:   2.0-3.0   TTR:   48.9 % (8.1 mo)   INR used for dosin.1 (8/3/2020)   Warfarin maintenance plan:   5 mg (5 mg x 1) every Mon, Wed, Fri; 7.5 mg (7.5 mg x 1) all other days   Full warfarin instructions:   5 mg every Mon, Wed, Fri; 7.5 mg all other days   Weekly warfarin total:   45 mg   No change documented:   Maria Del Rosario Trujillo RN   Plan last modified:   Naya Diallo RN (2020)   Next INR check:   2020   Priority:   Maintenance   Target end date:   Indefinite    Indications    Long-term (current) use of anticoagulants [Z79.01] [Z79.01]  Anticoagulation monitoring  INR range 2-3 [Z79.01]  History of pulmonary embolism [Z86.711]  Personal history of DVT (deep vein thrombosis) [Z86.718]             Anticoagulation Episode Summary     INR check location:   Anticoagulation Clinic    Preferred lab:   [facility]    Send INR reminders to:   [facility]    Comments:   Leaves for       Anticoagulation Care Providers     Provider Role Specialty Phone number    Isma Martinez MD [facility] Internal Medicine [phone]            See the Encounter Report to view Anticoagulation Flowsheet and Dosing Calendar (Go to Encounters tab in chart review, and find the Anticoagulation Therapy Visit)        Maria Del Rosario Trujillo RN

## 2020-08-17 ENCOUNTER — ANTICOAGULATION THERAPY VISIT (OUTPATIENT)
Dept: ANTICOAGULATION | Facility: OTHER | Age: 85
End: 2020-08-17
Attending: INTERNAL MEDICINE
Payer: COMMERCIAL

## 2020-08-17 DIAGNOSIS — Z86.711 HISTORY OF PULMONARY EMBOLISM: ICD-10-CM

## 2020-08-17 DIAGNOSIS — Z79.01 ANTICOAGULATION MONITORING, INR RANGE 2-3: ICD-10-CM

## 2020-08-17 DIAGNOSIS — Z79.01 LONG TERM CURRENT USE OF ANTICOAGULANT THERAPY: ICD-10-CM

## 2020-08-17 DIAGNOSIS — Z86.718 PERSONAL HISTORY OF DVT (DEEP VEIN THROMBOSIS): ICD-10-CM

## 2020-08-17 LAB — INR POINT OF CARE: 3.6 (ref 0.86–1.14)

## 2020-08-17 PROCEDURE — 36416 COLLJ CAPILLARY BLOOD SPEC: CPT | Mod: ZL

## 2020-08-17 PROCEDURE — 85610 PROTHROMBIN TIME: CPT | Mod: QW,ZL

## 2020-08-17 RX ORDER — WARFARIN SODIUM 5 MG/1
TABLET ORAL
Qty: 30 TABLET | Refills: 1
Start: 2020-08-17 | End: 2020-08-24

## 2020-08-17 RX ORDER — WARFARIN SODIUM 7.5 MG/1
TABLET ORAL
Qty: 90 TABLET | Refills: 0
Start: 2020-08-17 | End: 2020-08-24

## 2020-08-17 NOTE — PROGRESS NOTES
ANTICOAGULATION FOLLOW-UP CLINIC VISIT    Patient Name:  Michael Martinez  Date:  8/17/2020  Contact Type:  Face to face    SUBJECTIVE:  Patient Findings         Clinical Outcomes     Negatives:   Major bleeding event, Thromboembolic event, Anticoagulation-related hospital admission, Anticoagulation-related ED visit, Anticoagulation-related fatality           OBJECTIVE    Recent labs: (last 7 days)     08/17/20   INR 3.6*       ASSESSMENT / PLAN  INR assessment THER    Recheck INR In: 2 WEEKS    INR Location Clinic      Anticoagulation Summary  As of 8/17/2020    INR goal:   2.0-3.0   TTR:   46.6 % (8.1 mo)   INR used for dosing:   3.6! (8/17/2020)   Warfarin maintenance plan:   7.5 mg (7.5 mg x 1) every Tue, Thu; 5 mg (5 mg x 1) all other days   Full warfarin instructions:   7.5 mg every Tue, Thu; 5 mg all other days   Weekly warfarin total:   40 mg   Plan last modified:   Naya Diallo RN (8/17/2020)   Next INR check:   8/31/2020   Priority:   Maintenance   Target end date:   Indefinite    Indications    Long-term (current) use of anticoagulants [Z79.01] [Z79.01]  Anticoagulation monitoring  INR range 2-3 [Z79.01]  History of pulmonary embolism [Z86.711]  Personal history of DVT (deep vein thrombosis) [Z86.718]             Anticoagulation Episode Summary     INR check location:   Anticoagulation Clinic    Preferred lab:   [facility]    Send INR reminders to:   [facility]    Comments:   Leaves for [place], October      Anticoagulation Care Providers     Provider Role Specialty Phone number    Isma Martinez MD [facility] Internal Medicine [phone]            See the Encounter Report to view Anticoagulation Flowsheet and Dosing Calendar (Go to Encounters tab in chart review, and find the Anticoagulation Therapy Visit)        Naya Diallo, RN

## 2020-08-24 DIAGNOSIS — Z95.1 S/P CABG X 4: ICD-10-CM

## 2020-08-24 DIAGNOSIS — Z86.718 PERSONAL HISTORY OF DVT (DEEP VEIN THROMBOSIS): ICD-10-CM

## 2020-08-24 RX ORDER — WARFARIN SODIUM 5 MG/1
TABLET ORAL
Qty: 60 TABLET | Refills: 1 | Status: SHIPPED | OUTPATIENT
Start: 2020-08-24 | End: 2021-05-03

## 2020-08-24 RX ORDER — WARFARIN SODIUM 7.5 MG/1
TABLET ORAL
Qty: 30 TABLET | Refills: 1 | Status: SHIPPED | OUTPATIENT
Start: 2020-08-24 | End: 2021-03-15

## 2020-08-24 RX ORDER — METOPROLOL SUCCINATE 25 MG/1
TABLET, EXTENDED RELEASE ORAL
Qty: 45 TABLET | Refills: 2 | Status: SHIPPED | OUTPATIENT
Start: 2020-08-24 | End: 2021-05-03

## 2020-08-24 NOTE — TELEPHONE ENCOUNTER
Last INR 3.6:  08/17/2020.  Warfarin prescriptions approved per [facility] Refill Protocol.  Maria Del Rosario Trujillo RN ....................  8/24/2020   2:13 PM

## 2020-08-24 NOTE — TELEPHONE ENCOUNTER
Routing refill request to provider for review/approval because:  Blood pressure under 140/90 in past 12 months         BP Readings from Last 3 Encounters:   05/21/20 (!) 162/88   10/10/19 134/76   08/06/19 132/72        LOV: 5/21/2020  Ruth Ann Chandler RN on 8/24/2020 at 2:48 PM

## 2020-08-31 ENCOUNTER — ANTICOAGULATION THERAPY VISIT (OUTPATIENT)
Dept: ANTICOAGULATION | Facility: OTHER | Age: 85
End: 2020-08-31
Attending: INTERNAL MEDICINE
Payer: COMMERCIAL

## 2020-08-31 DIAGNOSIS — Z86.711 HISTORY OF PULMONARY EMBOLISM: ICD-10-CM

## 2020-08-31 DIAGNOSIS — Z79.01 ANTICOAGULATION MONITORING, INR RANGE 2-3: ICD-10-CM

## 2020-08-31 DIAGNOSIS — Z79.01 LONG TERM CURRENT USE OF ANTICOAGULANT THERAPY: ICD-10-CM

## 2020-08-31 DIAGNOSIS — Z86.718 PERSONAL HISTORY OF DVT (DEEP VEIN THROMBOSIS): ICD-10-CM

## 2020-08-31 LAB — INR POINT OF CARE: 2.4 (ref 0.86–1.14)

## 2020-08-31 PROCEDURE — 36416 COLLJ CAPILLARY BLOOD SPEC: CPT | Mod: ZL

## 2020-08-31 PROCEDURE — 85610 PROTHROMBIN TIME: CPT | Mod: QW,ZL

## 2020-08-31 NOTE — PROGRESS NOTES
ANTICOAGULATION FOLLOW-UP CLINIC VISIT    Patient Name:  Michael Martinez  Date:  2020  Contact Type:  Face to Face    SUBJECTIVE:  Patient Findings         Clinical Outcomes     Negatives:   Major bleeding event, Thromboembolic event, Anticoagulation-related hospital admission, Anticoagulation-related ED visit, Anticoagulation-related fatality           OBJECTIVE    Recent labs: (last 7 days)     20   INR 2.4*       ASSESSMENT / PLAN  INR assessment THER    Recheck INR In: 4 WEEKS    INR Location Clinic      Anticoagulation Summary  As of 2020    INR goal:   2.0-3.0   TTR:   43.7 % (8.1 mo)   INR used for dosin.4 (2020)   Warfarin maintenance plan:   7.5 mg (7.5 mg x 1) every Tue, Thu; 5 mg (5 mg x 1) all other days   Full warfarin instructions:   7.5 mg every Tue, Thu; 5 mg all other days   Weekly warfarin total:   40 mg   No change documented:   Peyton Mazariegos RN   Plan last modified:   Naya Diallo RN (2020)   Next INR check:   2020   Priority:   Maintenance   Target end date:   Indefinite    Indications    Long-term (current) use of anticoagulants [Z79.01] [Z79.01]  Anticoagulation monitoring  INR range 2-3 [Z79.01]  History of pulmonary embolism [Z86.711]  Personal history of DVT (deep vein thrombosis) [Z86.718]             Anticoagulation Episode Summary     INR check location:   Anticoagulation Clinic    Preferred lab:   [facility]    Send INR reminders to:   [facility]    Comments:   Leaves for       Anticoagulation Care Providers     Provider Role Specialty Phone number    Isma Martinez MD [facility] Internal Medicine [phone]            See the Encounter Report to view Anticoagulation Flowsheet and Dosing Calendar (Go to Encounters tab in chart review, and find the Anticoagulation Therapy Visit)    Continue same dose of Coumadin/Warfarinand recheck INR in 4 weeks.      Peyton Mazariegos RN

## 2020-09-28 ENCOUNTER — ANTICOAGULATION THERAPY VISIT (OUTPATIENT)
Dept: ANTICOAGULATION | Facility: OTHER | Age: 85
End: 2020-09-28
Attending: INTERNAL MEDICINE
Payer: COMMERCIAL

## 2020-09-28 DIAGNOSIS — Z79.01 ANTICOAGULATION MONITORING, INR RANGE 2-3: ICD-10-CM

## 2020-09-28 DIAGNOSIS — Z86.718 PERSONAL HISTORY OF DVT (DEEP VEIN THROMBOSIS): ICD-10-CM

## 2020-09-28 DIAGNOSIS — Z86.711 HISTORY OF PULMONARY EMBOLISM: ICD-10-CM

## 2020-09-28 DIAGNOSIS — Z79.01 LONG TERM CURRENT USE OF ANTICOAGULANT THERAPY: ICD-10-CM

## 2020-09-28 LAB — INR POINT OF CARE: 3.1 (ref 0.86–1.14)

## 2020-09-28 PROCEDURE — 36416 COLLJ CAPILLARY BLOOD SPEC: CPT | Mod: ZL

## 2020-09-28 NOTE — PROGRESS NOTES
ANTICOAGULATION FOLLOW-UP CLINIC VISIT    Patient Name:  Michael Martinez  Date:  9/28/2020  Contact Type:  Face to Face    SUBJECTIVE:  Patient Findings         Clinical Outcomes     Negatives:   Major bleeding event, Thromboembolic event, Anticoagulation-related hospital admission, Anticoagulation-related ED visit, Anticoagulation-related fatality           OBJECTIVE    Recent labs: (last 7 days)     09/28/20   INR 3.1*       ASSESSMENT / PLAN  INR assessment SUPRA    Recheck INR In: 4 WEEKS    INR Location Clinic      Anticoagulation Summary  As of 9/28/2020    INR goal:   2.0-3.0   TTR:   42.1 % (8.1 mo)   INR used for dosing:   3.1! (9/28/2020)   Warfarin maintenance plan:   7.5 mg (7.5 mg x 1) every Tue, Thu; 5 mg (5 mg x 1) all other days   Full warfarin instructions:   7.5 mg every Tue, Thu; 5 mg all other days   Weekly warfarin total:   40 mg   No change documented:   Peyton Mazariegos RN   Plan last modified:   Naya Diallo RN (8/17/2020)   Next INR check:   10/26/2020   Priority:   Maintenance   Target end date:   Indefinite    Indications    Long-term (current) use of anticoagulants [Z79.01] [Z79.01]  Anticoagulation monitoring  INR range 2-3 [Z79.01]  History of pulmonary embolism [Z86.711]  Personal history of DVT (deep vein thrombosis) [Z86.718]             Anticoagulation Episode Summary     INR check location:   Anticoagulation Clinic    Preferred lab:   [facility]    Send INR reminders to:   [facility]    Comments:   Leaves for [place], [place]      Anticoagulation Care Providers     Provider Role Specialty Phone number    Isma Martinez MD [facility] Internal Medicine [phone]            See the Encounter Report to view Anticoagulation Flowsheet and Dosing Calendar (Go to Encounters tab in chart review, and find the Anticoagulation Therapy Visit)    Continue same dose of Coumadin/Warfarinand recheck INR in 4 weeks.  Discussed signs of bleeding,  caution with injury especially to head and diet with elevated INR.    Peyton Mazariegos RN

## 2020-10-26 ENCOUNTER — ANTICOAGULATION THERAPY VISIT (OUTPATIENT)
Dept: ANTICOAGULATION | Facility: OTHER | Age: 85
End: 2020-10-26
Attending: INTERNAL MEDICINE
Payer: COMMERCIAL

## 2020-10-26 DIAGNOSIS — Z86.711 HISTORY OF PULMONARY EMBOLISM: ICD-10-CM

## 2020-10-26 DIAGNOSIS — Z79.01 ANTICOAGULATION MONITORING, INR RANGE 2-3: ICD-10-CM

## 2020-10-26 DIAGNOSIS — Z86.718 PERSONAL HISTORY OF DVT (DEEP VEIN THROMBOSIS): ICD-10-CM

## 2020-10-26 DIAGNOSIS — Z79.01 LONG TERM CURRENT USE OF ANTICOAGULANT THERAPY: ICD-10-CM

## 2020-10-26 LAB — INR POINT OF CARE: 2.5 (ref 0.86–1.14)

## 2020-10-26 PROCEDURE — 85610 PROTHROMBIN TIME: CPT | Mod: ZL

## 2020-10-26 NOTE — PROGRESS NOTES
ANTICOAGULATION FOLLOW-UP CLINIC VISIT    Patient Name:  Michael Martinez  Date:  10/26/2020  Contact Type:  Face to Face    SUBJECTIVE:  Patient Findings         Clinical Outcomes     Negatives:  Major bleeding event, Thromboembolic event, Anticoagulation-related hospital admission, Anticoagulation-related ED visit, Anticoagulation-related fatality           OBJECTIVE    Recent labs: (last 7 days)     10/26/20   INR 2.5*       ASSESSMENT / PLAN  INR assessment THER    Recheck INR In: 2 WEEKS    INR Location Clinic      Anticoagulation Summary  As of 10/26/2020    INR goal:  2.0-3.0   TTR:  47.8 % (8.1 mo)   INR used for dosin.5 (10/26/2020)   Warfarin maintenance plan:  7.5 mg (7.5 mg x 1) every Tue, Thu; 5 mg (5 mg x 1) all other days   Full warfarin instructions:  7.5 mg every Tue, Thu; 5 mg all other days   Weekly warfarin total:  40 mg   No change documented:  Jose Luis Feliciano RN   Plan last modified:  Naya Diallo RN (2020)   Next INR check:  2020   Priority:  Maintenance   Target end date:  Indefinite    Indications    Long-term (current) use of anticoagulants [Z79.01] [Z79.01]  Anticoagulation monitoring  INR range 2-3 [Z79.01]  History of pulmonary embolism [Z86.711]  Personal history of DVT (deep vein thrombosis) [Z86.718]             Anticoagulation Episode Summary     INR check location:  Anticoagulation Clinic    Preferred lab:  [facility]    Send INR reminders to:  [facility]    Comments:  Leaves for       Anticoagulation Care Providers     Provider Role Specialty Phone number    Isma Martinez MD [facility] Internal Medicine [phone]            See the Encounter Report to view Anticoagulation Flowsheet and Dosing Calendar (Go to Encounters tab in chart review, and find the Anticoagulation Therapy Visit)      Jose Luis Feliciano RN

## 2021-02-24 ENCOUNTER — TELEPHONE (OUTPATIENT)
Dept: PEDIATRICS | Facility: OTHER | Age: 86
End: 2021-02-24

## 2021-02-24 NOTE — TELEPHONE ENCOUNTER
"Spoke with pt via phone. Pt reported he will be back from \"snowbirding\" around the middle of March, 2021. Pt would like his PCP to advise if he should get the COVID-19 vaccination. 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 Pt advised PCP in out of the clinic today, that message will be addressed upon return. The patient indicates understanding of these issues and agrees with the plan.      Rochelle Pedraza RN  ....................  2/24/2021   4:52 PM      "

## 2021-02-25 NOTE — TELEPHONE ENCOUNTER
So far Guillain Mentcle has not been reported with the COVID vaccine.  I'd recommend getting the vaccine.    Signed, Isma Martinez MD, FAAP, FACP  Internal Medicine & Pediatrics

## 2021-02-25 NOTE — TELEPHONE ENCOUNTER
Called Michelle back and after proper verification of Pt information notified her of [facility] recommendation for Pt to still get the COVID vaccine.   Lorena lAtman LPN on 2/25/2021 at 9:54 AM

## 2021-03-11 ENCOUNTER — OFFICE VISIT (OUTPATIENT)
Dept: PEDIATRICS | Facility: OTHER | Age: 86
End: 2021-03-11
Attending: INTERNAL MEDICINE
Payer: COMMERCIAL

## 2021-03-11 VITALS
WEIGHT: 172.2 LBS | OXYGEN SATURATION: 96 % | HEART RATE: 70 BPM | BODY MASS INDEX: 25.06 KG/M2 | SYSTOLIC BLOOD PRESSURE: 134 MMHG | TEMPERATURE: 99 F | RESPIRATION RATE: 18 BRPM | DIASTOLIC BLOOD PRESSURE: 76 MMHG

## 2021-03-11 DIAGNOSIS — H61.21 IMPACTED CERUMEN OF RIGHT EAR: Primary | ICD-10-CM

## 2021-03-11 PROCEDURE — 69209 REMOVE IMPACTED EAR WAX UNI: CPT

## 2021-03-11 PROCEDURE — G0463 HOSPITAL OUTPT CLINIC VISIT: HCPCS

## 2021-03-11 PROCEDURE — 99213 OFFICE O/P EST LOW 20 MIN: CPT | Performed by: INTERNAL MEDICINE

## 2021-03-11 RX ORDER — ATORVASTATIN CALCIUM 10 MG/1
10 TABLET, FILM COATED ORAL DAILY
COMMUNITY
End: 2021-05-03

## 2021-03-11 ASSESSMENT — PAIN SCALES - GENERAL: PAINLEVEL: SEVERE PAIN (6)

## 2021-03-11 NOTE — NURSING NOTE
"Chief Complaint   Patient presents with     Ear Problem     right plugged ear      Patient presents for a right plugged ear. He flew in from [place] last night. He also states he saw a dentist in [place] for a tooth concern. They stated his tooth issue might affect his ears.     Initial /76 (BP Location: Right arm, Patient Position: Sitting, Cuff Size: Adult Regular)   Pulse 70   Temp 99  F (37.2  C) (Tympanic)   Resp 18   Wt 78.1 kg (172 lb 3.2 oz)   SpO2 96%   BMI 25.06 kg/m   Estimated body mass index is 25.06 kg/m  as calculated from the following:    Height as of 5/21/20: 1.765 m (5' 9.5\").    Weight as of this encounter: 78.1 kg (172 lb 3.2 oz).  Medication Reconciliation: complete    Tangela Piper MA  "

## 2021-03-11 NOTE — PROGRESS NOTES
Subjective   Michael Martinez is a 85 year old male who presents for plugged ear.  He was in [place].  He saw a nurse practitioner in [place].  They tried to remove wax with a tool but were unsuccessful.  He thinks the eardrops gave him a rash on his penis.  He saw the urologist and was given medicine that made that go away.    Objective   Vitals: /76 (BP Location: Right arm, Patient Position: Sitting, Cuff Size: Adult Regular)   Pulse 70   Temp 99  F (37.2  C) (Tympanic)   Resp 18   Wt 78.1 kg (172 lb 3.2 oz)   SpO2 96%   BMI 25.06 kg/m      HEENT: Left tympanic membrane normal.  Right tympanic membrane has cerumen which is impacted and initially obscures the tympanic membrane.  After flushing by the nurse visualized tympanic membrane is slightly erythematous.    Review and Analysis of Data   I personally reviewed the following:  External notes: No  Results: No  Use of an independent historian: No  Independent review of a test performed by another physician: No  Discussion of management with another physician: No  Moderate risk of morbidity from additional diagnostic testing and/or treatment.    Assessment & Plan   1. Impacted cerumen of right ear   -- Impacted cerumen was flushed by the nurse   -- Return for recheck if symptoms are not resolving in the next few weeks      Signed, Isma Martinez MD, FAAP, FACP  Internal Medicine & Pediatrics

## 2021-03-13 ENCOUNTER — TELEPHONE (OUTPATIENT)
Dept: FAMILY MEDICINE | Facility: OTHER | Age: 86
End: 2021-03-13

## 2021-03-13 NOTE — TELEPHONE ENCOUNTER
Patient came to [facility] and said he is currently out of his medications until Monday. He stated he sees Dr. Martinez and is a snowbird. He says his vehicle is being sent here from [place] and his medications are getting shipped with it. Due to a delay, he has been out of his medications since yesterday. He was told by [facility] that they would accept a verbal order for the necessary medications. Verbal order for 4 tabs metoprolol 25mg and 3 tabs warfarin 5 mg was given to [facility] pharmacy per Elaine Nagel NP.  Deb ENNIS CMA...3/13/2021 5:27 PM

## 2021-03-15 ENCOUNTER — ANTICOAGULATION THERAPY VISIT (OUTPATIENT)
Dept: ANTICOAGULATION | Facility: OTHER | Age: 86
End: 2021-03-15
Attending: INTERNAL MEDICINE
Payer: COMMERCIAL

## 2021-03-15 DIAGNOSIS — Z86.711 HISTORY OF PULMONARY EMBOLISM: ICD-10-CM

## 2021-03-15 DIAGNOSIS — Z86.718 PERSONAL HISTORY OF DVT (DEEP VEIN THROMBOSIS): Primary | ICD-10-CM

## 2021-03-15 DIAGNOSIS — Z79.01 LONG TERM CURRENT USE OF ANTICOAGULANT THERAPY: ICD-10-CM

## 2021-03-15 DIAGNOSIS — Z79.01 ANTICOAGULATION MONITORING, INR RANGE 2-3: ICD-10-CM

## 2021-03-15 LAB — INR POINT OF CARE: 2.4 (ref 0.86–1.14)

## 2021-03-15 PROCEDURE — 36416 COLLJ CAPILLARY BLOOD SPEC: CPT | Mod: ZL

## 2021-03-15 RX ORDER — WARFARIN SODIUM 7.5 MG/1
TABLET ORAL
Qty: 30 TABLET | Refills: 1 | Status: SHIPPED | OUTPATIENT
Start: 2021-03-15 | End: 2021-05-03

## 2021-03-18 NOTE — ADDENDUM NOTE
Addended by: ISAIAS HERBERT on: 3/18/2021 09:32 AM     Modules accepted: Level of Service, SmartSet

## 2021-03-18 NOTE — PROGRESS NOTES
ANTICOAGULATION FOLLOW-UP CLINIC VISIT    Patient Name:  Michael Martinez  Date:  3/18/2021  Contact Type:  Face to Face    SUBJECTIVE:  Patient Findings              OBJECTIVE    Recent labs: (last 7 days)     03/15/21   INR 2.4*       ASSESSMENT / PLAN  INR assessment THER    Recheck INR In: 4 WEEKS    INR Location Clinic      Anticoagulation Summary  As of 3/15/2021    INR goal:  2.0-3.0   TTR:  53.6 % (6.6 mo)   INR used for dosin.4 (3/15/2021)   Warfarin maintenance plan:  5 mg (5 mg x 1) every Mon, Wed, Fri; 7.5 mg (7.5 mg x 1) all other days   Full warfarin instructions:  5 mg every Mon, Wed, Fri; 7.5 mg all other days   Weekly warfarin total:  45 mg   Plan last modified:  Naya Diallo RN (3/15/2021)   Next INR check:  2021   Priority:  Maintenance   Target end date:  Indefinite    Indications    Long-term (current) use of anticoagulants [Z79.01] [Z79.01]  Anticoagulation monitoring  INR range 2-3 [Z79.01]  History of pulmonary embolism [Z86.711]  Personal history of DVT (deep vein thrombosis) [Z86.718]             Anticoagulation Episode Summary     INR check location:  Anticoagulation Clinic    Preferred lab:  [facility]    Send INR reminders to:  [facility]    Comments:  Leaves for       Anticoagulation Care Providers     Provider Role Specialty Phone number    Isma Martinez MD Referring Internal Medicine [phone]            See the Encounter Report to view Anticoagulation Flowsheet and Dosing Calendar (Go to Encounters tab in chart review, and find the Anticoagulation Therapy Visit)        Naya Diallo, RN

## 2021-05-03 DIAGNOSIS — E78.2 MIXED HYPERLIPIDEMIA: Chronic | ICD-10-CM

## 2021-05-03 DIAGNOSIS — Z86.718 PERSONAL HISTORY OF DVT (DEEP VEIN THROMBOSIS): ICD-10-CM

## 2021-05-03 DIAGNOSIS — K21.9 GASTROESOPHAGEAL REFLUX DISEASE, UNSPECIFIED WHETHER ESOPHAGITIS PRESENT: ICD-10-CM

## 2021-05-03 DIAGNOSIS — E78.2 MIXED HYPERLIPIDEMIA: Primary | ICD-10-CM

## 2021-05-03 DIAGNOSIS — Z95.1 S/P CABG X 4: ICD-10-CM

## 2021-05-03 RX ORDER — WARFARIN SODIUM 7.5 MG/1
TABLET ORAL
Qty: 30 TABLET | Refills: 1 | Status: SHIPPED | OUTPATIENT
Start: 2021-05-03 | End: 2021-10-03

## 2021-05-03 RX ORDER — WARFARIN SODIUM 5 MG/1
TABLET ORAL
Qty: 60 TABLET | Refills: 1 | Status: SHIPPED | OUTPATIENT
Start: 2021-05-03 | End: 2022-05-25

## 2021-05-03 RX ORDER — COVID-19 TEST SPECIMEN COLLECT
MISCELLANEOUS MISCELLANEOUS
COMMUNITY
Start: 2021-03-15 | End: 2022-01-21

## 2021-05-03 RX ORDER — EZETIMIBE 10 MG/1
10 TABLET ORAL
Qty: 36 TABLET | Refills: 3 | Status: SHIPPED | OUTPATIENT
Start: 2021-05-03 | End: 2022-01-21

## 2021-05-03 NOTE — TELEPHONE ENCOUNTER
"Requested Prescriptions   Pending Prescriptions Disp Refills     warfarin ANTICOAGULANT (COUMADIN) 7.5 MG tablet 30 tablet 1     Sig: TAKE 1 TABLET 4 DAYS PER WEEK OR [facility] (TAKE 5MG TABLET 3 DAYS PER WEEK)       Vitamin K Antagonists Failed - 5/3/2021  4:13 PM        Failed - INR is within goal in the past 6 weeks     Confirm INR is within goal in the past 6 weeks.     Recent Labs   Lab Test 03/15/21   INR 2.4*                       Passed - Recent (12 mo) or future (30 days) visit within the authorizing provider's specialty     Patient has had an office visit with the authorizing provider or a provider within the authorizing providers department within the previous 12 mos or has a future within next 30 days. See \"Patient Info\" tab in inExtend Healthsket, or \"Choose Columns\" in Meds & Orders section of the refill encounter.              Passed - Medication is active on med list        Passed - Patient is 18 years of age or older           warfarin ANTICOAGULANT (COUMADIN) 5 MG tablet 60 tablet 1     Sig: TAKE 1 TABLET 5 DAYS PER WEEK OR AS DIRECTED BY PROTIME CLINIC (TAKE 7.5MG TABLET 2 DAYS PER WEEK)       Vitamin K Antagonists Failed - 5/3/2021  4:13 PM        Failed - INR is within goal in the past 6 weeks     Confirm INR is within goal in the past 6 weeks.     Recent Labs   Lab Test 03/15/21   INR 2.4*                       Passed - Recent (12 mo) or future (30 days) visit within the authorizing provider's specialty     Patient has had an office visit with the authorizing provider or a provider within the authorizing providers department within the previous 12 mos or has a future within next 30 days. See \"Patient Info\" tab in inALENTYet, or \"Choose Columns\" in Meds & Orders section of the refill encounter.              Passed - Medication is active on med list        Passed - Patient is 18 years of age or older           ezetimibe (ZETIA) 10 MG tablet 36 tablet 3     Sig: Take 1 tablet (10 mg) by " "mouth three times a week       Antihyperlipidemic agents Failed - 5/3/2021  4:13 PM        Failed - Lipid panel on file in past 12 mos     Recent Labs   Lab Test 08/07/17  1104   TRIG 193*   HDL 40   *               Failed - Normal serum ALT on record in past 12 mos     Recent Labs   Lab Test 04/17/17  0836   GICHALT 9             Passed - Recent (12 mo) or future (30 days) visit within the authorizing provider's specialty     Patient has had an office visit with the authorizing provider or a provider within the authorizing providers department within the previous 12 mos or has a future within next 30 days. See \"Patient Info\" tab in inbasket, or \"Choose Columns\" in Meds & Orders section of the refill encounter.              Passed - Medication is active on med list        Passed - Patient is age 18 years or older         Signed Prescriptions Disp Refills    COVID-19 Specimen Collection KIT       Sig: TEST AS DIRECTED       There is no refill protocol information for this order          "

## 2021-05-04 RX ORDER — METOPROLOL SUCCINATE 25 MG/1
TABLET, EXTENDED RELEASE ORAL
Qty: 45 TABLET | Refills: 2 | Status: SHIPPED | OUTPATIENT
Start: 2021-05-04 | End: 2022-01-21

## 2021-05-04 RX ORDER — ATORVASTATIN CALCIUM 10 MG/1
10 TABLET, FILM COATED ORAL DAILY
Qty: 90 TABLET | Refills: 3 | Status: SHIPPED | OUTPATIENT
Start: 2021-05-04 | End: 2022-01-21

## 2021-05-04 NOTE — TELEPHONE ENCOUNTER
Routing refill request to provider for review/approval because:  Medication is reported/historical      LOV;   3/11/2021      Ruth Ann Chandler RN on 5/4/2021 at 8:51 AM

## 2021-05-21 ENCOUNTER — TRANSFERRED RECORDS (OUTPATIENT)
Dept: HEALTH INFORMATION MANAGEMENT | Facility: OTHER | Age: 86
End: 2021-05-21

## 2021-05-24 ENCOUNTER — OFFICE VISIT (OUTPATIENT)
Dept: PEDIATRICS | Facility: OTHER | Age: 86
End: 2021-05-24
Attending: INTERNAL MEDICINE
Payer: COMMERCIAL

## 2021-05-24 VITALS
WEIGHT: 171.1 LBS | TEMPERATURE: 97.1 F | OXYGEN SATURATION: 96 % | SYSTOLIC BLOOD PRESSURE: 128 MMHG | HEART RATE: 64 BPM | DIASTOLIC BLOOD PRESSURE: 76 MMHG | RESPIRATION RATE: 16 BRPM | BODY MASS INDEX: 24.9 KG/M2

## 2021-05-24 DIAGNOSIS — Z79.899 OTHER LONG TERM (CURRENT) DRUG THERAPY: ICD-10-CM

## 2021-05-24 DIAGNOSIS — F39 MOOD DISORDER (H): ICD-10-CM

## 2021-05-24 DIAGNOSIS — R73.03 PRE-DIABETES: Chronic | ICD-10-CM

## 2021-05-24 DIAGNOSIS — R53.83 FATIGUE, UNSPECIFIED TYPE: Primary | ICD-10-CM

## 2021-05-24 DIAGNOSIS — F41.1 GAD (GENERALIZED ANXIETY DISORDER): ICD-10-CM

## 2021-05-24 DIAGNOSIS — E78.2 MIXED HYPERLIPIDEMIA: Chronic | ICD-10-CM

## 2021-05-24 LAB
ALBUMIN SERPL-MCNC: 3.9 G/DL (ref 3.5–5.7)
ALP SERPL-CCNC: 69 U/L (ref 34–104)
ALT SERPL W P-5'-P-CCNC: 16 U/L (ref 7–52)
ANION GAP SERPL CALCULATED.3IONS-SCNC: 8 MMOL/L (ref 3–14)
AST SERPL W P-5'-P-CCNC: 18 U/L (ref 13–39)
BASOPHILS # BLD AUTO: 0.1 10E9/L (ref 0–0.2)
BASOPHILS NFR BLD AUTO: 0.6 %
BILIRUB SERPL-MCNC: 1 MG/DL (ref 0.3–1)
BUN SERPL-MCNC: 16 MG/DL (ref 7–25)
CALCIUM SERPL-MCNC: 9.3 MG/DL (ref 8.6–10.3)
CHLORIDE SERPL-SCNC: 101 MMOL/L (ref 98–107)
CHOLEST SERPL-MCNC: 139 MG/DL
CO2 SERPL-SCNC: 26 MMOL/L (ref 21–31)
CREAT SERPL-MCNC: 0.97 MG/DL (ref 0.7–1.3)
DEPRECATED CALCIDIOL+CALCIFEROL SERPL-MC: 50.7 NG/ML
DIFFERENTIAL METHOD BLD: NORMAL
EOSINOPHIL # BLD AUTO: 0.1 10E9/L (ref 0–0.7)
EOSINOPHIL NFR BLD AUTO: 1.3 %
ERYTHROCYTE [DISTWIDTH] IN BLOOD BY AUTOMATED COUNT: 13.8 % (ref 10–15)
GFR SERPL CREATININE-BSD FRML MDRD: 74 ML/MIN/{1.73_M2}
GLUCOSE SERPL-MCNC: 99 MG/DL (ref 70–105)
HBA1C MFR BLD: 6.5 % (ref 4–6)
HCT VFR BLD AUTO: 44.6 % (ref 40–53)
HDLC SERPL-MCNC: 37 MG/DL (ref 23–92)
HGB BLD-MCNC: 15.2 G/DL (ref 13.3–17.7)
IMM GRANULOCYTES # BLD: 0 10E9/L (ref 0–0.4)
IMM GRANULOCYTES NFR BLD: 0.4 %
LDLC SERPL CALC-MCNC: 67 MG/DL
LYMPHOCYTES # BLD AUTO: 1.7 10E9/L (ref 0.8–5.3)
LYMPHOCYTES NFR BLD AUTO: 21.9 %
MCH RBC QN AUTO: 29.9 PG (ref 26.5–33)
MCHC RBC AUTO-ENTMCNC: 34.1 G/DL (ref 31.5–36.5)
MCV RBC AUTO: 88 FL (ref 78–100)
MONOCYTES # BLD AUTO: 0.7 10E9/L (ref 0–1.3)
MONOCYTES NFR BLD AUTO: 8.5 %
NEUTROPHILS # BLD AUTO: 5.3 10E9/L (ref 1.6–8.3)
NEUTROPHILS NFR BLD AUTO: 67.3 %
NONHDLC SERPL-MCNC: 102 MG/DL
PLATELET # BLD AUTO: 204 10E9/L (ref 150–450)
POTASSIUM SERPL-SCNC: 4.2 MMOL/L (ref 3.5–5.1)
PROT SERPL-MCNC: 7.2 G/DL (ref 6.4–8.9)
RBC # BLD AUTO: 5.08 10E12/L (ref 4.4–5.9)
SODIUM SERPL-SCNC: 135 MMOL/L (ref 134–144)
TRIGL SERPL-MCNC: 174 MG/DL
TSH SERPL DL<=0.05 MIU/L-ACNC: 1.75 IU/ML (ref 0.34–5.6)
VIT B12 SERPL-MCNC: 454 PG/ML (ref 180–914)
WBC # BLD AUTO: 7.9 10E9/L (ref 4–11)

## 2021-05-24 PROCEDURE — 82306 VITAMIN D 25 HYDROXY: CPT | Mod: ZL | Performed by: INTERNAL MEDICINE

## 2021-05-24 PROCEDURE — 36415 COLL VENOUS BLD VENIPUNCTURE: CPT | Mod: ZL | Performed by: INTERNAL MEDICINE

## 2021-05-24 PROCEDURE — G0463 HOSPITAL OUTPT CLINIC VISIT: HCPCS

## 2021-05-24 PROCEDURE — 80061 LIPID PANEL: CPT | Mod: ZL | Performed by: INTERNAL MEDICINE

## 2021-05-24 PROCEDURE — 84443 ASSAY THYROID STIM HORMONE: CPT | Mod: ZL | Performed by: INTERNAL MEDICINE

## 2021-05-24 PROCEDURE — 99214 OFFICE O/P EST MOD 30 MIN: CPT | Performed by: INTERNAL MEDICINE

## 2021-05-24 PROCEDURE — 82607 VITAMIN B-12: CPT | Mod: ZL | Performed by: INTERNAL MEDICINE

## 2021-05-24 PROCEDURE — 83036 HEMOGLOBIN GLYCOSYLATED A1C: CPT | Mod: ZL | Performed by: INTERNAL MEDICINE

## 2021-05-24 PROCEDURE — 80053 COMPREHEN METABOLIC PANEL: CPT | Mod: ZL | Performed by: INTERNAL MEDICINE

## 2021-05-24 PROCEDURE — 85025 COMPLETE CBC W/AUTO DIFF WBC: CPT | Mod: ZL | Performed by: INTERNAL MEDICINE

## 2021-05-24 RX ORDER — CITALOPRAM HYDROBROMIDE 20 MG/1
20 TABLET ORAL DAILY
Qty: 90 TABLET | Refills: 3 | Status: SHIPPED | OUTPATIENT
Start: 2021-05-24 | End: 2022-01-21

## 2021-05-24 RX ORDER — CITALOPRAM HYDROBROMIDE 10 MG/1
10 TABLET ORAL DAILY
COMMUNITY
Start: 2021-05-24 | End: 2021-05-24

## 2021-05-24 ASSESSMENT — ANXIETY QUESTIONNAIRES
7. FEELING AFRAID AS IF SOMETHING AWFUL MIGHT HAPPEN: NOT AT ALL
1. FEELING NERVOUS, ANXIOUS, OR ON EDGE: NEARLY EVERY DAY
IF YOU CHECKED OFF ANY PROBLEMS ON THIS QUESTIONNAIRE, HOW DIFFICULT HAVE THESE PROBLEMS MADE IT FOR YOU TO DO YOUR WORK, TAKE CARE OF THINGS AT HOME, OR GET ALONG WITH OTHER PEOPLE: VERY DIFFICULT
2. NOT BEING ABLE TO STOP OR CONTROL WORRYING: MORE THAN HALF THE DAYS
5. BEING SO RESTLESS THAT IT IS HARD TO SIT STILL: NEARLY EVERY DAY
GAD7 TOTAL SCORE: 9
6. BECOMING EASILY ANNOYED OR IRRITABLE: NOT AT ALL
3. WORRYING TOO MUCH ABOUT DIFFERENT THINGS: NOT AT ALL

## 2021-05-24 ASSESSMENT — PATIENT HEALTH QUESTIONNAIRE - PHQ9
SUM OF ALL RESPONSES TO PHQ QUESTIONS 1-9: 4
5. POOR APPETITE OR OVEREATING: SEVERAL DAYS

## 2021-05-24 ASSESSMENT — PAIN SCALES - GENERAL: PAINLEVEL: NO PAIN (0)

## 2021-05-24 NOTE — PROGRESS NOTES
Subjective   Michael Martinez is a 85 year old male who presents for discuss anxiety.  He has been feeling a lot worried.  He also has been feeling depressed.  He is sleeping okay.  Ever since his second Covid shot he thought he was more tired with lower ambition.  His son recently moved from [place] to live in his home.  This is stressful for him.  He is worried one of his medications may be causing him trouble.    Objective   Vitals: /76 (BP Location: Right arm, Patient Position: Sitting, Cuff Size: Adult Regular)   Pulse 64   Temp 97.1  F (36.2  C) (Tympanic)   Resp 16   Wt 77.6 kg (171 lb 1.6 oz)   SpO2 96%   BMI 24.90 kg/m        Review and Analysis of Data   I personally reviewed the following:  External notes: No  Results: No  Use of an independent historian: No  Independent review of a test performed by another physician: No  Discussion of management with another physician: No  Moderate risk of morbidity from additional diagnostic testing and/or treatment.    Assessment & Plan   1. Fatigue, unspecified type  Lab work to look for reversible causes as outlined below  - Comprehensive metabolic panel; Future  - CBC with platelets differential; Future  - Thyrotropin GH; Future  - Vitamin B12; Future  - Vitamin D Total GH; Future  - Comprehensive metabolic panel  - CBC with platelets differential  - Thyrotropin GH  - Vitamin B12  - Vitamin D Total GH    2. OLIVIA (generalized anxiety disorder)  3. Mood disorder (H)  It looks as though his doctor in [place] has started him on citalopram.  It sounds like it has been on board for quite a while at least a couple of months.  We discussed options and decided to increase the dose from 10 to 20 mg.  Adverse effect was discussed.  - citalopram (CELEXA) 20 MG tablet; Take 1 tablet (20 mg) by mouth daily  Dispense: 90 tablet; Refill: 3    4. Pre-diabetes  - Hemoglobin A1c; Future  - Hemoglobin A1c    5. Other long term (current) drug therapy   - Vitamin D Total  GH; Future  - Vitamin D Total GH    6. Mixed hyperlipidemia  - Lipid Profile; Future  - Lipid Profile        Patient Instructions      -- Increase citalopram to 20 mg daily   -- You should establish with a therapist   -- Meet with    -- Gratitude list   -- Daily exercise   -- Eat healthy foods   -- 211 for mental health emergency    [place] counselors/therapists   Telephone Hours Kids? Address   [facility]  (Many counselors) [phone] M-Th 8-5  F 8-12 Yes [address] [place] [address]   [email or web address]   [facility]  (Many counselors) [phone]  Yes [address]   [email or web address]://[email or web address] [email or web address]   [facility]  (Many counselors) [phone]  Yes [address]  [email or web address]://[email or web address]   [facility]  Balta Choudhary [phone]  Yes [facility]  [address]  [email or web address]   [facility]  Pj Urrutia [phone]  Yes [address] [facility].   [address]  [email or web address]   Rena Craig [phone]   [address]  [email or web address]   [facility]  Beau [place]agne [phone]   [address] [facility] [address]  [email or web address]   Jocelynn Dobbs [phone]   [address]   Juani Mcdaniel [phone]   [address] [place] [address]   Lorena Ramesh [phone]  Yes [address]   Allison Owens [phone]   [address] [place]    Michel Rae [phone]   [address] [place] [address]   Rosie Aureliano [phone]   [address] [place] [address]   [email or web address]://[email or web address]   Joaquina Loza [phone]   [address] [place] [address] [address]  ([facility])  [email or web address]   [facility]  Elmo Cuello [phone]   [address]  Michele Rinne Cindy Thomas [phone]      [facility]: Isabel [phone]  Yes OSITO Hall  [address]  [email or web address]   [facility]: [place] [phone]  Yes OSITO Denise  [address] [facility]  [email or web address]           Return if symptoms worsen or fail to improve.    Signed, Isma Martinez MD, FAAP, FACP  Internal Medicine & Pediatrics

## 2021-05-24 NOTE — LETTER
May 25, 2021      Michael Martinez  [address] [address] [id number]        Dear ,    We are writing to inform you of your test results.        Resulted Orders   Hemoglobin A1c   Result Value Ref Range    Hemoglobin A1C 6.5 (H) 4.0 - 6.0 %   Comprehensive metabolic panel   Result Value Ref Range    Sodium 135 134 - 144 mmol/L    Potassium 4.2 3.5 - 5.1 mmol/L    Chloride 101 98 - 107 mmol/L    Carbon Dioxide 26 21 - 31 mmol/L    Anion Gap 8 3 - 14 mmol/L    Glucose 99 70 - 105 mg/dL    Urea Nitrogen 16 7 - 25 mg/dL    Creatinine 0.97 0.70 - 1.30 mg/dL    GFR Estimate 74 >60 mL/min/[1.73_m2]    GFR Estimate If Black 89 >60 mL/min/[1.73_m2]    Calcium 9.3 8.6 - 10.3 mg/dL    Bilirubin Total 1.0 0.3 - 1.0 mg/dL    Albumin 3.9 3.5 - 5.7 g/dL    Protein Total 7.2 6.4 - 8.9 g/dL    Alkaline Phosphatase 69 34 - 104 U/L    ALT 16 7 - 52 U/L    AST 18 13 - 39 U/L   CBC with platelets differential   Result Value Ref Range    WBC 7.9 4.0 - 11.0 10e9/L    RBC Count 5.08 4.4 - 5.9 10e12/L    Hemoglobin 15.2 13.3 - 17.7 g/dL    Hematocrit 44.6 40.0 - 53.0 %    MCV 88 78 - 100 fl    MCH 29.9 26.5 - 33.0 pg    MCHC 34.1 31.5 - 36.5 g/dL    RDW 13.8 10.0 - 15.0 %    Platelet Count 204 150 - 450 10e9/L    Diff Method Automated Method     % Neutrophils 67.3 %    % Lymphocytes 21.9 %    % Monocytes 8.5 %    % Eosinophils 1.3 %    % Basophils 0.6 %    % Immature Granulocytes 0.4 %    Absolute Neutrophil 5.3 1.6 - 8.3 10e9/L    Absolute Lymphocytes 1.7 0.8 - 5.3 10e9/L    Absolute Monocytes 0.7 0.0 - 1.3 10e9/L    Absolute Eosinophils 0.1 0.0 - 0.7 10e9/L    Absolute Basophils 0.1 0.0 - 0.2 10e9/L    Abs Immature Granulocytes 0.0 0 - 0.4 10e9/L   Thyrotropin GH   Result Value Ref Range    Thyrotropin 1.75 0.34 - 5.60 IU/mL   Vitamin B12   Result Value Ref Range    Vitamin B12 454 180 - 914 pg/mL   Vitamin D Total GH   Result Value Ref Range    Vitamin D Total 50.7 ng/mL      Comment:      Comments:  Deficiency:              <10 ng/mL  Insufficiency:        10-29 ng/mL  Sufficiency:          ng/mL  Possible Toxicity:     >100 ng/mL     Lipid Profile   Result Value Ref Range    Cholesterol 139 <200 mg/dL    Triglycerides 174 (H) <150 mg/dL      Comment:      Borderline high:  150-199 mg/dl  High:             200-499 mg/dl  Very high:       >499 mg/dl      HDL Cholesterol 37 23 - 92 mg/dL    LDL Cholesterol Calculated 67 <100 mg/dL      Comment:      Desirable:       <100 mg/dl    Non HDL Cholesterol 102 <130 mg/dL       If you have any questions or concerns, please call the clinic at the number listed above.       Sincerely,      Isma Martinez MD

## 2021-05-24 NOTE — NURSING NOTE
"Patient presents to the clinic today for fatigue.  Patient states he got his second COVID-19 shot 04/14/21 and since he has progressively felt more fatigued.  Lakesha Multani LPN 5/24/2021   12:43 PM    Chief Complaint   Patient presents with     Fatigue       Initial /76 (BP Location: Right arm, Patient Position: Sitting, Cuff Size: Adult Regular)   Pulse 64   Temp 97.1  F (36.2  C) (Tympanic)   Resp 16   Wt 77.6 kg (171 lb 1.6 oz)   SpO2 96%   BMI 24.90 kg/m   Estimated body mass index is 24.9 kg/m  as calculated from the following:    Height as of 5/21/20: 1.765 m (5' 9.5\").    Weight as of this encounter: 77.6 kg (171 lb 1.6 oz).  Medication Reconciliation: complete  Lakesha Multani LPN    "

## 2021-05-24 NOTE — LETTER
May 25, 2021      Michael Martinez  [address] [address] [address]        Dear ,    We are writing to inform you of your test results.    Lab work looks pretty good.  A1c has increased slightly, now you are in the Diabetes range. Treatment is a 5% weight loss, daily physical activity and reduced carbohydrate intake.      Signed, Isma Martinez MD, FAAP, FACP  Internal Medicine & Pediatrics     -- Pre-diabetes:    fasting glucose: 100 - 125    hemoglobin A1c: 5.7 - 6.4   -- Diabetes:    fasting glucose greater than 125    random glucose greater than 200    hemoglobin A1c: > or = 6.5        Resulted Orders   Hemoglobin A1c   Result Value Ref Range    Hemoglobin A1C 6.5 (H) 4.0 - 6.0 %   Comprehensive metabolic panel   Result Value Ref Range    Sodium 135 134 - 144 mmol/L    Potassium 4.2 3.5 - 5.1 mmol/L    Chloride 101 98 - 107 mmol/L    Carbon Dioxide 26 21 - 31 mmol/L    Anion Gap 8 3 - 14 mmol/L    Glucose 99 70 - 105 mg/dL    Urea Nitrogen 16 7 - 25 mg/dL    Creatinine 0.97 0.70 - 1.30 mg/dL    GFR Estimate 74 >60 mL/min/[1.73_m2]    GFR Estimate If Black 89 >60 mL/min/[1.73_m2]    Calcium 9.3 8.6 - 10.3 mg/dL    Bilirubin Total 1.0 0.3 - 1.0 mg/dL    Albumin 3.9 3.5 - 5.7 g/dL    Protein Total 7.2 6.4 - 8.9 g/dL    Alkaline Phosphatase 69 34 - 104 U/L    ALT 16 7 - 52 U/L    AST 18 13 - 39 U/L   CBC with platelets differential   Result Value Ref Range    WBC 7.9 4.0 - 11.0 10e9/L    RBC Count 5.08 4.4 - 5.9 10e12/L    Hemoglobin 15.2 13.3 - 17.7 g/dL    Hematocrit 44.6 40.0 - 53.0 %    MCV 88 78 - 100 fl    MCH 29.9 26.5 - 33.0 pg    MCHC 34.1 31.5 - 36.5 g/dL    RDW 13.8 10.0 - 15.0 %    Platelet Count 204 150 - 450 10e9/L    Diff Method Automated Method     % Neutrophils 67.3 %    % Lymphocytes 21.9 %    % Monocytes 8.5 %    % Eosinophils 1.3 %    % Basophils 0.6 %    % Immature Granulocytes 0.4 %    Absolute Neutrophil 5.3 1.6 - 8.3 10e9/L    Absolute Lymphocytes 1.7 0.8 - 5.3  10e9/L    Absolute Monocytes 0.7 0.0 - 1.3 10e9/L    Absolute Eosinophils 0.1 0.0 - 0.7 10e9/L    Absolute Basophils 0.1 0.0 - 0.2 10e9/L    Abs Immature Granulocytes 0.0 0 - 0.4 10e9/L   Thyrotropin GH   Result Value Ref Range    Thyrotropin 1.75 0.34 - 5.60 IU/mL   Vitamin B12   Result Value Ref Range    Vitamin B12 454 180 - 914 pg/mL   Vitamin D Total GH   Result Value Ref Range    Vitamin D Total 50.7 ng/mL      Comment:      Comments:  Deficiency:             <10 ng/mL  Insufficiency:        10-29 ng/mL  Sufficiency:          ng/mL  Possible Toxicity:     >100 ng/mL     Lipid Profile   Result Value Ref Range    Cholesterol 139 <200 mg/dL    Triglycerides 174 (H) <150 mg/dL      Comment:      Borderline high:  150-199 mg/dl  High:             200-499 mg/dl  Very high:       >499 mg/dl      HDL Cholesterol 37 23 - 92 mg/dL    LDL Cholesterol Calculated 67 <100 mg/dL      Comment:      Desirable:       <100 mg/dl    Non HDL Cholesterol 102 <130 mg/dL       If you have any questions or concerns, please call the clinic at the number listed above.       Sincerely,      Isma Martinez MD

## 2021-05-24 NOTE — PATIENT INSTRUCTIONS
-- Increase citalopram to 20 mg daily   -- You should establish with a therapist   -- Meet with    -- Gratitude list   -- Daily exercise   -- Eat healthy foods   -- 211 for mental health emergency    [place] counselors/therapists   Telephone Hours Kids? Address   [facility]  (Many counselors) [phone] M-Th 8-5  F 8-12 Yes [address] [place] [address]   [email or web address]  (Many counselors) [phone]  Yes [address] [place]   [email or web address]://[email or web address]   [facility]  (Many counselors) [phone]  Yes [address]  http://[email or web address]   [facility]  Balta Choudhary [phone]  Yes [facility]  [address] [place] [address], Suite   [email or web address]   [facility]  Pj Urrutia [phone]  Yes [address] [place] [address]   [address]  [email or web address]://[email or web address]   Rena Craig [phone]   [address]  [email or web address]   [facility]  Beau Champagne [phone]   [address]  [email or web address]   Jocelynn Dobbs [phone]   [address]   Juani Mcdaniel [phone]   [address] [place] [address]   Lorena Chandler [phone]  Yes [address]   Allison Owens [phone]   [address]    Michel Rae [phone]   [address] [place] [address]   Rosie Bedoya [phone]   [address] [place] [address]   http://[email or web address].[email or web address]   Joaquina Loza [phone]   [address] [place] [address] [address]  ([facility])  [email or web address] [email or web address]   [facility]  Elmo Cuello [phone]   [address] [place] [address]  Michele Rinne Cindy Thomas [phone]      [facility]: Isabel [phone]  Yes OSITO Hall  [address] [place] [address]  http://www.[email or web address].[email or web address]/   [facility]: [place] [phone]  Yes  [place]  [address] [facility]  http://[email or web address]/

## 2021-05-25 ASSESSMENT — ANXIETY QUESTIONNAIRES: GAD7 TOTAL SCORE: 9

## 2021-06-09 ENCOUNTER — ANTICOAGULATION THERAPY VISIT (OUTPATIENT)
Dept: ANTICOAGULATION | Facility: OTHER | Age: 86
End: 2021-06-09
Payer: COMMERCIAL

## 2021-06-09 DIAGNOSIS — Z79.01 ANTICOAGULATION MONITORING, INR RANGE 2-3: ICD-10-CM

## 2021-06-09 DIAGNOSIS — Z79.01 LONG TERM CURRENT USE OF ANTICOAGULANT THERAPY: ICD-10-CM

## 2021-06-09 DIAGNOSIS — Z86.718 PERSONAL HISTORY OF DVT (DEEP VEIN THROMBOSIS): ICD-10-CM

## 2021-06-09 DIAGNOSIS — Z86.711 HISTORY OF PULMONARY EMBOLISM: ICD-10-CM

## 2021-06-09 LAB — INR POINT OF CARE: 2 (ref 0.86–1.14)

## 2021-06-09 PROCEDURE — 36416 COLLJ CAPILLARY BLOOD SPEC: CPT | Mod: ZL

## 2021-06-09 NOTE — PROGRESS NOTES
ANTICOAGULATION FOLLOW-UP CLINIC VISIT    Patient Name:  Michael Martinez  Date:  2021  Contact Type:  Face to Face    SUBJECTIVE:  Patient Findings     Positives:  Change in medications             OBJECTIVE    Recent labs: (last 7 days)     21   INR 2.0*       ASSESSMENT / PLAN  INR assessment THER    Recheck INR In: 4 WEEKS    INR Location Clinic      Anticoagulation Summary  As of 2021    INR goal:  2.0-3.0   TTR:  75.2 % (7.5 mo)   INR used for dosin.0 (2021)   Warfarin maintenance plan:  5 mg (5 mg x 1) every Mon, Wed, Fri; 7.5 mg (7.5 mg x 1) all other days   Full warfarin instructions:  5 mg every Mon, Wed, Fri; 7.5 mg all other days   Weekly warfarin total:  45 mg   No change documented:  Naya Diallo RN   Plan last modified:  Naya Diallo RN (3/15/2021)   Next INR check:  2021   Priority:  Maintenance   Target end date:  Indefinite    Indications    Long-term (current) use of anticoagulants [Z79.01] [Z79.01]  Anticoagulation monitoring  INR range 2-3 [Z79.01]  History of pulmonary embolism [Z86.711]  Personal history of DVT (deep vein thrombosis) [Z86.718]             Anticoagulation Episode Summary     INR check location:  Anticoagulation Clinic    Preferred lab:  [facility]    Send INR reminders to:  [facility]    Comments:  Leaves for       Anticoagulation Care Providers     Provider Role Specialty Phone number    Isma Martinez MD Referring Internal Medicine [phone]            See the Encounter Report to view Anticoagulation Flowsheet and Dosing Calendar (Go to Encounters tab in chart review, and find the Anticoagulation Therapy Visit)        Naya Diallo, RN

## 2021-07-07 ENCOUNTER — ANTICOAGULATION THERAPY VISIT (OUTPATIENT)
Dept: ANTICOAGULATION | Facility: OTHER | Age: 86
End: 2021-07-07
Attending: INTERNAL MEDICINE
Payer: COMMERCIAL

## 2021-07-07 DIAGNOSIS — Z79.01 ANTICOAGULATION MONITORING, INR RANGE 2-3: ICD-10-CM

## 2021-07-07 DIAGNOSIS — Z86.718 PERSONAL HISTORY OF DVT (DEEP VEIN THROMBOSIS): ICD-10-CM

## 2021-07-07 DIAGNOSIS — Z79.01 LONG TERM CURRENT USE OF ANTICOAGULANT THERAPY: ICD-10-CM

## 2021-07-07 DIAGNOSIS — Z86.711 HISTORY OF PULMONARY EMBOLISM: ICD-10-CM

## 2021-07-07 LAB — INR POINT OF CARE: 2.7 (ref 0.86–1.14)

## 2021-07-07 PROCEDURE — 36416 COLLJ CAPILLARY BLOOD SPEC: CPT | Mod: ZL

## 2021-07-07 NOTE — PROGRESS NOTES
ANTICOAGULATION FOLLOW-UP CLINIC VISIT    Patient Name:  Michael Martinez  Date:  2021  Contact Type:  Face to Face    SUBJECTIVE:  Patient Findings         Clinical Outcomes     Negatives:  Major bleeding event, Thromboembolic event, Anticoagulation-related hospital admission, Anticoagulation-related ED visit, Anticoagulation-related fatality           OBJECTIVE    Recent labs: (last 7 days)     21   INR 2.7*       ASSESSMENT / PLAN  INR assessment THER    Recheck INR In: 4 WEEKS    INR Location Clinic      Anticoagulation Summary  As of 2021    INR goal:  2.0-3.0   TTR:  82.5 % (7.5 mo)   INR used for dosin.7 (2021)   Warfarin maintenance plan:  5 mg (5 mg x 1) every Mon, Wed, Fri; 7.5 mg (7.5 mg x 1) all other days   Full warfarin instructions:  5 mg every Mon, Wed, Fri; 7.5 mg all other days   Weekly warfarin total:  45 mg   No change documented:  Naya Diallo RN   Plan last modified:  Naya Diallo RN (3/15/2021)   Next INR check:  2021   Priority:  Maintenance   Target end date:  Indefinite    Indications    Long-term (current) use of anticoagulants [Z79.01] [Z79.01]  Anticoagulation monitoring  INR range 2-3 [Z79.01]  History of pulmonary embolism [Z86.711]  Personal history of DVT (deep vein thrombosis) [Z86.718]             Anticoagulation Episode Summary     INR check location:  Anticoagulation Clinic    Preferred lab:  [facility]    Send INR reminders to:  [facility]    Comments:  Leaves for       Anticoagulation Care Providers     Provider Role Specialty Phone number    Isma Martinez MD Referring Internal Medicine [phone]            See the Encounter Report to view Anticoagulation Flowsheet and Dosing Calendar (Go to Encounters tab in chart review, and find the Anticoagulation Therapy Visit)        Naya Diallo RN

## 2021-08-04 ENCOUNTER — ANTICOAGULATION THERAPY VISIT (OUTPATIENT)
Dept: ANTICOAGULATION | Facility: OTHER | Age: 86
End: 2021-08-04
Attending: INTERNAL MEDICINE
Payer: COMMERCIAL

## 2021-08-04 DIAGNOSIS — Z86.718 PERSONAL HISTORY OF DVT (DEEP VEIN THROMBOSIS): ICD-10-CM

## 2021-08-04 DIAGNOSIS — Z86.711 HISTORY OF PULMONARY EMBOLISM: ICD-10-CM

## 2021-08-04 DIAGNOSIS — Z79.01 ANTICOAGULATION MONITORING, INR RANGE 2-3: ICD-10-CM

## 2021-08-04 DIAGNOSIS — Z79.01 LONG TERM CURRENT USE OF ANTICOAGULANT THERAPY: Primary | ICD-10-CM

## 2021-08-04 LAB — INR POINT OF CARE: 2.2 (ref 0.9–1.1)

## 2021-08-04 PROCEDURE — 36416 COLLJ CAPILLARY BLOOD SPEC: CPT | Mod: ZL

## 2021-08-04 NOTE — PROGRESS NOTES
ANTICOAGULATION FOLLOW-UP CLINIC VISIT    Patient Name:  Michael Martinez  Date:  2021  Contact Type:  Face to Face    SUBJECTIVE:  Patient Findings         Clinical Outcomes     Negatives:  Major bleeding event, Thromboembolic event, Anticoagulation-related hospital admission, Anticoagulation-related ED visit, Anticoagulation-related fatality           OBJECTIVE    Recent labs: (last 7 days)     21  0855   INR 2.2*       ASSESSMENT / PLAN  INR assessment THER    Recheck INR In: 4 WEEKS    INR Location Clinic      Anticoagulation Summary  As of 2021    INR goal:  2.0-3.0   TTR:  90.6 % (7.5 mo)   INR used for dosin.2 (2021)   Warfarin maintenance plan:  5 mg (5 mg x 1) every Mon, Wed, Fri; 7.5 mg (7.5 mg x 1) all other days   Full warfarin instructions:  5 mg every Mon, Wed, Fri; 7.5 mg all other days   Weekly warfarin total:  45 mg   Plan last modified:  Naya Diallo RN (3/15/2021)   Next INR check:  2021   Priority:  Maintenance   Target end date:  Indefinite    Indications    Long-term (current) use of anticoagulants [Z79.01] [Z79.01]  Anticoagulation monitoring  INR range 2-3 [Z79.01]  History of pulmonary embolism [Z86.711]  Personal history of DVT (deep vein thrombosis) [Z86.718]             Anticoagulation Episode Summary     INR check location:  Anticoagulation Clinic    Preferred lab:  [facility]    Send INR reminders to:  [facility]    Comments:  Leaves for       Anticoagulation Care Providers     Provider Role Specialty Phone number    Isma Martinez MD Referring Internal Medicine [phone]            See the Encounter Report to view Anticoagulation Flowsheet and Dosing Calendar (Go to Encounters tab in chart review, and find the Anticoagulation Therapy Visit)        Naya Diallo, RN

## 2021-09-01 ENCOUNTER — ANTICOAGULATION THERAPY VISIT (OUTPATIENT)
Dept: ANTICOAGULATION | Facility: OTHER | Age: 86
End: 2021-09-01
Attending: INTERNAL MEDICINE
Payer: COMMERCIAL

## 2021-09-01 DIAGNOSIS — Z86.718 PERSONAL HISTORY OF DVT (DEEP VEIN THROMBOSIS): ICD-10-CM

## 2021-09-01 DIAGNOSIS — Z79.01 LONG TERM CURRENT USE OF ANTICOAGULANT THERAPY: Primary | ICD-10-CM

## 2021-09-01 DIAGNOSIS — Z86.711 HISTORY OF PULMONARY EMBOLISM: ICD-10-CM

## 2021-09-01 DIAGNOSIS — Z79.01 ANTICOAGULATION MONITORING, INR RANGE 2-3: ICD-10-CM

## 2021-09-01 LAB — INR POINT OF CARE: 2.4 (ref 0.9–1.1)

## 2021-09-01 PROCEDURE — 36416 COLLJ CAPILLARY BLOOD SPEC: CPT | Mod: ZL

## 2021-09-01 NOTE — PROGRESS NOTES
ANTICOAGULATION FOLLOW-UP CLINIC VISIT    Patient Name:  Michael Martinez  Date:  2021  Contact Type:  Face to Face    SUBJECTIVE:  Patient Findings         Clinical Outcomes     Negatives:  Major bleeding event, Thromboembolic event, Anticoagulation-related hospital admission, Anticoagulation-related ED visit, Anticoagulation-related fatality           OBJECTIVE    Recent labs: (last 7 days)     21  0903   INR 2.4*       ASSESSMENT / PLAN  INR assessment THER    Recheck INR In: 4 WEEKS    INR Location Clinic      Anticoagulation Summary  As of 2021    INR goal:  2.0-3.0   TTR:  96.2 % (7.5 mo)   INR used for dosin.4 (2021)   Warfarin maintenance plan:  5 mg (5 mg x 1) every Mon, Wed, Fri; 7.5 mg (7.5 mg x 1) all other days   Full warfarin instructions:  5 mg every Mon, Wed, Fri; 7.5 mg all other days   Weekly warfarin total:  45 mg   No change documented:  Naya Diallo RN   Plan last modified:  Naya Diallo RN (3/15/2021)   Next INR check:  2021   Priority:  Maintenance   Target end date:  Indefinite    Indications    Long-term (current) use of anticoagulants [Z79.01] [Z79.01]  Anticoagulation monitoring  INR range 2-3 [Z79.01]  History of pulmonary embolism [Z86.711]  Personal history of DVT (deep vein thrombosis) [Z86.718]             Anticoagulation Episode Summary     INR check location:  Anticoagulation Clinic    Preferred lab:  [facility]    Send INR reminders to:  [facility]    Comments:        Anticoagulation Care Providers     Provider Role Specialty Phone number    Isma Martinez MD [facility] Internal Medicine [phone]            See the Encounter Report to view Anticoagulation Flowsheet and Dosing Calendar (Go to Encounters tab in chart review, and find the Anticoagulation Therapy Visit)        Naya Diallo RN

## 2021-09-29 ENCOUNTER — ANTICOAGULATION THERAPY VISIT (OUTPATIENT)
Dept: ANTICOAGULATION | Facility: OTHER | Age: 86
End: 2021-09-29
Attending: INTERNAL MEDICINE
Payer: COMMERCIAL

## 2021-09-29 DIAGNOSIS — Z79.01 LONG TERM CURRENT USE OF ANTICOAGULANT THERAPY: Primary | ICD-10-CM

## 2021-09-29 DIAGNOSIS — Z86.711 HISTORY OF PULMONARY EMBOLISM: ICD-10-CM

## 2021-09-29 DIAGNOSIS — Z86.718 PERSONAL HISTORY OF DVT (DEEP VEIN THROMBOSIS): ICD-10-CM

## 2021-09-29 DIAGNOSIS — Z79.01 ANTICOAGULATION MONITORING, INR RANGE 2-3: ICD-10-CM

## 2021-09-29 LAB — INR POINT OF CARE: 1.9 (ref 0.9–1.1)

## 2021-09-29 PROCEDURE — 36416 COLLJ CAPILLARY BLOOD SPEC: CPT | Mod: ZL

## 2021-09-29 NOTE — PROGRESS NOTES
ANTICOAGULATION FOLLOW-UP CLINIC VISIT    Patient Name:  Michael Martinez  Date:  2021  Contact Type:  Face to Face    SUBJECTIVE:  Patient Findings         Clinical Outcomes     Negatives:  Major bleeding event, Thromboembolic event, Anticoagulation-related hospital admission, Anticoagulation-related ED visit, Anticoagulation-related fatality           OBJECTIVE    Recent labs: (last 7 days)     21  0819   INR 1.9*       ASSESSMENT / PLAN  INR assessment SUB    Recheck INR In: 4 WEEKS    INR Location Clinic      Anticoagulation Summary  As of 2021    INR goal:  2.0-3.0   TTR:  95.9 % (7.5 mo)   INR used for dosin.9 (2021)   Warfarin maintenance plan:  5 mg (5 mg x 1) every Mon, Wed, Fri; 7.5 mg (7.5 mg x 1) all other days   Full warfarin instructions:  5 mg every Mon, Wed, Fri; 7.5 mg all other days   Weekly warfarin total:  45 mg   No change documented:  Rosa Guerrero RN   Plan last modified:  Naya Diallo RN (3/15/2021)   Next INR check:  10/27/2021   Priority:  Maintenance   Target end date:  Indefinite    Indications    Long-term (current) use of anticoagulants [Z79.01] [Z79.01]  Anticoagulation monitoring  INR range 2-3 [Z79.01]  History of pulmonary embolism [Z86.711]  Personal history of DVT (deep vein thrombosis) [Z86.718]             Anticoagulation Episode Summary     INR check location:  Anticoagulation Clinic    Preferred lab:  [facility]    Send INR reminders to:  [facility]    Comments:        Anticoagulation Care Providers     Provider Role Specialty Phone number    Isma Martinez MD [facility] Internal Medicine [phone]            See the Encounter Report to view Anticoagulation Flowsheet and Dosing Calendar (Go to Encounters tab in chart review, and find the Anticoagulation Therapy Visit)        Roas Guerrero, RN

## 2021-09-30 DIAGNOSIS — Z86.718 PERSONAL HISTORY OF DVT (DEEP VEIN THROMBOSIS): ICD-10-CM

## 2021-10-01 ENCOUNTER — ALLIED HEALTH/NURSE VISIT (OUTPATIENT)
Dept: FAMILY MEDICINE | Facility: OTHER | Age: 86
End: 2021-10-01
Attending: FAMILY MEDICINE
Payer: COMMERCIAL

## 2021-10-01 DIAGNOSIS — R43.9 UNSPECIFIED DISTURBANCES OF SMELL AND TASTE: Primary | ICD-10-CM

## 2021-10-01 PROCEDURE — C9803 HOPD COVID-19 SPEC COLLECT: HCPCS

## 2021-10-01 PROCEDURE — U0005 INFEC AGEN DETEC AMPLI PROBE: HCPCS | Mod: ZL

## 2021-10-01 NOTE — PROGRESS NOTES
Patient here for COVID testing.  Chills body aches and not taste  Mey Marie RN on 10/1/2021 at 1:03 PM

## 2021-10-03 LAB — SARS-COV-2 RNA RESP QL NAA+PROBE: NEGATIVE

## 2021-10-03 RX ORDER — WARFARIN SODIUM 7.5 MG/1
TABLET ORAL
Qty: 8 TABLET | Refills: 24 | Status: SHIPPED | OUTPATIENT
Start: 2021-10-03 | End: 2022-08-19

## 2021-10-04 ENCOUNTER — TELEPHONE (OUTPATIENT)
Dept: PEDIATRICS | Facility: OTHER | Age: 86
End: 2021-10-04

## 2021-10-04 NOTE — TELEPHONE ENCOUNTER
Wants results of covid test, over 48 hours.  Please call      Shayla Gonzalez on 10/4/2021 at 8:36 AM

## 2021-10-04 NOTE — TELEPHONE ENCOUNTER
After verification of name and date of birth, patient notified of negative results for covid-19 test. Patient verbalizes understanding and has no further questions or concerns. Cata Devlin RN  ....................  10/4/2021   8:44 AM

## 2021-10-27 ENCOUNTER — ANTICOAGULATION THERAPY VISIT (OUTPATIENT)
Dept: ANTICOAGULATION | Facility: OTHER | Age: 86
End: 2021-10-27
Attending: INTERNAL MEDICINE
Payer: COMMERCIAL

## 2021-10-27 DIAGNOSIS — Z79.01 ANTICOAGULATION MONITORING, INR RANGE 2-3: ICD-10-CM

## 2021-10-27 DIAGNOSIS — Z86.718 PERSONAL HISTORY OF DVT (DEEP VEIN THROMBOSIS): ICD-10-CM

## 2021-10-27 DIAGNOSIS — Z79.01 LONG TERM CURRENT USE OF ANTICOAGULANT THERAPY: Primary | ICD-10-CM

## 2021-10-27 DIAGNOSIS — Z86.711 HISTORY OF PULMONARY EMBOLISM: ICD-10-CM

## 2021-10-27 LAB — INR POINT OF CARE: 1.9 (ref 0.9–1.1)

## 2021-10-27 PROCEDURE — 36416 COLLJ CAPILLARY BLOOD SPEC: CPT | Mod: ZL

## 2021-10-27 NOTE — PROGRESS NOTES
ANTICOAGULATION FOLLOW-UP CLINIC VISIT    Patient Name:  Michael Martinez  Date:  10/27/2021  Contact Type:  Face to Face    SUBJECTIVE:  Patient Findings         Clinical Outcomes     Negatives:  Major bleeding event, Thromboembolic event, Anticoagulation-related hospital admission, Anticoagulation-related ED visit, Anticoagulation-related fatality           OBJECTIVE    Recent labs: (last 7 days)     10/27/21  0817   INR 1.9*       ASSESSMENT / PLAN  INR assessment SUB    Recheck INR In: 3 WEEKS    INR Location Clinic      Anticoagulation Summary  As of 10/27/2021    INR goal:  2.0-3.0   TTR:  85.2 % (7.5 mo)   INR used for dosin.9 (10/27/2021)   Warfarin maintenance plan:  5 mg (5 mg x 1) every Mon, Fri; 7.5 mg (7.5 mg x 1) all other days   Full warfarin instructions:  5 mg every Mon, Fri; 7.5 mg all other days   Weekly warfarin total:  47.5 mg   Plan last modified:  Naya Diallo RN (10/27/2021)   Next INR check:  2021   Priority:  Maintenance   Target end date:  Indefinite    Indications    Long-term (current) use of anticoagulants [Z79.01] [Z79.01]  Anticoagulation monitoring  INR range 2-3 [Z79.01]  History of pulmonary embolism [Z86.711]  Personal history of DVT (deep vein thrombosis) [Z86.718]             Anticoagulation Episode Summary     INR check location:  Anticoagulation Clinic    Preferred lab:  [facility]    Send INR reminders to:  [facility]    Comments:        Anticoagulation Care Providers     Provider Role Specialty Phone number    Isma Martinez MD [facility] Internal Medicine [phone]            See the Encounter Report to view Anticoagulation Flowsheet and Dosing Calendar (Go to Encounters tab in chart review, and find the Anticoagulation Therapy Visit)    Will be going to [place] for 2 months    Naya Diallo, SAAD

## 2021-12-31 ENCOUNTER — ANTICOAGULATION THERAPY VISIT (OUTPATIENT)
Dept: ANTICOAGULATION | Facility: OTHER | Age: 86
End: 2021-12-31
Attending: INTERNAL MEDICINE
Payer: COMMERCIAL

## 2021-12-31 DIAGNOSIS — Z79.01 LONG TERM CURRENT USE OF ANTICOAGULANT THERAPY: Primary | ICD-10-CM

## 2021-12-31 DIAGNOSIS — Z86.718 PERSONAL HISTORY OF DVT (DEEP VEIN THROMBOSIS): ICD-10-CM

## 2021-12-31 DIAGNOSIS — Z79.01 ANTICOAGULATION MONITORING, INR RANGE 2-3: ICD-10-CM

## 2021-12-31 DIAGNOSIS — Z86.711 HISTORY OF PULMONARY EMBOLISM: ICD-10-CM

## 2021-12-31 LAB — INR POINT OF CARE: 4.3 (ref 0.9–1.1)

## 2021-12-31 PROCEDURE — 36416 COLLJ CAPILLARY BLOOD SPEC: CPT | Mod: ZL

## 2021-12-31 NOTE — PROGRESS NOTES
ANTICOAGULATION FOLLOW-UP CLINIC VISIT    Patient Name:  Michael Martinez  Date:  2021  Contact Type:  Face to Face    SUBJECTIVE:  Patient Findings     Comments:  Patient denies any identifiable changes that caused the supratherapeutic INR. Just back from [place] says he did not get INR checked when he was there.          Clinical Outcomes     Negatives:  Major bleeding event, Thromboembolic event, Anticoagulation-related hospital admission, Anticoagulation-related ED visit, Anticoagulation-related fatality    Comments:  Patient denies any identifiable changes that caused the supratherapeutic INR. Just back from [place] says he did not get INR checked when he was there.             OBJECTIVE    Recent labs: (last 7 days)     21  1104   INR 4.3*       ASSESSMENT / PLAN  INR assessment SUPRA    Recheck INR In: 2 WEEKS    INR Location Clinic      Anticoagulation Summary  As of 2021    INR goal:  2.0-3.0   TTR:  75.4 % (9.7 mo)   INR used for dosin.3 (2021)   Warfarin maintenance plan:  5 mg (5 mg x 1) every Mon, Wed, Fri; 7.5 mg (7.5 mg x 1) all other days   Full warfarin instructions:  : Hold; Otherwise 5 mg every Mon, Wed, Fri; 7.5 mg all other days   Weekly warfarin total:  45 mg   Plan last modified:  Naya Diallo RN (2021)   Next INR check:  2022   Priority:  Maintenance   Target end date:  Indefinite    Indications    Long-term (current) use of anticoagulants [Z79.01] [Z79.01]  Anticoagulation monitoring  INR range 2-3 [Z79.01]  History of pulmonary embolism [Z86.711]  Personal history of DVT (deep vein thrombosis) [Z86.718]             Anticoagulation Episode Summary     INR check location:  Anticoagulation Clinic    Preferred lab:  [facility]    Send INR reminders to:  [facility]    Comments:        Anticoagulation Care Providers     Provider Role Specialty Phone number    Isma Martinez MD Responsible  Internal Medicine [phone]            See the Encounter Report to view Anticoagulation Flowsheet and Dosing Calendar (Go to Encounters tab in chart review, and find the Anticoagulation Therapy Visit)        Naya Diallo RN

## 2022-01-14 ENCOUNTER — ANTICOAGULATION THERAPY VISIT (OUTPATIENT)
Dept: ANTICOAGULATION | Facility: OTHER | Age: 87
End: 2022-01-14
Attending: INTERNAL MEDICINE
Payer: COMMERCIAL

## 2022-01-14 DIAGNOSIS — Z79.01 LONG TERM CURRENT USE OF ANTICOAGULANT THERAPY: Primary | ICD-10-CM

## 2022-01-14 DIAGNOSIS — Z86.718 PERSONAL HISTORY OF DVT (DEEP VEIN THROMBOSIS): ICD-10-CM

## 2022-01-14 DIAGNOSIS — Z86.711 HISTORY OF PULMONARY EMBOLISM: ICD-10-CM

## 2022-01-14 DIAGNOSIS — Z79.01 ANTICOAGULATION MONITORING, INR RANGE 2-3: ICD-10-CM

## 2022-01-14 LAB — INR POINT OF CARE: 2.6 (ref 0.9–1.1)

## 2022-01-14 PROCEDURE — 36416 COLLJ CAPILLARY BLOOD SPEC: CPT | Mod: ZL

## 2022-01-14 NOTE — PROGRESS NOTES
ANTICOAGULATION FOLLOW-UP CLINIC VISIT    Patient Name:  Michael Martinez  Date:  2022  Contact Type:  Face to Face    SUBJECTIVE:  Patient Findings         Clinical Outcomes     Negatives:  Major bleeding event, Thromboembolic event, Anticoagulation-related hospital admission, Anticoagulation-related ED visit, Anticoagulation-related fatality           OBJECTIVE    Recent labs: (last 7 days)     22  0905   INR 2.6*       ASSESSMENT / PLAN  INR assessment THER    Recheck INR In: 4 WEEKS    INR Location Clinic      Anticoagulation Summary  As of 2022    INR goal:  2.0-3.0   TTR:  73.1 % (10.2 mo)   INR used for dosin.6 (2022)   Warfarin maintenance plan:  5 mg (5 mg x 1) every Mon, Wed, Fri; 7.5 mg (7.5 mg x 1) all other days   Full warfarin instructions:  5 mg every Mon, Wed, Fri; 7.5 mg all other days   Weekly warfarin total:  45 mg   No change documented:  Naya Diallo RN   Plan last modified:  Naya Diallo RN (2021)   Next INR check:  2022   Priority:  Maintenance   Target end date:  Indefinite    Indications    Long-term (current) use of anticoagulants [Z79.01] [Z79.01]  Anticoagulation monitoring  INR range 2-3 [Z79.01]  History of pulmonary embolism [Z86.711]  Personal history of DVT (deep vein thrombosis) [Z86.718]             Anticoagulation Episode Summary     INR check location:  Anticoagulation Clinic    Preferred lab:  [facility]    Send INR reminders to:  [facility]    Comments:        Anticoagulation Care Providers     Provider Role Specialty Phone number    Isma Martinez MD [facility] Internal Medicine [phone]            See the Encounter Report to view Anticoagulation Flowsheet and Dosing Calendar (Go to Encounters tab in chart review, and find the Anticoagulation Therapy Visit)        Nyaa Diallo RN

## 2022-01-21 ENCOUNTER — OFFICE VISIT (OUTPATIENT)
Dept: PEDIATRICS | Facility: OTHER | Age: 87
End: 2022-01-21
Attending: INTERNAL MEDICINE
Payer: COMMERCIAL

## 2022-01-21 VITALS
BODY MASS INDEX: 25.39 KG/M2 | SYSTOLIC BLOOD PRESSURE: 122 MMHG | WEIGHT: 171.4 LBS | HEIGHT: 69 IN | OXYGEN SATURATION: 97 % | RESPIRATION RATE: 16 BRPM | TEMPERATURE: 96.6 F | HEART RATE: 60 BPM | DIASTOLIC BLOOD PRESSURE: 70 MMHG

## 2022-01-21 DIAGNOSIS — Z23 NEED FOR VACCINATION: ICD-10-CM

## 2022-01-21 DIAGNOSIS — K21.9 GASTROESOPHAGEAL REFLUX DISEASE, UNSPECIFIED WHETHER ESOPHAGITIS PRESENT: ICD-10-CM

## 2022-01-21 DIAGNOSIS — K64.9 ACUTE HEMORRHOID: ICD-10-CM

## 2022-01-21 DIAGNOSIS — E78.2 MIXED HYPERLIPIDEMIA: ICD-10-CM

## 2022-01-21 DIAGNOSIS — Z00.00 ENCOUNTER FOR MEDICARE ANNUAL WELLNESS EXAM: Primary | ICD-10-CM

## 2022-01-21 DIAGNOSIS — Z79.899 OTHER LONG TERM (CURRENT) DRUG THERAPY: ICD-10-CM

## 2022-01-21 DIAGNOSIS — Z95.1 S/P CABG X 4: ICD-10-CM

## 2022-01-21 DIAGNOSIS — F39 MOOD DISORDER (H): ICD-10-CM

## 2022-01-21 DIAGNOSIS — Z79.01 LONG TERM CURRENT USE OF ANTICOAGULANT THERAPY: ICD-10-CM

## 2022-01-21 DIAGNOSIS — E11.9 DIABETES MELLITUS TYPE 2, DIET-CONTROLLED (H): ICD-10-CM

## 2022-01-21 DIAGNOSIS — F41.1 GAD (GENERALIZED ANXIETY DISORDER): ICD-10-CM

## 2022-01-21 PROBLEM — R73.03 PRE-DIABETES: Chronic | Status: RESOLVED | Noted: 2020-04-23 | Resolved: 2022-01-21

## 2022-01-21 LAB
ANION GAP SERPL CALCULATED.3IONS-SCNC: 7 MMOL/L (ref 3–14)
BUN SERPL-MCNC: 20 MG/DL (ref 7–25)
CALCIUM SERPL-MCNC: 9.9 MG/DL (ref 8.6–10.3)
CHLORIDE BLD-SCNC: 100 MMOL/L (ref 98–107)
CHOLEST SERPL-MCNC: 163 MG/DL
CO2 SERPL-SCNC: 28 MMOL/L (ref 21–31)
CREAT SERPL-MCNC: 1.11 MG/DL (ref 0.7–1.3)
ERYTHROCYTE [DISTWIDTH] IN BLOOD BY AUTOMATED COUNT: 14 % (ref 10–15)
FASTING STATUS PATIENT QL REPORTED: ABNORMAL
GFR SERPL CREATININE-BSD FRML MDRD: 65 ML/MIN/1.73M2
GLUCOSE BLD-MCNC: 123 MG/DL (ref 70–105)
HBA1C MFR BLD: 6.6 % (ref 4–6.2)
HCT VFR BLD AUTO: 46.8 % (ref 40–53)
HDLC SERPL-MCNC: 39 MG/DL (ref 23–92)
HGB BLD-MCNC: 15.6 G/DL (ref 13.3–17.7)
LDLC SERPL CALC-MCNC: 83 MG/DL
MCH RBC QN AUTO: 29.5 PG (ref 26.5–33)
MCHC RBC AUTO-ENTMCNC: 33.3 G/DL (ref 31.5–36.5)
MCV RBC AUTO: 89 FL (ref 78–100)
NONHDLC SERPL-MCNC: 124 MG/DL
PLATELET # BLD AUTO: 211 10E3/UL (ref 150–450)
POTASSIUM BLD-SCNC: 4.3 MMOL/L (ref 3.5–5.1)
RBC # BLD AUTO: 5.29 10E6/UL (ref 4.4–5.9)
SODIUM SERPL-SCNC: 135 MMOL/L (ref 134–144)
TRIGL SERPL-MCNC: 206 MG/DL
WBC # BLD AUTO: 6.4 10E3/UL (ref 4–11)

## 2022-01-21 PROCEDURE — 85027 COMPLETE CBC AUTOMATED: CPT | Mod: ZL | Performed by: INTERNAL MEDICINE

## 2022-01-21 PROCEDURE — 80061 LIPID PANEL: CPT | Mod: ZL | Performed by: INTERNAL MEDICINE

## 2022-01-21 PROCEDURE — 36415 COLL VENOUS BLD VENIPUNCTURE: CPT | Mod: ZL | Performed by: INTERNAL MEDICINE

## 2022-01-21 PROCEDURE — 82310 ASSAY OF CALCIUM: CPT | Mod: ZL | Performed by: INTERNAL MEDICINE

## 2022-01-21 PROCEDURE — G0439 PPPS, SUBSEQ VISIT: HCPCS | Performed by: INTERNAL MEDICINE

## 2022-01-21 PROCEDURE — 83036 HEMOGLOBIN GLYCOSYLATED A1C: CPT | Mod: ZL | Performed by: INTERNAL MEDICINE

## 2022-01-21 PROCEDURE — G0009 ADMIN PNEUMOCOCCAL VACCINE: HCPCS

## 2022-01-21 RX ORDER — EZETIMIBE 10 MG/1
10 TABLET ORAL
Qty: 36 TABLET | Refills: 3 | Status: SHIPPED | OUTPATIENT
Start: 2022-01-21 | End: 2023-02-09

## 2022-01-21 RX ORDER — ATORVASTATIN CALCIUM 10 MG/1
10 TABLET, FILM COATED ORAL DAILY
Qty: 90 TABLET | Refills: 3 | Status: SHIPPED | OUTPATIENT
Start: 2022-01-21 | End: 2022-05-25

## 2022-01-21 RX ORDER — LATANOPROST 50 UG/ML
SOLUTION/ DROPS OPHTHALMIC
COMMUNITY
Start: 2021-12-09

## 2022-01-21 RX ORDER — LISINOPRIL 2.5 MG/1
2.5 TABLET ORAL DAILY
Qty: 90 TABLET | Refills: 3 | Status: SHIPPED | OUTPATIENT
Start: 2022-01-21 | End: 2023-02-09

## 2022-01-21 RX ORDER — CITALOPRAM HYDROBROMIDE 20 MG/1
20 TABLET ORAL DAILY
Qty: 90 TABLET | Refills: 3 | Status: SHIPPED | OUTPATIENT
Start: 2022-01-21 | End: 2023-02-09

## 2022-01-21 RX ORDER — METOPROLOL SUCCINATE 25 MG/1
12.5 TABLET, EXTENDED RELEASE ORAL DAILY
Qty: 45 TABLET | Refills: 3 | Status: SHIPPED | OUTPATIENT
Start: 2022-01-21 | End: 2022-05-25

## 2022-01-21 ASSESSMENT — ACTIVITIES OF DAILY LIVING (ADL): CURRENT_FUNCTION: NO ASSISTANCE NEEDED

## 2022-01-21 ASSESSMENT — PATIENT HEALTH QUESTIONNAIRE - PHQ9: SUM OF ALL RESPONSES TO PHQ QUESTIONS 1-9: 1

## 2022-01-21 ASSESSMENT — MIFFLIN-ST. JEOR: SCORE: 1448.88

## 2022-01-21 ASSESSMENT — PAIN SCALES - GENERAL: PAINLEVEL: NO PAIN (0)

## 2022-01-21 NOTE — LETTER
January 21, 2022      Mcihael Martinez  [address] [facility] [id number]        Dear ,    We are writing to inform you of your test results.    Labs look good.    Signed, Isma Martinez MD, FAAP, FACP  Internal Medicine & Pediatrics      Resulted Orders   CBC with platelets   Result Value Ref Range    WBC Count 6.4 4.0 - 11.0 10e3/uL    RBC Count 5.29 4.40 - 5.90 10e6/uL    Hemoglobin 15.6 13.3 - 17.7 g/dL    Hematocrit 46.8 40.0 - 53.0 %    MCV 89 78 - 100 fL    MCH 29.5 26.5 - 33.0 pg    MCHC 33.3 31.5 - 36.5 g/dL    RDW 14.0 10.0 - 15.0 %    Platelet Count 211 150 - 450 10e3/uL   Basic metabolic panel   Result Value Ref Range    Sodium 135 134 - 144 mmol/L    Potassium 4.3 3.5 - 5.1 mmol/L    Chloride 100 98 - 107 mmol/L    Carbon Dioxide (CO2) 28 21 - 31 mmol/L    Anion Gap 7 3 - 14 mmol/L    Urea Nitrogen 20 7 - 25 mg/dL    Creatinine 1.11 0.70 - 1.30 mg/dL    Calcium 9.9 8.6 - 10.3 mg/dL    Glucose 123 (H) 70 - 105 mg/dL    GFR Estimate 65 >60 mL/min/1.73m2      Comment:      Effective December 21, 2021 eGFRcr in adults is calculated using the 2021 CKD-EPI creatinine equation which includes age and gender (Chiquita stanford al., NEJ, DOI: 10.1056/ZMDPqp5934671)   Hemoglobin A1c   Result Value Ref Range    Hemoglobin A1C 6.6 (H) 4.0 - 6.2 %   Lipid Profile   Result Value Ref Range    Cholesterol 163 <200 mg/dL    Triglycerides 206 (H) <150 mg/dL    Direct Measure HDL 39 23 - 92 mg/dL    LDL Cholesterol Calculated 83 <=100 mg/dL    Non HDL Cholesterol 124 <130 mg/dL    Patient Fasting > 8hrs? Unknown     Narrative    Cholesterol  Desirable:  <200 mg/dL    Triglycerides  Normal:  Less than 150 mg/dL  Borderline High:  150-199 mg/dL  High:  200-499 mg/dL  Very High:  Greater than or equal to 500 mg/dL    Direct Measure HDL  Female:  Greater than or equal to 50 mg/dL   Male:  Greater than or equal to 40 mg/dL    LDL Cholesterol  Desirable:  <100mg/dL  Above Desirable:  100-129 mg/dL   Borderline  High:  130-159 mg/dL   High:  160-189 mg/dL   Very High:  >= 190 mg/dL    Non HDL Cholesterol  Desirable:  130 mg/dL  Above Desirable:  130-159 mg/dL  Borderline High:  160-189 mg/dL  High:  190-219 mg/dL  Very High:  Greater than or equal to 220 mg/dL       If you have any questions or concerns, please call the clinic at the number listed above.       Sincerely,      Isma Martinez MD

## 2022-01-21 NOTE — PATIENT INSTRUCTIONS
Aspects of Diabetes we can improve:  Hemoglobin A1c Lab Results   Component Value Date    A1C 6.5 05/24/2021    Goal range is under 8. Best is 6.5 to 7   Blood Pressure 122/70 Goal to keep less than 140/90   Tobacco  reports that he has quit smoking. His smoking use included cigarettes. He has a 8.10 pack-year smoking history. He has never used smokeless tobacco. Goal to abstain from tobacco   Aspirin yes Aspirin reduces risk of heart disease and stroke   ACE/ARB Start lisinopril These medications reduce risk of kidney disease   Cholesterol yes Statins reduce risk of heart disease and stroke   Eye Exam annual Annual diabetic eye exam   Healthy weight Body mass index is 25.5 kg/m . Goal BMI under 30, best is under 25.      -- I'm trying to exercise daily (goal at least 20 min/day) with moderate aerobic activity   -- Eat healthy (resources from ADA at http://www.diabetes.org/)   -- I'm taking good care of my feet. Consider seeing the Podiatrist   -- Check blood sugars as directed, record in log book and bring to every appointment   -- Goal sugar before breakfast: under 140   -- Goal sugar 2 hours after supper: under 170   -- Next diabetes lab draw: 6-12 months   -- Next diabetes office visit: 6-12 months    Check your Blood Pressure   -- Sit in a chair, feet flat on the floor for 5 minutes   -- Avoid caffeine, exercise and smoking for 30 minutes before checking   -- Have your arm at the level of your heart   -- Make sure you have the correct size cuff   -- Write them down, bring your log book in to your appointment     -- Goal blood pressure 120/70   -- Learn about DASH Diet for dietary ways to reduce blood pressure  1. Google: NIH DASH diet  2. NIH site: https://www.nhlbi.nih.gov/health-topics/dash-eating-plan  3. PDF from [facility]: https://www.nhlbi.nih.gov/files/docs/public/heart/new_dash.pdf   -- Work on 5% weight loss   -- Daily physical exercise (eg. 30 min brisk walk)        -- MiraLax 1 capful daily, titrate to  soft formed stool 1-2x/day(applesauce consistency)   -- Consider switching to Metamucil after a few weeks   -- Warm baths daily   -- Topical hydrocortisone cream for a few days to reduce itching   -- Topical lidocaine cream as needed for pain   -- Tylenol 1000 mg (2 extra strength tablets) 3times per day scheduled   -- No other sources of Tylenol/acetaminophen/APAP, as this can affect your liver   -- Ibuprofen 600 mg (3 tablets) 3 times per day for 3 days, then as needed   -- Take ibuprofen with food,as can be hard on the stomach   -- Avoid overzealous cleaning. Best with minimal gentle wiping   -- Avoid cleaning with soaps   -- Call or come back if concerns, else as needed   -- If bleeding persists or other concerns, will refer for colonoscopy and/or General Surgery     -- Pneumonia shot today   -- Get a 3rd covid dose    How to get your COVID-19 vaccine  COVID-19 vaccine is free    1. From [facility]  We are continuing to schedule all people ages 5+ for the Pfizer COVID-19 vaccination. All booster doses at [facility] are Pfizer.     Pfizer booster doses of COVID-19 vaccination are available to:    Anyone ages 5-11 who are immunocompromised and it has been at least 28 days of receiving the second Pfizer pediatric dose,  Anyone ages 12-17 at least 5+ months after completing a primary series of the Pfizer (only) COVID-19 vaccine,  Anyone age 18+ who received the Ramesh & Ramesh COVID-19 vaccine 2+ months ago,   Anyone age 18+ who received the second dose of the Moderna COVID-19 vaccine 5+ months ago.    Please call our appointment line at [phone], weekdays 7:00am and 5:00pm.     Ages 12+ can schedule Monday-Friday between 8:00am and 12:00pm and 1:00pm and 3:00pm   Ages 5-11 can schedule Monday-Friday between 3:00pm and 4:30pm       Thank you,   [facility]     2. From [place] [facility], pharmacy or other clinic  Who currently qualifies?    12+    See the FAQ at  [place]://[place].gov/[place]id19/vaccine/find-vaccine/community-vaccination-program/faq.jsp    How to schedule?    Schedule on-line via Vaccine Connector at https://vaccineconnector.mn.gov/)    Call [phone] or toll free at [phone]      Patient Education   Personalized Prevention Plan  You are due for the preventive services outlined below.  Your care team is available to assist you in scheduling these services.  If you have already completed any of these items, please share that information with your care team to update in your medical record.  Health Maintenance Due   Topic Date Due     Zoster (Shingles) Vaccine (1 of 2) Never done     Flu Vaccine (1) 09/01/2021     COVID-19 Vaccine (3 - Booster for Pfizer series) 09/14/2021

## 2022-01-21 NOTE — NURSING NOTE
"Chief Complaint   Patient presents with     Medicare Visit         Initial /70   Pulse 60   Temp (!) 96.6  F (35.9  C) (Tympanic)   Resp 16   Ht 1.746 m (5' 8.75\")   Wt 77.7 kg (171 lb 6.4 oz)   SpO2 97%   BMI 25.50 kg/m   Estimated body mass index is 25.5 kg/m  as calculated from the following:    Height as of this encounter: 1.746 m (5' 8.75\").    Weight as of this encounter: 77.7 kg (171 lb 6.4 oz).       Medication Reconciliation: Complete      FOOD SECURITY SCREENING QUESTIONS:    The next two questions are to help us understand your food security.  If you are feeling you need any assistance in this area, we have resources available to support you today.    Hunger Vital Signs:  Within the past 12 months we worried whether our food would run out before we got money to buy more. Never  Within the past 12 months the food we bought just didn't last and we didn't have money to get more. Never  Norma J. Gosselin, LPN,LPN on 1/21/2022 at 10:55 AM          Norma J. Gosselin, LPN   "

## 2022-01-21 NOTE — PROGRESS NOTES
SUBJECTIVE:   Michael Martinez is a 86 year old male who presents for Preventive Visit.      Patient has been advised of split billing requirements and indicates understanding: Yes  Are you in the first 12 months of your Medicare coverage?  No    HPI  Do you feel safe in your environment? Yes    Have you ever done Advance Care Planning? (For example, a Health Directive, POLST, or a discussion with a medical provider or your loved ones about your wishes): Yes, advance care planning is on file.       Fall risk  Fallen 2 or more times in the past year?: (P) No  Any fall with injury in the past year?: (P) No    Cognitive Screening   1) Repeat 3 items (Leader, Season, Table)    2) Clock draw: NORMAL  3) 3 item recall: Recalls 3 objects  Results: NORMAL clock, 1-2 items recalled: COGNITIVE IMPAIRMENT LESS LIKELY    Mini-CogTM Copyright MELA Wiseman. Licensed by the author for use in [facility]; reprinted with permission (lalo@[facility]). All rights reserved.      Do you have sleep apnea, excessive snoring or daytime drowsiness?: no    Reviewed and updated as needed this visit by clinical staff  Tobacco  Allergies  Meds  Problems  Med Hx  Surg Hx  Fam Hx         Reviewed and updated as needed this visit by Provider               Social History     Tobacco Use     Smoking status: Former Smoker     Packs/day: 0.90     Years: 9.00     Pack years: 8.10     Types: Cigarettes     Smokeless tobacco: Never Used   Substance Use Topics     Alcohol use: Yes     Alcohol/week: 0.0 standard drinks     Comment: beer 3 times per week      If you drink alcohol do you typically have >3 drinks per day or >7 drinks per week? No    Alcohol Use 1/21/2022   Prescreen: >3 drinks/day or >7 drinks/week? No           He has battled hemorrhoids off and on for years.  Now the last month and a half he has felt some bulges down there.  It makes it hard for him to white.  No significant pain.  He was in [place] and so he thought maybe he  "was eating too much pepperoni pizza.    Current providers sharing in care for this patient include:   Patient Care Team:  sIma Martinez MD as PCP - General (Pediatrics)  Isma Martinez MD as Assigned PCP   Dr Lyon- Eye provider    The following health maintenance items are reviewed in Epic and correct as of today:  Health Maintenance Due   Topic Date Due     ZOSTER IMMUNIZATION (1 of 2) Never done     MEDICARE ANNUAL WELLNESS VISIT  Never done     INFLUENZA VACCINE (1) 09/01/2021     COVID-19 Vaccine (3 - Booster for Pfizer series) 09/14/2021     Labs reviewed in EPIC  Immunizations are reviewed        Review of Systems  Constitutional, HEENT, cardiovascular, pulmonary, gi and gu systems are negative, except as otherwise noted.    OBJECTIVE:   /70   Pulse 60   Temp (!) 96.6  F (35.9  C) (Tympanic)   Resp 16   Ht 1.746 m (5' 8.75\")   Wt 77.7 kg (171 lb 6.4 oz)   SpO2 97%   BMI 25.50 kg/m   Estimated body mass index is 25.5 kg/m  as calculated from the following:    Height as of this encounter: 1.746 m (5' 8.75\").    Weight as of this encounter: 77.7 kg (171 lb 6.4 oz).  Physical Exam  HEENT: No carotid bruits  Cardiovascular: Regular rate and rhythm, no murmur  Pulmonary: Clear  MSK: No edema    Diagnostic Test Results:  Labs reviewed in Epic    ASSESSMENT / PLAN:       ICD-10-CM    1. Encounter for Medicare annual wellness exam  Z00.00    2. Acute hemorrhoid  K64.9    3. Need for vaccination  Z23    4. Mixed hyperlipidemia  E78.2 atorvastatin (LIPITOR) 10 MG tablet     ezetimibe (ZETIA) 10 MG tablet     Lipid Profile     Lipid Profile   5. OLIVIA (generalized anxiety disorder)  F41.1 citalopram (CELEXA) 20 MG tablet   6. 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 Gastroesophageal reflux disease, unspecified whether esophagitis present  K21.9 omeprazole (PRILOSEC) 20 MG DR capsule   9. " "Diabetes mellitus type 2, diet-controlled (H)  E11.9 Basic metabolic panel     Hemoglobin A1c     lisinopril (ZESTRIL) 2.5 MG tablet     Basic metabolic panel     Hemoglobin A1c   10. Other long term (current) drug therapy  Z79.899    11. Long-term (current) use of anticoagulants [Z79.01]  Z79.01 CBC with platelets     CBC with platelets           COUNSELING:  Reviewed preventive health counseling, as reflected in patient instructions    Estimated body mass index is 25.5 kg/m  as calculated from the following:    Height as of this encounter: 1.746 m (5' 8.75\").    Weight as of this encounter: 77.7 kg (171 lb 6.4 oz).        He reports that he has quit smoking. His smoking use included cigarettes. He has a 8.10 pack-year smoking history. He has never used smokeless tobacco.      Appropriate preventive services were discussed with this patient, including applicable screening as appropriate for cardiovascular disease, diabetes, osteopenia/osteoporosis, and glaucoma.  As appropriate for age/gender, discussed screening for colorectal cancer, prostate cancer, breast cancer, and cervical cancer. Checklist reviewing preventive services available has been given to the patient.    Reviewed patients plan of care and provided an AVS. The Basic Care Plan (routine screening as documented in Health Maintenance) for Michael meets the Care Plan requirement. This Care Plan has been established and reviewed with the Patient.    Counseling Resources:  ATP IV Guidelines  Pooled Cohorts Equation Calculator  Breast Cancer Risk Calculator  Breast Cancer: Medication to Reduce Risk  FRAX Risk Assessment  ICSI Preventive Guidelines  Dietary Guidelines for Americans, 2010  Retail Info's MyPlate  ASA Prophylaxis  Lung CA Screening    Isma Martinez MD  [facility]    Identified Health Risks:  "

## 2022-02-09 DIAGNOSIS — Z95.1 S/P CABG X 4: ICD-10-CM

## 2022-02-09 DIAGNOSIS — E78.2 MIXED HYPERLIPIDEMIA: ICD-10-CM

## 2022-02-09 RX ORDER — METOPROLOL SUCCINATE 25 MG/1
TABLET, EXTENDED RELEASE ORAL
Qty: 45 TABLET | Refills: 3 | OUTPATIENT
Start: 2022-02-09

## 2022-02-09 RX ORDER — EZETIMIBE 10 MG/1
TABLET ORAL
Qty: 39 TABLET | Refills: 3 | OUTPATIENT
Start: 2022-02-09

## 2022-02-09 NOTE — TELEPHONE ENCOUNTER
ezetimibe (ZETIA) 10 MG tablet 36 tablet 3 1/21/2022  No   Sig - Route: Take 1 tablet (10 mg) by mouth three times a week      metoprolol succinate ER (TOPROL-XL) 25 MG 24 hr tablet 45 tablet 3 1/21/2022  No   Sig - Route: Take 0.5 tablets (12.5 mg) by mouth daily - Oral     To Jaylene LOPEZ  [facility] requesting  Called and left message for patient to verify which pharmacy he is using.    Called and spoke with patient and patient states he is using [facility] mailservice for Rx's but states he just received refills on above requested and does not need at this time.  Ruth Ann Chandler RN on 2/9/2022 at 10:37 AM

## 2022-02-17 PROBLEM — K21.9 GASTROESOPHAGEAL REFLUX DISEASE: Status: ACTIVE | Noted: 2018-06-04

## 2022-02-23 ENCOUNTER — TELEPHONE (OUTPATIENT)
Dept: PEDIATRICS | Facility: OTHER | Age: 87
End: 2022-02-23
Payer: COMMERCIAL

## 2022-02-23 DIAGNOSIS — K62.9 RECTAL DISORDER: ICD-10-CM

## 2022-02-23 DIAGNOSIS — Z87.19 HISTORY OF HEMORRHOIDS: Primary | ICD-10-CM

## 2022-02-23 NOTE — TELEPHONE ENCOUNTER
Patient called with questions regarding a possible surgery for his hemorrhoids. He is unsure if he needs to be seen by his primary first or what steps he should take. Patient requested callback.    Diandra Hatfield on 2/23/2022 at 8:45 AM

## 2022-02-24 NOTE — TELEPHONE ENCOUNTER
Pt states he is having internal hemorrhoid issues.  Had issues 30 years ago then this past Nov/Dec he has felt bulges in his rectum.  He is wondering if he could see the surgeon.  Would like to know if another provider could send the referral, if he needs one, since Isma Martinez MD is out of the office until March 7th.  He would like to get an appt as soon as possible.  Eleonora Pizarro CMA ([facility])......................2/24/2022  3:48 PM

## 2022-02-28 NOTE — TELEPHONE ENCOUNTER
Left message to let him know that the referral was placed.   Margarita Damian CMA([facility])..................2/28/2022   2:13 PM

## 2022-03-29 ENCOUNTER — TRANSFERRED RECORDS (OUTPATIENT)
Dept: HEALTH INFORMATION MANAGEMENT | Facility: OTHER | Age: 87
End: 2022-03-29
Payer: COMMERCIAL

## 2022-04-11 ENCOUNTER — ANTICOAGULATION THERAPY VISIT (OUTPATIENT)
Dept: ANTICOAGULATION | Facility: OTHER | Age: 87
End: 2022-04-11
Attending: INTERNAL MEDICINE
Payer: COMMERCIAL

## 2022-04-11 DIAGNOSIS — Z86.711 HISTORY OF PULMONARY EMBOLISM: ICD-10-CM

## 2022-04-11 DIAGNOSIS — Z79.01 LONG TERM CURRENT USE OF ANTICOAGULANT THERAPY: Primary | ICD-10-CM

## 2022-04-11 DIAGNOSIS — Z79.01 ANTICOAGULATION MONITORING, INR RANGE 2-3: ICD-10-CM

## 2022-04-11 DIAGNOSIS — Z86.718 PERSONAL HISTORY OF DVT (DEEP VEIN THROMBOSIS): ICD-10-CM

## 2022-04-11 LAB — INR POINT OF CARE: 3.7 (ref 0.9–1.1)

## 2022-04-11 PROCEDURE — 85610 PROTHROMBIN TIME: CPT | Mod: ZL,QW

## 2022-04-11 NOTE — PROGRESS NOTES
ANTICOAGULATION FOLLOW-UP CLINIC VISIT    Patient Name:  Michael Martinez  Date:  4/11/2022  Contact Type:  Face to Face    SUBJECTIVE:  Patient Findings     Comments:  Patient verbalized that he was instructed to take 7.5 mg on Wednesday's as well the past month.        Clinical Outcomes     Negatives:  Major bleeding event, Thromboembolic event, Anticoagulation-related hospital admission, Anticoagulation-related ED visit, Anticoagulation-related fatality    Comments:  Patient verbalized that he was instructed to take 7.5 mg on Wednesday's as well the past month.           OBJECTIVE    Recent labs: (last 7 days)     04/11/22  1440   INR 3.7*       ASSESSMENT / PLAN  INR assessment SUPRA    Recheck INR In: 2 WEEKS    INR Location Clinic      Anticoagulation Summary  As of 4/11/2022    INR goal:  2.0-3.0   TTR:  62.3 % (1 y)   INR used for dosing:  3.7 (4/11/2022)   Warfarin maintenance plan:  5 mg (5 mg x 1) every Mon, Wed, Fri; 7.5 mg (7.5 mg x 1) all other days   Full warfarin instructions:  5 mg every Mon, Wed, Fri; 7.5 mg all other days   Weekly warfarin total:  45 mg   Plan last modified:  Naya Diallo RN (12/31/2021)   Next INR check:  4/25/2022   Priority:  Maintenance   Target end date:  Indefinite    Indications    Long-term (current) use of anticoagulants [Z79.01] [Z79.01]  Anticoagulation monitoring  INR range 2-3 [Z79.01]  History of pulmonary embolism [Z86.711]  Personal history of DVT (deep vein thrombosis) [Z86.718]             Anticoagulation Episode Summary     INR check location:  Anticoagulation Clinic    Preferred lab:  [facility]    Send INR reminders to:  [facility]    Comments:        Anticoagulation Care Providers     Provider Role Specialty Phone number    Isma Martinez MD [facility] Internal Medicine [phone]            See the Encounter Report to view Anticoagulation Flowsheet and Dosing Calendar (Go to Encounters tab in  chart review, and find the Anticoagulation Therapy Visit)        Radha Seo RN               ANTICOAGULATION FOLLOW-UP CLINIC VISIT    Patient Name:  Michael Martinez  Date:  4/11/2022  Contact Type:  Face to Face    SUBJECTIVE:  Patient Findings     Comments:  Patient verbalized that he was instructed to take 7.5 mg on Wednesday's as well the past month. Patient verbalized that he wanted to go back to his normal dosing of 5mg on M, W, and F, and that he didn't want to drop to low. Patient likes to be on the high end of the range.         Clinical Outcomes     Negatives:  Major bleeding event, Thromboembolic event, Anticoagulation-related hospital admission, Anticoagulation-related ED visit, Anticoagulation-related fatality    Comments:  Patient verbalized that he was instructed to take 7.5 mg on Wednesday's as well the past month. Patient verbalized that he wanted to go back to his normal dosing of 5mg on M, W, and F, and that he didn't want to drop to low. Patient likes to be on the high end of the range.            OBJECTIVE    Recent labs: (last 7 days)     04/11/22  1440   INR 3.7*       ASSESSMENT / PLAN  INR assessment SUPRA    Recheck INR In: 2 WEEKS    INR Location Clinic      Anticoagulation Summary  As of 4/11/2022    INR goal:  2.0-3.0   TTR:  62.3 % (1 y)   INR used for dosing:  3.7 (4/11/2022)   Warfarin maintenance plan:  5 mg (5 mg x 1) every Mon, Wed, Fri; 7.5 mg (7.5 mg x 1) all other days   Full warfarin instructions:  5 mg every Mon, Wed, Fri; 7.5 mg all other days   Weekly warfarin total:  45 mg   Plan last modified:  Naya Diallo RN (12/31/2021)   Next INR check:  4/25/2022   Priority:  Maintenance   Target end date:  Indefinite    Indications    Long-term (current) use of anticoagulants [Z79.01] [Z79.01]  Anticoagulation monitoring  INR range 2-3 [Z79.01]  History of pulmonary embolism [Z86.711]  Personal history of DVT (deep vein thrombosis) [Z86.718]              Anticoagulation Episode Summary     INR check location:  Anticoagulation Clinic    Preferred lab:  [facility]    Send INR reminders to:  [facility]    Comments:        Anticoagulation Care Providers     Provider Role Specialty Phone number    Isma Martinez MD [facility] Internal Medicine [phone]            See the Encounter Report to view Anticoagulation Flowsheet and Dosing Calendar (Go to Encounters tab in chart review, and find the Anticoagulation Therapy Visit)        Radha Seo RN

## 2022-04-18 ENCOUNTER — OFFICE VISIT (OUTPATIENT)
Dept: SURGERY | Facility: OTHER | Age: 87
End: 2022-04-18
Attending: SURGERY
Payer: COMMERCIAL

## 2022-04-18 VITALS
TEMPERATURE: 97.2 F | WEIGHT: 172 LBS | DIASTOLIC BLOOD PRESSURE: 80 MMHG | HEART RATE: 64 BPM | OXYGEN SATURATION: 97 % | BODY MASS INDEX: 25.59 KG/M2 | RESPIRATION RATE: 18 BRPM | SYSTOLIC BLOOD PRESSURE: 128 MMHG

## 2022-04-18 DIAGNOSIS — Z87.19 HISTORY OF HEMORRHOIDS: Primary | ICD-10-CM

## 2022-04-18 DIAGNOSIS — K62.9 RECTAL DISORDER: ICD-10-CM

## 2022-04-18 PROCEDURE — 46600 DIAGNOSTIC ANOSCOPY SPX: CPT | Performed by: SURGERY

## 2022-04-18 PROCEDURE — G0463 HOSPITAL OUTPT CLINIC VISIT: HCPCS

## 2022-04-18 ASSESSMENT — PAIN SCALES - GENERAL: PAINLEVEL: NO PAIN (0)

## 2022-04-18 NOTE — PROGRESS NOTES
Procedure Note     Pre/Post Operative Diagnosis:   Internal and external hemorrhoids     Procedure:    Anoscopy     Surgeon: CHELO Freedman MD     Local Anesthesia: none     Indication for the procedure:    This is a 86 year old male patient with hemorrhoids.  Patient states he had a bout of hemorrhoids roughly 30 years ago very controlled with Preparation H.  Now he states he had a flare of his hemorrhoids recently but they have since calm down.  Patient denies ever having bleeding.  States that he had a little bit of discomfort with bowel movements.  Patient states that he can feel some extra tissue down there.  Never has to push hemorrhoids back in.  Patient does not take a fiber supplement.  Patient denies chronic constipation or diarrhea.    Michael Martinez is an 86-year-old male with likely grade 1 or grade 2 internal hemorrhoids.  Unlikely the patient will be a candidate for banding based on his symptoms and he also takes a blood thinner.  We will plan for anoscopy.      Procedure:   The patient was placed on the left lateral decubitus position.  The anal margin and anal verge were examined and there is very mild external hemorrhoidal tissues that are noninflamed today most prominent in the anterior and right side.  Again, these are quite mild and likely asymptomatic.  Anal verge and margin are palpated and there is no mass or fluctuance.  No pain to palpation.  Digital rectal exam was performed and there is no stricture or masses.  Possible mild internal hemorrhoidal tissues on the left posterior column.  Anoscopy is performed and there is very mild internal hemorrhoidal tissue in that area as well.  Really no significant internal hemorrhoids other than that.  Patient tolerated anoscopy.    Plan:  Start fiber supplement, recommend Benefiber daily   Follow-up in clinic with concerns or worsening symptoms.      CHELO Freedman MD

## 2022-04-18 NOTE — NURSING NOTE
"Chief Complaint   Patient presents with     Derm Problem     Internal hemorrhoids with pain.       Initial /80 (BP Location: Left arm, Patient Position: Sitting, Cuff Size: Adult Large)   Pulse 64   Temp 97.2  F (36.2  C) (Tympanic)   Resp 18   Wt 78 kg (172 lb)   SpO2 97%   BMI 25.59 kg/m   Estimated body mass index is 25.59 kg/m  as calculated from the following:    Height as of 1/21/22: 1.746 m (5' 8.75\").    Weight as of this encounter: 78 kg (172 lb).  Medication Reconciliation: complete    Susanne Rojas LPN  "

## 2022-04-25 ENCOUNTER — ANTICOAGULATION THERAPY VISIT (OUTPATIENT)
Dept: ANTICOAGULATION | Facility: OTHER | Age: 87
End: 2022-04-25
Attending: SURGERY
Payer: COMMERCIAL

## 2022-04-25 DIAGNOSIS — Z79.01 LONG TERM CURRENT USE OF ANTICOAGULANT THERAPY: Primary | ICD-10-CM

## 2022-04-25 DIAGNOSIS — Z86.711 HISTORY OF PULMONARY EMBOLISM: ICD-10-CM

## 2022-04-25 DIAGNOSIS — Z86.718 PERSONAL HISTORY OF DVT (DEEP VEIN THROMBOSIS): ICD-10-CM

## 2022-04-25 DIAGNOSIS — Z79.01 ANTICOAGULATION MONITORING, INR RANGE 2-3: ICD-10-CM

## 2022-04-25 LAB — INR POINT OF CARE: 3.5 (ref 0.9–1.1)

## 2022-04-25 PROCEDURE — 85610 PROTHROMBIN TIME: CPT | Mod: ZL,QW

## 2022-04-25 NOTE — PROGRESS NOTES
ANTICOAGULATION FOLLOW-UP CLINIC VISIT    Patient Name:  Michael Martinez  Date:  4/25/2022  Contact Type:  Face to Face    SUBJECTIVE:  Patient Findings         Clinical Outcomes     Negatives:  Major bleeding event, Thromboembolic event, Anticoagulation-related hospital admission, Anticoagulation-related ED visit, Anticoagulation-related fatality           OBJECTIVE    Recent labs: (last 7 days)     04/25/22  0838   INR 3.5*       ASSESSMENT / PLAN  INR assessment SUPRA    Recheck INR In: 2 WEEKS    INR Location Clinic      Anticoagulation Summary  As of 4/25/2022    INR goal:  2.0-3.0   TTR:  58.5 % (1 y)   INR used for dosing:  3.5 (4/25/2022)   Warfarin maintenance plan:  5 mg (5 mg x 1) every Mon, Wed, Fri; 7.5 mg (7.5 mg x 1) all other days   Full warfarin instructions:  4/25: Hold; Otherwise 5 mg every Mon, Wed, Fri; 7.5 mg all other days   Weekly warfarin total:  45 mg   Plan last modified:  Naya Diallo RN (12/31/2021)   Next INR check:  5/9/2022   Priority:  Maintenance   Target end date:  Indefinite    Indications    Long-term (current) use of anticoagulants [Z79.01] [Z79.01]  Anticoagulation monitoring  INR range 2-3 [Z79.01]  History of pulmonary embolism [Z86.711]  Personal history of DVT (deep vein thrombosis) [Z86.718]             Anticoagulation Episode Summary     INR check location:  Anticoagulation Clinic    Preferred lab:  [facility]    Send INR reminders to:  [facility]    Comments:        Anticoagulation Care Providers     Provider Role Specialty Phone number    Isma Martinez MD [facility] Internal Medicine [phone]            See the Encounter Report to view Anticoagulation Flowsheet and Dosing Calendar (Go to Encounters tab in chart review, and find the Anticoagulation Therapy Visit)    Reviewed signs and symptoms of bleeding, including extremecaution against falling and hitting head. Patient advised to seek medical  attention if any bleeding or injury occurs. Follow-up per protocol.    Radha Seo RN

## 2022-05-09 ENCOUNTER — ANTICOAGULATION THERAPY VISIT (OUTPATIENT)
Dept: ANTICOAGULATION | Facility: OTHER | Age: 87
End: 2022-05-09
Attending: INTERNAL MEDICINE
Payer: COMMERCIAL

## 2022-05-09 DIAGNOSIS — Z79.01 LONG TERM CURRENT USE OF ANTICOAGULANT THERAPY: Primary | ICD-10-CM

## 2022-05-09 DIAGNOSIS — Z86.711 HISTORY OF PULMONARY EMBOLISM: ICD-10-CM

## 2022-05-09 DIAGNOSIS — Z86.718 PERSONAL HISTORY OF DVT (DEEP VEIN THROMBOSIS): ICD-10-CM

## 2022-05-09 DIAGNOSIS — Z79.01 ANTICOAGULATION MONITORING, INR RANGE 2-3: ICD-10-CM

## 2022-05-09 LAB — INR POINT OF CARE: 2.5 (ref 0.9–1.1)

## 2022-05-09 PROCEDURE — 85610 PROTHROMBIN TIME: CPT | Mod: ZL,QW

## 2022-05-09 NOTE — PROGRESS NOTES
ANTICOAGULATION MANAGEMENT     Michael Martinez 86 year old male is on warfarin with therapeutic INR result. (Goal INR 2.0-3.0)    Recent labs: (last 7 days)     05/09/22  0848   INR 2.5*       ASSESSMENT     Source(s): Chart Review and In Person     Warfarin doses taken: Warfarin taken as instructed  Diet: No new diet changes identified  New illness, injury, or hospitalization: No  Medication/supplement changes: None noted  Signs or symptoms of bleeding or clotting: No  Previous INR: Supra last visit, therapeutic today.  Additional findings: None       PLAN     Recommended plan for no diet, medication or health factor changes affecting INR     Dosing Instructions: continue your current warfarin dose with next INR in 4 weeks, patient did not want to come in earlier for next INR check.       Summary  As of 5/9/2022    Full warfarin instructions:  5 mg every Mon, Wed, Fri; 7.5 mg all other days   Next INR check:  6/6/2022             In person    Lab visit scheduled    Education provided: Please call back if any changes to your diet, medications or how you've been taking warfarin    Plan made per [facility] anticoagulation protocol    Radha Seo RN  Anticoagulation Clinic  5/9/2022    _______________________________________________________________________     Anticoagulation Episode Summary     Current INR goal:  2.0-3.0   TTR:  56.6 % (1 y)   Target end date:  Indefinite   Send INR reminders to:  [facility]    Indications    Long-term (current) use of anticoagulants [Z79.01] [Z79.01]  Anticoagulation monitoring  INR range 2-3 [Z79.01]  History of pulmonary embolism [Z86.711]  Personal history of DVT (deep vein thrombosis) [Z86.718]           Comments:           Anticoagulation Care Providers     Provider Role Specialty Phone number    Stephane Freedman MD Referring Surgery [phone]    Isma Martinez MD Responsible Internal Medicine [phone]

## 2022-06-01 LAB — RETINOPATHY: NORMAL

## 2022-06-06 ENCOUNTER — ANTICOAGULATION THERAPY VISIT (OUTPATIENT)
Dept: ANTICOAGULATION | Facility: OTHER | Age: 87
End: 2022-06-06
Attending: INTERNAL MEDICINE
Payer: COMMERCIAL

## 2022-06-06 DIAGNOSIS — Z79.01 LONG TERM CURRENT USE OF ANTICOAGULANT THERAPY: Primary | ICD-10-CM

## 2022-06-06 DIAGNOSIS — Z86.718 PERSONAL HISTORY OF DVT (DEEP VEIN THROMBOSIS): ICD-10-CM

## 2022-06-06 DIAGNOSIS — Z86.711 HISTORY OF PULMONARY EMBOLISM: ICD-10-CM

## 2022-06-06 DIAGNOSIS — Z79.01 ANTICOAGULATION MONITORING, INR RANGE 2-3: ICD-10-CM

## 2022-06-06 LAB — INR POINT OF CARE: 1.8 (ref 0.9–1.1)

## 2022-06-06 PROCEDURE — 85610 PROTHROMBIN TIME: CPT | Mod: ZL,QW

## 2022-06-06 NOTE — PROGRESS NOTES
ANTICOAGULATION MANAGEMENT     Michael Martinez 86 year old male is on warfarin with subtherapeutic INR result. (Goal INR 2.0-3.0)    Recent labs: (last 7 days)     06/06/22  0850   INR 1.8*       ASSESSMENT     Source(s): Chart Review and In person     Warfarin doses taken: Warfarin taken as instructed  Diet: No new diet changes identified  New illness, injury, or hospitalization: No  Medication/supplement changes: None noted  Signs or symptoms of bleeding or clotting: No  Previous INR: Therapeutic last visit; previously outside of goal range  Additional findings: None       PLAN     Recommended plan for no diet, medication or health factor changes affecting INR     Dosing Instructions: continue your current warfarin dose with next INR in 2 weeks       Summary  As of 6/6/2022    Full warfarin instructions:  5 mg every Mon, Wed, Fri; 7.5 mg all other days   Next INR check:  6/20/2022             In person    Lab visit scheduled    Education provided: Please call back if any changes to your diet, medications or how you've been taking warfarin    Plan made per [facility] anticoagulation protocol    Radha Seo RN  Anticoagulation Clinic  6/6/2022    _______________________________________________________________________     Anticoagulation Episode Summary     Current INR goal:  2.0-3.0   TTR:  54.4 % (1 y)   Target end date:  Indefinite   Send INR reminders to:  [facility]    Indications    Long-term (current) use of anticoagulants [Z79.01] [Z79.01]  Anticoagulation monitoring  INR range 2-3 [Z79.01]  History of pulmonary embolism [Z86.711]  Personal history of DVT (deep vein thrombosis) [Z86.718]           Comments:           Anticoagulation Care Providers     Provider Role Specialty Phone number    Stephane Freedman MD Referring Surgery [phone]    Isma Martinez MD [facility] Internal Medicine [phone]

## 2022-06-20 ENCOUNTER — ANTICOAGULATION THERAPY VISIT (OUTPATIENT)
Dept: ANTICOAGULATION | Facility: OTHER | Age: 87
End: 2022-06-20
Attending: INTERNAL MEDICINE
Payer: COMMERCIAL

## 2022-06-20 DIAGNOSIS — Z79.01 LONG TERM CURRENT USE OF ANTICOAGULANT THERAPY: Primary | ICD-10-CM

## 2022-06-20 DIAGNOSIS — Z86.711 HISTORY OF PULMONARY EMBOLISM: ICD-10-CM

## 2022-06-20 DIAGNOSIS — Z79.01 ANTICOAGULATION MONITORING, INR RANGE 2-3: ICD-10-CM

## 2022-06-20 DIAGNOSIS — Z86.718 PERSONAL HISTORY OF DVT (DEEP VEIN THROMBOSIS): ICD-10-CM

## 2022-06-20 LAB — INR POINT OF CARE: 3 (ref 0.9–1.1)

## 2022-06-20 PROCEDURE — 85610 PROTHROMBIN TIME: CPT | Mod: ZL,QW

## 2022-06-20 NOTE — PROGRESS NOTES
ANTICOAGULATION MANAGEMENT     Michael Martinez 86 year old male is on warfarin with therapeutic INR result. (Goal INR 2.0-3.0)    Recent labs: (last 7 days)     06/20/22  0820   INR 3.0*       ASSESSMENT     Source(s): Chart Review and In person     Warfarin doses taken: Warfarin taken as instructed  Diet: No new diet changes identified  New illness, injury, or hospitalization: No  Medication/supplement changes: None noted  Signs or symptoms of bleeding or clotting: No  Previous INR: Subtherapeutic  Additional findings: None       PLAN     Recommended plan for no diet, medication or health factor changes affecting INR     Dosing Instructions: continue your current warfarin dose with next INR in 3 weeks       Summary  As of 6/20/2022    Full warfarin instructions:  5 mg every Mon, Wed, Fri; 7.5 mg all other days   Next INR check:  7/11/2022             In person    Lab visit scheduled    Education provided: Please call back if any changes to your diet, medications or how you've been taking warfarin    Plan made per [facility] anticoagulation protocol    Radha Seo RN  Anticoagulation Clinic  6/20/2022    _______________________________________________________________________     Anticoagulation Episode Summary     Current INR goal:  2.0-3.0   TTR:  53.8 % (1 y)   Target end date:  Indefinite   Send INR reminders to:  [facility]    Indications    Long-term (current) use of anticoagulants [Z79.01] [Z79.01]  Anticoagulation monitoring  INR range 2-3 [Z79.01]  History of pulmonary embolism [Z86.711]  Personal history of DVT (deep vein thrombosis) [Z86.718]           Comments:           Anticoagulation Care Providers     Provider Role Specialty Phone number    Stephane Freedman MD Referring Surgery [phone]    Isma Martinez MD [facility] Internal Medicine [phone]

## 2022-07-11 ENCOUNTER — ANTICOAGULATION THERAPY VISIT (OUTPATIENT)
Dept: ANTICOAGULATION | Facility: OTHER | Age: 87
End: 2022-07-11
Attending: INTERNAL MEDICINE
Payer: COMMERCIAL

## 2022-07-11 DIAGNOSIS — Z86.711 HISTORY OF PULMONARY EMBOLISM: ICD-10-CM

## 2022-07-11 DIAGNOSIS — Z79.01 ANTICOAGULATION MONITORING, INR RANGE 2-3: ICD-10-CM

## 2022-07-11 DIAGNOSIS — Z86.718 PERSONAL HISTORY OF DVT (DEEP VEIN THROMBOSIS): ICD-10-CM

## 2022-07-11 DIAGNOSIS — Z79.01 LONG TERM CURRENT USE OF ANTICOAGULANT THERAPY: Primary | ICD-10-CM

## 2022-07-11 LAB — INR POINT OF CARE: 2.4 (ref 0.9–1.1)

## 2022-07-11 PROCEDURE — 36416 COLLJ CAPILLARY BLOOD SPEC: CPT | Mod: ZL

## 2022-07-11 NOTE — PROGRESS NOTES
ANTICOAGULATION MANAGEMENT     Michael Martinez 86 year old male is on warfarin with therapeutic INR result. (Goal INR 2.0-3.0)    Recent labs: (last 7 days)     07/11/22  0755   INR 2.4*       ASSESSMENT     Source(s): Chart Review and In person     Warfarin doses taken: Warfarin taken as instructed  Diet: No new diet changes identified  New illness, injury, or hospitalization: No  Medication/supplement changes: None noted  Signs or symptoms of bleeding or clotting: No  Previous INR: Therapeutic last 2(+) visits  Additional findings: None       PLAN     Recommended plan for no diet, medication or health factor changes affecting INR     Dosing Instructions: continue your current warfarin dose with next INR in 4 weeks       Summary  As of 7/11/2022    Full warfarin instructions:  5 mg every Mon, Wed, Fri; 7.5 mg all other days   Next INR check:  8/8/2022             In person    Lab visit scheduled    Education provided: Please call back if any changes to your diet, medications or how you've been taking warfarin    Plan made per [facility] anticoagulation protocol    Radha Seo, RN  Anticoagulation Clinic  7/11/2022    _______________________________________________________________________     Anticoagulation Episode Summary     Current INR goal:  2.0-3.0   TTR:  53.8 % (1 y)   Target end date:  Indefinite   Send INR reminders to:  [facility]    Indications    Long-term (current) use of anticoagulants [Z79.01] [Z79.01]  Anticoagulation monitoring  INR range 2-3 [Z79.01]  History of pulmonary embolism [Z86.711]  Personal history of DVT (deep vein thrombosis) [Z86.718]           Comments:           Anticoagulation Care Providers     Provider Role Specialty Phone number    Stephane Freedman MD Referring Surgery [phone]    Isma Martinez MD Responsible Internal Medicine [phone]

## 2022-07-26 DIAGNOSIS — Z95.1 S/P CABG X 4: ICD-10-CM

## 2022-07-26 DIAGNOSIS — K21.9 GASTROESOPHAGEAL REFLUX DISEASE, UNSPECIFIED WHETHER ESOPHAGITIS PRESENT: ICD-10-CM

## 2022-07-26 DIAGNOSIS — E78.2 MIXED HYPERLIPIDEMIA: ICD-10-CM

## 2022-07-26 RX ORDER — METOPROLOL SUCCINATE 25 MG/1
TABLET, EXTENDED RELEASE ORAL
Qty: 45 TABLET | Refills: 1 | Status: SHIPPED | OUTPATIENT
Start: 2022-07-26 | End: 2023-02-09

## 2022-07-26 RX ORDER — ATORVASTATIN CALCIUM 10 MG/1
TABLET, FILM COATED ORAL
Qty: 90 TABLET | Refills: 1 | Status: SHIPPED | OUTPATIENT
Start: 2022-07-26 | End: 2023-02-09

## 2022-07-26 NOTE — TELEPHONE ENCOUNTER
"Requested Prescriptions   Pending Prescriptions Disp Refills     omeprazole (PRILOSEC) 20 MG DR capsule [Pharmacy Med Name: Omeprazole 20 MG Oral Capsule Delayed Release] 45 capsule 3     Sig: TAKE 1 CAPSULE BY MOUTH  EVERY OTHER DAY       PPI Protocol Passed - 7/26/2022  8:47 AM        Passed - Not on Clopidogrel (unless Pantoprazole ordered)        Passed - No diagnosis of osteoporosis on record        Passed - Recent (12 mo) or future (30 days) visit within the authorizing provider's specialty     Patient has had an office visit with the authorizing provider or a provider within the authorizing providers department within the previous 12 mos or has a future within next 30 days. See \"Patient Info\" tab in inbasket, or \"Choose Columns\" in Meds & Orders section of the refill encounter.              Passed - Medication is active on med list        Passed - Patient is age 18 or older        Last Written Prescription Date:  5/25/22  Last Fill Quantity: 45,   # refills: 0           metoprolol succinate ER (TOPROL XL) 25 MG 24 hr tablet [Pharmacy Med Name: Metoprolol Succinate ER 25 MG Oral Tablet Extended Release 24 Hour] 45 tablet 3     Sig: TAKE ONE-HALF TABLET BY  MOUTH DAILY       Beta-Blockers Protocol Passed - 7/26/2022  8:47 AM        Passed - Blood pressure under 140/90 in past 12 months     BP Readings from Last 3 Encounters:   04/18/22 128/80   01/21/22 122/70   05/24/21 128/76                 Passed - Patient is age 6 or older        Passed - Recent (12 mo) or future (30 days) visit within the authorizing provider's specialty     Patient has had an office visit with the authorizing provider or a provider within the authorizing providers department within the previous 12 mos or has a future within next 30 days. See \"Patient Info\" tab in inbasket, or \"Choose Columns\" in Meds & Orders section of the refill encounter.              Passed - Medication is active on med list        Last Written Prescription Date:  " "5/25/22  Last Fill Quantity: 45,   # refills: 0         atorvastatin (LIPITOR) 10 MG tablet [Pharmacy Med Name: Atorvastatin Calcium 10 MG Oral Tablet] 90 tablet 3     Sig: TAKE 1 TABLET BY MOUTH  DAILY       Statins Protocol Passed - 7/26/2022  8:47 AM        Passed - LDL on file in past 12 months     Recent Labs   Lab Test 01/21/22  1127   LDL 83             Passed - No abnormal creatine kinase in past 12 months     Recent Labs   Lab Test 04/17/17  0836   CKT 64                Passed - Recent (12 mo) or future (30 days) visit within the authorizing provider's specialty     Patient has had an office visit with the authorizing provider or a provider within the authorizing providers department within the previous 12 mos or has a future within next 30 days. See \"Patient Info\" tab in inbasket, or \"Choose Columns\" in Meds & Orders section of the refill encounter.              Passed - Medication is active on med list        Passed - Patient is age 18 or older        Last Written Prescription Date:  5/25/22  Last Fill Quantity: 90,   # refills: 0    Last Office Visit: 1/21/22 Juan  Future Office visit: none       Routing refill request to provider for review/approval because:  Brenda J. Goodell, RN on 7/26/2022 at 11:29 AM      "

## 2022-08-08 ENCOUNTER — ANTICOAGULATION THERAPY VISIT (OUTPATIENT)
Dept: ANTICOAGULATION | Facility: OTHER | Age: 87
End: 2022-08-08
Attending: INTERNAL MEDICINE
Payer: COMMERCIAL

## 2022-08-08 DIAGNOSIS — Z79.01 LONG TERM CURRENT USE OF ANTICOAGULANT THERAPY: Primary | ICD-10-CM

## 2022-08-08 DIAGNOSIS — Z79.01 ANTICOAGULATION MONITORING, INR RANGE 2-3: ICD-10-CM

## 2022-08-08 DIAGNOSIS — Z86.718 PERSONAL HISTORY OF DVT (DEEP VEIN THROMBOSIS): ICD-10-CM

## 2022-08-08 DIAGNOSIS — Z86.711 HISTORY OF PULMONARY EMBOLISM: ICD-10-CM

## 2022-08-08 LAB — INR POINT OF CARE: 2.4 (ref 0.9–1.1)

## 2022-08-08 PROCEDURE — 36416 COLLJ CAPILLARY BLOOD SPEC: CPT | Mod: ZL

## 2022-08-08 PROCEDURE — 85610 PROTHROMBIN TIME: CPT | Mod: ZL,QW

## 2022-08-08 NOTE — PROGRESS NOTES
ANTICOAGULATION MANAGEMENT     Michael Martinez 86 year old male is on warfarin with therapeutic INR result. (Goal INR 2.0-3.0)    Recent labs: (last 7 days)     08/08/22  0814   INR 2.4*       ASSESSMENT     Source(s): Chart Review and patient     Warfarin doses taken: Warfarin taken as instructed  Diet: No new diet changes identified  New illness, injury, or hospitalization: No  Medication/supplement changes: None noted  Signs or symptoms of bleeding or clotting: No  Previous INR: Therapeutic last 2(+) visits  Additional findings: None       PLAN     Recommended plan for no diet, medication or health factor changes affecting INR     Dosing Instructions: Continue your current warfarin dose with next INR in 4 weeks       Summary  As of 8/8/2022    Full warfarin instructions:  5 mg every Mon, Wed, Fri; 7.5 mg all other days   Next INR check:  9/5/2022             Discussed with patient in clinic    Patient elected to schedule next visit at checkout    Education provided: None required    Plan made per [facility] anticoagulation protocol    Jose Luis Feliciano RN  Anticoagulation Clinic  8/8/2022    _______________________________________________________________________     Anticoagulation Episode Summary     Current INR goal:  2.0-3.0   TTR:  53.8 % (1 y)   Target end date:  Indefinite   Send INR reminders to:  [facility]    Indications    Long-term (current) use of anticoagulants [Z79.01] [Z79.01]  Anticoagulation monitoring  INR range 2-3 [Z79.01]  History of pulmonary embolism [Z86.711]  Personal history of DVT (deep vein thrombosis) [Z86.718]           Comments:           Anticoagulation Care Providers     Provider Role Specialty Phone number    Stephane Freedman MD Referring Surgery [phone]    Isma Martinez MD Responsible Internal Medicine [phone]

## 2022-08-19 DIAGNOSIS — Z86.718 PERSONAL HISTORY OF DVT (DEEP VEIN THROMBOSIS): ICD-10-CM

## 2022-08-19 RX ORDER — WARFARIN SODIUM 5 MG/1
TABLET ORAL
Qty: 39 TABLET | Refills: 1 | Status: SHIPPED | OUTPATIENT
Start: 2022-08-19 | End: 2023-01-19

## 2022-08-19 RX ORDER — WARFARIN SODIUM 7.5 MG/1
TABLET ORAL
Qty: 48 TABLET | Refills: 1 | Status: SHIPPED | OUTPATIENT
Start: 2022-08-19 | End: 2023-01-19

## 2022-08-19 NOTE — TELEPHONE ENCOUNTER
[employer] mail service sent Rx request for the following:      Requested Prescriptions   Pending Prescriptions Disp Refills     warfarin ANTICOAGULANT (COUMADIN) 5 MG tablet 39 tablet 0     Sig: TAKE 1 TABLET BY MOUTH 3  DAYS WEEKLY OR AS DIRECTED  BY PROTIME CLINIC. TAKE  7.5MG TABLET 4 DAYS PER  WEEK       Vitamin K Antagonists Failed - 8/19/2022 12:05 PM        Failed - INR is within goal in the past 6 weeks     Confirm INR is within goal in the past 6 weeks.     Recent Labs   Lab Test 08/08/22  0814   INR 2.4*        Last Prescription Date:   5/25/22  Last Fill Qty/Refills:         39, R-0    Last Office Visit:                 Future Office visit:           None         warfarin ANTICOAGULANT (COUMADIN) 7.5 MG tablet 8 tablet 24     Sig: TAKE 1 TABLET BY MOUTH 4  DAYS PER WEEK OR AS  DIRECTED BY PROTIME CLINIC; TAKE 5MG TABLET 3 DAYS PER  WEEK       Vitamin K Antagonists Failed - 8/19/2022 12:05 PM        Failed - INR is within goal in the past 6 weeks     Confirm INR is within goal in the past 6 weeks.     Recent Labs   Lab Test 08/08/22 0814   INR 2.4*          Last Prescription Date:   10/3/21  Last Fill Qty/Refills:         8, R-24    Last Office Visit:              1/21/22  Future Office visit:           None

## 2022-09-06 ENCOUNTER — ANTICOAGULATION THERAPY VISIT (OUTPATIENT)
Dept: ANTICOAGULATION | Facility: OTHER | Age: 87
End: 2022-09-06
Attending: INTERNAL MEDICINE
Payer: COMMERCIAL

## 2022-09-06 DIAGNOSIS — Z86.718 PERSONAL HISTORY OF DVT (DEEP VEIN THROMBOSIS): ICD-10-CM

## 2022-09-06 DIAGNOSIS — Z79.01 ANTICOAGULATION MONITORING, INR RANGE 2-3: ICD-10-CM

## 2022-09-06 DIAGNOSIS — Z86.711 HISTORY OF PULMONARY EMBOLISM: ICD-10-CM

## 2022-09-06 DIAGNOSIS — Z79.01 LONG TERM CURRENT USE OF ANTICOAGULANT THERAPY: Primary | ICD-10-CM

## 2022-09-06 LAB — INR POINT OF CARE: 4.4 (ref 0.9–1.1)

## 2022-09-06 PROCEDURE — 36416 COLLJ CAPILLARY BLOOD SPEC: CPT | Mod: ZL

## 2022-09-06 PROCEDURE — 85610 PROTHROMBIN TIME: CPT | Mod: ZL,QW

## 2022-09-06 NOTE — PROGRESS NOTES
ANTICOAGULATION MANAGEMENT     Michael Martinez 86 year old male is on warfarin with supratherapeutic INR result. (Goal INR 2.0-3.0)    Recent labs: (last 7 days)     09/06/22  0803   INR 4.4*       ASSESSMENT     Source(s): Chart Review and In person     Warfarin doses taken: Warfarin taken as instructed  Diet: Change in alcohol intake may be affecting INR. Alcohol over the weekend   New illness, injury, or hospitalization: No  Medication/supplement changes: None noted  Signs or symptoms of bleeding or clotting: No  Previous INR: Therapeutic last 2(+) visits  Additional findings: None       PLAN     Recommended plan for temporary change(s) affecting INR     Dosing Instructions: hold dose then continue your current warfarin dose with next INR in 2 weeks       Summary  As of 9/6/2022    Full warfarin instructions:  9/6: Hold; Otherwise 5 mg every Mon, Wed, Fri; 7.5 mg all other days   Next INR check:  9/20/2022             In person    Lab visit scheduled    Education provided: Please call back if any changes to your diet, medications or how you've been taking warfarin    Plan made per [facility] anticoagulation protocol    Radha Seo RN  Anticoagulation Clinic  9/6/2022    _______________________________________________________________________     Anticoagulation Episode Summary     Current INR goal:  2.0-3.0   TTR:  48.2 % (1 y)   Target end date:  Indefinite   Send INR reminders to:  [facility]    Indications    Long-term (current) use of anticoagulants [Z79.01] [Z79.01]  Anticoagulation monitoring  INR range 2-3 [Z79.01]  History of pulmonary embolism [Z86.711]  Personal history of DVT (deep vein thrombosis) [Z86.718]           Comments:           Anticoagulation Care Providers     Provider Role Specialty Phone number    Stephane Freedman MD Referring Surgery [phone]    Isma Martinez MD Responsible Internal Medicine [phone]

## 2022-09-15 ENCOUNTER — OFFICE VISIT (OUTPATIENT)
Dept: FAMILY MEDICINE | Facility: OTHER | Age: 87
End: 2022-09-15
Attending: FAMILY MEDICINE
Payer: COMMERCIAL

## 2022-09-15 VITALS
TEMPERATURE: 97.7 F | SYSTOLIC BLOOD PRESSURE: 136 MMHG | RESPIRATION RATE: 20 BRPM | BODY MASS INDEX: 25.38 KG/M2 | OXYGEN SATURATION: 98 % | HEART RATE: 64 BPM | WEIGHT: 170.6 LBS | DIASTOLIC BLOOD PRESSURE: 68 MMHG

## 2022-09-15 DIAGNOSIS — E11.9 DIABETES MELLITUS TYPE 2, DIET-CONTROLLED (H): Primary | ICD-10-CM

## 2022-09-15 DIAGNOSIS — R39.198 DIFFICULTY URINATING: ICD-10-CM

## 2022-09-15 LAB
ALBUMIN SERPL-MCNC: 4.1 G/DL (ref 3.5–5.7)
ALBUMIN UR-MCNC: NEGATIVE MG/DL
ALP SERPL-CCNC: 69 U/L (ref 34–104)
ALT SERPL W P-5'-P-CCNC: 14 U/L (ref 7–52)
ANION GAP SERPL CALCULATED.3IONS-SCNC: 7 MMOL/L (ref 3–14)
APPEARANCE UR: CLEAR
AST SERPL W P-5'-P-CCNC: 18 U/L (ref 13–39)
BILIRUB SERPL-MCNC: 1 MG/DL (ref 0.3–1)
BILIRUB UR QL STRIP: NEGATIVE
BUN SERPL-MCNC: 25 MG/DL (ref 7–25)
CALCIUM SERPL-MCNC: 9.6 MG/DL (ref 8.6–10.3)
CHLORIDE BLD-SCNC: 103 MMOL/L (ref 98–107)
CO2 SERPL-SCNC: 27 MMOL/L (ref 21–31)
COLOR UR AUTO: YELLOW
CREAT SERPL-MCNC: 1.31 MG/DL (ref 0.7–1.3)
GFR SERPL CREATININE-BSD FRML MDRD: 53 ML/MIN/1.73M2
GLUCOSE BLD-MCNC: 134 MG/DL (ref 70–105)
GLUCOSE UR STRIP-MCNC: NEGATIVE MG/DL
HBA1C MFR BLD: 6.5 % (ref 4–6.2)
HGB UR QL STRIP: ABNORMAL
KETONES UR STRIP-MCNC: NEGATIVE MG/DL
LEUKOCYTE ESTERASE UR QL STRIP: ABNORMAL
MUCOUS THREADS #/AREA URNS LPF: PRESENT /LPF
NITRATE UR QL: NEGATIVE
PH UR STRIP: 5 [PH] (ref 5–9)
POTASSIUM BLD-SCNC: 4.3 MMOL/L (ref 3.5–5.1)
PROT SERPL-MCNC: 7.2 G/DL (ref 6.4–8.9)
RBC URINE: 19 /HPF
SODIUM SERPL-SCNC: 137 MMOL/L (ref 134–144)
SP GR UR STRIP: 1.02 (ref 1–1.03)
UROBILINOGEN UR STRIP-MCNC: NORMAL MG/DL
WBC URINE: 5 /HPF

## 2022-09-15 PROCEDURE — 99213 OFFICE O/P EST LOW 20 MIN: CPT | Performed by: FAMILY MEDICINE

## 2022-09-15 PROCEDURE — G0463 HOSPITAL OUTPT CLINIC VISIT: HCPCS

## 2022-09-15 PROCEDURE — 36415 COLL VENOUS BLD VENIPUNCTURE: CPT | Mod: ZL | Performed by: FAMILY MEDICINE

## 2022-09-15 PROCEDURE — 83036 HEMOGLOBIN GLYCOSYLATED A1C: CPT | Mod: ZL | Performed by: FAMILY MEDICINE

## 2022-09-15 PROCEDURE — 80053 COMPREHEN METABOLIC PANEL: CPT | Mod: ZL | Performed by: FAMILY MEDICINE

## 2022-09-15 PROCEDURE — 87086 URINE CULTURE/COLONY COUNT: CPT | Mod: ZL | Performed by: FAMILY MEDICINE

## 2022-09-15 PROCEDURE — 81003 URINALYSIS AUTO W/O SCOPE: CPT | Mod: ZL | Performed by: FAMILY MEDICINE

## 2022-09-15 PROCEDURE — 82043 UR ALBUMIN QUANTITATIVE: CPT | Mod: ZL | Performed by: FAMILY MEDICINE

## 2022-09-15 ASSESSMENT — PAIN SCALES - GENERAL: PAINLEVEL: MILD PAIN (2)

## 2022-09-15 ASSESSMENT — ENCOUNTER SYMPTOMS
HEMATURIA: 0
ABDOMINAL DISTENTION: 0
CONSTIPATION: 0
RECTAL PAIN: 0
CHILLS: 0
NAUSEA: 0
ABDOMINAL PAIN: 0
HEMATOCHEZIA: 0
FREQUENCY: 0
DIARRHEA: 0
APPETITE CHANGE: 0
HEARTBURN: 0
DIFFICULTY URINATING: 1
DYSURIA: 0
ACTIVITY CHANGE: 0

## 2022-09-15 ASSESSMENT — PATIENT HEALTH QUESTIONNAIRE - PHQ9
10. IF YOU CHECKED OFF ANY PROBLEMS, HOW DIFFICULT HAVE THESE PROBLEMS MADE IT FOR YOU TO DO YOUR WORK, TAKE CARE OF THINGS AT HOME, OR GET ALONG WITH OTHER PEOPLE: NOT DIFFICULT AT ALL
SUM OF ALL RESPONSES TO PHQ QUESTIONS 1-9: 9
SUM OF ALL RESPONSES TO PHQ QUESTIONS 1-9: 9

## 2022-09-15 NOTE — PROGRESS NOTES
"  Assessment & Plan     Diabetes mellitus type 2, diet-controlled (H)  Currently under good control  - Hemoglobin A1c; Future  - Comprehensive Metabolic Panel; Future  - FOOT EXAM  - Albumin Random Urine Quantitative with Creat Ratio; Future  - Albumin Random Urine Quantitative with Creat Ratio  - Comprehensive Metabolic Panel  - Hemoglobin A1c    Difficulty urinating  Urine sent for culture pending at time of dictation  - UA reflex to Microscopic and Culture; Future  - UA reflex to Microscopic and Culture  - Urine Culture    Recent gastroenteritis:  Symptoms have completely resolved.  Abdominal exam is normal.  Reassurance provided.             BMI:   Estimated body mass index is 25.38 kg/m  as calculated from the following:    Height as of 1/21/22: 1.746 m (5' 8.75\").    Weight as of this encounter: 77.4 kg (170 lb 9.6 oz).       There are no Patient Instructions on file for this visit.    No follow-ups on file.    Brianna Russell MD  [facility]    Ritesh Barajas is a 86 year old, presenting for the following health issues:  Abdominal Pain      HPI     87 yo male presents with GI concerns.  He is a little hard to follow.  [facility]    Ate fried chicken from the grocery store last week, was not cooked thoroughly. Later that night had some lower back cramping and difficulty urinating. Ache and left hip pain. Did not get much sleep. Few loose stools.    Good appetite. No vomiting. Weight stable.    Thought he should get checked out before he left for [place].    Denies hematuria, fever chills          Review of Systems   Constitutional: Negative for activity change, appetite change and chills.   Gastrointestinal: Negative for abdominal distention, abdominal pain, constipation, diarrhea, heartburn, hematochezia, nausea and rectal pain.   Genitourinary: Positive for difficulty urinating. Negative for dysuria, frequency and hematuria.      Constitutional, " HEENT, cardiovascular, pulmonary, gi and gu systems are negative, except as otherwise noted.      Objective    /68   Pulse 64   Temp 97.7  F (36.5  C) (Tympanic)   Resp 20   Wt 77.4 kg (170 lb 9.6 oz)   SpO2 98%   BMI 25.38 kg/m    Body mass index is 25.38 kg/m .  Physical Exam   GENERAL: healthy, alert and no distress  RESP: lungs clear to auscultation - no rales, rhonchi or wheezes  CV: regular rate and rhythm, normal S1 S2, no S3 or S4, no murmur, click or rub, no peripheral edema and peripheral pulses strong  ABDOMEN: soft, nontender, no hepatosplenomegaly, no masses and bowel sounds normal    Results for orders placed or performed in visit on 09/15/22 (from the past 24 hour(s))   UA reflex to Microscopic and Culture    Specimen: Urine, Midstream   Result Value Ref Range    Color Urine Yellow Colorless, Straw, Light Yellow, Yellow    Appearance Urine Clear Clear    Glucose Urine Negative Negative mg/dL    Bilirubin Urine Negative Negative    Ketones Urine Negative Negative mg/dL    Specific Gravity Urine 1.025 1.000 - 1.030    Blood Urine Moderate (A) Negative    pH Urine 5.0 5.0 - 9.0    Protein Albumin Urine Negative Negative mg/dL    Urobilinogen Urine Normal Normal, 2.0 mg/dL    Nitrite Urine Negative Negative    Leukocyte Esterase Urine Moderate (A) Negative    Mucus Urine Present (A) None Seen /LPF    RBC Urine 19 (H) <=2 /HPF    WBC Urine 5 <=5 /HPF    Narrative    Urine Culture ordered based on laboratory criteria   Comprehensive Metabolic Panel   Result Value Ref Range    Sodium 137 134 - 144 mmol/L    Potassium 4.3 3.5 - 5.1 mmol/L    Chloride 103 98 - 107 mmol/L    Carbon Dioxide (CO2) 27 21 - 31 mmol/L    Anion Gap 7 3 - 14 mmol/L    Urea Nitrogen 25 7 - 25 mg/dL    Creatinine 1.31 (H) 0.70 - 1.30 mg/dL    Calcium 9.6 8.6 - 10.3 mg/dL    Glucose 134 (H) 70 - 105 mg/dL    Alkaline Phosphatase 69 34 - 104 U/L    AST 18 13 - 39 U/L    ALT 14 7 - 52 U/L    Protein Total 7.2 6.4 - 8.9 g/dL     Albumin 4.1 3.5 - 5.7 g/dL    Bilirubin Total 1.0 0.3 - 1.0 mg/dL    GFR Estimate 53 (L) >60 mL/min/1.73m2   Hemoglobin A1c   Result Value Ref Range    Hemoglobin A1C 6.5 (H) 4.0 - 6.2 %                   Answers for HPI/ROS submitted by the patient on 9/15/2022  If you checked off any problems, how difficult have these problems made it for you to do your work, take care of things at home, or get along with other people?: Not difficult at all  PHQ9 TOTAL SCORE: 9

## 2022-09-15 NOTE — NURSING NOTE
Patient is here follow up of abdomen pain. States started last Friday night, having low back and hip pain, difficulty sleeping, urinary issues, and now feeling well. Has been improving over the past week. Would like to have labs to check everything out.     Medication Reconciliation: complete      Marilynn Carr LPN 9/15/2022 3:03 PM       Advance care directive on file? no  Advance care directive provided to patient? no       Marilynn Carr LPN

## 2022-09-16 LAB
CREAT UR-MCNC: 187 MG/DL
MICROALBUMIN UR-MCNC: 23.4 MG/L
MICROALBUMIN/CREAT UR: 12.51 MG/G CR (ref 0–17)

## 2022-09-17 LAB — BACTERIA UR CULT: NORMAL

## 2022-09-20 ENCOUNTER — ANTICOAGULATION THERAPY VISIT (OUTPATIENT)
Dept: ANTICOAGULATION | Facility: OTHER | Age: 87
End: 2022-09-20
Attending: INTERNAL MEDICINE
Payer: COMMERCIAL

## 2022-09-20 DIAGNOSIS — Z86.718 PERSONAL HISTORY OF DVT (DEEP VEIN THROMBOSIS): ICD-10-CM

## 2022-09-20 DIAGNOSIS — Z86.711 HISTORY OF PULMONARY EMBOLISM: ICD-10-CM

## 2022-09-20 DIAGNOSIS — Z79.01 LONG TERM CURRENT USE OF ANTICOAGULANT THERAPY: Primary | ICD-10-CM

## 2022-09-20 DIAGNOSIS — Z79.01 ANTICOAGULATION MONITORING, INR RANGE 2-3: ICD-10-CM

## 2022-09-20 LAB — INR POINT OF CARE: 3 (ref 0.9–1.1)

## 2022-09-20 PROCEDURE — 36416 COLLJ CAPILLARY BLOOD SPEC: CPT | Mod: ZL

## 2022-09-20 PROCEDURE — 85610 PROTHROMBIN TIME: CPT | Mod: ZL,QW

## 2022-09-20 NOTE — PROGRESS NOTES
ANTICOAGULATION MANAGEMENT     Michael Martinez 86 year old male is on warfarin with therapeutic INR result. (Goal INR 2.0-3.0)    Recent labs: (last 7 days)     09/20/22  0819   INR 3.0*       ASSESSMENT     Source(s): Chart Review     Warfarin doses taken: Warfarin taken as instructed  Diet: No new diet changes identified  New illness, injury, or hospitalization: No  Medication/supplement changes: None noted  Signs or symptoms of bleeding or clotting: No  Previous INR: Supratherapeutic  Additional findings: None       PLAN     Recommended plan for no diet, medication or health factor changes affecting INR     Dosing Instructions: Continue your current warfarin dose with next INR in 4 weeks       Summary  As of 9/20/2022    Full warfarin instructions:  5 mg every Mon, Wed, Fri; 7.5 mg all other days   Next INR check:  10/18/2022             In person    Lab visit scheduled    Education provided: Please call back if any changes to your diet, medications or how you've been taking warfarin       Plan made per [facility] anticoagulation protocol    Radha Seo RN  Anticoagulation Clinic  9/20/2022    _______________________________________________________________________     Anticoagulation Episode Summary     Current INR goal:  2.0-3.0   TTR:  44.4 % (1 y)   Target end date:  Indefinite   Send INR reminders to:  [facility]    Indications    Long-term (current) use of anticoagulants [Z79.01] [Z79.01]  Anticoagulation monitoring  INR range 2-3 [Z79.01]  History of pulmonary embolism [Z86.711]  Personal history of DVT (deep vein thrombosis) [Z86.718]           Comments:           Anticoagulation Care Providers     Provider Role Specialty Phone number    Stephane Freedman MD Referring Surgery [phone]    Isma Martinez MD Responsible Internal Medicine [phone]

## 2022-10-18 ENCOUNTER — ANTICOAGULATION THERAPY VISIT (OUTPATIENT)
Dept: ANTICOAGULATION | Facility: OTHER | Age: 87
End: 2022-10-18
Attending: INTERNAL MEDICINE
Payer: COMMERCIAL

## 2022-10-18 DIAGNOSIS — Z79.01 LONG TERM CURRENT USE OF ANTICOAGULANT THERAPY: Primary | ICD-10-CM

## 2022-10-18 DIAGNOSIS — Z86.711 HISTORY OF PULMONARY EMBOLISM: ICD-10-CM

## 2022-10-18 DIAGNOSIS — Z86.718 PERSONAL HISTORY OF DVT (DEEP VEIN THROMBOSIS): ICD-10-CM

## 2022-10-18 DIAGNOSIS — Z79.01 ANTICOAGULATION MONITORING, INR RANGE 2-3: ICD-10-CM

## 2022-10-18 LAB — INR POINT OF CARE: 3.1 (ref 0.9–1.1)

## 2022-10-18 PROCEDURE — 36416 COLLJ CAPILLARY BLOOD SPEC: CPT | Mod: ZL

## 2022-10-18 PROCEDURE — 85610 PROTHROMBIN TIME: CPT | Mod: ZL,QW

## 2022-10-31 ENCOUNTER — TRANSFERRED RECORDS (OUTPATIENT)
Dept: HEALTH INFORMATION MANAGEMENT | Facility: OTHER | Age: 87
End: 2022-10-31

## 2022-10-31 LAB — RETINOPATHY: NEGATIVE

## 2022-12-01 ENCOUNTER — TRANSFERRED RECORDS (OUTPATIENT)
Dept: MULTI SPECIALTY CLINIC | Facility: CLINIC | Age: 87
End: 2022-12-01

## 2022-12-01 LAB — RETINOPATHY: NORMAL

## 2022-12-12 ENCOUNTER — ANTICOAGULATION THERAPY VISIT (OUTPATIENT)
Dept: ANTICOAGULATION | Facility: OTHER | Age: 87
End: 2022-12-12
Attending: INTERNAL MEDICINE
Payer: COMMERCIAL

## 2022-12-12 DIAGNOSIS — Z79.01 LONG TERM CURRENT USE OF ANTICOAGULANT THERAPY: Primary | ICD-10-CM

## 2022-12-12 DIAGNOSIS — Z86.718 PERSONAL HISTORY OF DVT (DEEP VEIN THROMBOSIS): ICD-10-CM

## 2022-12-12 DIAGNOSIS — Z79.01 ANTICOAGULATION MONITORING, INR RANGE 2-3: ICD-10-CM

## 2022-12-12 DIAGNOSIS — Z86.711 HISTORY OF PULMONARY EMBOLISM: ICD-10-CM

## 2022-12-12 LAB — INR POINT OF CARE: 2.8 (ref 0.9–1.1)

## 2022-12-12 PROCEDURE — 36416 COLLJ CAPILLARY BLOOD SPEC: CPT | Mod: ZL

## 2022-12-12 PROCEDURE — 85610 PROTHROMBIN TIME: CPT | Mod: ZL,QW

## 2022-12-12 NOTE — PROGRESS NOTES
ANTICOAGULATION MANAGEMENT     Michael Martinez 87 year old male is on warfarin with therapeutic INR result. (Goal INR 2.0-3.0)    Recent labs: (last 7 days)     12/12/22  1341   INR 2.8*       ASSESSMENT     Source(s): Chart Review and in person     Warfarin doses taken: Warfarin taken as instructed  Diet: No new diet changes identified  New illness, injury, or hospitalization: No  Medication/supplement changes: None noted  Signs or symptoms of bleeding or clotting: No  Previous INR: Supratherapeutic  Additional findings: None       PLAN     Recommended plan for no diet, medication or health factor changes affecting INR     Dosing Instructions: Continue your current warfarin dose with next INR in 4 weeks       Summary  As of 12/12/2022    Full warfarin instructions:  5 mg every Mon, Wed, Fri; 7.5 mg all other days; Starting 12/12/2022   Next INR check:  1/9/2023             in person    Patient elected to schedule next visit 01/09/2023    Education provided: None required    Plan made per [facility] anticoagulation protocol    Yogesh Quinn RN  ....................  12/12/2022   1:46 PM  Anticoagulation Clinic  12/12/2022    _______________________________________________________________________     Anticoagulation Episode Summary     Current INR goal:  2.0-3.0   TTR:  46.0 % (1 y)   Target end date:  Indefinite   Send INR reminders to:  [facility]    Indications    Long-term (current) use of anticoagulants [Z79.01] [Z79.01]  Anticoagulation monitoring  INR range 2-3 [Z79.01]  History of pulmonary embolism [Z86.711]  Personal history of DVT (deep vein thrombosis) [Z86.718]           Comments:           Anticoagulation Care Providers     Provider Role Specialty Phone number    Stephane Freedman MD Referring Surgery [phone]    Isma Martinez MD [facility] Internal Medicine [phone]

## 2023-01-16 ENCOUNTER — TELEPHONE (OUTPATIENT)
Dept: PEDIATRICS | Facility: OTHER | Age: 88
End: 2023-01-16

## 2023-01-16 NOTE — TELEPHONE ENCOUNTER
ANTICOAGULATION     Michael Martinez is overdue for INR check.      Spoke with Karl and scheduled lab appointment on 1/18/23    Radha Seo RN

## 2023-01-18 ENCOUNTER — ANTICOAGULATION THERAPY VISIT (OUTPATIENT)
Dept: ANTICOAGULATION | Facility: OTHER | Age: 88
End: 2023-01-18
Attending: INTERNAL MEDICINE
Payer: COMMERCIAL

## 2023-01-18 DIAGNOSIS — Z79.01 LONG TERM CURRENT USE OF ANTICOAGULANT THERAPY: Primary | ICD-10-CM

## 2023-01-18 DIAGNOSIS — Z86.711 HISTORY OF PULMONARY EMBOLISM: ICD-10-CM

## 2023-01-18 DIAGNOSIS — Z86.718 PERSONAL HISTORY OF DVT (DEEP VEIN THROMBOSIS): ICD-10-CM

## 2023-01-18 DIAGNOSIS — K21.9 GASTROESOPHAGEAL REFLUX DISEASE, UNSPECIFIED WHETHER ESOPHAGITIS PRESENT: ICD-10-CM

## 2023-01-18 DIAGNOSIS — Z79.01 ANTICOAGULATION MONITORING, INR RANGE 2-3: ICD-10-CM

## 2023-01-18 LAB — INR POINT OF CARE: 1.5 (ref 0.9–1.1)

## 2023-01-18 PROCEDURE — 85610 PROTHROMBIN TIME: CPT | Mod: ZL,QW

## 2023-01-18 NOTE — PROGRESS NOTES
ANTICOAGULATION MANAGEMENT     Michael Martinez 87 year old male is on warfarin with subtherapeutic INR result. (Goal INR 2.0-3.0)    Recent labs: (last 7 days)     01/18/23  0859   INR 1.5*       ASSESSMENT       Source(s): Chart Review and In person       Warfarin doses taken: Warfarin taken as instructed    Diet: Increased greens/vitamin K in diet; plans to resume previous intake    New illness, injury, or hospitalization: No    Medication/supplement changes: None noted    Signs or symptoms of bleeding or clotting: No    Previous INR: Therapeutic last visit; previously outside of goal range    Additional findings: None       PLAN     Recommended plan for temporary change(s) affecting INR     Dosing Instructions: booster dose then continue your current warfarin dose with next INR in 2 weeks       Summary  As of 1/18/2023    Full warfarin instructions:  1/18: 7.5 mg; Otherwise 5 mg every Mon, Wed, Fri; 7.5 mg all other days   Next INR check:  2/1/2023             In person    Lab visit scheduled    Education provided: Please call back if any changes to your diet, medications or how you've been taking warfarin    Plan made per [facility] anticoagulation protocol    Radha Seo RN  Anticoagulation Clinic  1/18/2023    _______________________________________________________________________     Anticoagulation Episode Summary     Current INR goal:  2.0-3.0   TTR:  50.3 % (1 y)   Target end date:  Indefinite   Send INR reminders to:  [facility]    Indications    Long-term (current) use of anticoagulants [Z79.01] [Z79.01]  Anticoagulation monitoring  INR range 2-3 [Z79.01]  History of pulmonary embolism [Z86.711]  Personal history of DVT (deep vein thrombosis) [Z86.718]           Comments:           Anticoagulation Care Providers     Provider Role Specialty Phone number    Stephane Freedman MD Referring Surgery [phone]    Isma Martinez MD Responsible Internal Medicine [phone]

## 2023-01-19 RX ORDER — WARFARIN SODIUM 7.5 MG/1
TABLET ORAL
Qty: 38 TABLET | Refills: 4 | Status: SHIPPED | OUTPATIENT
Start: 2023-01-19 | End: 2023-12-20

## 2023-01-19 RX ORDER — WARFARIN SODIUM 5 MG/1
TABLET ORAL
Qty: 34 TABLET | Refills: 3 | Status: SHIPPED | OUTPATIENT
Start: 2023-01-19 | End: 2023-12-23

## 2023-01-19 NOTE — TELEPHONE ENCOUNTER
"[facility] home delivery sent Rx request for the following:      Requested Prescriptions   Pending Prescriptions Disp Refills     omeprazole (PRILOSEC) 20 MG DR capsule [Pharmacy Med Name: Omeprazole 20 MG Oral Capsule Delayed Release] 50 capsule 2     Sig: TAKE 1 CAPSULE BY MOUTH  EVERY OTHER DAY       PPI Protocol Passed - 1/18/2023  2:49 PM        Passed - Not on Clopidogrel (unless Pantoprazole ordered)        Passed - No diagnosis of osteoporosis on record        Passed - Recent (12 mo) or future (30 days) visit within the authorizing provider's specialty     Patient has had an office visit with the authorizing provider or a provider within the authorizing providers department within the previous 12 mos or has a future within next 30 days. See \"Patient Info\" tab in inbasket, or \"Choose Columns\" in Meds & Orders section of the refill encounter.              Passed - Medication is active on med list        Passed - Patient is age 18 or older           warfarin ANTICOAGULANT (COUMADIN) 5 MG tablet [Pharmacy Med Name: Warfarin Sodium 5 MG Oral Tablet] 34 tablet 3     Sig: TAKE 1 TABLET BY MOUTH 3  DAYS WEEKLY OR AS DIRECTED  BY [facility] TAKE 7.5  MG TABLET 4 DAYS PER WEEK       Vitamin K Antagonists Failed - 1/18/2023  2:49 PM        Failed - INR is within goal in the past 6 weeks     Confirm INR is within goal in the past 6 weeks.     Recent Labs   Lab Test 01/18/23  0859   INR 1.5*                       Failed - Medication is active on med list        Passed - Recent (12 mo) or future (30 days) visit within the authorizing provider's specialty     Patient has had an office visit with the authorizing provider or a provider within the authorizing providers department within the previous 12 mos or has a future within next 30 days. See \"Patient Info\" tab in inbasket, or \"Choose Columns\" in Meds & Orders section of the refill encounter.              Passed - Patient is 18 years of age or older           warfarin " "ANTICOAGULANT (COUMADIN) 7.5 MG tablet [Pharmacy Med Name: WARFARIN  7.5MG  TAB] 38 tablet 4     Sig: TAKE 1 TABLET BY MOUTH 4   DAYS PER WEEK OR AS   DIRECTED BY [facility]. TAKE 5MG TABLET 3 DAYS PER  WEEK       Vitamin K Antagonists Failed - 1/18/2023  2:49 PM        Failed - INR is within goal in the past 6 weeks     Confirm INR is within goal in the past 6 weeks.     Recent Labs   Lab Test 01/18/23  0859   INR 1.5*                       Passed - Recent (12 mo) or future (30 days) visit within the authorizing provider's specialty     Patient has had an office visit with the authorizing provider or a provider within the authorizing providers department within the previous 12 mos or has a future within next 30 days. See \"Patient Info\" tab in inbasket, or \"Choose Columns\" in Meds & Orders section of the refill encounter.              Passed - Medication is active on med list        Passed - Patient is 18 years of age or older           omeprazole (PRILOSEC) 20 MG   Last Prescription Date:   7/26/22  Last Fill Qty/Refills:         45, R-1    Last Office Visit:              1/21/22   Future Office visit:           None noted    warfarin ANTICOAGULANT (COUMADIN) 7.5 MG tablet and   warfarin ANTICOAGULANT (COUMADIN) 5 MG tablet  Last Prescription Date:   8/19/22  Last Fill Qty/Refills:         48/39, R-1    Last Office Visit:              1/21/22   Future Office visit:           Anticoag visit on 2/1  "

## 2023-01-25 ENCOUNTER — OFFICE VISIT (OUTPATIENT)
Dept: INTERNAL MEDICINE | Facility: OTHER | Age: 88
End: 2023-01-25
Attending: INTERNAL MEDICINE
Payer: COMMERCIAL

## 2023-01-25 ENCOUNTER — ANTICOAGULATION THERAPY VISIT (OUTPATIENT)
Dept: ANTICOAGULATION | Facility: OTHER | Age: 88
End: 2023-01-25
Attending: INTERNAL MEDICINE
Payer: COMMERCIAL

## 2023-01-25 VITALS
WEIGHT: 171.6 LBS | HEART RATE: 88 BPM | SYSTOLIC BLOOD PRESSURE: 188 MMHG | RESPIRATION RATE: 20 BRPM | OXYGEN SATURATION: 99 % | DIASTOLIC BLOOD PRESSURE: 104 MMHG | TEMPERATURE: 97.3 F | BODY MASS INDEX: 25.53 KG/M2

## 2023-01-25 DIAGNOSIS — Z79.01 ANTICOAGULATION MONITORING, INR RANGE 2-3: ICD-10-CM

## 2023-01-25 DIAGNOSIS — M70.62 GREATER TROCHANTERIC BURSITIS OF LEFT HIP: ICD-10-CM

## 2023-01-25 DIAGNOSIS — Z79.01 LONG TERM CURRENT USE OF ANTICOAGULANT THERAPY: Primary | ICD-10-CM

## 2023-01-25 DIAGNOSIS — Z86.711 HISTORY OF PULMONARY EMBOLISM: ICD-10-CM

## 2023-01-25 DIAGNOSIS — M76.32 IT BAND SYNDROME, LEFT: Primary | ICD-10-CM

## 2023-01-25 DIAGNOSIS — Z86.718 PERSONAL HISTORY OF DVT (DEEP VEIN THROMBOSIS): ICD-10-CM

## 2023-01-25 LAB — INR POINT OF CARE: 4.1 (ref 0.9–1.1)

## 2023-01-25 PROCEDURE — 85610 PROTHROMBIN TIME: CPT | Mod: ZL,QW

## 2023-01-25 PROCEDURE — G0463 HOSPITAL OUTPT CLINIC VISIT: HCPCS | Performed by: INTERNAL MEDICINE

## 2023-01-25 PROCEDURE — 99213 OFFICE O/P EST LOW 20 MIN: CPT | Performed by: INTERNAL MEDICINE

## 2023-01-25 ASSESSMENT — ENCOUNTER SYMPTOMS
FEVER: 0
CHILLS: 0

## 2023-01-25 ASSESSMENT — PAIN SCALES - GENERAL: PAINLEVEL: SEVERE PAIN (6)

## 2023-01-25 NOTE — PROGRESS NOTES
ANTICOAGULATION MANAGEMENT     Michael Martinez 87 year old male is on warfarin with supratherapeutic INR result. (Goal INR 2.0-3.0)    Recent labs: (last 7 days)     01/25/23  1442   INR 4.1*       ASSESSMENT       Source(s): Chart Review and In person       Warfarin doses taken: Warfarin taken as instructed    Diet: Decreased green intake due to his subtherapeutic INR last week.     New illness, injury, or hospitalization: No    Medication/supplement changes: None noted    Signs or symptoms of bleeding or clotting: No; Possible, patient verbalized that he has been have this pain in his left lateral thigh and verbalized it feels like he has a clot and it feels like it did when he had a clot in his other leg. Patient verbalized that it would start and then go away after getting up and walking around. Patient denied any swelling, redness, or loss of sensation to the lower limb. Encouraged patient to go to the rapid clinic or ED. Patient verbalized that he will try the rapid clinic first.      Previous INR: Subtherapeutic    Additional findings: None       PLAN     Recommended plan for no diet, medication or health factor changes affecting INR     Dosing Instructions: hold dose then continue your current warfarin dose with next INR in 1 week       Summary  As of 1/25/2023    Full warfarin instructions:  1/25: Hold; Otherwise 5 mg every Mon, Wed, Fri; 7.5 mg all other days   Next INR check:  2/1/2023             In person    Lab visit scheduled    Education provided: Please call back if any changes to your diet, medications or how you've been taking warfarin    Plan made per [facility] protocol    Rdaha Seo, RN  Anticoagulation Clinic  1/25/2023    _______________________________________________________________________     Anticoagulation Episode Summary     Current INR goal:  2.0-3.0   TTR:  49.1 % (1 y)   Target end date:  Indefinite   Send INR reminders to:  [facility]     Indications    Long-term (current) use of anticoagulants [Z79.01] [Z79.01]  Anticoagulation monitoring  INR range 2-3 [Z79.01]  History of pulmonary embolism [Z86.711]  Personal history of DVT (deep vein thrombosis) [Z86.718]           Comments:           Anticoagulation Care Providers     Provider Role Specialty Phone number    Stephane Freedman MD Referring Surgery [phone]    Isma Martinez MD [facility] Internal Medicine [phone]

## 2023-01-25 NOTE — PROGRESS NOTES
Assessment & Plan   Michael Martinez is a 87 year old, presenting for the following health issues:      ICD-10-CM    1. 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 Greater trochanteric bursitis of left hip  M70.62         Patient presents with 3 days of outer left hip pain down to the knee from the outer hip.  Typically little bit more pain throughout the day, better in the morning.  Pain was worse today and decided to come in for evaluation.    Palpation of the outer left thigh/hip are quite painful.  Likely has IT band syndrome and greater trochanteric bursitis and based on examination.  Home care instructions provided.  Symptomatic management advised.    Encouraged regular exercise.     Return for follow-up as needed for new or worsening symptoms, Appointments: [phone].    Ton Tatum MD  [facility] [place]     Subjective   Left Upper Thigh Pain      Pain  This is a new problem. The current episode started in the past 7 days. The problem occurs constantly. The problem has been unchanged. Pertinent negatives include no chills or fever. Nothing aggravates the symptoms. He has tried nothing for the symptoms.      Review of Systems   Constitutional: Negative for chills and fever.   Musculoskeletal: Positive for gait problem.        Left outer hip / thigh pain for past 3 days          Objective    BP (!) 188/104 (BP Location: Right arm, Patient Position: Sitting, Cuff Size: Adult Regular)   Pulse 88   Temp 97.3  F (36.3  C) (Temporal)   Resp 20   Wt 77.8 kg (171 lb 9.6 oz)   SpO2 99%   BMI 25.53 kg/m    Body mass index is 25.53 kg/m .  Physical Exam  Constitutional:       Appearance: Normal appearance.   Eyes:      Conjunctiva/sclera: Conjunctivae normal.   Musculoskeletal:         General: Tenderness present.      Comments: Left outer hip pain to palpation, increased pain with left hip external rotation and flexion.   -- reproducing his pain.   Skin:     General: Skin is warm and dry.       Findings: No rash.   Neurological:      General: No focal deficit present.      Mental Status: He is alert.

## 2023-01-25 NOTE — PATIENT INSTRUCTIONS
See attached home care instructions - Exercises.     Try to do these stretches a few times daily.     Return as needed for follow-up for new / worsening symptoms.    Clinic : [phone]  Appointment line: [phone]

## 2023-01-25 NOTE — NURSING NOTE
"No chief complaint on file.        Initial BP (!) 190/104 (BP Location: Right arm, Patient Position: Sitting, Cuff Size: Adult Regular)   Pulse 88   Temp 97.3  F (36.3  C) (Temporal)   Resp 20   Wt 77.8 kg (171 lb 9.6 oz)   SpO2 99%   BMI 25.53 kg/m   Estimated body mass index is 25.53 kg/m  as calculated from the following:    Height as of 1/21/22: 1.746 m (5' 8.75\").    Weight as of this encounter: 77.8 kg (171 lb 9.6 oz).       FOOD SECURITY SCREENING QUESTIONS:    The next two questions are to help us understand your food security.  If you are feeling you need any assistance in this area, we have resources available to support you today.    Hunger Vital Signs:  Within the past 12 months we worried whether our food would run out before we got money to buy more. Never  Within the past 12 months the food we bought just didn't last and we didn't have money to get more. Never    Advance Care Directive on file? no      Medication reconciliation complete.      Gatito Clancy,on 1/25/2023 at 3:44 PM          "

## 2023-02-01 ENCOUNTER — ANTICOAGULATION THERAPY VISIT (OUTPATIENT)
Dept: ANTICOAGULATION | Facility: OTHER | Age: 88
End: 2023-02-01
Attending: INTERNAL MEDICINE
Payer: COMMERCIAL

## 2023-02-01 DIAGNOSIS — Z79.01 LONG TERM CURRENT USE OF ANTICOAGULANT THERAPY: Primary | ICD-10-CM

## 2023-02-01 DIAGNOSIS — Z79.01 ANTICOAGULATION MONITORING, INR RANGE 2-3: ICD-10-CM

## 2023-02-01 DIAGNOSIS — Z86.711 HISTORY OF PULMONARY EMBOLISM: ICD-10-CM

## 2023-02-01 DIAGNOSIS — Z86.718 PERSONAL HISTORY OF DVT (DEEP VEIN THROMBOSIS): ICD-10-CM

## 2023-02-01 LAB — INR POINT OF CARE: 1.6 (ref 0.9–1.1)

## 2023-02-01 PROCEDURE — 85610 PROTHROMBIN TIME: CPT | Mod: ZL,QW

## 2023-02-01 PROCEDURE — 36416 COLLJ CAPILLARY BLOOD SPEC: CPT | Mod: ZL

## 2023-02-01 NOTE — PROGRESS NOTES
ANTICOAGULATION MANAGEMENT     Michael Martinez 87 year old male is on warfarin with subtherapeutic INR result. (Goal INR 2.0-3.0)    Recent labs: (last 7 days)     02/01/23  0904   INR 1.6*       ASSESSMENT       Source(s): Chart Review and In person       Warfarin doses taken: Warfarin taken as instructed    Diet: No new diet changes identified    New illness, injury, or hospitalization: No    Medication/supplement changes: Tylenol PRN    Signs or symptoms of bleeding or clotting: No    Previous INR: Supratherapeutic    Additional findings: None       PLAN     Recommended plan for no diet, medication or health factor changes affecting INR     Dosing Instructions: booster dose then continue your current warfarin dose with next INR in 2 weeks       Summary  As of 2/1/2023    Full warfarin instructions:  2/1: 7.5 mg; Otherwise 5 mg every Mon, Wed, Fri; 7.5 mg all other days   Next INR check:  2/15/2023             In person    Lab visit scheduled    Education provided: Please call back if any changes to your diet, medications or how you've been taking warfarin    Plan made per [facility] anticoagulation protocol    Radha Seo, RN  Anticoagulation Clinic  2/1/2023    _______________________________________________________________________     Anticoagulation Episode Summary     Current INR goal:  2.0-3.0   TTR:  48.0 % (1 y)   Target end date:  Indefinite   Send INR reminders to:  [facility]    Indications    Long-term (current) use of anticoagulants [Z79.01] [Z79.01]  Anticoagulation monitoring  INR range 2-3 [Z79.01]  History of pulmonary embolism [Z86.711]  Personal history of DVT (deep vein thrombosis) [Z86.718]           Comments:           Anticoagulation Care Providers     Provider Role Specialty Phone number    Stephane Freedman MD Referring Surgery [phone]    Isma Martinez MD Responsible Internal Medicine [phone]

## 2023-02-09 ENCOUNTER — OFFICE VISIT (OUTPATIENT)
Dept: PEDIATRICS | Facility: OTHER | Age: 88
End: 2023-02-09
Attending: INTERNAL MEDICINE
Payer: COMMERCIAL

## 2023-02-09 VITALS
OXYGEN SATURATION: 98 % | DIASTOLIC BLOOD PRESSURE: 70 MMHG | TEMPERATURE: 97.4 F | WEIGHT: 169.6 LBS | HEART RATE: 62 BPM | SYSTOLIC BLOOD PRESSURE: 134 MMHG | BODY MASS INDEX: 25.12 KG/M2 | HEIGHT: 69 IN | RESPIRATION RATE: 20 BRPM

## 2023-02-09 DIAGNOSIS — F39 MOOD DISORDER (H): ICD-10-CM

## 2023-02-09 DIAGNOSIS — Z13.220 SCREENING FOR HYPERLIPIDEMIA: ICD-10-CM

## 2023-02-09 DIAGNOSIS — Z79.01 CHRONIC ANTICOAGULATION: ICD-10-CM

## 2023-02-09 DIAGNOSIS — E11.9 DIABETES MELLITUS TYPE 2, DIET-CONTROLLED (H): ICD-10-CM

## 2023-02-09 DIAGNOSIS — M76.32 IT BAND SYNDROME, LEFT: ICD-10-CM

## 2023-02-09 DIAGNOSIS — Z00.00 ENCOUNTER FOR MEDICARE ANNUAL WELLNESS EXAM: Primary | ICD-10-CM

## 2023-02-09 DIAGNOSIS — Z95.1 S/P CABG X 4: ICD-10-CM

## 2023-02-09 DIAGNOSIS — K21.9 GASTROESOPHAGEAL REFLUX DISEASE, UNSPECIFIED WHETHER ESOPHAGITIS PRESENT: ICD-10-CM

## 2023-02-09 DIAGNOSIS — F41.1 GAD (GENERALIZED ANXIETY DISORDER): ICD-10-CM

## 2023-02-09 DIAGNOSIS — E78.2 MIXED HYPERLIPIDEMIA: ICD-10-CM

## 2023-02-09 DIAGNOSIS — R31.29 MICROSCOPIC HEMATURIA: ICD-10-CM

## 2023-02-09 LAB
ALBUMIN UR-MCNC: NEGATIVE MG/DL
ANION GAP SERPL CALCULATED.3IONS-SCNC: 7 MMOL/L (ref 7–15)
APPEARANCE UR: CLEAR
BASOPHILS # BLD AUTO: 0.1 10E3/UL (ref 0–0.2)
BASOPHILS NFR BLD AUTO: 1 %
BILIRUB UR QL STRIP: NEGATIVE
BUN SERPL-MCNC: 19.3 MG/DL (ref 8–23)
CALCIUM SERPL-MCNC: 9.4 MG/DL (ref 8.8–10.2)
CHLORIDE SERPL-SCNC: 100 MMOL/L (ref 98–107)
CHOLEST SERPL-MCNC: 179 MG/DL
COLOR UR AUTO: ABNORMAL
CREAT SERPL-MCNC: 1.12 MG/DL (ref 0.67–1.17)
DEPRECATED HCO3 PLAS-SCNC: 28 MMOL/L (ref 22–29)
EOSINOPHIL # BLD AUTO: 0.2 10E3/UL (ref 0–0.7)
EOSINOPHIL NFR BLD AUTO: 2 %
ERYTHROCYTE [DISTWIDTH] IN BLOOD BY AUTOMATED COUNT: 13.9 % (ref 10–15)
GFR SERPL CREATININE-BSD FRML MDRD: 64 ML/MIN/1.73M2
GLUCOSE SERPL-MCNC: 88 MG/DL (ref 70–99)
GLUCOSE UR STRIP-MCNC: NEGATIVE MG/DL
HBA1C MFR BLD: 6.5 % (ref 4–6.2)
HCT VFR BLD AUTO: 45.3 % (ref 40–53)
HDLC SERPL-MCNC: 40 MG/DL
HGB BLD-MCNC: 15 G/DL (ref 13.3–17.7)
HGB UR QL STRIP: ABNORMAL
HOLD SPECIMEN: NORMAL
IMM GRANULOCYTES # BLD: 0 10E3/UL
IMM GRANULOCYTES NFR BLD: 0 %
KETONES UR STRIP-MCNC: NEGATIVE MG/DL
LDLC SERPL CALC-MCNC: 85 MG/DL
LEUKOCYTE ESTERASE UR QL STRIP: NEGATIVE
LYMPHOCYTES # BLD AUTO: 1.6 10E3/UL (ref 0.8–5.3)
LYMPHOCYTES NFR BLD AUTO: 25 %
MCH RBC QN AUTO: 29.5 PG (ref 26.5–33)
MCHC RBC AUTO-ENTMCNC: 33.1 G/DL (ref 31.5–36.5)
MCV RBC AUTO: 89 FL (ref 78–100)
MONOCYTES # BLD AUTO: 0.6 10E3/UL (ref 0–1.3)
MONOCYTES NFR BLD AUTO: 10 %
MUCOUS THREADS #/AREA URNS LPF: PRESENT /LPF
NEUTROPHILS # BLD AUTO: 3.8 10E3/UL (ref 1.6–8.3)
NEUTROPHILS NFR BLD AUTO: 62 %
NITRATE UR QL: NEGATIVE
NONHDLC SERPL-MCNC: 139 MG/DL
NRBC # BLD AUTO: 0 10E3/UL
NRBC BLD AUTO-RTO: 0 /100
PH UR STRIP: 5.5 [PH] (ref 5–9)
PLATELET # BLD AUTO: 219 10E3/UL (ref 150–450)
POTASSIUM SERPL-SCNC: 4.8 MMOL/L (ref 3.4–5.3)
RBC # BLD AUTO: 5.09 10E6/UL (ref 4.4–5.9)
RBC URINE: 6 /HPF
SODIUM SERPL-SCNC: 135 MMOL/L (ref 136–145)
SP GR UR STRIP: 1.02 (ref 1–1.03)
TRIGL SERPL-MCNC: 270 MG/DL
UROBILINOGEN UR STRIP-MCNC: NORMAL MG/DL
WBC # BLD AUTO: 6.2 10E3/UL (ref 4–11)
WBC URINE: 1 /HPF

## 2023-02-09 PROCEDURE — 36415 COLL VENOUS BLD VENIPUNCTURE: CPT | Mod: ZL | Performed by: INTERNAL MEDICINE

## 2023-02-09 PROCEDURE — 99207 PR ANNUAL WELLNESS VISIT, PPS, SUBSEQUENT STAT: CPT | Performed by: INTERNAL MEDICINE

## 2023-02-09 PROCEDURE — 80048 BASIC METABOLIC PNL TOTAL CA: CPT | Mod: ZL | Performed by: INTERNAL MEDICINE

## 2023-02-09 PROCEDURE — 80061 LIPID PANEL: CPT | Mod: ZL | Performed by: INTERNAL MEDICINE

## 2023-02-09 PROCEDURE — 85025 COMPLETE CBC W/AUTO DIFF WBC: CPT | Mod: ZL | Performed by: INTERNAL MEDICINE

## 2023-02-09 PROCEDURE — 81001 URINALYSIS AUTO W/SCOPE: CPT | Mod: ZL | Performed by: INTERNAL MEDICINE

## 2023-02-09 PROCEDURE — G0463 HOSPITAL OUTPT CLINIC VISIT: HCPCS

## 2023-02-09 PROCEDURE — 83036 HEMOGLOBIN GLYCOSYLATED A1C: CPT | Mod: ZL | Performed by: INTERNAL MEDICINE

## 2023-02-09 RX ORDER — CITALOPRAM HYDROBROMIDE 20 MG/1
20 TABLET ORAL DAILY
Qty: 90 TABLET | Refills: 3 | Status: SHIPPED | OUTPATIENT
Start: 2023-02-09 | End: 2024-03-11

## 2023-02-09 RX ORDER — ATORVASTATIN CALCIUM 10 MG/1
10 TABLET, FILM COATED ORAL DAILY
Qty: 90 TABLET | Refills: 3 | Status: SHIPPED | OUTPATIENT
Start: 2023-02-09 | End: 2023-12-28

## 2023-02-09 RX ORDER — METOPROLOL SUCCINATE 25 MG/1
12.5 TABLET, EXTENDED RELEASE ORAL DAILY
Qty: 45 TABLET | Refills: 3 | Status: SHIPPED | OUTPATIENT
Start: 2023-02-09 | End: 2023-12-01

## 2023-02-09 RX ORDER — EZETIMIBE 10 MG/1
10 TABLET ORAL
Qty: 36 TABLET | Refills: 3 | Status: CANCELLED | OUTPATIENT
Start: 2023-02-10

## 2023-02-09 RX ORDER — LISINOPRIL 2.5 MG/1
2.5 TABLET ORAL DAILY
Qty: 90 TABLET | Refills: 3 | Status: CANCELLED | OUTPATIENT
Start: 2023-02-09

## 2023-02-09 RX ORDER — LISINOPRIL 2.5 MG/1
2.5 TABLET ORAL DAILY
Qty: 90 TABLET | Refills: 3 | Status: SHIPPED | OUTPATIENT
Start: 2023-02-09 | End: 2023-08-01

## 2023-02-09 ASSESSMENT — ENCOUNTER SYMPTOMS
MYALGIAS: 1
ABDOMINAL PAIN: 1

## 2023-02-09 ASSESSMENT — PATIENT HEALTH QUESTIONNAIRE - PHQ9
10. IF YOU CHECKED OFF ANY PROBLEMS, HOW DIFFICULT HAVE THESE PROBLEMS MADE IT FOR YOU TO DO YOUR WORK, TAKE CARE OF THINGS AT HOME, OR GET ALONG WITH OTHER PEOPLE: NOT DIFFICULT AT ALL
SUM OF ALL RESPONSES TO PHQ QUESTIONS 1-9: 4
SUM OF ALL RESPONSES TO PHQ QUESTIONS 1-9: 4

## 2023-02-09 ASSESSMENT — ACTIVITIES OF DAILY LIVING (ADL): CURRENT_FUNCTION: NO ASSISTANCE NEEDED

## 2023-02-09 ASSESSMENT — ANXIETY QUESTIONNAIRES
GAD7 TOTAL SCORE: 4
IF YOU CHECKED OFF ANY PROBLEMS ON THIS QUESTIONNAIRE, HOW DIFFICULT HAVE THESE PROBLEMS MADE IT FOR YOU TO DO YOUR WORK, TAKE CARE OF THINGS AT HOME, OR GET ALONG WITH OTHER PEOPLE: NOT DIFFICULT AT ALL
7. FEELING AFRAID AS IF SOMETHING AWFUL MIGHT HAPPEN: NOT AT ALL
3. WORRYING TOO MUCH ABOUT DIFFERENT THINGS: SEVERAL DAYS
5. BEING SO RESTLESS THAT IT IS HARD TO SIT STILL: NOT AT ALL
4. TROUBLE RELAXING: SEVERAL DAYS
GAD7 TOTAL SCORE: 4
1. FEELING NERVOUS, ANXIOUS, OR ON EDGE: SEVERAL DAYS
GAD7 TOTAL SCORE: 4
7. FEELING AFRAID AS IF SOMETHING AWFUL MIGHT HAPPEN: NOT AT ALL
6. BECOMING EASILY ANNOYED OR IRRITABLE: NOT AT ALL
2. NOT BEING ABLE TO STOP OR CONTROL WORRYING: SEVERAL DAYS
8. IF YOU CHECKED OFF ANY PROBLEMS, HOW DIFFICULT HAVE THESE MADE IT FOR YOU TO DO YOUR WORK, TAKE CARE OF THINGS AT HOME, OR GET ALONG WITH OTHER PEOPLE?: NOT DIFFICULT AT ALL

## 2023-02-09 ASSESSMENT — PAIN SCALES - GENERAL: PAINLEVEL: NO PAIN (0)

## 2023-02-09 NOTE — PROGRESS NOTES
The patient was provided with written information regarding signs of hearing loss.  He is at risk for falling and has been provided with information to reduce the risk of falling at home.

## 2023-02-09 NOTE — Clinical Note
Please abstract the following data from this visit with this patient into the appropriate field in Epic:  Tests that can be patient reported without a hard copy:  Eye exam with ophthalmology on this date: June 2022- December 2022 Exam Location[facility] - patient reported  Other Tests found in the patient's chart through Chart Review/Care Everywhere:  Eye exam with ophthalmology done by this group June 2022- December 2022 Exam Location: [facility] - patient reported  Note to [employer]: If this section is blank, no results were found via Chart Review/Care Everywhere.

## 2023-02-09 NOTE — NURSING NOTE
"Chief Complaint   Patient presents with     Medicare Visit     annual         Initial /70   Pulse 62   Temp 97.4  F (36.3  C) (Tympanic)   Resp 20   Ht 1.753 m (5' 9\")   Wt 76.9 kg (169 lb 9.6 oz)   SpO2 98%   BMI 25.05 kg/m   Estimated body mass index is 25.05 kg/m  as calculated from the following:    Height as of this encounter: 1.753 m (5' 9\").    Weight as of this encounter: 76.9 kg (169 lb 9.6 oz).         Norma J. Gosselin, LPN   "

## 2023-02-09 NOTE — PATIENT INSTRUCTIONS
-- Consider switching from warfarin to Eliquis     -- Obtain urine to look for ongoing blood   -- Back to Dr. Travis if bleeding persists     -- You should get a covid and flu booster   -- Get the new shingles vaccine series from a nurse-only visit, pharmacy or [facility] ([facility] RN).        Patient Education   Personalized Prevention Plan  You are due for the preventive services outlined below.  Your care team is available to assist you in scheduling these services.  If you have already completed any of these items, please share that information with your care team to update in your medical record.  Health Maintenance Due   Topic Date Due    Eye Exam  Never done    Zoster (Shingles) Vaccine (1 of 2) Never done    COVID-19 Vaccine (3 - Booster for Pfizer series) 06/09/2021    Flu Vaccine (1) 09/01/2022    A1C Lab  12/15/2022    Cholesterol Lab  01/21/2023       Signs of Hearing Loss      Hearing much better with one ear can be a sign of hearing loss.   Hearing loss is a problem shared by many people. In fact, it is one of the most common health problems, particularly as people age. Most people age 65 and older have some hearing loss. By age 80, almost everyone does. Hearing loss often occurs slowly over the years. So you may not realize your hearing has gotten worse.  Have your hearing checked  Call your healthcare provider if you:  Have to strain to hear normal conversation  Have to watch other people s faces very carefully to follow what they re saying  Need to ask people to repeat what they ve said  Often misunderstand what people are saying  Turn the volume of the television or radio up so high that others complain  Feel that people are mumbling when they re talking to you  Find that the effort to hear leaves you feeling tired and irritated  Notice, when using the phone, that you hear better with one ear than the other  [employer] last reviewed this educational content on 1/1/2020 2000-2021 [employer]  [employer]. All rights reserved. This information is not intended as a substitute for professional medical care. Always follow your healthcare professional's instructions.          Preventing Falls at Home  A person can fall for many reasons. Older adults may fall because reaction time slows down as we age. Your muscles and joints may get stiff, weak, or less flexible because of illness, medicines, or a physical condition.   Other health problems that make falls more likely include:   Arthritis  Dizziness or lightheadedness when you stand up (orthostatic hypotension)  History of a stroke  Dizziness  Anemia  Certain medicines taken for mental illness or to control blood pressure.  Problems with balance or gait  Bladder or urinary problems  History of falling  Changes in vision (vision impairment)  Changes in thinking skills and memory (cognitive impairment)  Injuries from a fall can include serious injuries such as broken bones, dislocated joints, internal bleeding and cuts. Injuries like these can limit your independence.   Prevention tips  To help prevent falls and fall-related injuries, follow the tips below.    Floors  To make floors safer:   Put nonskid pads under area rugs.  Remove small rugs.  Replace worn floor coverings.  Tack carpets firmly to each step on carpeted stairs. Put nonskid strips on the edges of uncarpeted stairs.  Keep floors and stairs free of clutter and cords.  Arrange furniture so there are clear pathways.  Clean up any spills right away.  Bathrooms    To make bathrooms safer:   Install grab bars in the tub or shower.  Apply nonskid strips or put a nonskid rubber mat in the tub or shower.  Sit on a bath chair to bathe.  Use bathmats with nonskid backing.  Lighting  To improve visibility in your home:    Keep a flashlight in each room. Or put a lamp next to the bed within easy reach.  Put nightlights in the bedrooms, hallways, kitchen, and bathrooms.  Make sure all stairways have  good lighting.  Take your time when going up and down stairs.  Put handrails on both sides of stairs and in walkways for more support. To prevent injury to your wrist or arm, don t use handrails to pull yourself up.  Install grab bars to pull yourself up.  Move or rearrange items that you use often. This will make them easier to find or reach.  Look at your home to find any safety hazards. Especially look at doorways, walkways, and the driveway. Remove or repair any safety problems that you find.  Other changes to make  Look around to find any safety hazards. Look closely at doorways, walkways, and the driveway. Remove or repair any safety problems that you find.  Wear shoes that fit well.  Take your time when going up and down stairs.  Put handrails on both sides of stairs and in walkways for more support. To prevent injury to your wrist or arm, don t use handrails to pull yourself up.  Install grab bars wherever needed to pull yourself up.  Arrange items that you use often. This will make them easier to find or reach.    [employer] last reviewed this educational content on 3/1/2020    5573-3958 [employer]. All rights reserved. This information is not intended as a substitute for professional medical care. Always follow your healthcare professional's instructions.

## 2023-02-09 NOTE — LETTER
February 10, 2023      Karl Martinez  [address] [facility] [address]        Dear ,    We are writing to inform you of your test results.    Lab work looking pretty good. You still have blood in the urine, so I'll refer you back to the Urologist to see if he recommends any additional work-up.  I'm guessing perhaps not since you have had it investigated in the past.    Signed, Isma Martinez MD, FAAP, FACP  Internal Medicine & Pediatrics      Resulted Orders   UA with Microscopic reflex to Culture   Result Value Ref Range    Color Urine Light Yellow Colorless, Straw, Light Yellow, Yellow    Appearance Urine Clear Clear    Glucose Urine Negative Negative mg/dL    Bilirubin Urine Negative Negative    Ketones Urine Negative Negative mg/dL    Specific Gravity Urine 1.021 1.000 - 1.030    Blood Urine Trace (A) Negative    pH Urine 5.5 5.0 - 9.0    Protein Albumin Urine Negative Negative mg/dL    Urobilinogen Urine Normal Normal, 2.0 mg/dL    Nitrite Urine Negative Negative    Leukocyte Esterase Urine Negative Negative    Mucus Urine Present (A) None Seen /LPF    RBC Urine 6 (H) <=2 /HPF    WBC Urine 1 <=5 /HPF    Narrative    Urine Culture not indicated   HEMOGLOBIN A1C   Result Value Ref Range    Hemoglobin A1C 6.5 (H) 4.0 - 6.2 %   Lipid Profile   Result Value Ref Range    Cholesterol 179 <200 mg/dL    Triglycerides 270 (H) <150 mg/dL    Direct Measure HDL 40 >=40 mg/dL    LDL Cholesterol Calculated 85 <=100 mg/dL    Non HDL Cholesterol 139 (H) <130 mg/dL    Narrative    Cholesterol  Desirable:  <200 mg/dL    Triglycerides  Normal:  Less than 150 mg/dL  Borderline High:  150-199 mg/dL  High:  200-499 mg/dL  Very High:  Greater than or equal to 500 mg/dL    Direct Measure HDL  Female:  Greater than or equal to 50 mg/dL   Male:  Greater than or equal to 40 mg/dL    LDL Cholesterol  Desirable:  <100mg/dL  Above Desirable:  100-129 mg/dL   Borderline High:  130-159 mg/dL   High:  160-189 mg/dL  Michelle Quintana is a 71 year old female presenting with transitional care management. Patient states that she's generally not feeling very well, this is related to bouts of diarrhea.     Recent Review Flowsheet Data     Date 8/18/2022    Adult PHQ 2 Score 6    Adult PHQ 2 Interpretation Further screening needed    Little interest or pleasure in activity? Nearly every day    Feeling down, depressed or hopeless? Nearly every day    Adult PHQ 9 Score 24    Adult PHQ 9 Interpretation Severe Depression    Trouble falling or staying asleep or sleeping all the time? Nearly every day    Feeling tired or having little energy? Nearly every day    Poor appetite or overeating? Nearly every day    Feeling bad about yourself or that you are a failure or have let yourself or family down? Nearly every day    Trouble concentrating on things such as reading the newspaper or watching TV? Nearly every day    Moving or speaking slowly that other people have noticed or the opposite - being so fidgety or restless that you have been moving around a lot more than usual? Nearly every day    Thoughts that you would be better off dead or of hurting yourself in some way? Not at all    If you reported any problems, how difficult have these problems made it to do your work, take care of things at home, or get along with other people? Somewhat difficult        Medications Reviewed.     Pharmacy Verified.    Denies known Latex allergy or symptoms of Latex sensitivity.    Health Maintenance Due   Topic Date Due   • Hepatitis B Vaccine (1 of 3 - 3-dose series) Never done   • DM/CKD Microalbumin  Never done   • Shingles Vaccine (1 of 2) Never done   • DTaP/Tdap/Td Vaccine (2 - Td or Tdap) 11/10/2021   • Colorectal Cancer Risk - Colonoscopy  04/12/2022   • COVID-19 Vaccine (4 - Booster for Pfizer series) 05/31/2022       Patient is due for topics as listed above but is not proceeding with Immunization(s) COVID-19, Dtap/Tdap/Td, Hep B and Shingles at    Very High:  >= 190 mg/dL    Non HDL Cholesterol  Desirable:  130 mg/dL  Above Desirable:  130-159 mg/dL  Borderline High:  160-189 mg/dL  High:  190-219 mg/dL  Very High:  Greater than or equal to 220 mg/dL   Basic metabolic panel   Result Value Ref Range    Sodium 135 (L) 136 - 145 mmol/L    Potassium 4.8 3.4 - 5.3 mmol/L    Chloride 100 98 - 107 mmol/L    Carbon Dioxide (CO2) 28 22 - 29 mmol/L    Anion Gap 7 7 - 15 mmol/L    Urea Nitrogen 19.3 8.0 - 23.0 mg/dL    Creatinine 1.12 0.67 - 1.17 mg/dL    Calcium 9.4 8.8 - 10.2 mg/dL    Glucose 88 70 - 99 mg/dL    GFR Estimate 64 >60 mL/min/1.73m2      Comment:      eGFR calculated using 2021 CKD-EPI equation.   CBC with platelets and differential   Result Value Ref Range    WBC Count 6.2 4.0 - 11.0 10e3/uL    RBC Count 5.09 4.40 - 5.90 10e6/uL    Hemoglobin 15.0 13.3 - 17.7 g/dL    Hematocrit 45.3 40.0 - 53.0 %    MCV 89 78 - 100 fL    MCH 29.5 26.5 - 33.0 pg    MCHC 33.1 31.5 - 36.5 g/dL    RDW 13.9 10.0 - 15.0 %    Platelet Count 219 150 - 450 10e3/uL    % Neutrophils 62 %    % Lymphocytes 25 %    % Monocytes 10 %    % Eosinophils 2 %    % Basophils 1 %    % Immature Granulocytes 0 %    NRBCs per 100 WBC 0 <1 /100    Absolute Neutrophils 3.8 1.6 - 8.3 10e3/uL    Absolute Lymphocytes 1.6 0.8 - 5.3 10e3/uL    Absolute Monocytes 0.6 0.0 - 1.3 10e3/uL    Absolute Eosinophils 0.2 0.0 - 0.7 10e3/uL    Absolute Basophils 0.1 0.0 - 0.2 10e3/uL    Absolute Immature Granulocytes 0.0 <=0.4 10e3/uL    Absolute NRBCs 0.0 10e3/uL       If you have any questions or concerns, please call the clinic at the number listed above.       Sincerely,      Isma Martinez MD           this time. Education provided for Immunization(s) COVID-19, Dtap/Tdap/Td, Hep B and Shingles.         Patient would like communication of their results via:      [employer]    Cell Phone:   Telephone Information:   Mobile [phone]     Okay to leave a message containing results? Yes

## 2023-02-09 NOTE — PROGRESS NOTES
"SUBJECTIVE:   Karl is a 87 year old who presents for Preventive Visit.      Patient has been advised of split billing requirements and indicates understanding: Yes  Are you in the first 12 months of your Medicare coverage?  No    Healthy Habits:     In general, how would you rate your overall health?  Good    Frequency of exercise:  6-7 days/week    Duration of exercise:  15-30 minutes    Do you usually eat at least 4 servings of fruit and vegetables a day, include whole grains    & fiber and avoid regularly eating high fat or \"junk\" foods?  Yes    Taking medications regularly:  Yes    Medication side effects:  Muscle aches    Ability to successfully perform activities of daily living:  No assistance needed    Home Safety:  No safety concerns identified    Hearing Impairment:  Need to ask people to speak up or repeat themselves and difficulty understanding soft or whispered speech    In the past 6 months, have you been bothered by leaking of urine?  No    In general, how would you rate your overall mental or emotional health?  Good      PHQ-2 Total Score: 2    Additional concerns today:  No      Have you ever done Advance Care Planning? (For example, a Health Directive, POLST, or a discussion with a medical provider or your loved ones about your wishes): No, advance care planning information given to patient to review.  Patient plans to discuss their wishes with loved ones or provider.         Fall risk  Fallen 2 or more times in the past year?: Yes  Any fall with injury in the past year?: No    Cognitive Screening   1) Repeat 3 items (Leader, Season, Table)    2) Clock draw: NORMAL  3) 3 item recall: Recalls NO objects   Results: 0 items recalled: PROBABLE COGNITIVE IMPAIRMENT, **INFORM PROVIDER**    Mini-CogTM Copyright MELA Wiseman. Licensed by the author for use in [facility]; reprinted with permission (lalo@.[email or web address]). All rights reserved.      Do you have sleep apnea, excessive snoring or daytime " drowsiness?: no    Reviewed and updated as needed this visit by clinical staff   Tobacco  Allergies  Meds   Med Hx  Surg Hx  Fam Hx  Soc Hx        Reviewed and updated as needed this visit by Provider                 Social History     Tobacco Use     Smoking status: Former     Packs/day: 0.90     Years: 9.00     Pack years: 8.10     Types: Cigarettes     Smokeless tobacco: Never   Substance Use Topics     Alcohol use: Yes     Alcohol/week: 3.0 standard drinks     Types: 3 Cans of beer per week     Comment: beer 3 times per week      If you drink alcohol do you typically have >3 drinks per day or >7 drinks per week? Not applicable    Alcohol Use 2/9/2023   Prescreen: >3 drinks/day or >7 drinks/week? No   Prescreen: >3 drinks/day or >7 drinks/week? -   No flowsheet data found.        Current providers sharing in care for this patient include:   Patient Care Team:  Imsa Martinez MD as PCP - General (Pediatrics)  Isma Martinez MD as Assigned PCP  Stephane Freedman MD as Assigned Surgical Provider   [facility] Lyon    The following health maintenance items are reviewed in Epic and correct as of today:  Health Maintenance   Topic Date Due     EYE EXAM  Never done     ZOSTER IMMUNIZATION (1 of 2) Never done     COVID-19 Vaccine (3 - Booster for Pfizer series) 06/09/2021     INFLUENZA VACCINE (1) 09/01/2022     A1C  12/15/2022     LIPID  01/21/2023     MEDICARE ANNUAL WELLNESS VISIT  01/21/2023     BMP  09/15/2023     MICROALBUMIN  09/15/2023     DIABETIC FOOT EXAM  09/15/2023     FALL RISK ASSESSMENT  02/09/2024     ADVANCE CARE PLANNING  01/21/2027     DTAP/TDAP/TD IMMUNIZATION (4 - Td or Tdap) 02/03/2028     PHQ-2 (once per calendar year)  Completed     Pneumococcal Vaccine: 65+ Years  Completed     IPV IMMUNIZATION  Aged Out     MENINGITIS IMMUNIZATION  Aged Out     Labs reviewed in [facility]  Imm reviewed        Review of Systems  Constitutional, HEENT, cardiovascular, pulmonary,  "gi and gu systems are negative, except as otherwise noted.    OBJECTIVE:   /70   Pulse 62   Temp 97.4  F (36.3  C) (Tympanic)   Resp 20   Ht 1.753 m (5' 9\")   Wt 76.9 kg (169 lb 9.6 oz)   SpO2 98%   BMI 25.05 kg/m   Estimated body mass index is 25.05 kg/m  as calculated from the following:    Height as of this encounter: 1.753 m (5' 9\").    Weight as of this encounter: 76.9 kg (169 lb 9.6 oz).  Physical Exam  Gen: Alert, NAD.  HEENT: MMM  Neck: Supple  CV: RRR no m/r/g  Pulm: CTAB, no w/r/r. No increased work of breathing  Msk: No LE edema  Abd: Soft  Neuro: Grossly intact  Skin: No concerning lesions.  Psychiatric: Normal affect and insight. Does not appear anxious or depressed.        Diagnostic Test Results:  Labs reviewed in Epic    ASSESSMENT / PLAN:       ICD-10-CM    1. Encounter for Medicare annual wellness exam  Z00.00       2. Diabetes mellitus type 2, diet-controlled (H)  E11.9 HEMOGLOBIN A1C     Lipid Profile     lisinopril (ZESTRIL) 2.5 MG tablet     Basic metabolic panel      3. Screening for hyperlipidemia  Z13.220 Lipid Profile      4. Mixed hyperlipidemia  E78.2 atorvastatin (LIPITOR) 10 MG tablet      5. OLIVIA (generalized anxiety disorder)  F41.1 citalopram (CELEXA) 20 MG tablet      6. Mood disorder (H)  F39 citalopram (CELEXA) 20 MG tablet      7. S/P CABG x 4  Z95.1 metoprolol succinate ER (TOPROL XL) 25 MG 24 hr tablet      8. It band syndrome, left  M76.32       9. Gastroesophageal reflux disease, unspecified whether esophagitis present  K21.9 omeprazole (PRILOSEC) 20 MG DR capsule      10. Microscopic hematuria  R31.29 UA with Microscopic reflex to Culture     CBC with Platelets & Differential      11. Chronic anticoagulation  Z79.01 CBC with Platelets & Differential        We discussed continuing warfarin versus switching to Eliquis.  At first he wanted to change and then he decided he would rather continue checking INRs.    Patient Instructions      -- Consider switching from " "warfarin to Eliquis     -- Obtain urine to look for ongoing blood   -- Back to Dr. Travis if bleeding persists     -- You should get a covid and flu booster   -- Get the new shingles vaccine series from a nurse-only visit, pharmacy or [facility] ([facility]).                   COUNSELING:  Reviewed preventive health counseling, as reflected in patient instructions      BMI:   Estimated body mass index is 25.05 kg/m  as calculated from the following:    Height as of this encounter: 1.753 m (5' 9\").    Weight as of this encounter: 76.9 kg (169 lb 9.6 oz).         He reports that he has quit smoking. His smoking use included cigarettes. He has a 8.10 pack-year smoking history. He has never used smokeless tobacco.      Appropriate preventive services were discussed with this patient, including applicable screening as appropriate for cardiovascular disease, diabetes, osteopenia/osteoporosis, and glaucoma.  As appropriate for age/gender, discussed screening for colorectal cancer, prostate cancer, breast cancer, and cervical cancer. Checklist reviewing preventive services available has been given to the patient.    Reviewed patients plan of care and provided an AVS. The Basic Care Plan (routine screening as documented in Health Maintenance) for Michael meets the Care Plan requirement. This Care Plan has been established and reviewed with the Patient.          Isma Martinez MD  [facility]    Identified Health Risks:  Answers for HPI/ROS submitted by the patient on 2/9/2023  If you checked off any problems, how difficult have these problems made it for you to do your work, take care of things at home, or get along with other people?: Not difficult at all  PHQ9 TOTAL SCORE: 4  OLIVIA 7 TOTAL SCORE: 4      "

## 2023-02-15 ENCOUNTER — OFFICE VISIT (OUTPATIENT)
Dept: UROLOGY | Facility: OTHER | Age: 88
End: 2023-02-15
Attending: INTERNAL MEDICINE
Payer: COMMERCIAL

## 2023-02-15 ENCOUNTER — ANTICOAGULATION THERAPY VISIT (OUTPATIENT)
Dept: ANTICOAGULATION | Facility: OTHER | Age: 88
End: 2023-02-15
Attending: INTERNAL MEDICINE
Payer: COMMERCIAL

## 2023-02-15 VITALS
SYSTOLIC BLOOD PRESSURE: 138 MMHG | HEART RATE: 60 BPM | DIASTOLIC BLOOD PRESSURE: 84 MMHG | RESPIRATION RATE: 16 BRPM | OXYGEN SATURATION: 97 % | BODY MASS INDEX: 24.66 KG/M2 | WEIGHT: 167 LBS

## 2023-02-15 DIAGNOSIS — R31.29 MICROSCOPIC HEMATURIA: Primary | ICD-10-CM

## 2023-02-15 DIAGNOSIS — Z86.718 PERSONAL HISTORY OF DVT (DEEP VEIN THROMBOSIS): ICD-10-CM

## 2023-02-15 DIAGNOSIS — Z79.01 CHRONIC ANTICOAGULATION: ICD-10-CM

## 2023-02-15 DIAGNOSIS — R31.0 GROSS HEMATURIA: ICD-10-CM

## 2023-02-15 DIAGNOSIS — Z86.711 HISTORY OF PULMONARY EMBOLISM: ICD-10-CM

## 2023-02-15 DIAGNOSIS — Z79.01 LONG TERM CURRENT USE OF ANTICOAGULANT THERAPY: Primary | ICD-10-CM

## 2023-02-15 DIAGNOSIS — Z79.01 ANTICOAGULATION MONITORING, INR RANGE 2-3: ICD-10-CM

## 2023-02-15 LAB — INR POINT OF CARE: 3.3 (ref 0.9–1.1)

## 2023-02-15 PROCEDURE — 85610 PROTHROMBIN TIME: CPT | Mod: ZL,QW

## 2023-02-15 PROCEDURE — G0463 HOSPITAL OUTPT CLINIC VISIT: HCPCS | Mod: 25

## 2023-02-15 PROCEDURE — 51798 US URINE CAPACITY MEASURE: CPT | Performed by: UROLOGY

## 2023-02-15 PROCEDURE — G0463 HOSPITAL OUTPT CLINIC VISIT: HCPCS

## 2023-02-15 PROCEDURE — 99203 OFFICE O/P NEW LOW 30 MIN: CPT | Performed by: UROLOGY

## 2023-02-15 ASSESSMENT — PAIN SCALES - GENERAL: PAINLEVEL: NO PAIN (0)

## 2023-02-15 NOTE — PROGRESS NOTES
Type of Visit  NPV    Chief Complaint  Microhematuria    HPI  Mr. Martinez is a 87 year old male who presents with recent microhematuria.  The patient has a remote history of gross hematuria in 2015.  At that time he was admitted with clot retention.  He was seen by urology and underwent a work-up including CT urogram and cystoscopy at that time 8 years ago.  The work-up was negative.    Recently he since has undergone greenlight BPH treatment in [place].  Since that time his gross hematuria episodes have resolved and his symptoms have been managed well.  He denies dysuria fevers and gross hematuria today.  He has not visualized gross hematuria in the past few years since the greenlight BPH treatment.      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  Patient Active Problem List   Diagnosis     Personal history of DVT (deep vein thrombosis)     History of pulmonary embolism     Abdominal aortic aneurysm without rupture     Bladder outlet obstruction     Ischemic cardiomyopathy     History of four vessel coronary artery bypass graft     H/O transurethral resection of prostate     Anticoagulation monitoring, INR range 2-3     Long-term (current) use of anticoagulants [Z79.01]     Cataract     DJD (degenerative joint disease) of cervical spine     Diaphragmatic hernia     Lumbar disc disease     Osteoarthrosis involving lower leg     Primary osteoarthritis of right hand     S/P CABG x 4     S/P TURP     Spinal stenosis, lumbar     Lesion of ulnar nerve     Other pulmonary embolism without acute cor pulmonale     History of Guillain-Milan syndrome     Gastroesophageal reflux disease, esophagitis presence not  specified     Primary osteoarthritis of left knee     Mixed hyperlipidemia     Peripheral arterial disease (H)     OLIVIA (generalized anxiety disorder)     Cervical arthritis     Primary osteoarthritis involving multiple joints     Diabetes mellitus type 2, diet-controlled (H)       Past Surgical History  He  has a past surgical history that includes Arthroscopy shoulder rotator cuff repair; Biopsy prostate transrectal; Colonoscopy; Bypass graft artery coronary; Cystoscopy, transurethral resection (TUR) prostate, combined; and other surgical history.    Medications  He has a current medication list which includes the following prescription(s): aspirin, atorvastatin, vitamin d-3, citalopram, latanoprost, lisinopril, metoprolol succinate er, omeprazole, warfarin anticoagulant, and warfarin anticoagulant.    Allergies  Allergies   Allergen Reactions     Influenza Vac Split [Flu Virus Vaccine]      guillan barre       Social History  He  reports that he has quit smoking. His smoking use included cigarettes. He has a 8.10 pack-year smoking history. He has never used smokeless tobacco. He reports current alcohol use of about 3.0 standard drinks per week. He reports that he does not use drugs.  No drug abuse.    Family History  Family History   Problem Relation Age of Onset     Heart Disease Mother         Heart Disease     Other - See Comments Father         No Known Problems     Breast Cancer Sister         Cancer-breast,Otherwise healthy     Other - See Comments Brother         BPH otherwise healthy at 72     Arthritis Brother         Arthritis,Osteoarthritis, otherwise healthy at 66     Colon Cancer Brother         Cancer-colon     Breast Cancer Sister 59        Cancer-breast,       Review of Systems  I personally reviewed the ROS with the patient.    Nursing Notes:   Celsa Alvarez LPN  2/15/2023 10:39 AM  Signed  Chief Complaint   Patient presents with     Follow Up     hematuria   Patient presents to the  clinic today for a follow up for hematuria    Review of Systems:    Weight loss:    No     Recent fever/chills:  No   Night sweats:   No  Current skin rash:  No   Recent hair loss:  No  Heat intolerance:  No   Cold intolerance:  No  Chest pain:   No   Palpitations:   No  Shortness of breath:  No   Wheezing:   No  Constipation:    No   Diarrhea:   No   Nausea:   No   Vomiting:   No   Kidney/side pain:  No   Back pain:   No  Frequent headaches:  No   Dizziness:     No  Leg swelling:   No   Calf pain:    No      Medication Reconciliation: completed   Celsa AlvarezODALYS  2/15/2023 10:38 AM       Physical Exam  Vitals:    02/15/23 1045   Pulse: 60   Resp: 16   SpO2: 97%   Weight: 75.8 kg (167 lb)     Constitutional: NAD, WDWN.   Head: NCAT  Eyes: Conjunctivae normal  Pulmonary/Chest: Respirations are even and non-labored bilaterally.  Abdominal: Soft. No distension. No CVA tenderness.  Extremities: PEGGY x 4, Warm. No clubbing.  No cyanosis.    Skin: Pink, warm and dry.  No rashes noted.  Genitourinary:  Nonpalpable bladder    Labs   02/09/23 15:50   Color Urine Light Yellow   Appearance Urine Clear   Glucose Urine Negative   Bilirubin Urine Negative   Ketones Urine Negative   Specific Gravity Urine 1.021   pH Urine 5.5   Protein Albumin Urine Negative   Urobilinogen mg/dL Normal   Nitrite Urine Negative   Blood Urine Trace !   Leukocyte Esterase Urine Negative   WBC Urine 1   RBC Urine 6 (H)   Mucus Urine Present !     Imaging  CT Urogram  11/3/2015  I personally reviewed and interpreted the images and report.  IMPRESSION:     1. There is a markedly enlarged prostate.  2. There is an asymmetry in the posterior left side of the prostate which is different in 2011 and the left ureter appears deviated as it goes into the bladder in this area. Possibility of a bladder mass cannot be excluded. This area measures 2.7 x 2.5   cm. Endoscopic correlation recommended.  3. Moderate diverticulosis without  diverticulitis.    Assessment  Mr. Martinez is a 87 year old male with microhematuria.  We discussed the patient's recent UA which does reveal abnormal microhematuria given the count is greater than 2.  We discussed the fact that given his history it is expected that he will likely have low-grade abnormal microhematuria.    Discussed rationale for work up.  Discussed the specific indications for cross-sectional imaging as well as diagnostic cystoscopy.  Discussed potential findings of hematuria work up including, but not limited to, kidney stones, bladder tumors and/or kidney tumors.  Also discussed the potential for a normal work up.    Plan  Following this discussion we elected to proceed with observation.  If the patient sees gross hematuria in the future he will follow-up for a work-up.

## 2023-02-15 NOTE — PROGRESS NOTES
ANTICOAGULATION MANAGEMENT     Michael Martinez 87 year old male is on warfarin with supratherapeutic INR result. (Goal INR 2.0-3.0)    Recent labs: (last 7 days)     02/15/23  0854   INR 3.3*       ASSESSMENT       Source(s): Chart Review and In person       Warfarin doses taken: Warfarin taken as instructed    Diet: No new diet changes identified    New illness, injury, or hospitalization: No    Medication/supplement changes: slightly pulled a muscle and has been taking extra tylenol and is starting tofeel better.     Signs or symptoms of bleeding or clotting: No    Previous INR: Subtherapeutic    Additional findings: None       PLAN     Recommended plan for temporary change(s) affecting INR     Dosing Instructions: partial hold then continue your current warfarin dose with next INR in 2 weeks       Summary  As of 2/15/2023    Full warfarin instructions:  2/15: 2.5 mg; Otherwise 5 mg every Mon, Wed, Fri; 7.5 mg all other days   Next INR check:  3/1/2023             In person    Lab visit scheduled    Education provided: Please call back if any changes to your diet, medications or how you've been taking warfarin    Plan made per [facility] anticoagulation protocol    Radha Seo, RN  Anticoagulation Clinic  2/15/2023    _______________________________________________________________________     Anticoagulation Episode Summary     Current INR goal:  2.0-3.0   TTR:  46.4 % (1 y)   Target end date:  Indefinite   Send INR reminders to:  [facility]    Indications    Long-term (current) use of anticoagulants [Z79.01] [Z79.01]  Anticoagulation monitoring  INR range 2-3 [Z79.01]  History of pulmonary embolism [Z86.711]  Personal history of DVT (deep vein thrombosis) [Z86.718]           Comments:           Anticoagulation Care Providers     Provider Role Specialty Phone number    Stephane Freedman MD Referring Surgery [phone]    Isma Martinez MD [facility] Internal Medicine [phone]

## 2023-02-15 NOTE — NURSING NOTE
Chief Complaint   Patient presents with     Follow Up     hematuria   Patient presents to the clinic today for a follow up for hematuria    Review of Systems:    Weight loss:    Yes     Recent fever/chills:  No   Night sweats:   No  Current skin rash:  No   Recent hair loss:  No  Heat intolerance:  No   Cold intolerance:  No  Chest pain:   No   Palpitations:   Yes  Shortness of breath:  Yes   Wheezing:   No  Constipation:    No   Diarrhea:   No   Nausea:   Yes   Vomiting:   No   Kidney/side pain:  No   Back pain:   Yes  Frequent headaches:  No   Dizziness:     Yes  Leg swelling:   No   Calf pain:    No    Post-Void Residual  A post-void residual was measured by ultrasonic bladder scanner.  34 mL  Celsa Alvarez LPN  2/15/2023 10:52 AM    Medication Reconciliation: completed   Celsa Alvarez LPN  2/15/2023 10:38 AM

## 2023-03-14 ENCOUNTER — ANTICOAGULATION THERAPY VISIT (OUTPATIENT)
Dept: ANTICOAGULATION | Facility: OTHER | Age: 88
End: 2023-03-14
Attending: INTERNAL MEDICINE
Payer: COMMERCIAL

## 2023-03-14 DIAGNOSIS — Z79.01 LONG TERM CURRENT USE OF ANTICOAGULANT THERAPY: Primary | ICD-10-CM

## 2023-03-14 DIAGNOSIS — Z79.01 ANTICOAGULATION MONITORING, INR RANGE 2-3: ICD-10-CM

## 2023-03-14 DIAGNOSIS — Z86.718 PERSONAL HISTORY OF DVT (DEEP VEIN THROMBOSIS): ICD-10-CM

## 2023-03-14 DIAGNOSIS — Z86.711 HISTORY OF PULMONARY EMBOLISM: ICD-10-CM

## 2023-03-14 LAB — INR POINT OF CARE: 3 (ref 0.9–1.1)

## 2023-03-14 PROCEDURE — 85610 PROTHROMBIN TIME: CPT | Mod: ZL,QW

## 2023-03-14 PROCEDURE — 36416 COLLJ CAPILLARY BLOOD SPEC: CPT | Mod: ZL

## 2023-03-14 NOTE — PROGRESS NOTES
ANTICOAGULATION MANAGEMENT     Michael Martinez 87 year old male is on warfarin with therapeutic INR result. (Goal INR 2.0-3.0)    Recent labs: (last 7 days)     03/14/23  1434   INR 3.0*       ASSESSMENT       Source(s): Chart Review and In person       Warfarin doses taken: Warfarin taken as instructed    Diet: No new diet changes identified    New illness, injury, or hospitalization: Yes: Had a fall, no bleeding concerns and did not hit head. Fell on right hip. No to mild pain    Medication/supplement changes: None noted    Signs or symptoms of bleeding or clotting: No    Previous INR: Supratherapeutic    Additional findings: None       PLAN     Recommended plan for no diet, medication or health factor changes affecting INR     Dosing Instructions: Continue your current warfarin dose with next INR in 4 weeks       Summary  As of 3/14/2023    Full warfarin instructions:  5 mg every Mon, Wed, Fri; 7.5 mg all other days   Next INR check:  4/11/2023             In person    Lab visit scheduled    Education provided: Please call back if any changes to your diet, medications or how you've been taking warfarin    Plan made per [facility] anticoagulation protocol    Radha Seo RN  Anticoagulation Clinic  3/14/2023    _______________________________________________________________________     Anticoagulation Episode Summary     Current INR goal:  2.0-3.0   TTR:  46.4 % (1 y)   Target end date:  Indefinite   Send INR reminders to:  [facility]    Indications    Long-term (current) use of anticoagulants [Z79.01] [Z79.01]  Anticoagulation monitoring  INR range 2-3 [Z79.01]  History of pulmonary embolism [Z86.711]  Personal history of DVT (deep vein thrombosis) [Z86.718]           Comments:           Anticoagulation Care Providers     Provider Role Specialty Phone number    Isma Martinez MD Referring Internal Medicine - Pediatrics [phone]    Stephane Freedman MD Referring Surgery  [phone]

## 2023-03-15 ENCOUNTER — HOSPITAL ENCOUNTER (OUTPATIENT)
Dept: GENERAL RADIOLOGY | Facility: OTHER | Age: 88
Discharge: HOME | End: 2023-03-15
Attending: NURSE PRACTITIONER
Payer: COMMERCIAL

## 2023-03-15 ENCOUNTER — OFFICE VISIT (OUTPATIENT)
Dept: FAMILY MEDICINE | Facility: OTHER | Age: 88
End: 2023-03-15
Attending: NURSE PRACTITIONER
Payer: COMMERCIAL

## 2023-03-15 VITALS
HEART RATE: 80 BPM | TEMPERATURE: 99.4 F | WEIGHT: 169.5 LBS | HEIGHT: 69 IN | DIASTOLIC BLOOD PRESSURE: 80 MMHG | RESPIRATION RATE: 20 BRPM | SYSTOLIC BLOOD PRESSURE: 136 MMHG | OXYGEN SATURATION: 96 % | BODY MASS INDEX: 25.1 KG/M2

## 2023-03-15 DIAGNOSIS — M25.561 ACUTE PAIN OF RIGHT KNEE: ICD-10-CM

## 2023-03-15 DIAGNOSIS — Z91.81 PERSONAL HISTORY OF FALL: ICD-10-CM

## 2023-03-15 DIAGNOSIS — M15.0 PRIMARY OSTEOARTHRITIS INVOLVING MULTIPLE JOINTS: Chronic | ICD-10-CM

## 2023-03-15 DIAGNOSIS — Z91.81 PERSONAL HISTORY OF FALL: Primary | ICD-10-CM

## 2023-03-15 DIAGNOSIS — M25.551 HIP PAIN, RIGHT: ICD-10-CM

## 2023-03-15 DIAGNOSIS — Z86.718 PERSONAL HISTORY OF DVT (DEEP VEIN THROMBOSIS): ICD-10-CM

## 2023-03-15 DIAGNOSIS — Z79.01 ANTICOAGULATION MONITORING, INR RANGE 2-3: ICD-10-CM

## 2023-03-15 DIAGNOSIS — Z79.01 LONG TERM CURRENT USE OF ANTICOAGULANT THERAPY: ICD-10-CM

## 2023-03-15 PROCEDURE — 73502 X-RAY EXAM HIP UNI 2-3 VIEWS: CPT

## 2023-03-15 PROCEDURE — 73562 X-RAY EXAM OF KNEE 3: CPT | Mod: RT

## 2023-03-15 PROCEDURE — 99215 OFFICE O/P EST HI 40 MIN: CPT | Performed by: NURSE PRACTITIONER

## 2023-03-15 PROCEDURE — G0463 HOSPITAL OUTPT CLINIC VISIT: HCPCS | Mod: 25 | Performed by: NURSE PRACTITIONER

## 2023-03-15 RX ORDER — CHLORHEXIDINE GLUCONATE ORAL RINSE 1.2 MG/ML
SOLUTION DENTAL
COMMUNITY
Start: 2022-11-28 | End: 2023-03-15

## 2023-03-15 ASSESSMENT — PAIN SCALES - GENERAL: PAINLEVEL: MODERATE PAIN (5)

## 2023-03-15 NOTE — NURSING NOTE
"Chief Complaint   Patient presents with     Hip Pain     Fell onto right hip 3/10/23. Right hip pain taking Warfarin, large bruise         Initial /80   Pulse 80   Temp 99.4  F (37.4  C) (Tympanic)   Resp 20   Ht 1.753 m (5' 9\")   Wt 76.9 kg (169 lb 8 oz)   SpO2 96%   BMI 25.03 kg/m   Estimated body mass index is 25.03 kg/m  as calculated from the following:    Height as of this encounter: 1.753 m (5' 9\").    Weight as of this encounter: 76.9 kg (169 lb 8 oz).         Norma J. Gosselin, ODALYS   "

## 2023-03-15 NOTE — PROGRESS NOTES
"HPI:    Michael Martinez is a 87 year old male who presents to [facility] today for right hip pain status post a fall. Fall on 3/10/23 when he was outside. Using walker at home, reports pain increasing down into his right knee which started just today.  He is on Coumadin.    ROS:  Refer to HPI    /80   Pulse 80   Temp 99.4  F (37.4  C) (Tympanic)   Resp 20   Ht 1.753 m (5' 9\")   Wt 76.9 kg (169 lb 8 oz)   SpO2 96%   BMI 25.03 kg/m      EXAM:  General Appearance: Well appearing male, appropriate appearance for age. No acute distress  Respiratory: normal chest wall and respirations.  Normal effort.  Clear to auscultation bilaterally, no wheezing, crackles or rhonchi.  No increased work of breathing.  No cough appreciated.  Cardiac: RRR with no murmurs  Musculoskeletal:  Equal movement of bilateral upper extremities.  Equal movement of bilateral lower extremities.  Pain to palpation on right hip.  Dermatological: no rashes noted of exposed skin. bruising noted on right hip.  Psychological: normal affect, alert, oriented, and pleasant.     Diagnostics:  Results for orders placed or performed during the hospital encounter of 03/15/23   XR Knee Right 3 Views     Status: None    Narrative    XR KNEE RIGHT 3 VIEWS    HISTORY: 87 years Male Personal history of fall; Acute pain of right  knee    COMPARISON: None    TECHNIQUE: 3 views right knee    FINDINGS: There is chondrocalcinosis and osteoarthritic change of  moderate severity. There is no evidence of fracture or dislocation.      Impression    IMPRESSION: Degenerative changes. No evidence of fracture or  dislocation.    JAUN VAZQUEZ MD         SYSTEM ID:  [id number]   Results for orders placed or performed during the hospital encounter of 03/15/23   XR Pelvis w Hip Right G/E 2 Views     Status: None    Narrative    XR PELVIS AND HIP RIGHT 2 VIEWS    HISTORY: 87 years Male Personal history of fall; Hip pain, right    COMPARISON: None    TECHNIQUE: Pelvis " and right hip 3 views    FINDINGS: Joint spaces are congruent. Articular surfaces are smooth.  There is no evidence of fracture or dislocation.    There are degenerative changes of the lumbar spine.      Impression    IMPRESSION: No evidence of pelvic or right hip fracture.    JAUN VAZQUEZ MD         SYSTEM ID:  [id number]       ASSESSMENT/PLAN:  Michael was seen today for hip pain.    Diagnoses and all orders for this visit:    Personal history of fall  -     XR Pelvis w Hip Right G/E 2 Views; negative for fracture  -     XR Knee Right 3 Views; negative for fracture    Hip pain, right  -     XR Pelvis w Hip Right G/E 2 Views; negative for fracture    Acute pain of right knee  -     XR Knee Right 3 Views; negative for fracture    Primary osteoarthritis involving multiple joints    Personal history of DVT (deep vein thrombosis)  Anticoagulation monitoring, INR range 2-3  Long-term (current) use of anticoagulants [Z79.01]  Patient is on long term warfarin for history of DVT. He reports his most recent INR check was 3.    Encouraged symptomatic treatment - May use over-the-counter Tylenol PRN for discomfort, swelling.      Discussed warning signs/symptoms indicative of need to follow-up.    Follow up with PCP or ED if symptoms persist or worsen or concerns.    I explained my diagnostic considerations and recommendations to the patient, who voiced understanding and agreement with the treatment plan. All questions were answered. We discussed potential side effects of any prescribed or recommended therapies, as well as expectations for response to treatments.    MICH Meade, AGNP-C  [facility]  03/15/2023 5:20 PM    Total time spent with this patient was 59 minutes which included chart review, visualization and interpretation of labs/images, time spent with the patient and documentation.

## 2023-03-15 NOTE — RESULT ENCOUNTER NOTE
Please call the patient with the results. Karl [phone] and call daughter Michelle [phone].    Negative for fractures. Use Tylenol for pain, ice/heat. Follow up if not improving.    MICH Meade, AGNP-C  Internal Medicine

## 2023-04-12 ENCOUNTER — ANTICOAGULATION THERAPY VISIT (OUTPATIENT)
Dept: ANTICOAGULATION | Facility: OTHER | Age: 88
End: 2023-04-12
Attending: INTERNAL MEDICINE
Payer: COMMERCIAL

## 2023-04-12 DIAGNOSIS — Z86.711 HISTORY OF PULMONARY EMBOLISM: ICD-10-CM

## 2023-04-12 DIAGNOSIS — Z79.01 LONG TERM CURRENT USE OF ANTICOAGULANT THERAPY: Primary | ICD-10-CM

## 2023-04-12 DIAGNOSIS — Z79.01 ANTICOAGULATION MONITORING, INR RANGE 2-3: ICD-10-CM

## 2023-04-12 DIAGNOSIS — Z86.718 PERSONAL HISTORY OF DVT (DEEP VEIN THROMBOSIS): ICD-10-CM

## 2023-04-12 LAB — INR POINT OF CARE: 2.9 (ref 0.9–1.1)

## 2023-04-12 PROCEDURE — 85610 PROTHROMBIN TIME: CPT | Mod: ZL,QW

## 2023-04-12 NOTE — PROGRESS NOTES
ANTICOAGULATION MANAGEMENT     Michael Martinez 87 year old male is on warfarin with therapeutic INR result. (Goal INR 2.0-3.0)    Recent labs: (last 7 days)     04/12/23  0908   INR 2.9*       ASSESSMENT       Source(s): Chart Review and In person       Warfarin doses taken: Warfarin taken as instructed    Diet: No new diet changes identified    New illness, injury, or hospitalization: No    Medication/supplement changes: None noted    Signs or symptoms of bleeding or clotting: No    Previous INR: Therapeutic last visit; previously outside of goal range    Additional findings: None       PLAN     Recommended plan for no diet, medication or health factor changes affecting INR     Dosing Instructions: Continue your current warfarin dose with next INR in 4 weeks       Summary  As of 4/12/2023    Full warfarin instructions:  5 mg every Mon, Wed, Fri; 7.5 mg all other days   Next INR check:  5/10/2023             In person    Lab visit scheduled    Education provided: Please call back if any changes to your diet, medications or how you've been taking warfarin    Plan made per [facility] anticoagulation protocol    Radha Seo RN  Anticoagulation Clinic  4/12/2023    _______________________________________________________________________     Anticoagulation Episode Summary     Current INR goal:  2.0-3.0   TTR:  54.3 % (1 y)   Target end date:  Indefinite   Send INR reminders to:  [facility]    Indications    Long-term (current) use of anticoagulants [Z79.01] [Z79.01]  Anticoagulation monitoring  INR range 2-3 [Z79.01]  History of pulmonary embolism [Z86.711]  Personal history of DVT (deep vein thrombosis) [Z86.718]           Comments:           Anticoagulation Care Providers     Provider Role Specialty Phone number    Isma Martinez MD Referring Internal Medicine [phone]    Stephane Freedman MD Referring Surgery [phone]

## 2023-05-24 ENCOUNTER — ANTICOAGULATION THERAPY VISIT (OUTPATIENT)
Dept: ANTICOAGULATION | Facility: OTHER | Age: 88
End: 2023-05-24
Attending: INTERNAL MEDICINE
Payer: COMMERCIAL

## 2023-05-24 DIAGNOSIS — Z86.718 PERSONAL HISTORY OF DVT (DEEP VEIN THROMBOSIS): ICD-10-CM

## 2023-05-24 DIAGNOSIS — Z79.01 LONG TERM CURRENT USE OF ANTICOAGULANT THERAPY: Primary | ICD-10-CM

## 2023-05-24 DIAGNOSIS — Z86.711 HISTORY OF PULMONARY EMBOLISM: ICD-10-CM

## 2023-05-24 DIAGNOSIS — Z79.01 ANTICOAGULATION MONITORING, INR RANGE 2-3: ICD-10-CM

## 2023-05-24 LAB — INR POINT OF CARE: 1.7 (ref 0.9–1.1)

## 2023-05-24 PROCEDURE — 85610 PROTHROMBIN TIME: CPT | Mod: ZL,QW

## 2023-05-24 NOTE — PROGRESS NOTES
ANTICOAGULATION MANAGEMENT     Michael Martinez 87 year old male is on warfarin with subtherapeutic INR result. (Goal INR 2.0-3.0)    Recent labs: (last 7 days)     05/24/23  1436   INR 1.7*       ASSESSMENT       Source(s): Chart Review and In person       Warfarin doses taken: Warfarin taken as instructed    Diet: No new diet changes identified    New illness, injury, or hospitalization: No    Medication/supplement changes: None noted    Signs or symptoms of bleeding or clotting: No    Previous INR: Therapeutic last 2(+) visits    Additional findings: None       PLAN     Recommended plan for no diet, medication or health factor changes affecting INR     Dosing Instructions: booster dose then continue your current warfarin dose with next INR in 2 weeks       Summary  As of 5/24/2023    Full warfarin instructions:  5/24: 7.5 mg; Otherwise 5 mg every Mon, Wed, Fri; 7.5 mg all other days   Next INR check:  6/7/2023             In person    Lab visit scheduled    Education provided: Please call back if any changes to your diet, medications or how you've been taking warfarin    Plan made per [facility] anticoagulation protocol    Radha Seo RN  Anticoagulation Clinic  5/24/2023    _______________________________________________________________________     Anticoagulation Episode Summary     Current INR goal:  2.0-3.0   TTR:  57.0 % (1 y)   Target end date:  Indefinite   Send INR reminders to:  [facility]    Indications    Long-term (current) use of anticoagulants [Z79.01] [Z79.01]  Anticoagulation monitoring  INR range 2-3 [Z79.01]  History of pulmonary embolism [Z86.711]  Personal history of DVT (deep vein thrombosis) [Z86.718]           Comments:           Anticoagulation Care Providers     Provider Role Specialty Phone number    Isma Martinez MD Referring Internal Medicine [phone]    Stephane Freedman MD Referring Surgery [phone]

## 2023-06-07 ENCOUNTER — ANTICOAGULATION THERAPY VISIT (OUTPATIENT)
Dept: ANTICOAGULATION | Facility: OTHER | Age: 88
End: 2023-06-07
Attending: INTERNAL MEDICINE
Payer: COMMERCIAL

## 2023-06-07 DIAGNOSIS — Z79.01 ANTICOAGULATION MONITORING, INR RANGE 2-3: ICD-10-CM

## 2023-06-07 DIAGNOSIS — Z86.711 HISTORY OF PULMONARY EMBOLISM: ICD-10-CM

## 2023-06-07 DIAGNOSIS — Z86.718 PERSONAL HISTORY OF DVT (DEEP VEIN THROMBOSIS): ICD-10-CM

## 2023-06-07 DIAGNOSIS — Z79.01 LONG TERM CURRENT USE OF ANTICOAGULANT THERAPY: Primary | ICD-10-CM

## 2023-06-07 LAB — INR POINT OF CARE: 2.4 (ref 0.9–1.1)

## 2023-06-07 PROCEDURE — 36416 COLLJ CAPILLARY BLOOD SPEC: CPT | Mod: ZL

## 2023-06-07 PROCEDURE — 85610 PROTHROMBIN TIME: CPT | Mod: ZL,QW

## 2023-06-07 NOTE — PROGRESS NOTES
ANTICOAGULATION MANAGEMENT     Michael Martinez 87 year old male is on warfarin with therapeutic INR result. (Goal INR 2.0-3.0)    Recent labs: (last 7 days)     06/07/23  0754   INR 2.4*       ASSESSMENT       Source(s): Chart Review and In person       Warfarin doses taken: Warfarin taken as instructed    Diet: No new diet changes identified    New illness, injury, or hospitalization: No    Medication/supplement changes: None noted    Signs or symptoms of bleeding or clotting: No    Previous INR: Subtherapeutic    Additional findings: None       PLAN     Recommended plan for no diet, medication or health factor changes affecting INR     Dosing Instructions: Continue your current warfarin dose with next INR in 4 weeks       Summary  As of 6/7/2023    Full warfarin instructions:  5 mg every Mon, Wed, Fri; 7.5 mg all other days   Next INR check:  7/5/2023             In person    Lab visit scheduled    Education provided: Please call back if any changes to your diet, medications or how you've been taking warfarin    Plan made per [facility] anticoagulation protocol    Radha Seo RN  Anticoagulation Clinic  6/7/2023    _______________________________________________________________________     Anticoagulation Episode Summary     Current INR goal:  2.0-3.0   TTR:  57.7 % (1 y)   Target end date:  Indefinite   Send INR reminders to:  [facility]    Indications    Long-term (current) use of anticoagulants [Z79.01] [Z79.01]  Anticoagulation monitoring  INR range 2-3 [Z79.01]  History of pulmonary embolism [Z86.711]  Personal history of DVT (deep vein thrombosis) [Z86.718]           Comments:           Anticoagulation Care Providers     Provider Role Specialty Phone number    Isma Martinez MD Referring Internal Medicine [phone]    Stephane Freedman MD Referring Surgery [phone]

## 2023-06-22 ENCOUNTER — TRANSFERRED RECORDS (OUTPATIENT)
Dept: HEALTH INFORMATION MANAGEMENT | Facility: OTHER | Age: 88
End: 2023-06-22
Payer: COMMERCIAL

## 2023-07-06 ENCOUNTER — ANTICOAGULATION THERAPY VISIT (OUTPATIENT)
Dept: ANTICOAGULATION | Facility: OTHER | Age: 88
End: 2023-07-06
Attending: INTERNAL MEDICINE
Payer: COMMERCIAL

## 2023-07-06 DIAGNOSIS — Z79.01 LONG TERM CURRENT USE OF ANTICOAGULANT THERAPY: Primary | ICD-10-CM

## 2023-07-06 DIAGNOSIS — Z86.711 HISTORY OF PULMONARY EMBOLISM: ICD-10-CM

## 2023-07-06 DIAGNOSIS — Z79.01 ANTICOAGULATION MONITORING, INR RANGE 2-3: ICD-10-CM

## 2023-07-06 DIAGNOSIS — Z86.718 PERSONAL HISTORY OF DVT (DEEP VEIN THROMBOSIS): ICD-10-CM

## 2023-07-06 LAB — INR POINT OF CARE: 3 (ref 0.9–1.1)

## 2023-07-06 PROCEDURE — 85610 PROTHROMBIN TIME: CPT | Mod: ZL,QW

## 2023-07-06 NOTE — PROGRESS NOTES
ANTICOAGULATION MANAGEMENT     Michael Martinez 87 year old male is on warfarin with therapeutic INR result. (Goal INR 2.0-3.0)    Recent labs: (last 7 days)     07/06/23  0955   INR 3.0*       ASSESSMENT       Source(s): Chart Review and In person       Warfarin doses taken: Warfarin taken as instructed    Diet: No new diet changes identified    New illness, injury, or hospitalization: No    Medication/supplement changes: None noted    Signs or symptoms of bleeding or clotting: No    Previous INR: Therapeutic last visit; previously outside of goal range    Additional findings: None       PLAN     Recommended plan for no diet, medication or health factor changes affecting INR     Dosing Instructions: Continue your current warfarin dose with next INR in 4 weeks       Summary  As of 7/6/2023    Full warfarin instructions:  5 mg every Mon, Wed, Fri; 7.5 mg all other days   Next INR check:  8/3/2023             In person    Lab visit scheduled    Education provided: Please call back if any changes to your diet, medications or how you've been taking warfarin    Plan made per [facility] anticoagulation protocol    Radha Seo RN  Anticoagulation Clinic  7/6/2023    _______________________________________________________________________     Anticoagulation Episode Summary     Current INR goal:  2.0-3.0   TTR:  58.2 % (1 y)   Target end date:  Indefinite   Send INR reminders to:  [facility]    Indications    Long-term (current) use of anticoagulants [Z79.01] [Z79.01]  Anticoagulation monitoring  INR range 2-3 [Z79.01]  History of pulmonary embolism [Z86.711]  Personal history of DVT (deep vein thrombosis) [Z86.718]           Comments:  likes to come every 4 weeks         Anticoagulation Care Providers     Provider Role Specialty Phone number    Isma Martinez MD Referring Internal Medicine [phone]    Stephane Freedman MD Referring Surgery [phone]

## 2023-07-25 ENCOUNTER — TELEPHONE (OUTPATIENT)
Dept: PEDIATRICS | Facility: OTHER | Age: 88
End: 2023-07-25
Payer: COMMERCIAL

## 2023-07-25 NOTE — TELEPHONE ENCOUNTER
Reason for call: Patient wanting a work in appointment.    Is the appointment for a Hospital Follow up?  no     (If yes - Unable to find an appointment with any provider during the time frame needed. Nurse/Provider - Can this patient be worked into a schedule with PCP or team member?)    Patient is having the following symptoms:  having balance issues 1 week    The patient is requesting an appointment with  [facility]    Was an appointment offered for this call? Yes    If Yes, what is the date of the appointment?  NA     Preferred method for responding to this message: Telephone Call    Phone number patient can be reached at? Home number on file [phone] (home)    If we can't reach you directly, may we leave a detailed response at the number you provided? Yes    Can this message wait until your PCP/provider returns if unavailable today? Yes      Laquita Mann on 7/25/2023 at 8:45 AM

## 2023-08-01 ENCOUNTER — OFFICE VISIT (OUTPATIENT)
Dept: FAMILY MEDICINE | Facility: OTHER | Age: 88
End: 2023-08-01
Attending: NURSE PRACTITIONER
Payer: COMMERCIAL

## 2023-08-01 ENCOUNTER — TELEPHONE (OUTPATIENT)
Dept: FAMILY MEDICINE | Facility: OTHER | Age: 88
End: 2023-08-01

## 2023-08-01 VITALS
WEIGHT: 160 LBS | SYSTOLIC BLOOD PRESSURE: 128 MMHG | RESPIRATION RATE: 18 BRPM | DIASTOLIC BLOOD PRESSURE: 74 MMHG | BODY MASS INDEX: 23.63 KG/M2 | TEMPERATURE: 97.6 F | HEART RATE: 60 BPM

## 2023-08-01 DIAGNOSIS — E11.9 DIABETES MELLITUS TYPE 2, DIET-CONTROLLED (H): ICD-10-CM

## 2023-08-01 DIAGNOSIS — F41.1 GAD (GENERALIZED ANXIETY DISORDER): Primary | ICD-10-CM

## 2023-08-01 LAB
ALBUMIN SERPL BCG-MCNC: 4.1 G/DL (ref 3.5–5.2)
ALP SERPL-CCNC: 120 U/L (ref 40–129)
ALT SERPL W P-5'-P-CCNC: 14 U/L (ref 0–70)
ANION GAP SERPL CALCULATED.3IONS-SCNC: 9 MMOL/L (ref 7–15)
AST SERPL W P-5'-P-CCNC: 22 U/L (ref 0–45)
BASOPHILS # BLD AUTO: 0.1 10E3/UL (ref 0–0.2)
BASOPHILS NFR BLD AUTO: 1 %
BILIRUB SERPL-MCNC: 1 MG/DL
BUN SERPL-MCNC: 18.7 MG/DL (ref 8–23)
CALCIUM SERPL-MCNC: 9.7 MG/DL (ref 8.8–10.2)
CHLORIDE SERPL-SCNC: 103 MMOL/L (ref 98–107)
CREAT SERPL-MCNC: 1.06 MG/DL (ref 0.67–1.17)
CREAT UR-MCNC: 132.7 MG/DL
DEPRECATED HCO3 PLAS-SCNC: 26 MMOL/L (ref 22–29)
EOSINOPHIL # BLD AUTO: 0.1 10E3/UL (ref 0–0.7)
EOSINOPHIL NFR BLD AUTO: 2 %
ERYTHROCYTE [DISTWIDTH] IN BLOOD BY AUTOMATED COUNT: 13.6 % (ref 10–15)
GFR SERPL CREATININE-BSD FRML MDRD: 68 ML/MIN/1.73M2
GLUCOSE SERPL-MCNC: 118 MG/DL (ref 70–99)
HBA1C MFR BLD: 6.4 % (ref 4–6.2)
HCT VFR BLD AUTO: 45.3 % (ref 40–53)
HGB BLD-MCNC: 15.3 G/DL (ref 13.3–17.7)
HOLD SPECIMEN: NORMAL
IMM GRANULOCYTES # BLD: 0 10E3/UL
IMM GRANULOCYTES NFR BLD: 0 %
LYMPHOCYTES # BLD AUTO: 1.6 10E3/UL (ref 0.8–5.3)
LYMPHOCYTES NFR BLD AUTO: 27 %
MCH RBC QN AUTO: 29.8 PG (ref 26.5–33)
MCHC RBC AUTO-ENTMCNC: 33.8 G/DL (ref 31.5–36.5)
MCV RBC AUTO: 88 FL (ref 78–100)
MICROALBUMIN UR-MCNC: 24.6 MG/L
MICROALBUMIN/CREAT UR: 18.54 MG/G CR (ref 0–17)
MONOCYTES # BLD AUTO: 0.5 10E3/UL (ref 0–1.3)
MONOCYTES NFR BLD AUTO: 9 %
NEUTROPHILS # BLD AUTO: 3.6 10E3/UL (ref 1.6–8.3)
NEUTROPHILS NFR BLD AUTO: 61 %
NRBC # BLD AUTO: 0 10E3/UL
NRBC BLD AUTO-RTO: 0 /100
PLATELET # BLD AUTO: 226 10E3/UL (ref 150–450)
POTASSIUM SERPL-SCNC: 4.4 MMOL/L (ref 3.4–5.3)
PROT SERPL-MCNC: 7.4 G/DL (ref 6.4–8.3)
RBC # BLD AUTO: 5.13 10E6/UL (ref 4.4–5.9)
SODIUM SERPL-SCNC: 138 MMOL/L (ref 136–145)
WBC # BLD AUTO: 6 10E3/UL (ref 4–11)

## 2023-08-01 PROCEDURE — 83036 HEMOGLOBIN GLYCOSYLATED A1C: CPT | Mod: ZL | Performed by: NURSE PRACTITIONER

## 2023-08-01 PROCEDURE — 99213 OFFICE O/P EST LOW 20 MIN: CPT | Performed by: NURSE PRACTITIONER

## 2023-08-01 PROCEDURE — 82570 ASSAY OF URINE CREATININE: CPT | Mod: ZL | Performed by: NURSE PRACTITIONER

## 2023-08-01 PROCEDURE — G0463 HOSPITAL OUTPT CLINIC VISIT: HCPCS

## 2023-08-01 PROCEDURE — 85025 COMPLETE CBC W/AUTO DIFF WBC: CPT | Mod: ZL | Performed by: NURSE PRACTITIONER

## 2023-08-01 PROCEDURE — 80053 COMPREHEN METABOLIC PANEL: CPT | Mod: ZL | Performed by: NURSE PRACTITIONER

## 2023-08-01 PROCEDURE — 36415 COLL VENOUS BLD VENIPUNCTURE: CPT | Mod: ZL | Performed by: NURSE PRACTITIONER

## 2023-08-01 ASSESSMENT — ANXIETY QUESTIONNAIRES
3. WORRYING TOO MUCH ABOUT DIFFERENT THINGS: MORE THAN HALF THE DAYS
7. FEELING AFRAID AS IF SOMETHING AWFUL MIGHT HAPPEN: SEVERAL DAYS
1. FEELING NERVOUS, ANXIOUS, OR ON EDGE: MORE THAN HALF THE DAYS
GAD7 TOTAL SCORE: 13
GAD7 TOTAL SCORE: 13
2. NOT BEING ABLE TO STOP OR CONTROL WORRYING: MORE THAN HALF THE DAYS
IF YOU CHECKED OFF ANY PROBLEMS ON THIS QUESTIONNAIRE, HOW DIFFICULT HAVE THESE PROBLEMS MADE IT FOR YOU TO DO YOUR WORK, TAKE CARE OF THINGS AT HOME, OR GET ALONG WITH OTHER PEOPLE: SOMEWHAT DIFFICULT
4. TROUBLE RELAXING: MORE THAN HALF THE DAYS
6. BECOMING EASILY ANNOYED OR IRRITABLE: MORE THAN HALF THE DAYS
8. IF YOU CHECKED OFF ANY PROBLEMS, HOW DIFFICULT HAVE THESE MADE IT FOR YOU TO DO YOUR WORK, TAKE CARE OF THINGS AT HOME, OR GET ALONG WITH OTHER PEOPLE?: SOMEWHAT DIFFICULT
5. BEING SO RESTLESS THAT IT IS HARD TO SIT STILL: MORE THAN HALF THE DAYS
7. FEELING AFRAID AS IF SOMETHING AWFUL MIGHT HAPPEN: SEVERAL DAYS
GAD7 TOTAL SCORE: 13

## 2023-08-01 ASSESSMENT — PATIENT HEALTH QUESTIONNAIRE - PHQ9
SUM OF ALL RESPONSES TO PHQ QUESTIONS 1-9: 12
SUM OF ALL RESPONSES TO PHQ QUESTIONS 1-9: 12
10. IF YOU CHECKED OFF ANY PROBLEMS, HOW DIFFICULT HAVE THESE PROBLEMS MADE IT FOR YOU TO DO YOUR WORK, TAKE CARE OF THINGS AT HOME, OR GET ALONG WITH OTHER PEOPLE: SOMEWHAT DIFFICULT

## 2023-08-01 ASSESSMENT — PAIN SCALES - GENERAL: PAINLEVEL: NO PAIN (0)

## 2023-08-01 NOTE — TELEPHONE ENCOUNTER
DMO-patient thinks that DMO called with test results from 08.01.23    Please call and advise    Thank You  Emily Bhatti on 8/1/2023 at 1:45 PM

## 2023-08-01 NOTE — PROGRESS NOTES
"  Assessment & Plan   Problem List Items Addressed This Visit          Endocrine    Diabetes mellitus type 2, diet-controlled (H)    Relevant Orders    Hemoglobin A1c (Completed)    CBC and Differential (Completed)    Comprehensive Metabolic Panel (Completed)    Albumin Random Urine Quantitative with Creat Ratio (Completed)       Behavioral    OLIVIA (generalized anxiety disorder) - Primary      He request labs to be completed today which were as noted below.  All labs are stable.  We did discuss his symptoms are likely related to depression and anxiety.  He is at the maximum dose of citalopram at this time.  I did recommend referral to [facility] for therapy which he adamantly declines.  He states he is done this before, knows what they are going to ask him and does not want to talk to anyone.  He will continue to work with his Jehovah's witness for therapy as needed.  Would recommend follow-up over the next 2 to 4 weeks if symptoms are not continuing to improve for consideration of alternative versus adjunctive treatment for depression and anxiety.    Review of the result(s) of each unique test - A1C, CBC, CMP  Ordering of each unique test  23 minutes spent by me on the date of the encounter doing chart review, history and exam, documentation and further activities per the note       BMI:   Estimated body mass index is 23.63 kg/m  as calculated from the following:    Height as of 3/15/23: 1.753 m (5' 9\").    Weight as of this encounter: 72.6 kg (160 lb).       Depression Screening Follow Up        8/1/2023    10:07 AM   PHQ   PHQ-9 Total Score 12   Q9: Thoughts of better off dead/self-harm past 2 weeks Not at all         Follow Up Actions Taken  Crisis resource information provided in After Visit Summary  Patient declined referral.         No follow-ups on file.    M[facility]    Subjective   Karl is a 87 year old, presenting for the following health issues:  Balance/ " Vestibular      History of Present Illness       Mental Health Follow-up:  Patient presents to follow-up on Depression & Anxiety.Patient's depression since last visit has been:  No change  The patient is having other symptoms associated with depression.  Patient's anxiety since last visit has been:  Medium  The patient is not having other symptoms associated with anxiety.  Any significant life events: relationship concerns  Patient is not feeling anxious or having panic attacks.  Patient has no concerns about alcohol or drug use.    He eats 2-3 servings of fruits and vegetables daily.He consumes 1 sweetened beverage(s) daily.He exercises with enough effort to increase his heart rate 20 to 29 minutes per day.  He exercises with enough effort to increase his heart rate 7 days per week. He is missing 1 dose(s) of medications per week.  He is not taking prescribed medications regularly due to other.     He comes in today because overall he is not feeling well.  He states he has been feeling fatigued and anxious for the past year, this seems to be getting worse.  He has not a lot of family issues, his children are not getting along, his son has had life issues and was living with him for a while.  He was placed on citalopram in April, he only took this Monday Wednesday Friday, about a month ago he has health insurance assessment talk to him about taking his daily.  He does report he has been taking this daily and is starting to show some improvement in his symptoms.  He continues to exercise daily but feels tired all the time.    Review of Systems   See above      Objective    /74   Pulse 60   Temp 97.6  F (36.4  C)   Resp 18   Wt 72.6 kg (160 lb)   BMI 23.63 kg/m    Body mass index is 23.63 kg/m .  Physical Exam   GENERAL: healthy, alert and no distress  EYES: Eyes grossly normal to inspection  RESP: lungs clear to auscultation - no rales, rhonchi or wheezes  CV: regular rate and rhythm, normal S1 S2, no  peripheral edema  NEURO: Normal strength and tone, mentation intact and speech normal  PSYCH: mentation appears normal, affect normal/bright    Results for orders placed or performed in visit on 08/01/23 (from the past 24 hour(s))   Hemoglobin A1c   Result Value Ref Range    Hemoglobin A1C 6.4 (H) 4.0 - 6.2 %   CBC and Differential    Narrative    The following orders were created for panel order CBC and Differential.  Procedure                               Abnormality         Status                     ---------                               -----------         ------                     CBC with platelets and d...[[id number]]                      Final result                 Please view results for these tests on the individual orders.   Comprehensive Metabolic Panel   Result Value Ref Range    Sodium 138 136 - 145 mmol/L    Potassium 4.4 3.4 - 5.3 mmol/L    Chloride 103 98 - 107 mmol/L    Carbon Dioxide (CO2) 26 22 - 29 mmol/L    Anion Gap 9 7 - 15 mmol/L    Urea Nitrogen 18.7 8.0 - 23.0 mg/dL    Creatinine 1.06 0.67 - 1.17 mg/dL    Calcium 9.7 8.8 - 10.2 mg/dL    Glucose 118 (H) 70 - 99 mg/dL    Alkaline Phosphatase 120 40 - 129 U/L    AST 22 0 - 45 U/L    ALT 14 0 - 70 U/L    Protein Total 7.4 6.4 - 8.3 g/dL    Albumin 4.1 3.5 - 5.2 g/dL    Bilirubin Total 1.0 <=1.2 mg/dL    GFR Estimate 68 >60 mL/min/1.73m2   Albumin Random Urine Quantitative with Creat Ratio   Result Value Ref Range    Creatinine Urine mg/dL 132.7 mg/dL    Albumin Urine mg/L 24.6 mg/L    Albumin Urine mg/g Cr 18.54 (H) 0.00 - 17.00 mg/g Cr   Extra Tube    Narrative    The following orders were created for panel order Extra Tube.  Procedure                               Abnormality         Status                     ---------                               -----------         ------                     Extra Serum Separator Tu...[[id number]]                      In process                   Please view results for these tests on the individual  orders.   CBC with platelets and differential   Result Value Ref Range    WBC Count 6.0 4.0 - 11.0 10e3/uL    RBC Count 5.13 4.40 - 5.90 10e6/uL    Hemoglobin 15.3 13.3 - 17.7 g/dL    Hematocrit 45.3 40.0 - 53.0 %    MCV 88 78 - 100 fL    MCH 29.8 26.5 - 33.0 pg    MCHC 33.8 31.5 - 36.5 g/dL    RDW 13.6 10.0 - 15.0 %    Platelet Count 226 150 - 450 10e3/uL    % Neutrophils 61 %    % Lymphocytes 27 %    % Monocytes 9 %    % Eosinophils 2 %    % Basophils 1 %    % Immature Granulocytes 0 %    NRBCs per 100 WBC 0 <1 /100    Absolute Neutrophils 3.6 1.6 - 8.3 10e3/uL    Absolute Lymphocytes 1.6 0.8 - 5.3 10e3/uL    Absolute Monocytes 0.5 0.0 - 1.3 10e3/uL    Absolute Eosinophils 0.1 0.0 - 0.7 10e3/uL    Absolute Basophils 0.1 0.0 - 0.2 10e3/uL    Absolute Immature Granulocytes 0.0 <=0.4 10e3/uL    Absolute NRBCs 0.0 10e3/uL

## 2023-08-01 NOTE — TELEPHONE ENCOUNTER
Patient notified of results and transferred to appointment line to set up follow up appt.   Jacklyn Yates LPN ..........8/1/2023 3:06 PM

## 2023-08-01 NOTE — NURSING NOTE
Patient presents today for balance issues.    Medication Reconciliation Complete    Alison Tinoco LPN  8/1/2023 10:21 AM

## 2023-08-07 ENCOUNTER — ANTICOAGULATION THERAPY VISIT (OUTPATIENT)
Dept: ANTICOAGULATION | Facility: OTHER | Age: 88
End: 2023-08-07
Attending: INTERNAL MEDICINE
Payer: COMMERCIAL

## 2023-08-07 DIAGNOSIS — Z79.01 ANTICOAGULATION MONITORING, INR RANGE 2-3: ICD-10-CM

## 2023-08-07 DIAGNOSIS — Z86.711 HISTORY OF PULMONARY EMBOLISM: ICD-10-CM

## 2023-08-07 DIAGNOSIS — Z79.01 LONG TERM CURRENT USE OF ANTICOAGULANT THERAPY: Primary | ICD-10-CM

## 2023-08-07 DIAGNOSIS — Z86.718 PERSONAL HISTORY OF DVT (DEEP VEIN THROMBOSIS): ICD-10-CM

## 2023-08-07 LAB — INR POINT OF CARE: 2.6 (ref 0.9–1.1)

## 2023-08-07 PROCEDURE — 85610 PROTHROMBIN TIME: CPT | Mod: ZL,QW

## 2023-08-07 PROCEDURE — 36416 COLLJ CAPILLARY BLOOD SPEC: CPT | Mod: ZL

## 2023-08-07 NOTE — PROGRESS NOTES
ANTICOAGULATION MANAGEMENT     Michael Martinez 87 year old male is on warfarin with therapeutic INR result. (Goal INR 2.0-3.0)    Recent labs: (last 7 days)     08/07/23  0903   INR 2.6*       ASSESSMENT     Source(s): Chart Review and In person      Warfarin doses taken: Warfarin taken as instructed  Diet: No new diet changes identified  New illness, injury, or hospitalization: No  Medication/supplement changes: None noted  Signs or symptoms of bleeding or clotting: No  Previous INR: Therapeutic last 2(+) visits  Additional findings: None     PLAN     Recommended plan for no diet, medication or health factor changes affecting INR     Dosing Instructions: Continue your current warfarin dose with next INR in 4 weeks       Summary  As of 8/7/2023      Full warfarin instructions:  5 mg every Mon, Wed, Fri; 7.5 mg all other days   Next INR check:  9/5/2023               In person    Lab visit scheduled    Education provided: Please call back if any changes to your diet, medications or how you've been taking warfarin    Plan made per [facility] anticoagulation protocol    Radha Seo RN  Anticoagulation Clinic  8/7/2023    _______________________________________________________________________     Anticoagulation Episode Summary       Current INR goal:  2.0-3.0   TTR:  58.2 % (1 y)   Target end date:  Indefinite   Send INR reminders to:  [facility]    Indications    Long-term (current) use of anticoagulants [Z79.01] [Z79.01]  Anticoagulation monitoring  INR range 2-3 [Z79.01]  History of pulmonary embolism [Z86.711]  Personal history of DVT (deep vein thrombosis) [Z86.718]             Comments:  likes to come every 4 weeks             Anticoagulation Care Providers       Provider Role Specialty Phone number    Isma Martinez MD Referring Internal Medicine [phone]    Stephane Freedman MD Referring Surgery [phone]

## 2023-08-27 ENCOUNTER — APPOINTMENT (OUTPATIENT)
Dept: CT IMAGING | Facility: OTHER | Age: 88
End: 2023-08-27
Attending: STUDENT IN AN ORGANIZED HEALTH CARE EDUCATION/TRAINING PROGRAM
Payer: COMMERCIAL

## 2023-08-27 ENCOUNTER — HOSPITAL ENCOUNTER (EMERGENCY)
Facility: OTHER | Age: 88
Discharge: HOME | End: 2023-08-27
Attending: FAMILY MEDICINE | Admitting: FAMILY MEDICINE
Payer: COMMERCIAL

## 2023-08-27 VITALS
SYSTOLIC BLOOD PRESSURE: 163 MMHG | DIASTOLIC BLOOD PRESSURE: 99 MMHG | RESPIRATION RATE: 16 BRPM | TEMPERATURE: 97.1 F | OXYGEN SATURATION: 95 % | HEART RATE: 49 BPM

## 2023-08-27 DIAGNOSIS — M54.50 LEFT-SIDED LOW BACK PAIN WITHOUT SCIATICA, UNSPECIFIED CHRONICITY: ICD-10-CM

## 2023-08-27 DIAGNOSIS — R00.1 BRADYCARDIA: ICD-10-CM

## 2023-08-27 LAB
ALBUMIN UR-MCNC: NEGATIVE MG/DL
ANION GAP SERPL CALCULATED.3IONS-SCNC: 12 MMOL/L (ref 7–15)
APPEARANCE UR: CLEAR
ATRIAL RATE - MUSE: 62 BPM
BASOPHILS # BLD AUTO: 0.1 10E3/UL (ref 0–0.2)
BASOPHILS NFR BLD AUTO: 1 %
BILIRUB UR QL STRIP: NEGATIVE
BUN SERPL-MCNC: 16.6 MG/DL (ref 8–23)
CALCIUM SERPL-MCNC: 9.6 MG/DL (ref 8.8–10.2)
CHLORIDE SERPL-SCNC: 97 MMOL/L (ref 98–107)
COLOR UR AUTO: ABNORMAL
CREAT SERPL-MCNC: 0.93 MG/DL (ref 0.67–1.17)
DEPRECATED HCO3 PLAS-SCNC: 22 MMOL/L (ref 22–29)
DIASTOLIC BLOOD PRESSURE - MUSE: NORMAL MMHG
EOSINOPHIL # BLD AUTO: 0.2 10E3/UL (ref 0–0.7)
EOSINOPHIL NFR BLD AUTO: 2 %
ERYTHROCYTE [DISTWIDTH] IN BLOOD BY AUTOMATED COUNT: 13.6 % (ref 10–15)
GFR SERPL CREATININE-BSD FRML MDRD: 80 ML/MIN/1.73M2
GLUCOSE SERPL-MCNC: 147 MG/DL (ref 70–99)
GLUCOSE UR STRIP-MCNC: NEGATIVE MG/DL
HCT VFR BLD AUTO: 43.3 % (ref 40–53)
HGB BLD-MCNC: 14.8 G/DL (ref 13.3–17.7)
HGB UR QL STRIP: ABNORMAL
HOLD SPECIMEN: NORMAL
IMM GRANULOCYTES # BLD: 0 10E3/UL
IMM GRANULOCYTES NFR BLD: 1 %
INTERPRETATION ECG - MUSE: NORMAL
KETONES UR STRIP-MCNC: NEGATIVE MG/DL
LEUKOCYTE ESTERASE UR QL STRIP: NEGATIVE
LYMPHOCYTES # BLD AUTO: 1.8 10E3/UL (ref 0.8–5.3)
LYMPHOCYTES NFR BLD AUTO: 21 %
MCH RBC QN AUTO: 29.5 PG (ref 26.5–33)
MCHC RBC AUTO-ENTMCNC: 34.2 G/DL (ref 31.5–36.5)
MCV RBC AUTO: 86 FL (ref 78–100)
MONOCYTES # BLD AUTO: 0.8 10E3/UL (ref 0–1.3)
MONOCYTES NFR BLD AUTO: 9 %
MUCOUS THREADS #/AREA URNS LPF: PRESENT /LPF
NEUTROPHILS # BLD AUTO: 5.8 10E3/UL (ref 1.6–8.3)
NEUTROPHILS NFR BLD AUTO: 66 %
NITRATE UR QL: NEGATIVE
NRBC # BLD AUTO: 0 10E3/UL
NRBC BLD AUTO-RTO: 0 /100
P AXIS - MUSE: 50 DEGREES
PH UR STRIP: 7.5 [PH] (ref 5–7)
PLATELET # BLD AUTO: 245 10E3/UL (ref 150–450)
POTASSIUM SERPL-SCNC: 4.2 MMOL/L (ref 3.4–5.3)
PR INTERVAL - MUSE: 190 MS
QRS DURATION - MUSE: 88 MS
QT - MUSE: 446 MS
QTC - MUSE: 452 MS
R AXIS - MUSE: -4 DEGREES
RBC # BLD AUTO: 5.01 10E6/UL (ref 4.4–5.9)
RBC URINE: 5 /HPF
SODIUM SERPL-SCNC: 131 MMOL/L (ref 136–145)
SP GR UR STRIP: 1.03 (ref 1–1.03)
SYSTOLIC BLOOD PRESSURE - MUSE: NORMAL MMHG
T AXIS - MUSE: 66 DEGREES
UROBILINOGEN UR STRIP-MCNC: NORMAL MG/DL
VENTRICULAR RATE- MUSE: 62 BPM
WBC # BLD AUTO: 8.6 10E3/UL (ref 4–11)
WBC URINE: 2 /HPF

## 2023-08-27 PROCEDURE — 93005 ELECTROCARDIOGRAM TRACING: CPT

## 2023-08-27 PROCEDURE — 96374 THER/PROPH/DIAG INJ IV PUSH: CPT | Mod: XU

## 2023-08-27 PROCEDURE — 93010 ELECTROCARDIOGRAM REPORT: CPT | Performed by: INTERNAL MEDICINE

## 2023-08-27 PROCEDURE — 36415 COLL VENOUS BLD VENIPUNCTURE: CPT | Performed by: STUDENT IN AN ORGANIZED HEALTH CARE EDUCATION/TRAINING PROGRAM

## 2023-08-27 PROCEDURE — 250N000013 HC RX MED GY IP 250 OP 250 PS 637: Performed by: FAMILY MEDICINE

## 2023-08-27 PROCEDURE — 250N000011 HC RX IP 250 OP 636: Performed by: FAMILY MEDICINE

## 2023-08-27 PROCEDURE — 96376 TX/PRO/DX INJ SAME DRUG ADON: CPT

## 2023-08-27 PROCEDURE — 250N000011 HC RX IP 250 OP 636: Performed by: STUDENT IN AN ORGANIZED HEALTH CARE EDUCATION/TRAINING PROGRAM

## 2023-08-27 PROCEDURE — 80048 BASIC METABOLIC PNL TOTAL CA: CPT | Performed by: STUDENT IN AN ORGANIZED HEALTH CARE EDUCATION/TRAINING PROGRAM

## 2023-08-27 PROCEDURE — 93005 ELECTROCARDIOGRAM TRACING: CPT | Mod: 76

## 2023-08-27 PROCEDURE — 74177 CT ABD & PELVIS W/CONTRAST: CPT | Mod: TC

## 2023-08-27 PROCEDURE — 85025 COMPLETE CBC W/AUTO DIFF WBC: CPT | Performed by: STUDENT IN AN ORGANIZED HEALTH CARE EDUCATION/TRAINING PROGRAM

## 2023-08-27 PROCEDURE — 99285 EMERGENCY DEPT VISIT HI MDM: CPT | Mod: 25

## 2023-08-27 PROCEDURE — 99284 EMERGENCY DEPT VISIT MOD MDM: CPT | Performed by: FAMILY MEDICINE

## 2023-08-27 PROCEDURE — 81001 URINALYSIS AUTO W/SCOPE: CPT | Performed by: STUDENT IN AN ORGANIZED HEALTH CARE EDUCATION/TRAINING PROGRAM

## 2023-08-27 RX ORDER — ACETAMINOPHEN 500 MG
1000 TABLET ORAL ONCE
Status: COMPLETED | OUTPATIENT
Start: 2023-08-27 | End: 2023-08-27

## 2023-08-27 RX ORDER — OXYCODONE AND ACETAMINOPHEN 5; 325 MG/1; MG/1
1 TABLET ORAL ONCE
Status: DISCONTINUED | OUTPATIENT
Start: 2023-08-27 | End: 2023-08-27 | Stop reason: HOSPADM

## 2023-08-27 RX ORDER — IOPAMIDOL 755 MG/ML
93 INJECTION, SOLUTION INTRAVASCULAR ONCE
Status: COMPLETED | OUTPATIENT
Start: 2023-08-27 | End: 2023-08-27

## 2023-08-27 RX ORDER — OXYCODONE AND ACETAMINOPHEN 5; 325 MG/1; MG/1
1 TABLET ORAL EVERY 6 HOURS PRN
Qty: 18 TABLET | Refills: 0 | Status: SHIPPED | OUTPATIENT
Start: 2023-08-27 | End: 2023-08-28

## 2023-08-27 RX ORDER — MORPHINE SULFATE 4 MG/ML
4 INJECTION, SOLUTION INTRAMUSCULAR; INTRAVENOUS ONCE
Status: COMPLETED | OUTPATIENT
Start: 2023-08-27 | End: 2023-08-27

## 2023-08-27 RX ADMIN — ACETAMINOPHEN 1000 MG: 500 TABLET ORAL at 08:35

## 2023-08-27 RX ADMIN — MORPHINE SULFATE 4 MG: 4 INJECTION, SOLUTION INTRAMUSCULAR; INTRAVENOUS at 08:35

## 2023-08-27 RX ADMIN — IOPAMIDOL 93 ML: 755 INJECTION, SOLUTION INTRAVENOUS at 07:05

## 2023-08-27 RX ADMIN — MORPHINE SULFATE 4 MG: 4 INJECTION, SOLUTION INTRAMUSCULAR; INTRAVENOUS at 10:02

## 2023-08-27 ASSESSMENT — ENCOUNTER SYMPTOMS
ABDOMINAL PAIN: 0
FEVER: 0
CHEST TIGHTNESS: 0
PALPITATIONS: 0
CHILLS: 0

## 2023-08-27 ASSESSMENT — ACTIVITIES OF DAILY LIVING (ADL)
ADLS_ACUITY_SCORE: 35

## 2023-08-27 NOTE — DISCHARGE INSTRUCTIONS
It was a pleasure taking care of you today Mr. Martinez.  Regarding your back pain we did not identify any emergency that needs immediate intervention.  It is my impression that you are having some sacroiliac pain.  I did place a physical therapy referral for you and sent a pain prescription to jamel Patel for you.  Return to ED with uncontrolled pain, leg weakness or pain radiating down the leg, fever or other concerns related to your back.  You did have a reassuring CT scan today that did not reveal any emergencies.    We did identify that your heart rate was pretty low in the ED.  This appears to be asymptomatic while you are here in the ED.  Recommend holding your metoprolol however, keeping an eye on your pulse and blood pressure until you can follow-up with your primary care doctor.  Return to ED with any passing out, severe lightheadedness or severe weakness.

## 2023-08-27 NOTE — ED PROVIDER NOTES
History     Chief Complaint   Patient presents with    Dysuria    Rectal/perineal Pain     HPI  Michael Martinez is a 87 year old male with history of diabetes, osteoarthritis, anxiety, hyperlipidemia, peripheral arterial disease, coronary artery disease, anticoagulation status, bypass, PE, DVT who presents to the emergency department with back pain, pelvic pain occasional dysuria.  No fevers or chills.  No chest pain or shortness of breath.  He told triage nurse initially that he was having rectal pain and ribbonlike stools, thusly Dr. Gomez ordered a CT concerned about possible occult malignancy.  Patient relates more of a story of left lower back pain to me.  Patient has had no syncope, near syncope or lightheadedness.  No recent med changes.    Allergies:  Allergies   Allergen Reactions    Influenza Vac Split [Influenza Virus Vaccine]      guillan barre       Problem List:    Patient Active Problem List    Diagnosis Date Noted    Diabetes mellitus type 2, diet-controlled (H) 01/21/2022     Priority: Medium    Primary osteoarthritis involving multiple joints 04/23/2020     Priority: Medium    Cervical arthritis 08/06/2019     Priority: Medium    OLIVIA (generalized anxiety disorder) 04/19/2019     Priority: Medium    Mixed hyperlipidemia 06/21/2018     Priority: Medium    Peripheral arterial disease (H) 06/21/2018     Priority: Medium    History of Guillain-[place] syndrome 06/04/2018     Priority: Medium    Gastroesophageal reflux disease, esophagitis presence not specified 06/04/2018     Priority: Medium     IMO Regulatory Load OCT 2020      Primary osteoarthritis of left knee 06/04/2018     Priority: Medium    Other pulmonary embolism without acute cor pulmonale 02/11/2018     Priority: Medium    Lumbar disc disease 04/25/2017     Priority: Medium    Primary osteoarthritis of right hand 04/25/2017     Priority: Medium    Spinal stenosis, lumbar 04/25/2017     Priority: Medium    Long-term (current) use of  anticoagulants [Z79.01] 11/18/2016     Priority: Medium    History of pulmonary embolism 11/16/2016     Priority: Medium    Abdominal aortic aneurysm without rupture (H) 11/16/2016     Priority: Medium     Last imaged 11/15: 3.3cm 9 when discussed with radiology, not reflected in report) Brandy Tucker MD       Anticoagulation monitoring, INR range 2-3 11/16/2016     Priority: Medium    Personal history of DVT (deep vein thrombosis) 10/29/2016     Priority: Medium     10/29/16      Bladder outlet obstruction 10/20/2015     Priority: Medium    DJD (degenerative joint disease) of cervical spine 07/22/2015     Priority: Medium    Ischemic cardiomyopathy 07/09/2014     Priority: Medium    History of four vessel coronary artery bypass graft 04/22/2014     Priority: Medium    H/O transurethral resection of prostate 04/22/2014     Priority: Medium    S/P CABG x 4 04/22/2014     Priority: Medium    S/P TURP 04/22/2014     Priority: Medium    Lesion of ulnar nerve 05/21/2012     Priority: Medium    Diaphragmatic hernia 01/06/2004     Priority: Medium    Osteoarthrosis involving lower leg 10/15/2003     Priority: Medium     Replacing diagnoses that were inactivated after the 10/1/2021 regulatory import.      Cataract 08/19/2002     Priority: Medium        Past Medical History:    Past Medical History:   Diagnosis Date    Cerebral infarction (H)     Edema     Embolism and thrombosis of right tibial vein (H)     Enlarged prostate with lower urinary tract symptoms (LUTS)     Guillain Barré syndrome (H)     Ischemic cardiomyopathy     Osteoarthritis     Other ill-defined and unknown causes of morbidity and mortality     Urinary tract infection        Past Surgical History:    Past Surgical History:   Procedure Laterality Date    ARTHROSCOPY SHOULDER ROTATOR CUFF REPAIR      1996    BIOPSY PROSTATE TRANSRECTAL      No Comments Provided    BYPASS GRAFT ARTERY CORONARY      December of 2013,four-vessel    COLONOSCOPY       10/31/2013,diverticulosis-no fu needed d/t age    CYSTOSCOPY, TRANSURETHRAL RESECTION (TUR) PROSTATE, COMBINED      2014,done in [place] -- excellent result    OTHER SURGICAL HISTORY      2013,41031.0,([place]) [place] INJ PROC SELECTIVE CORONARY ANGIOGRAPHY,LAD-95% ggwcnswj-80-82% distal stenosis.  Ramus-80% stenosis.  Right coronary-high-grade 90% stenosis.  EF-45%       Family History:    Family History   Problem Relation Age of Onset    Heart Disease Mother         Heart Disease    Other - See Comments Father         No Known Problems    Breast Cancer Sister         Cancer-breast,Otherwise healthy    Other - See Comments Brother         BPH otherwise healthy at 72    Arthritis Brother         Arthritis,Osteoarthritis, otherwise healthy at 66    Colon Cancer Brother         Cancer-colon    Breast Cancer Sister 59        Cancer-breast,       Social History:  Marital Status:  Single [1]  Social History     Tobacco Use    Smoking status: Former     Packs/day: 0.90     Years: 9.00     Pack years: 8.10     Types: Cigarettes    Smokeless tobacco: Never   Vaping Use    Vaping Use: Never used   Substance Use Topics    Alcohol use: Yes     Alcohol/week: 3.0 standard drinks of alcohol     Types: 3 Cans of beer per week     Comment: beer 3 times per week     Drug use: Never        Medications:    oxyCODONE-acetaminophen (PERCOCET) 5-325 MG tablet  aspirin EC 81 MG tablet  atorvastatin (LIPITOR) 10 MG tablet  Cholecalciferol (VITAMIN D-3) 1000 units CAPS  citalopram (CELEXA) 20 MG tablet  latanoprost (XALATAN) 0.005 % ophthalmic solution  metoprolol succinate ER (TOPROL XL) 25 MG 24 hr tablet  omeprazole (PRILOSEC) 20 MG DR capsule  warfarin ANTICOAGULANT (COUMADIN) 5 MG tablet  warfarin ANTICOAGULANT (COUMADIN) 7.5 MG tablet        Review of Systems   Constitutional:  Negative for chills and fever.   Respiratory:  Negative for chest tightness.    Cardiovascular:  Negative for chest pain, palpitations and leg  swelling.   Gastrointestinal:  Negative for abdominal pain.       Physical Exam   BP: (!) 196/104  Pulse: 58  Temp: 97.1  F (36.2  C)  Resp: 22  SpO2: 97 %  Lying Orthostatic BP: 161/86  Lying Orthostatic Pulse: 43 bpm  Sitting Orthostatic BP: 174/101  Sitting Orthostatic Pulse: 52 bpm  Standing Orthostatic BP: 167/98  Standing Orthostatic Pulse: 45 bpm      Physical Exam  Vitals and nursing note reviewed.   Constitutional:       Appearance: Normal appearance.   Eyes:      Pupils: Pupils are equal, round, and reactive to light.   Cardiovascular:      Rate and Rhythm: Regular rhythm. Bradycardia present.      Pulses: Normal pulses.   Pulmonary:      Effort: Pulmonary effort is normal.   Abdominal:      Tenderness: There is no abdominal tenderness.   Musculoskeletal:      Cervical back: No tenderness or bony tenderness.      Thoracic back: Normal. No spasms, tenderness or bony tenderness.      Lumbar back: Normal. No swelling, edema, tenderness or bony tenderness. Negative right straight leg raise test and negative left straight leg raise test.      Comments: Patient does have significant tenderness on the left SI joint.  This reproduces his pain.  He states he has history of sciatica but this does not radiate down his leg.  No overlying crepitus or erythema.   Neurological:      Mental Status: He is alert.         ED Course     EKG: Sinus bradycardia rate 41, QTc 409 ms, QRS duration 92 ms.  Patient asymptomatic at time of EKG.    Results for orders placed or performed during the hospital encounter of 08/27/23 (from the past 24 hour(s))   CBC with platelets differential    Narrative    The following orders were created for panel order CBC with platelets differential.  Procedure                               Abnormality         Status                     ---------                               -----------         ------                     CBC with platelets and d...[[id number]]                      Final result                  Please view results for these tests on the individual orders.   Basic metabolic panel   Result Value Ref Range    Sodium 131 (L) 136 - 145 mmol/L    Potassium 4.2 3.4 - 5.3 mmol/L    Chloride 97 (L) 98 - 107 mmol/L    Carbon Dioxide (CO2) 22 22 - 29 mmol/L    Anion Gap 12 7 - 15 mmol/L    Urea Nitrogen 16.6 8.0 - 23.0 mg/dL    Creatinine 0.93 0.67 - 1.17 mg/dL    Calcium 9.6 8.8 - 10.2 mg/dL    Glucose 147 (H) 70 - 99 mg/dL    GFR Estimate 80 >60 mL/min/1.73m2   CBC with platelets and differential   Result Value Ref Range    WBC Count 8.6 4.0 - 11.0 10e3/uL    RBC Count 5.01 4.40 - 5.90 10e6/uL    Hemoglobin 14.8 13.3 - 17.7 g/dL    Hematocrit 43.3 40.0 - 53.0 %    MCV 86 78 - 100 fL    MCH 29.5 26.5 - 33.0 pg    MCHC 34.2 31.5 - 36.5 g/dL    RDW 13.6 10.0 - 15.0 %    Platelet Count 245 150 - 450 10e3/uL    % Neutrophils 66 %    % Lymphocytes 21 %    % Monocytes 9 %    % Eosinophils 2 %    % Basophils 1 %    % Immature Granulocytes 1 %    NRBCs per 100 WBC 0 <1 /100    Absolute Neutrophils 5.8 1.6 - 8.3 10e3/uL    Absolute Lymphocytes 1.8 0.8 - 5.3 10e3/uL    Absolute Monocytes 0.8 0.0 - 1.3 10e3/uL    Absolute Eosinophils 0.2 0.0 - 0.7 10e3/uL    Absolute Basophils 0.1 0.0 - 0.2 10e3/uL    Absolute Immature Granulocytes 0.0 <=0.4 10e3/uL    Absolute NRBCs 0.0 10e3/uL   Extra Tube    Narrative    The following orders were created for panel order Extra Tube.  Procedure                               Abnormality         Status                     ---------                               -----------         ------                     Extra Blue Top Tube[[id number]]                              Final result               Extra Red Top Tube[[id number]]                               Final result               Extra Green Top (Lithium...[[id number]]                      Final result                 Please view results for these tests on the individual orders.   Extra Blue Top Tube   Result Value Ref Range    Hold  Specimen JIC    Extra Red Top Tube   Result Value Ref Range    Hold Specimen JIC    Extra Green Top (Lithium Heparin) ON ICE   Result Value Ref Range    Hold Specimen JIC    CT Abdomen Pelvis w Contrast    Narrative    PROCEDURE INFORMATION:   Exam: CT Abdomen And Pelvis With Contrast   Exam date and time: 8/27/2023 6:47 AM   Age: 87 years old   Clinical indication: Pain; Other: See notes; Prior surgery; Surgery date: 6+   months; Surgery type: Cabg, turp; Additional info: Rectal pain, abnormal   stools, weight loss     TECHNIQUE:   Imaging protocol: Computed tomography of the abdomen and pelvis with contrast.   Radiation optimization: All CT scans at this facility use at least one of these   dose optimization techniques: automated exposure control; mA and/or kV   adjustment per patient size (includes targeted exams where dose is matched to   clinical indication); or iterative reconstruction.   Contrast material: ISOVUE 370; Contrast volume: 93 ml; Contrast route:   INTRAVENOUS (IV);      REPORTING DATA:   Count of CT and Cardiac NM exams in prior 12 months: This patient has received   0 known CTs and 0 known cardiac nuclear medicine studies in the 12 months prior   to the current study.     COMPARISON:   No relevant prior studies available.     FINDINGS:   Lungs: Bibasilar atelectasis     Liver: Normal. No mass.   Gallbladder and bile ducts: The gallbladder is decompressed. There may be a   small gallstone in the gallbladder..   Pancreas: Normal. No ductal dilation.   Spleen: Normal. No splenomegaly.   Adrenal glands: Normal. No mass.   Kidneys and ureters: Normal. No hydronephrosis.   Stomach and bowel: Extensive diverticulosis of the rectosigmoid no   diverticulitis..   Appendix: No evidence of appendicitis.     Intraperitoneal space: Unremarkable. No free air. No significant fluid   collection.   Vasculature: Stenosis at the origin of the celiac axis and SMA. Stenosis at the   origin of the renal arteries.  Unruptured infrarenal abdominal aortic aneurysm   4.9 x 4.2 x 4.3 cm. 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 Unruptured aneurysm of the right common femoral artery 16.9 mm.   Lymph nodes: Unremarkable. No enlarged lymph nodes.   Urinary bladder: Unremarkable as visualized.   Reproductive: The prostate is enlarged, greater than 7 cm. Recommend urology   consult.   Bones/joints: Median sternotomy   Soft tissues: Unremarkable.       Impression    IMPRESSION:   1.   The gallbladder is decompressed. There may be a small gallstone in the   gallbladder..   2.   Unruptured infrarenal abdominal aortic aneurysm 4.9 x 4.2 cm.   3.   Extensive diverticulosis of the rectosigmoid no diverticulitis..   4.   The prostate is enlarged, greater than 7 cm. Recommend urology consult.   5.   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 Unruptured   aneurysm of the right common femoral artery 16.9 mm.     THIS DOCUMENT HAS BEEN ELECTRONICALLY SIGNED BY CHILO ESTRADA MD   Urine Macroscopic with reflex to Microscopic   Result Value Ref Range    Color Urine Light Yellow Colorless, Straw, Light Yellow, Yellow    Appearance Urine Clear Clear    Glucose Urine Negative Negative mg/dL    Bilirubin Urine Negative Negative    Ketones Urine Negative Negative mg/dL    Specific Gravity Urine 1.035 1.003 - 1.035    Blood Urine Trace (A) Negative    pH Urine 7.5 (H) 5.0 - 7.0    Protein Albumin Urine Negative Negative mg/dL    Urobilinogen Urine Normal Normal, 2.0 mg/dL    Nitrite Urine Negative Negative    Leukocyte Esterase Urine Negative Negative    RBC Urine 5 (H) <=2 /HPF    WBC Urine 2 <=5 /HPF    Mucus Urine Present (A) None Seen /LPF       Medications   oxyCODONE-acetaminophen (PERCOCET) 5-325 MG per tablet 1 tablet (has no administration in time range)   iopamidol (ISOVUE-370) solution 93 mL (93 mLs Intravenous $Given 8/27/23 0705)   morphine (PF) injection 4 mg (4 mg Intravenous $Given 8/27/23  "0835)   acetaminophen (TYLENOL) tablet 1,000 mg (1,000 mg Oral $Given 8/27/23 0835)   morphine (PF) injection 4 mg (4 mg Intravenous $Given 8/27/23 1002)       Assessments & Plan (with Medical Decision Making)     I have reviewed the nursing notes.    I have reviewed the findings, diagnosis, plan and need for follow up with the patient.    Medical Decision Making  The patient's presentation was of moderate complexity (an acute illness with systemic symptoms).    The patient's evaluation involved:  history and exam without other MDM data elements    The patient's management necessitated high risk (a decision regarding hospitalization).    Patient feeling much better over the course of his ED stay.  Serial examinations are reassuring.  Left SI joint tenderness reproduces his pain.  No evidence of distal neurologic dysfunction.  Reassuring work-up here in the ED, reassuring clinical trajectory.  After shared decision-making conversation patient agrees that no further urgent therapeutics or diagnostics are definitively indicated.  He is bradycardic, I do not see heart rates in the 40s looking back on his flowsheets.  Patient appears to be asymptomatic.  Discussed further observation, patient would like to go home.  He is accompanied by his adult daughter is going to take him home and make sure he gets taken care of.  He has an ENT appointment tomorrow.  He has a repeat primary care appointment in October but there can a try to move that up till next week.  I I recommended to have him hold his metoprolol for now at least while he is on pain control.  He is instructed to closely monitor his blood pressures and pulse.  Return to ED if worsening, otherwise follow-up with primary as below.  Explained that if this pain continues may need outpatient injection and/or MRI for diagnostic.    The below paragraphs were placed on his discharge AVS  \"It was a pleasure taking care of you today Mr. Martinez.  Regarding your back pain we " "did not identify any emergency that needs immediate intervention.  It is my impression that you are having some sacroiliac pain.  I did place a physical therapy referral for you and sent a pain prescription to jamel Patel for you.  Return to ED with uncontrolled pain, leg weakness or pain radiating down the leg, fever or other concerns related to your back.  You did have a reassuring CT scan today that did not reveal any emergencies.    We did identify that your heart rate was pretty low in the ED.  This appears to be asymptomatic while you are here in the ED.  Recommend holding your metoprolol however, keeping an eye on your pulse and blood pressure until you can follow-up with your primary care doctor.  Return to ED with any passing out, severe lightheadedness or severe weakness.\"    New Prescriptions    OXYCODONE-ACETAMINOPHEN (PERCOCET) 5-325 MG TABLET    Take 1 tablet by mouth every 6 hours as needed for pain       Final diagnoses:   Left-sided low back pain without sciatica, unspecified chronicity   Bradycardia       8/27/2023   [facility] Center[facility]i carrillo MD  08/27/23 6356    "

## 2023-08-27 NOTE — ED TRIAGE NOTES
Patient presents to ER with rectal pain that began 2 days ago. Patient reports ribbon like stools and flatus. Irregular heart rate. BP (!) 196/104   Pulse 58   Temp 97.1  F (36.2  C) (Tympanic)   Resp 22   SpO2 97%        Triage Assessment       Row Name 08/27/23 0625       Triage Assessment (Adult)    Airway WDL WDL       Respiratory WDL    Respiratory WDL WDL       Skin Circulation/Temperature WDL    Skin Circulation/Temperature WDL WDL       Cardiac WDL    Cardiac WDL WDL       Peripheral/Neurovascular WDL    Peripheral Neurovascular WDL WDL       Cognitive/Neuro/Behavioral WDL    Cognitive/Neuro/Behavioral WDL WDL

## 2023-08-28 ENCOUNTER — OFFICE VISIT (OUTPATIENT)
Dept: FAMILY MEDICINE | Facility: OTHER | Age: 88
End: 2023-08-28
Attending: NURSE PRACTITIONER
Payer: COMMERCIAL

## 2023-08-28 VITALS
DIASTOLIC BLOOD PRESSURE: 64 MMHG | HEART RATE: 72 BPM | TEMPERATURE: 98.4 F | WEIGHT: 159.4 LBS | OXYGEN SATURATION: 98 % | RESPIRATION RATE: 20 BRPM | BODY MASS INDEX: 23.54 KG/M2 | SYSTOLIC BLOOD PRESSURE: 114 MMHG

## 2023-08-28 DIAGNOSIS — F41.1 GAD (GENERALIZED ANXIETY DISORDER): Primary | ICD-10-CM

## 2023-08-28 PROCEDURE — 99213 OFFICE O/P EST LOW 20 MIN: CPT | Performed by: NURSE PRACTITIONER

## 2023-08-28 PROCEDURE — G0463 HOSPITAL OUTPT CLINIC VISIT: HCPCS

## 2023-08-28 ASSESSMENT — ANXIETY QUESTIONNAIRES
3. WORRYING TOO MUCH ABOUT DIFFERENT THINGS: MORE THAN HALF THE DAYS
GAD7 TOTAL SCORE: 14
4. TROUBLE RELAXING: NEARLY EVERY DAY
GAD7 TOTAL SCORE: 14
1. FEELING NERVOUS, ANXIOUS, OR ON EDGE: NEARLY EVERY DAY
7. FEELING AFRAID AS IF SOMETHING AWFUL MIGHT HAPPEN: NOT AT ALL
IF YOU CHECKED OFF ANY PROBLEMS ON THIS QUESTIONNAIRE, HOW DIFFICULT HAVE THESE PROBLEMS MADE IT FOR YOU TO DO YOUR WORK, TAKE CARE OF THINGS AT HOME, OR GET ALONG WITH OTHER PEOPLE: NOT DIFFICULT AT ALL
6. BECOMING EASILY ANNOYED OR IRRITABLE: SEVERAL DAYS
2. NOT BEING ABLE TO STOP OR CONTROL WORRYING: MORE THAN HALF THE DAYS
5. BEING SO RESTLESS THAT IT IS HARD TO SIT STILL: NEARLY EVERY DAY

## 2023-08-28 ASSESSMENT — PAIN SCALES - GENERAL: PAINLEVEL: NO PAIN (0)

## 2023-08-28 NOTE — PROGRESS NOTES
"  Assessment & Plan   Problem List Items Addressed This Visit          Behavioral    OLIVIA (generalized anxiety disorder) - Primary    Relevant Orders    Adult Mental Health  Referral        Ongoing anxiety and depression symptoms.  We did discuss importance of taking citalopram on a daily basis even when he is feeling well.  He will restart this today.  He does agree to work with counseling, referral was placed and he was assisted in making this appointment.  Since he is not taking the pain medications and pulses stable, he will restart metoprolol at 12.5 mg daily.  Follow-up as needed.         BMI:   Estimated body mass index is 23.54 kg/m  as calculated from the following:    Height as of 3/15/23: 1.753 m (5' 9\").    Weight as of this encounter: 72.3 kg (159 lb 6.4 oz).           No follow-ups on file.    [facility]    Ritesh Barajas is a 87 year old, presenting for the following health issues:  RECHECK      History of Present Illness       Reason for visit:  Follow up and check pills    He eats 2-3 servings of fruits and vegetables daily.He consumes 1 sweetened beverage(s) daily.He exercises with enough effort to increase his heart rate 20 to 29 minutes per day.  He exercises with enough effort to increase his heart rate 3 or less days per week.   He is taking medications regularly.     He comes in today to follow-up on anxiety.  He was seen in August 1, labs were stable at that time.  He does report that his anxiety and depression have continued about the same.  He does bring his daughter to his appointment today reports that she worries about him as he is constantly worrying about his son as well as feeling like he is in the middle of her and her brother.  They do not have a good relationship and do not speak.  She does not concerns about this but her father does.  He has been taking citalopram.  Previously was taking this approximate 3 times a week, does " report today that he has not been taking this at all.  Last time he declined seeing a therapist.  He was also emergency room yesterday for back pain/prostate concerns.  He was given pain medications in emergency room which were helpful.  He has not picked up his prescription for pain medication, reports today is having no pain and feels well from a pain standpoint.  His pulse was low, metoprolol was held while he is taking pain medications.      Review of Systems   See above      Objective    /64   Pulse 72   Temp 98.4  F (36.9  C)   Resp 20   Wt 72.3 kg (159 lb 6.4 oz)   SpO2 98%   BMI 23.54 kg/m    Body mass index is 23.54 kg/m .  Physical Exam   GENERAL: healthy, alert and no distress  EYES: Eyes grossly normal to inspection,  RESP: lungs clear to auscultation - no rales, rhonchi or wheezes  CV: regular rate and rhythm, normal S1 S2  NEURO: Normal strength and tone, mentation intact and speech normal  PSYCH: mentation appears normal, affect normal/bright

## 2023-08-28 NOTE — NURSING NOTE
Patient presents today for follow up on depression. Patient was also seen in the ED last night for low back pain.    Medication Reconciliation Complete    Alison Tinoco LPN  8/28/2023 9:32 AM

## 2023-08-31 LAB
ATRIAL RATE - MUSE: 41 BPM
DIASTOLIC BLOOD PRESSURE - MUSE: NORMAL MMHG
INTERPRETATION ECG - MUSE: NORMAL
P AXIS - MUSE: 46 DEGREES
PR INTERVAL - MUSE: 208 MS
QRS DURATION - MUSE: 92 MS
QT - MUSE: 496 MS
QTC - MUSE: 409 MS
R AXIS - MUSE: 11 DEGREES
SYSTOLIC BLOOD PRESSURE - MUSE: NORMAL MMHG
T AXIS - MUSE: 57 DEGREES
VENTRICULAR RATE- MUSE: 41 BPM

## 2023-09-05 ENCOUNTER — OFFICE VISIT (OUTPATIENT)
Dept: OTOLARYNGOLOGY | Facility: OTHER | Age: 88
End: 2023-09-05
Attending: OTOLARYNGOLOGY
Payer: COMMERCIAL

## 2023-09-05 DIAGNOSIS — R42 DYSEQUILIBRIUM: Primary | ICD-10-CM

## 2023-09-05 PROCEDURE — G0463 HOSPITAL OUTPT CLINIC VISIT: HCPCS

## 2023-09-05 NOTE — NURSING NOTE
Patient here to see ENT for Balance off since influenza immunization. Last shot 9/13/2016 noted in chart   Jacklyn Yates LPN ..........9/5/2023 9:29 AM

## 2023-09-07 ENCOUNTER — TELEPHONE (OUTPATIENT)
Dept: PEDIATRICS | Facility: OTHER | Age: 88
End: 2023-09-07
Payer: COMMERCIAL

## 2023-09-07 DIAGNOSIS — M79.2 NERVE PAIN: ICD-10-CM

## 2023-09-07 DIAGNOSIS — R26.89 BALANCE PROBLEMS: Primary | ICD-10-CM

## 2023-09-07 NOTE — TELEPHONE ENCOUNTER
Patient would like Martinez to put in a referral to see neurologist Dr Abdalla out of [place] on 9/7/2023 at 1:13 PM

## 2023-09-11 NOTE — TELEPHONE ENCOUNTER
For what reason does he need a Neurologist? Perhaps it would be best to schedule a visit first.    Signed, Isma Martinez MD, FAAP, FACP  Internal Medicine & Pediatrics

## 2023-09-11 NOTE — PROGRESS NOTES
document embedded image  Patient Name: Michael Martinez    Address: [address] [place] [address]     YOB: 1935    [place] [address]    MR Number: [id number]    Phone: [phone]  PCP: Isma Martinez MD            Appointment Date: 09/05/23   Visit Provider: Alin West MD    cc: Isma Martinez MD; ~    ENT Progress Note  Intake  Visit Reasons: Balance off since flu shot    HPI  History of Present Illness  Chief complaint unsteadiness    History  The patient is an 87-year-old man who comes to the office today to discuss his chronic unsteadiness or disequilibrium that he seems to be experiencing since undergoing a flu shot.  He developed Guillain-[place] syndrome shortly after having a flu shot a couple of years ago.  He denies any true vertigo.  He denies pressure in either ear.  He denies hearing loss or fluctuating hearing loss.    Exam  The external auditory canals and TMs are clear bilaterally  He does have somewhat of an unsteady gait the neck-no masses or adenopathy  Nasal-no obstruction or purulence  Head and neck integument-Clear  Oral cavity oropharynx-free of lesion  General-the patient appears well and in no distress  Neuro-there are no focal cranial nerve deficits appreciated    A&P  Assessment & Plan  (1) Disequilibrium:        Status: Acute        Code(s):  R42 - Dizziness and giddiness  The patient was reassured his history is not consistent with an ear etiology.  This sounds more central in origin.  He was advised to talk to his primary doctor about consideration of a formal neurology referral otherwise we discussed adaptive techniques to try to prevent falls.               Alin West MD    Filed: 09/07/23 0916    <Electronically signed by Alin West MD> 09/07/23 0921

## 2023-09-11 NOTE — TELEPHONE ENCOUNTER
He said that at ENT appointment Dr West suggested that he see a neurologist. For his balance and nerve pain.  Referral teed up.  Norma J. Gosselin, LPN .......  9/11/2023  8:22 AM

## 2023-09-18 ENCOUNTER — ANTICOAGULATION THERAPY VISIT (OUTPATIENT)
Dept: ANTICOAGULATION | Facility: OTHER | Age: 88
End: 2023-09-18
Attending: INTERNAL MEDICINE
Payer: COMMERCIAL

## 2023-09-18 DIAGNOSIS — Z86.718 PERSONAL HISTORY OF DVT (DEEP VEIN THROMBOSIS): ICD-10-CM

## 2023-09-18 DIAGNOSIS — Z79.01 ANTICOAGULATION MONITORING, INR RANGE 2-3: ICD-10-CM

## 2023-09-18 DIAGNOSIS — Z86.711 HISTORY OF PULMONARY EMBOLISM: ICD-10-CM

## 2023-09-18 DIAGNOSIS — Z79.01 LONG TERM CURRENT USE OF ANTICOAGULANT THERAPY: Primary | ICD-10-CM

## 2023-09-18 LAB — INR POINT OF CARE: 2.7 (ref 0.9–1.1)

## 2023-09-18 PROCEDURE — 85610 PROTHROMBIN TIME: CPT | Mod: ZL,QW

## 2023-09-18 PROCEDURE — 36416 COLLJ CAPILLARY BLOOD SPEC: CPT | Mod: ZL

## 2023-09-18 NOTE — PROGRESS NOTES
ANTICOAGULATION MANAGEMENT     Michael Martinez 87 year old male is on warfarin with therapeutic INR result. (Goal INR 2.0-3.0)    Recent labs: (last 7 days)     09/18/23  1053   INR 2.7*       ASSESSMENT     Source(s): Chart Review and In person      Warfarin doses taken: Warfarin taken as instructed  Diet: No new diet changes identified  New illness, injury, or hospitalization: No  Medication/supplement changes: None noted  Signs or symptoms of bleeding or clotting: No  Previous INR: Therapeutic last 2(+) visits  Additional findings: None     PLAN     Recommended plan for no diet, medication or health factor changes affecting INR     Dosing Instructions: Continue your current warfarin dose with next INR in 4 weeks       Summary  As of 9/18/2023      Full warfarin instructions:  5 mg every Mon, Wed, Fri; 7.5 mg all other days   Next INR check:  10/16/2023               In person    Lab visit scheduled    Education provided: Please call back if any changes to your diet, medications or how you've been taking warfarin    Plan made per [facility] anticoagulation protocol    Radha Soe RN  Anticoagulation Clinic  9/18/2023    _______________________________________________________________________     Anticoagulation Episode Summary       Current INR goal:  2.0-3.0   TTR:  66.9 % (1 y)   Target end date:  Indefinite   Send INR reminders to:  [facility]    Indications    Long-term (current) use of anticoagulants [Z79.01] [Z79.01]  Anticoagulation monitoring  INR range 2-3 [Z79.01]  History of pulmonary embolism [Z86.711]  Personal history of DVT (deep vein thrombosis) [Z86.718]             Comments:  likes to come every 4 weeks             Anticoagulation Care Providers       Provider Role Specialty Phone number    Isma Martinez MD Referring Internal Medicine [phone]    Stephane Freedman MD Referring Surgery [phone]

## 2023-09-29 ENCOUNTER — THERAPY VISIT (OUTPATIENT)
Dept: PHYSICAL THERAPY | Facility: OTHER | Age: 88
End: 2023-09-29
Attending: FAMILY MEDICINE
Payer: COMMERCIAL

## 2023-09-29 DIAGNOSIS — M54.50 LEFT-SIDED LOW BACK PAIN WITHOUT SCIATICA, UNSPECIFIED CHRONICITY: ICD-10-CM

## 2023-09-29 PROCEDURE — 97161 PT EVAL LOW COMPLEX 20 MIN: CPT | Mod: GP | Performed by: PHYSICAL THERAPIST

## 2023-09-29 PROCEDURE — 97110 THERAPEUTIC EXERCISES: CPT | Mod: GP | Performed by: PHYSICAL THERAPIST

## 2023-10-04 NOTE — PROGRESS NOTES
PHYSICAL THERAPY EVALUATION  Type of Visit: Evaluation    See electronic medical record for Abuse and Falls Screening details.    Subjective       Presenting condition or subjective complaint: low back pulled muscles  Date of onset: 08/27/23    Relevant medical history:     Dates & types of surgery:      Prior diagnostic imaging/testing results:       Prior therapy history for the same diagnosis, illness or injury: No      Prior Level of Function  Transfers: Independent  Ambulation: Independent  ADL: Independent  IADL:     Living Environment  Social support: Alone   Type of home:     Stairs to enter the home:     Is there a railing: Yes   Ramp:     Stairs inside the home:     Is there a railing: Yes   Help at home: Assist for driving and community activities; Medication and/or finances  Equipment owned:       Employment:      Hobbies/Interests: house and yardchores. restoring a boat    Patient goals for therapy: home exercises to improve back pain,    Pain assessment: Location: lumbar spine/Rating: good and bad days, 0-5/10     Objective   LUMBAR SPINE EVALUATION  PAIN: Pain Location: lumbar spine  Pain Frequency: intermittent  Pain is Exacerbated By: chores  INTEGUMENTARY (edema, incisions): WNL  POSTURE: WNL  GAIT:   Weightbearing Status: WBAT  Assistive Device(s): None  Gait Deviations: WNL  BALANCE/PROPRIOCEPTION:  decreased balance tolerance, unalbe to single leg balance  WEIGHTBEARING ALIGNMENT: WNL  NON-WEIGHTBEARING ALIGNMENT: WNL   ROM: AROM WNL  PELVIC/SI SCREEN: WNL  STRENGTH: WFL, hip ext 4/5    MYOTOMES:    DTR S: WNL  CORD SIGNS: WNL  DERMATOMES: WNL  NEURAL TENSION: Lumbar WNL  FLEXIBILITY: WNL, mild tight hams  LUMBAR/HIP Special Tests: WNL   PELVIS/SI SPECIAL TESTS: WNL  PALPATION:  mild/mod tension in paraspinals  SPINAL SEGMENTAL CONCLUSIONS: WNL      Assessment & Plan   CLINICAL IMPRESSIONS  Medical Diagnosis: Left-sided low back pain without sciatica, unspecified chronicity    Treatment  Diagnosis: balance deficits, lumbar spine DJD/DDD   Impression/Assessment: Patient is a 87 year old male with lumbar pain and balance complaints.  The following significant findings have been identified: Pain, Decreased ROM/flexibility, Decreased joint mobility, Decreased strength, Impaired balance, Decreased proprioception, and Impaired sensation. These impairments interfere with their ability to perform self care tasks, recreational activities, household chores, household mobility, and community mobility as compared to previous level of function.     Clinical Decision Making (Complexity):  Clinical Presentation: Evolving/Changing  Clinical Presentation Rationale: based on medical and personal factors listed in PT evaluation  Clinical Decision Making (Complexity): Low complexity    PLAN OF CARE  Treatment Interventions:  Modalities: Hot Pack  Interventions: Gait Training, Manual Therapy, Neuromuscular Re-education, Therapeutic Activity, Therapeutic Exercise    Long Term Goals     PT Goal 1  Goal Identifier: HEP  Goal Description: Patient will maintain HEP at least 3x/wk  Target Date: 01/02/24  PT Goal 2  Goal Identifier: pain  Goal Description: patient will have 50% less pain when doing house and yard chores  Target Date: 01/02/24  PT Goal 3  Goal Identifier: balance  Goal Description: Balance improved to DGI score of +2 for decreased risk of falls.  Target Date: 01/02/24      Frequency of Treatment: 1x/wk 6-12 weeks  Duration of Treatment: 6-12 wks    Recommended Referrals to Other Professionals:   Education Assessment:        Risks and benefits of evaluation/treatment have been explained.   Patient/Family/caregiver agrees with Plan of Care.     Evaluation Time:     PT Eval, Low Complexity Minutes (43908): 35       Signing Clinician: Armin Gamez PT      [facility]                                                                                   OUTPATIENT PHYSICAL  THERAPY      PLAN OF TREATMENT FOR OUTPATIENT REHABILITATION   Patient's Last Name, First Name, Michael Mcconnell YOB: 1935   Provider's Name   [facility]   Medical Record No.  [id number]     Onset Date: 08/27/23  Start of Care Date: 09/29/23     Medical Diagnosis:  Left-sided low back pain without sciatica, unspecified chronicity      PT Treatment Diagnosis:  balance deficits, lumbar spine DJD/DDD Plan of Treatment  Frequency/Duration: 1x/wk 6-12 weeks/ 6-12 wks    Certification date from 09/29/23 to 12/28/23         See note for plan of treatment details and functional goals     Armin Gamez, PT                         I CERTIFY THE NEED FOR THESE SERVICES FURNISHED UNDER        THIS PLAN OF TREATMENT AND WHILE UNDER MY CARE     (Physician attestation of this document indicates review and certification of the therapy plan).                Referring Provider:  Levi Ac      Initial Assessment  See Epic Evaluation- Start of Care Date: 09/29/23

## 2023-10-10 ENCOUNTER — OFFICE VISIT (OUTPATIENT)
Dept: PSYCHOLOGY | Facility: OTHER | Age: 88
End: 2023-10-10
Attending: NURSE PRACTITIONER
Payer: COMMERCIAL

## 2023-10-10 DIAGNOSIS — F41.1 GAD (GENERALIZED ANXIETY DISORDER): ICD-10-CM

## 2023-10-10 PROCEDURE — 90834 PSYTX W PT 45 MINUTES: CPT | Performed by: SOCIAL WORKER

## 2023-10-10 ASSESSMENT — COLUMBIA-SUICIDE SEVERITY RATING SCALE - C-SSRS
2. HAVE YOU ACTUALLY HAD ANY THOUGHTS OF KILLING YOURSELF?: NO
6. HAVE YOU EVER DONE ANYTHING, STARTED TO DO ANYTHING, OR PREPARED TO DO ANYTHING TO END YOUR LIFE?: NO
ATTEMPT LIFETIME: NO
1. HAVE YOU WISHED YOU WERE DEAD OR WISHED YOU COULD GO TO SLEEP AND NOT WAKE UP?: NO
TOTAL  NUMBER OF INTERRUPTED ATTEMPTS LIFETIME: NO
TOTAL  NUMBER OF ABORTED OR SELF INTERRUPTED ATTEMPTS LIFETIME: NO

## 2023-10-10 ASSESSMENT — PATIENT HEALTH QUESTIONNAIRE - PHQ9
10. IF YOU CHECKED OFF ANY PROBLEMS, HOW DIFFICULT HAVE THESE PROBLEMS MADE IT FOR YOU TO DO YOUR WORK, TAKE CARE OF THINGS AT HOME, OR GET ALONG WITH OTHER PEOPLE: SOMEWHAT DIFFICULT
SUM OF ALL RESPONSES TO PHQ QUESTIONS 1-9: 11
SUM OF ALL RESPONSES TO PHQ QUESTIONS 1-9: 11
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
## 2023-10-11 ENCOUNTER — OFFICE VISIT (OUTPATIENT)
Dept: FAMILY MEDICINE | Facility: OTHER | Age: 88
End: 2023-10-11
Attending: FAMILY MEDICINE
Payer: COMMERCIAL

## 2023-10-11 VITALS
SYSTOLIC BLOOD PRESSURE: 124 MMHG | DIASTOLIC BLOOD PRESSURE: 64 MMHG | RESPIRATION RATE: 16 BRPM | TEMPERATURE: 98.4 F | BODY MASS INDEX: 23.92 KG/M2 | OXYGEN SATURATION: 96 % | WEIGHT: 162 LBS | HEART RATE: 63 BPM

## 2023-10-11 DIAGNOSIS — R63.4 UNINTENTIONAL WEIGHT LOSS: Primary | ICD-10-CM

## 2023-10-11 DIAGNOSIS — Z86.69 HISTORY OF GUILLAIN-BARRE SYNDROME: Chronic | ICD-10-CM

## 2023-10-11 DIAGNOSIS — M79.10 MYALGIA: ICD-10-CM

## 2023-10-11 DIAGNOSIS — M13.0 POLYARTHROPATHY: ICD-10-CM

## 2023-10-11 LAB
ALBUMIN SERPL BCG-MCNC: 4.1 G/DL (ref 3.5–5.2)
ALBUMIN UR-MCNC: NEGATIVE MG/DL
ALP SERPL-CCNC: 128 U/L (ref 40–129)
ALT SERPL W P-5'-P-CCNC: 18 U/L (ref 0–70)
ANION GAP SERPL CALCULATED.3IONS-SCNC: 10 MMOL/L (ref 7–15)
APPEARANCE UR: CLEAR
AST SERPL W P-5'-P-CCNC: 26 U/L (ref 0–45)
BASO+EOS+MONOS # BLD AUTO: NORMAL 10*3/UL
BASO+EOS+MONOS NFR BLD AUTO: NORMAL %
BASOPHILS # BLD AUTO: 0.1 10E3/UL (ref 0–0.2)
BASOPHILS NFR BLD AUTO: 1 %
BILIRUB SERPL-MCNC: 0.8 MG/DL
BILIRUB UR QL STRIP: NEGATIVE
BUN SERPL-MCNC: 19.5 MG/DL (ref 8–23)
CALCIUM SERPL-MCNC: 9.6 MG/DL (ref 8.8–10.2)
CHLORIDE SERPL-SCNC: 103 MMOL/L (ref 98–107)
CK SERPL-CCNC: 100 U/L (ref 39–308)
COLOR UR AUTO: ABNORMAL
CREAT SERPL-MCNC: 1 MG/DL (ref 0.67–1.17)
DEPRECATED HCO3 PLAS-SCNC: 26 MMOL/L (ref 22–29)
EGFRCR SERPLBLD CKD-EPI 2021: 73 ML/MIN/1.73M2
EOSINOPHIL # BLD AUTO: 0.2 10E3/UL (ref 0–0.7)
EOSINOPHIL NFR BLD AUTO: 3 %
ERYTHROCYTE [DISTWIDTH] IN BLOOD BY AUTOMATED COUNT: 14.6 % (ref 10–15)
ERYTHROCYTE [SEDIMENTATION RATE] IN BLOOD BY WESTERGREN METHOD: <0 MM/HR (ref 0–20)
GLUCOSE SERPL-MCNC: 96 MG/DL (ref 70–99)
GLUCOSE UR STRIP-MCNC: NEGATIVE MG/DL
HCT VFR BLD AUTO: 45.6 % (ref 40–53)
HGB BLD-MCNC: 15.1 G/DL (ref 13.3–17.7)
HGB UR QL STRIP: NEGATIVE
IMM GRANULOCYTES # BLD: 0 10E3/UL
IMM GRANULOCYTES NFR BLD: 0 %
KETONES UR STRIP-MCNC: NEGATIVE MG/DL
LEUKOCYTE ESTERASE UR QL STRIP: NEGATIVE
LYMPHOCYTES # BLD AUTO: 1.5 10E3/UL (ref 0.8–5.3)
LYMPHOCYTES NFR BLD AUTO: 24 %
MCH RBC QN AUTO: 29.3 PG (ref 26.5–33)
MCHC RBC AUTO-ENTMCNC: 33.1 G/DL (ref 31.5–36.5)
MCV RBC AUTO: 89 FL (ref 78–100)
MONOCYTES # BLD AUTO: 0.5 10E3/UL (ref 0–1.3)
MONOCYTES NFR BLD AUTO: 9 %
NEUTROPHILS # BLD AUTO: 3.9 10E3/UL (ref 1.6–8.3)
NEUTROPHILS NFR BLD AUTO: 63 %
NITRATE UR QL: NEGATIVE
NRBC # BLD AUTO: 0 10E3/UL
NRBC BLD AUTO-RTO: 0 /100
PH UR STRIP: 6 [PH] (ref 5–9)
PLATELET # BLD AUTO: 216 10E3/UL (ref 150–450)
POTASSIUM SERPL-SCNC: 4.3 MMOL/L (ref 3.4–5.3)
PROT SERPL-MCNC: 7.3 G/DL (ref 6.4–8.3)
RBC # BLD AUTO: 5.15 10E6/UL (ref 4.4–5.9)
RBC URINE: 3 /HPF
SODIUM SERPL-SCNC: 139 MMOL/L (ref 135–145)
SP GR UR STRIP: 1.02 (ref 1–1.03)
TSH SERPL DL<=0.005 MIU/L-ACNC: 2.28 UIU/ML (ref 0.3–4.2)
UROBILINOGEN UR STRIP-MCNC: NORMAL MG/DL
WBC # BLD AUTO: 6.2 10E3/UL (ref 4–11)
WBC URINE: 1 /HPF

## 2023-10-11 PROCEDURE — 81001 URINALYSIS AUTO W/SCOPE: CPT | Mod: ZL | Performed by: FAMILY MEDICINE

## 2023-10-11 PROCEDURE — 36415 COLL VENOUS BLD VENIPUNCTURE: CPT | Mod: ZL | Performed by: FAMILY MEDICINE

## 2023-10-11 PROCEDURE — 85652 RBC SED RATE AUTOMATED: CPT | Mod: ZL | Performed by: FAMILY MEDICINE

## 2023-10-11 PROCEDURE — 84443 ASSAY THYROID STIM HORMONE: CPT | Mod: ZL | Performed by: FAMILY MEDICINE

## 2023-10-11 PROCEDURE — 99215 OFFICE O/P EST HI 40 MIN: CPT | Performed by: FAMILY MEDICINE

## 2023-10-11 PROCEDURE — 85025 COMPLETE CBC W/AUTO DIFF WBC: CPT | Mod: ZL | Performed by: FAMILY MEDICINE

## 2023-10-11 PROCEDURE — 82550 ASSAY OF CK (CPK): CPT | Mod: ZL | Performed by: FAMILY MEDICINE

## 2023-10-11 PROCEDURE — G0463 HOSPITAL OUTPT CLINIC VISIT: HCPCS

## 2023-10-11 PROCEDURE — 80053 COMPREHEN METABOLIC PANEL: CPT | Mod: ZL | Performed by: FAMILY MEDICINE

## 2023-10-11 ASSESSMENT — PAIN SCALES - GENERAL: PAINLEVEL: NO PAIN (0)

## 2023-10-11 NOTE — PROGRESS NOTES
"Sports Medicine Office Note    HPI:  87-year-old male with controlled type 2 diabetes, CAD status post four-vessel CABG anticoagulated on warfarin, PAD, and hyperlipidemia coming in for evaluation of diffuse body wide aches/pains as well as weight loss.  This patient reports in the last month and a half he has lost 15 pounds.  Upon chart review, it may be closer to 7-10 pounds.  On 3/15 he was 169 pounds.  He measures 162 pounds today.  He reports being as low as 157 pounds.  He reports that coming out of the service many years ago he weighed 175 pounds and has maintained this weight for many years.    His primary concern today is diffuse aches and pains throughout his body.  Both joints and muscle groups seem to be involved.  He strongly believes his symptoms are \"neurological.\"  These tend to be intermittent pains and migratory.  They are not associated with bruising and swelling.  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  He had a repeat flu shot 2 years ago and reports that he noticed functional changes following that injection that have been permanent.    He was seen in the ER on 8/27 for these concerns.  He was referred to ENT for balance.  At his follow-up outpatient ENT visit it was suggested that he sees neurology.  A referral has been placed but he canceled this appointment because a friend at [facility] suggested that he is seen in this office.  The patient says that his friend had the exact same symptoms and was given home exercises which provided complete resolution of his ailments.  Upon chart review, it appears that this provider has not seen that friend in the office.    The patient denies fever, chills, chest pain, difficulty breathing, or changes in urination.  He reports that since his ER visit he has noticed thinner stools.  He feels like he has more pronounced lower ribs on the right side compared to the left.  He does not describe whether or not this is due to weight " loss or potential mass effect on his ribs.  His last colonoscopy was 2013.      EXAM:  /64 (BP Location: Right arm, Patient Position: Sitting, Cuff Size: Adult Regular)   Pulse 63   Temp 98.4  F (36.9  C) (Temporal)   Resp 16   Wt 73.5 kg (162 lb)   SpO2 96%   BMI 23.92 kg/m    General: No acute distress  Neck: Supple, no lymphadenopathy, no thyromegaly or thyroid nodules appreciated  CV: Regular rate and rhythm without murmur, pulses intact in the lower extremities  Respiratory: Normal effort, talking in full sentences, no wheezes, rales, or rhonchi, normal respiratory rate  Abdomen: NABS, nontender, no hepatosplenomegaly  Psych: Normal mood and affect  Extremities: No peripheral edema      IMAGING:  None      ASSESSMENT/PLAN:  Diagnoses and all orders for this visit:  Unintentional weight loss  -     TSH with free T4 reflex; Future  -     CBC and Differential; Future  -     Comprehensive Metabolic Panel; Future  -     UA with Microscopic; Future  Polyarthropathy  -     Sedimentation Rate (ESR); Future  Myalgia  -     CK Total; Future  History of Guillain-[place] syndrome    87-year-old male with vague complaints of diffuse body wide aches/pains.  Symptoms are vague and could be related to nutrition, activity, electrolyte abnormalities, thyroid abnormalities, complications from previous Guillain-Barré syndrome, atypical angina presentation, PMR, underlying systemic inflammatory condition, PAD, diabetic neuropathy, or others.  The most concerning finding on today's evaluation is his unintentional weight loss.  Though his medical chart does not reveal the weight loss that the patient reports, it does sound like this is something that needs to be looked into further.  Suspect further work-up for potential underlying malignancy will follow.  - Screening labs today  - Recommend follow-up appointment with PCP, likely further testing to follow at that visit  - I think holding off on the neurology appointment for  now is reasonable      He Freeman MD  10/11/2023  8:47 AM    Total time spent with this patient was 47 minutes which included chart review, visualization and independent interpretation of images, time spent with the patient, and documentation.    Procedure time:  0 minute(s)

## 2023-10-17 ENCOUNTER — ANTICOAGULATION THERAPY VISIT (OUTPATIENT)
Dept: ANTICOAGULATION | Facility: OTHER | Age: 88
End: 2023-10-17
Payer: COMMERCIAL

## 2023-10-17 DIAGNOSIS — Z79.01 ANTICOAGULATION MONITORING, INR RANGE 2-3: ICD-10-CM

## 2023-10-17 DIAGNOSIS — Z79.01 LONG TERM CURRENT USE OF ANTICOAGULANT THERAPY: Primary | ICD-10-CM

## 2023-10-17 DIAGNOSIS — Z86.711 HISTORY OF PULMONARY EMBOLISM: ICD-10-CM

## 2023-10-17 DIAGNOSIS — Z86.718 PERSONAL HISTORY OF DVT (DEEP VEIN THROMBOSIS): ICD-10-CM

## 2023-10-17 LAB — INR POINT OF CARE: 1.5 (ref 0.9–1.1)

## 2023-10-17 PROCEDURE — 85610 PROTHROMBIN TIME: CPT | Mod: ZL,QW

## 2023-10-17 NOTE — PROGRESS NOTES
ANTICOAGULATION MANAGEMENT     Michael Martinez 87 year old male is on warfarin with subtherapeutic INR result. (Goal INR 2.0-3.0)    Recent labs: (last 7 days)     10/17/23  1039   INR 1.5*       ASSESSMENT     Source(s): Chart Review and In person      Warfarin doses taken: Missed dose(s) may be affecting INR  Diet: No new diet changes identified  Medication/supplement changes: None noted  New illness, injury, or hospitalization: Yes: Patient verbalized possibly strained their intercostal muscles moving a 300 pound stove  Signs or symptoms of bleeding or clotting: No  Previous result: Therapeutic last 2(+) visits  Additional findings: None     PLAN     Recommended plan for no diet, medication or health factor changes affecting INR     Dosing Instructions: booster dose then continue your current warfarin dose with next INR in 1 week       Summary  As of 10/17/2023      Full warfarin instructions:  10/17: 11.25 mg; Otherwise 5 mg every Mon, Wed, Fri; 7.5 mg all other days   Next INR check:  10/24/2023               In person    Lab visit scheduled    Education provided:   Please call back if any changes to your diet, medications or how you've been taking warfarin    Plan made per [facility] anticoagulation protocol    hTerese Tapia RN  Anticoagulation Clinic  10/17/2023    _______________________________________________________________________     Anticoagulation Episode Summary       Current INR goal:  2.0-3.0   TTR:  71.6% (1 y)   Target end date:  Indefinite   Send INR reminders to:  [facility]    Indications    Long-term (current) use of anticoagulants [Z79.01] [Z79.01]  Anticoagulation monitoring  INR range 2-3 [Z79.01]  History of pulmonary embolism [Z86.711]  Personal history of DVT (deep vein thrombosis) [Z86.718]             Comments:  likes to come every 4 weeks             Anticoagulation Care Providers       Provider Role Specialty Phone number    Isma Martinez MD Referring Internal  Medicine [phone]    Stephane Freedman MD [facility] Surgery [phone]

## 2023-10-27 ENCOUNTER — ANTICOAGULATION THERAPY VISIT (OUTPATIENT)
Dept: ANTICOAGULATION | Facility: OTHER | Age: 88
End: 2023-10-27
Attending: INTERNAL MEDICINE
Payer: COMMERCIAL

## 2023-10-27 DIAGNOSIS — Z86.718 PERSONAL HISTORY OF DVT (DEEP VEIN THROMBOSIS): ICD-10-CM

## 2023-10-27 DIAGNOSIS — Z79.01 ANTICOAGULATION MONITORING, INR RANGE 2-3: ICD-10-CM

## 2023-10-27 DIAGNOSIS — Z79.01 LONG TERM CURRENT USE OF ANTICOAGULANT THERAPY: Primary | ICD-10-CM

## 2023-10-27 DIAGNOSIS — Z86.711 HISTORY OF PULMONARY EMBOLISM: ICD-10-CM

## 2023-10-27 LAB — INR POINT OF CARE: 1.7 (ref 0.9–1.1)

## 2023-10-27 PROCEDURE — 85610 PROTHROMBIN TIME: CPT | Mod: ZL,QW

## 2023-10-27 PROCEDURE — 36416 COLLJ CAPILLARY BLOOD SPEC: CPT | Mod: ZL

## 2023-10-27 NOTE — PROGRESS NOTES
ANTICOAGULATION MANAGEMENT     Michael Martinez 87 year old male is on warfarin with subtherapeutic INR result. (Goal INR 2.0-3.0)    Recent labs: (last 7 days)     10/27/23  1346   INR 1.7*       ASSESSMENT     Source(s): Chart Review and In person      Warfarin doses taken: Warfarin taken as instructed  Diet: No new diet changes identified  New illness, injury, or hospitalization: Was constipated most of the week last week  Medication/supplement changes: None noted  Signs or symptoms of bleeding or clotting: No  Previous INR: Subtherapeutic  Additional findings: None     PLAN     Recommended plan for no diet, medication or health factor changes affecting INR     Dosing Instructions: Increase your warfarin dose (5.6% change) with next INR in 1 week       Summary  As of 10/27/2023      Full warfarin instructions:  5 mg every Mon, Fri; 7.5 mg all other days   Next INR check:  11/3/2023               In person    Lab visit scheduled    Education provided: Please call back if any changes to your diet, medications or how you've been taking warfarin    Plan made per [facility] anticoagulation protocol    Radha Seo RN  Anticoagulation Clinic  10/27/2023    _______________________________________________________________________     Anticoagulation Episode Summary       Current INR goal:  2.0-3.0   TTR:  71.5% (1 y)   Target end date:  Indefinite   Send INR reminders to:  [facility]    Indications    Long-term (current) use of anticoagulants [Z79.01] [Z79.01]  Anticoagulation monitoring  INR range 2-3 [Z79.01]  History of pulmonary embolism [Z86.711]  Personal history of DVT (deep vein thrombosis) [Z86.718]             Comments:  likes to come every 4 weeks             Anticoagulation Care Providers       Provider Role Specialty Phone number    Isma Martinez MD Referring Internal Medicine [phone]    Stephane Freedman MD Referring Surgery [phone]

## 2023-11-06 ENCOUNTER — OFFICE VISIT (OUTPATIENT)
Dept: FAMILY MEDICINE | Facility: OTHER | Age: 88
End: 2023-11-06
Payer: COMMERCIAL

## 2023-11-06 ENCOUNTER — TELEPHONE (OUTPATIENT)
Dept: PEDIATRICS | Facility: OTHER | Age: 88
End: 2023-11-06
Payer: COMMERCIAL

## 2023-11-06 ENCOUNTER — HOSPITAL ENCOUNTER (OUTPATIENT)
Dept: GENERAL RADIOLOGY | Facility: OTHER | Age: 88
Discharge: HOME | End: 2023-11-06
Payer: COMMERCIAL

## 2023-11-06 ENCOUNTER — ANTICOAGULATION THERAPY VISIT (OUTPATIENT)
Dept: ANTICOAGULATION | Facility: OTHER | Age: 88
End: 2023-11-06
Attending: INTERNAL MEDICINE
Payer: COMMERCIAL

## 2023-11-06 VITALS
RESPIRATION RATE: 18 BRPM | HEART RATE: 56 BPM | DIASTOLIC BLOOD PRESSURE: 82 MMHG | SYSTOLIC BLOOD PRESSURE: 142 MMHG | WEIGHT: 160.4 LBS | BODY MASS INDEX: 23.76 KG/M2 | HEIGHT: 69 IN | OXYGEN SATURATION: 96 % | TEMPERATURE: 97.7 F

## 2023-11-06 DIAGNOSIS — Z79.01 ANTICOAGULATION MONITORING, INR RANGE 2-3: ICD-10-CM

## 2023-11-06 DIAGNOSIS — Z86.711 HISTORY OF PULMONARY EMBOLISM: ICD-10-CM

## 2023-11-06 DIAGNOSIS — Z86.718 PERSONAL HISTORY OF DVT (DEEP VEIN THROMBOSIS): ICD-10-CM

## 2023-11-06 DIAGNOSIS — Z79.01 LONG TERM CURRENT USE OF ANTICOAGULANT THERAPY: Primary | ICD-10-CM

## 2023-11-06 DIAGNOSIS — S20.211A RIB CONTUSION, RIGHT, INITIAL ENCOUNTER: ICD-10-CM

## 2023-11-06 DIAGNOSIS — R07.81 RIB PAIN ON RIGHT SIDE: Primary | ICD-10-CM

## 2023-11-06 LAB — INR POINT OF CARE: 1.7 (ref 0.9–1.1)

## 2023-11-06 PROCEDURE — 36416 COLLJ CAPILLARY BLOOD SPEC: CPT | Mod: ZL

## 2023-11-06 PROCEDURE — 99213 OFFICE O/P EST LOW 20 MIN: CPT

## 2023-11-06 PROCEDURE — G0463 HOSPITAL OUTPT CLINIC VISIT: HCPCS

## 2023-11-06 PROCEDURE — 85610 PROTHROMBIN TIME: CPT | Mod: ZL,QW

## 2023-11-06 PROCEDURE — 71101 X-RAY EXAM UNILAT RIBS/CHEST: CPT | Mod: RT

## 2023-11-06 ASSESSMENT — PAIN SCALES - GENERAL: PAINLEVEL: EXTREME PAIN (8)

## 2023-11-06 NOTE — TELEPHONE ENCOUNTER
ANTICOAGULATION     Michael Martinez is overdue for an INR check.     Left message for patient to call and schedule lab appointment as soon as possible. If returning call, please schedule.     Radha Seo RN

## 2023-11-06 NOTE — NURSING NOTE
"Chief Complaint   Patient presents with    Lumbar/SI    Chest Pain     Right sided     Nail Problem     Lines in nails- he saw a commercial that stated if you have lines in your nails you could have a lung infection       Patient states he fell about 1 month ago. He has had low back pain and right sided chest pain since then. He is not taking any medications for pain. He has been rubbing Bengay and Icy Hot on painful area.     Initial BP (!) 148/78   Pulse 56   Temp 97.7  F (36.5  C) (Tympanic)   Resp 18   Ht 1.753 m (5' 9\")   Wt 72.8 kg (160 lb 6.4 oz)   SpO2 96%   BMI 23.69 kg/m   Estimated body mass index is 23.69 kg/m  as calculated from the following:    Height as of this encounter: 1.753 m (5' 9\").    Weight as of this encounter: 72.8 kg (160 lb 6.4 oz).  Medication Review: complete    The next two questions are to help us understand your food security.  If you are feeling you need any assistance in this area, we have resources available to support you today.          11/6/2023   SDOH- Food Insecurity   Within the past 12 months, did you worry that your food would run out before you got money to buy more? N   Within the past 12 months, did the food you bought just not last and you didn t have money to get more? N         Health Care Directive:  Patient does not have a Health Care Directive or Living Will: Discussed advance care planning with patient; however, patient declined at this time.    Tangela Piper CMA        "

## 2023-11-06 NOTE — PROGRESS NOTES
ASSESSMENT/PLAN:    I have reviewed the nursing notes.  I have reviewed the findings, diagnosis, plan and need for follow up with the patient.    1. Rib pain on right side  2. Rib contusion, right, initial encounter  - XR Ribs & Chest Rt 3vw    Patient presents with anterior right rib pain after moving a drying machine.  Right rib and chest x-ray was negative for any fractures or other concerning abnormalities.  Discussed results with patient in clinic.  Discussed with patient that his pain is most likely due to a rib contusion.  Discussed that rib pain can last for weeks to months.  Advised patient that he should not wear the rib band as this can restrict his breathing and lead to pneumonia.  Advised that patient can continue taking Tylenol as needed.  May also continue alternating between ice and heat.    Discussed warning signs/symptoms indicative of need to f/u    Follow up if symptoms persist or worsen or concerns    I explained my diagnostic considerations and recommendations to the patient, who voiced understanding and agreement with the treatment plan. All questions were answered. We discussed potential side effects of any prescribed or recommended therapies, as well as expectations for response to treatments.    Jose Luis Feliciano, MICH CNP  11/6/2023  12:44 PM    HPI:    Michael Martinez is a 87 year old male  who presents to [facility] today for concerns of rib pain    back pain located at right anterior ribs    Duration of symptoms: one month  Pain: 8/10  Pain progression: same  Injury: was moving a stove and thinks he may have injured his ribs or pulled a muscle  Mechanical symptoms (popping, locking, catching): No  Palliative: rest  Provocative: certain activities  Presence of sciatic symptoms: no  Denies fever, chills, shortness of breath    Treatments Tried: heat, ice, rest, and exercise    Prior history of similar symptoms: he injured his right ribs when he was young playing football     Patient  states that he has lost 14 lbs unintentionally over the past couple of months.     PCP: Juan    Past Medical History:   Diagnosis Date    Cerebral infarction (H)     6/2014,left cerebellum--seen on MRI    Edema     11/4/2003,Edema & cramps legs, ? 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)     10/29/2016    Enlarged prostate with lower urinary tract symptoms (LUTS)     8/4/2011    Guillain Barré syndrome (H24)     Ischemic cardiomyopathy     7/9/2014    Osteoarthritis     9/5/2001    Other ill-defined and unknown causes of morbidity and mortality     see prob list    Urinary tract infection     No Comments Provided     Past Surgical History:   Procedure Laterality Date    ARTHROSCOPY SHOULDER ROTATOR CUFF REPAIR      1996    BIOPSY PROSTATE TRANSRECTAL      No Comments Provided    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  [place]-80% stenosis.  Right coronary-high-grade 90% stenosis.  EF-45%     Social History     Tobacco Use    Smoking status: Former     Packs/day: 0.90     Years: 9.00     Additional pack years: 0.00     Total pack years: 8.10     Types: Cigarettes    Smokeless tobacco: Never   Substance Use Topics    Alcohol use: Yes     Alcohol/week: 3.0 standard drinks of alcohol     Types: 3 Cans of beer per week     Comment: beer 3 times per week      Current Outpatient Medications   Medication Sig Dispense Refill    aspirin EC 81 MG tablet Take 81 mg by mouth      atorvastatin (LIPITOR) 10 MG tablet Take 1 tablet (10 mg) by mouth daily 90 tablet 3    Cholecalciferol (VITAMIN D-3) 1000 units CAPS  "Take 1 capsule by mouth daily      citalopram (CELEXA) 20 MG tablet Take 1 tablet (20 mg) by mouth daily 90 tablet 3    latanoprost (XALATAN) 0.005 % ophthalmic solution       metoprolol succinate ER (TOPROL XL) 25 MG 24 hr tablet Take 0.5 tablets (12.5 mg) by mouth daily 45 tablet 3    omeprazole (PRILOSEC) 20 MG DR capsule Take 1 capsule (20 mg) by mouth every other day 45 capsule 3    warfarin ANTICOAGULANT (COUMADIN) 5 MG tablet TAKE 1 TABLET BY MOUTH 3  DAYS WEEKLY OR AS DIRECTED  BY [facility] TAKE 7.5  MG TABLET 4 DAYS PER WEEK 34 tablet 3    warfarin ANTICOAGULANT (COUMADIN) 7.5 MG tablet TAKE 1 TABLET BY MOUTH 4   DAYS PER WEEK OR AS   DIRECTED BY [facility]. TAKE 5MG TABLET 3 DAYS PER  WEEK 38 tablet 4     Allergies   Allergen Reactions    Influenza Vac Split [Influenza Virus Vaccine]      omari oakes     Past medical history, past surgical history, current medications and allergies reviewed and accurate to the best of my knowledge.      ROS:  Refer to HPI    BP (!) 142/82   Pulse 56   Temp 97.7  F (36.5  C) (Tympanic)   Resp 18   Ht 1.753 m (5' 9\")   Wt 72.8 kg (160 lb 6.4 oz)   SpO2 96%   BMI 23.69 kg/m      EXAM:  General Appearance: Well appearing 87 year old male, appropriate appearance for age. No acute distress   Respiratory: normal chest wall and respirations.  Normal effort.  Clear to auscultation bilaterally, no wheezing, crackles or rhonchi.  No increased work of breathing.  No cough appreciated.  Cardiac: RRR with no murmurs  Abdomen: soft, nontender, no rigidity, no rebound tenderness or guarding, normal bowel sounds present  Musculoskeletal:  Equal movement of bilateral upper extremities.  Equal movement of bilateral lower extremities.  Normal gait.  Mild tenderness with palpation of anterior right lower ribs.  Dermatological: no rashes noted of exposed skin  Neuro: Alert and oriented to person, place, and time.    Psychological: normal affect, alert, oriented, and pleasant. "     Xray:  Results for orders placed or performed in visit on 11/06/23   XR Ribs & Chest Rt 3vw     Status: None    Narrative    PROCEDURE:  XR RIBS & CHEST RT 3VW    HISTORY: right lower rib pain on right side of body/anterior; Rib pain  on right side, .    COMPARISON:  10/22/2015    FINDINGS:  The cardiomediastinal contours are normal. The trachea is midline.  There is calcific aortic atherosclerosis.  No focal consolidation, effusion or pneumothorax.    No acute right rib fracture is identified. Osteopenia. Costochondral  calcifications.      Impression    IMPRESSION:      No pneumothorax or evidence of acute rib fracture.      JASON GARCIA MD         SYSTEM ID:  [id number]   Results for orders placed or performed in visit on 11/06/23   INR POCT     Status: Abnormal   Result Value Ref Range    INR Point of Care 1.7 (A) 0.9 - 1.1

## 2023-11-06 NOTE — PROGRESS NOTES
ANTICOAGULATION MANAGEMENT     Michael Martinez 87 year old male is on warfarin with subtherapeutic INR result. (Goal INR 2.0-3.0)    Recent labs: (last 7 days)     11/06/23  1013   INR 1.7*       ASSESSMENT     Source(s): Chart Review and In person      Warfarin doses taken: Warfarin taken as instructed  Diet: No new diet changes identified  New illness, injury, or hospitalization: No  Medication/supplement changes: None noted  Signs or symptoms of bleeding or clotting: No  Previous INR: Subtherapeutic  Additional findings:  Patient verbalized that he is going to go to the ED today for his back pain and rib/chest pain from potentially straining those muscles from moving his stove.      PLAN     Recommended plan for no diet, medication or health factor changes affecting INR     Dosing Instructions: Increase your warfarin dose (10.5% change) with next INR in 1 week       Summary  As of 11/6/2023      Full warfarin instructions:  7.5 mg every day   Next INR check:  11/13/2023               In person    Lab visit scheduled    Education provided: Please call back if any changes to your diet, medications or how you've been taking warfarin    Plan made per [facility] anticoagulation protocol; closest % change with current tablet size made per protocol for for INR 1.7-1.9 (goal 2-3)    Radha Seo RN  [facility] Clinic  11/6/2023    _______________________________________________________________________     Anticoagulation Episode Summary       Current INR goal:  2.0-3.0   TTR:  71.5% (1 y)   Target end date:  Indefinite   Send INR reminders to:  [facility]    Indications    Long-term (current) use of anticoagulants [Z79.01] [Z79.01]  Anticoagulation monitoring  INR range 2-3 [Z79.01]  History of pulmonary embolism [Z86.711]  Personal history of DVT (deep vein thrombosis) [Z86.718]             Comments:  likes to come every 4 weeks             Anticoagulation Care Providers       Provider Role  Specialty Phone number    Isma Martinez MD Referring Internal Medicine [phone]    Stephane Freedman MD Referring Surgery [phone]

## 2023-11-13 DIAGNOSIS — E78.2 MIXED HYPERLIPIDEMIA: ICD-10-CM

## 2023-11-16 ENCOUNTER — OFFICE VISIT (OUTPATIENT)
Dept: PSYCHOLOGY | Facility: OTHER | Age: 88
End: 2023-11-16
Attending: SOCIAL WORKER
Payer: COMMERCIAL

## 2023-11-16 ENCOUNTER — FCC EXTENDED DOCUMENTATION (OUTPATIENT)
Dept: PSYCHIATRY | Facility: OTHER | Age: 88
End: 2023-11-16

## 2023-11-16 DIAGNOSIS — F41.1 GAD (GENERALIZED ANXIETY DISORDER): Primary | ICD-10-CM

## 2023-11-16 PROCEDURE — 90834 PSYTX W PT 45 MINUTES: CPT | Performed by: SOCIAL WORKER

## 2023-11-16 RX ORDER — ATORVASTATIN CALCIUM 10 MG/1
10 TABLET, FILM COATED ORAL DAILY
Qty: 100 TABLET | Refills: 2 | OUTPATIENT
Start: 2023-11-16

## 2023-11-16 NOTE — TELEPHONE ENCOUNTER
[facility] sent Rx request for the following:      Requested Prescriptions   Pending Prescriptions Disp Refills    atorvastatin (LIPITOR) 10 MG tablet [Pharmacy Med Name: Atorvastatin Calcium 10 MG Oral Tablet] 100 tablet 2     Sig: TAKE 1 TABLET BY MOUTH DAILY       [facility] Protocol Passed - 11/13/2023  6:57 AM     Last Prescription Date:   2/9/23  Last Fill Qty/Refills:         90, R-3    Last Office Visit:              8/28/23   Future Office visit:           11/28/23    Class: E-Prescribe   Order: [id number]   E-Prescribing Status: Receipt confirmed by pharmacy (2/9/2023  3:39 PM CST)   Redundant refill request refused: Too soon:    Carlita Schaeffer RN on 11/16/2023 at 11:44 AM

## 2023-11-16 NOTE — PROGRESS NOTES
[facility]   Mental Health & Addiction Services     Progress Note     Patient  Name:  Michael Martinez Date: 11/16/2023             Service Type: Individual     Visit Start Time: 1000  Visit End Time: 1050    Visit #:  2    Attendees: Client attended alone daughter was in lobby.     Service Modality:  In-person       DATA:   Interactive Complexity: No   Crisis: No     Presenting Concerns/  Current Stressors: 2nd session time with Karl. He described his main hardship that causes anxiety, stress for him. Would like for his son and daughter to come in and make amends from current dispute. Karl would like to have his family together and getting alone.      ASSESSMENT:  Mental Status Assessment:  Appearance:   Appropriate   Eye Contact:   Good   Psychomotor Behavior: Normal   Attitude:   Cooperative   Orientation:   All  Speech   Rate / Production: Talkative   Volume:  Normal   Mood:    Anxious   Affect:    Worrisome   Thought Content:  Clear   Thought Form:  Coherent   Insight:    Good       Safety Issues and Plan for Safety and Risk Management:   [place] Suicide Severity Rating Scale (Lifetime/Recent)      10/10/2023     1:00 PM   [place] Suicide Severity Rating (Lifetime/Recent)   Q1 Wish to be Dead (Lifetime) N   Q2 Non-Specific Active Suicidal Thoughts (Lifetime) N   Actual Attempt (Lifetime) N   Has subject engaged in non-suicidal self-injurious behavior? (Lifetime) N   Interrupted Attempts (Lifetime) N   Aborted or Self-Interrupted Attempt (Lifetime) N   Preparatory Acts or Behavior (Lifetime) N   Calculated C-SSRS Risk Score (Lifetime/Recent) No Risk Indicated     Patient denies current fears or concerns for personal safety.  Patient denies current or recent suicidal ideation or behaviors.  Patient denies current or recent homicidal ideation or behaviors.  Patient denies current or recent self injurious behavior or ideation.  Patient denies other safety concerns.  Recommended that  patient call 911 or go to the local ED should there be a change in any of these risk factors.  Patient reports there are no firearms in the house.     Diagnostic Criteria:  Generalized Anxiety Disorder  A. Excessive anxiety and worry about a number of events or activities (such as work or school performance).   B. The person finds it difficult to control the worry.   - Restlessness or feeling keyed up or on edge.    - Being easily fatigued.    - Difficulty concentrating or mind going blank.    - Irritability.    - Muscle tension.    - Sleep disturbance (difficulty falling or staying asleep, or restless unsatisfying sleep).   D. The focus of the anxiety and worry is not confined to features of an Axis I disorder.  E. The anxiety, worry, or physical symptoms cause clinically significant distress or impairment in social, occupational, or other important areas of functioning.   F. The disturbance is not due to the direct physiological effects of a substance (e.g., a drug of abuse, a medication) or a general medical condition (e.g., hyperthyroidism) and does not occur exclusively during a Mood Disorder, a Psychotic Disorder, or a Pervasive Developmental Disorder.    - The aformentioned symptoms began 2 year(s) ago and occurs 7 days per week and is experienced as moderate.      DSM5 Diagnoses: (Sustained by DSM5 Criteria Listed Above)  Diagnoses: 300.02 (F41.1) Generalized Anxiety Disorder  Psychosocial & Contextual Factors: Retired, wife passed recently, [employer] hard worker. Family conflict has been stressful and bothering him a lot.    Intervention:   Gathered initial information. Problem solving and strengths based approach.   Collateral Reports Completed:  Not Applicable at this time.       PLAN: (Homework, other):  1. Provider will continue Diagnostic Assessment.  Patient was given the following to do until next session: Keep family stress to the minimum, avoid being in the middle of adult kids' conflict  issues. Take care of self.     2. Provider recommended the following referrals: none at this time. .      3.  Suicide Risk and Safety Concerns were assessed for Michael Martinez.    Patient meets the following risk assessment and triage: low risk.    The following is recommended:   Per clinical judgment, provider is making the following recommendations learn more about situation and recommend support when feeling stressed.     Safety Plan: Contact ER if feeling out of control or panicked.       MARYCRUZ Fisher  11/16/2023        Answers submitted by the patient for this visit:  Patient Health Questionnaire (Submitted on 10/10/2023)  If you checked off any problems, how difficult have these problems made it for you to do your work, take care of things at home, or get along with other people?: Somewhat difficult  PHQ9 TOTAL SCORE: 11

## 2023-11-20 ENCOUNTER — HOSPITAL ENCOUNTER (EMERGENCY)
Facility: OTHER | Age: 88
Discharge: ANOTHER HOSPITAL | End: 2023-11-20
Attending: EMERGENCY MEDICINE | Admitting: EMERGENCY MEDICINE
Payer: COMMERCIAL

## 2023-11-20 ENCOUNTER — APPOINTMENT (OUTPATIENT)
Dept: GENERAL RADIOLOGY | Facility: OTHER | Age: 88
End: 2023-11-20
Attending: EMERGENCY MEDICINE
Payer: COMMERCIAL

## 2023-11-20 ENCOUNTER — APPOINTMENT (OUTPATIENT)
Dept: CT IMAGING | Facility: OTHER | Age: 88
End: 2023-11-20
Attending: EMERGENCY MEDICINE
Payer: COMMERCIAL

## 2023-11-20 VITALS
DIASTOLIC BLOOD PRESSURE: 103 MMHG | SYSTOLIC BLOOD PRESSURE: 183 MMHG | HEART RATE: 55 BPM | TEMPERATURE: 97 F | RESPIRATION RATE: 12 BRPM | OXYGEN SATURATION: 92 %

## 2023-11-20 DIAGNOSIS — S22.060A CLOSED WEDGE COMPRESSION FRACTURE OF T8 VERTEBRA, INITIAL ENCOUNTER (H): ICD-10-CM

## 2023-11-20 LAB
ALBUMIN SERPL BCG-MCNC: 4.2 G/DL (ref 3.5–5.2)
ALBUMIN UR-MCNC: NEGATIVE MG/DL
ALP SERPL-CCNC: 139 U/L (ref 40–150)
ALT SERPL W P-5'-P-CCNC: 16 U/L (ref 0–70)
ANION GAP SERPL CALCULATED.3IONS-SCNC: 12 MMOL/L (ref 7–15)
APPEARANCE UR: CLEAR
AST SERPL W P-5'-P-CCNC: 28 U/L (ref 0–45)
BASOPHILS # BLD AUTO: 0.1 10E3/UL (ref 0–0.2)
BASOPHILS NFR BLD AUTO: 1 %
BILIRUB SERPL-MCNC: 0.5 MG/DL
BILIRUB UR QL STRIP: NEGATIVE
BUN SERPL-MCNC: 28.7 MG/DL (ref 8–23)
CALCIUM SERPL-MCNC: 9.6 MG/DL (ref 8.8–10.2)
CHLORIDE SERPL-SCNC: 100 MMOL/L (ref 98–107)
COLOR UR AUTO: ABNORMAL
CREAT SERPL-MCNC: 1 MG/DL (ref 0.67–1.17)
D DIMER PPP FEU-MCNC: 6.3 UG/ML FEU (ref 0–0.5)
DEPRECATED HCO3 PLAS-SCNC: 23 MMOL/L (ref 22–29)
EGFRCR SERPLBLD CKD-EPI 2021: 73 ML/MIN/1.73M2
EOSINOPHIL # BLD AUTO: 0.2 10E3/UL (ref 0–0.7)
EOSINOPHIL NFR BLD AUTO: 3 %
ERYTHROCYTE [DISTWIDTH] IN BLOOD BY AUTOMATED COUNT: 14.3 % (ref 10–15)
GLUCOSE SERPL-MCNC: 139 MG/DL (ref 70–99)
GLUCOSE UR STRIP-MCNC: NEGATIVE MG/DL
HCT VFR BLD AUTO: 44.2 % (ref 40–53)
HGB BLD-MCNC: 15.1 G/DL (ref 13.3–17.7)
HGB UR QL STRIP: ABNORMAL
HOLD SPECIMEN: NORMAL
IMM GRANULOCYTES # BLD: 0 10E3/UL
IMM GRANULOCYTES NFR BLD: 0 %
INR PPP: 3.03 (ref 0.85–1.15)
KETONES UR STRIP-MCNC: NEGATIVE MG/DL
LEUKOCYTE ESTERASE UR QL STRIP: NEGATIVE
LIPASE SERPL-CCNC: 48 U/L (ref 13–60)
LYMPHOCYTES # BLD AUTO: 2.2 10E3/UL (ref 0.8–5.3)
LYMPHOCYTES NFR BLD AUTO: 28 %
MAGNESIUM SERPL-MCNC: 1.7 MG/DL (ref 1.7–2.3)
MCH RBC QN AUTO: 29.9 PG (ref 26.5–33)
MCHC RBC AUTO-ENTMCNC: 34.2 G/DL (ref 31.5–36.5)
MCV RBC AUTO: 88 FL (ref 78–100)
MONOCYTES # BLD AUTO: 0.8 10E3/UL (ref 0–1.3)
MONOCYTES NFR BLD AUTO: 10 %
NEUTROPHILS # BLD AUTO: 4.4 10E3/UL (ref 1.6–8.3)
NEUTROPHILS NFR BLD AUTO: 58 %
NITRATE UR QL: NEGATIVE
NRBC # BLD AUTO: 0 10E3/UL
NRBC BLD AUTO-RTO: 0 /100
PH UR STRIP: 6.5 [PH] (ref 5–9)
PLATELET # BLD AUTO: 213 10E3/UL (ref 150–450)
POTASSIUM SERPL-SCNC: 4.5 MMOL/L (ref 3.4–5.3)
PROT SERPL-MCNC: 7.5 G/DL (ref 6.4–8.3)
RBC # BLD AUTO: 5.05 10E6/UL (ref 4.4–5.9)
RBC URINE: 13 /HPF
SODIUM SERPL-SCNC: 135 MMOL/L (ref 135–145)
SP GR UR STRIP: 1.05 (ref 1–1.03)
TROPONIN T SERPL HS-MCNC: 16 NG/L
UROBILINOGEN UR STRIP-MCNC: NORMAL MG/DL
WBC # BLD AUTO: 7.7 10E3/UL (ref 4–11)
WBC URINE: 1 /HPF

## 2023-11-20 PROCEDURE — 36415 COLL VENOUS BLD VENIPUNCTURE: CPT | Performed by: EMERGENCY MEDICINE

## 2023-11-20 PROCEDURE — 250N000011 HC RX IP 250 OP 636: Performed by: EMERGENCY MEDICINE

## 2023-11-20 PROCEDURE — 85610 PROTHROMBIN TIME: CPT | Performed by: EMERGENCY MEDICINE

## 2023-11-20 PROCEDURE — 96376 TX/PRO/DX INJ SAME DRUG ADON: CPT | Performed by: EMERGENCY MEDICINE

## 2023-11-20 PROCEDURE — 80053 COMPREHEN METABOLIC PANEL: CPT | Performed by: EMERGENCY MEDICINE

## 2023-11-20 PROCEDURE — 99285 EMERGENCY DEPT VISIT HI MDM: CPT | Performed by: EMERGENCY MEDICINE

## 2023-11-20 PROCEDURE — 99285 EMERGENCY DEPT VISIT HI MDM: CPT | Mod: 25 | Performed by: EMERGENCY MEDICINE

## 2023-11-20 PROCEDURE — 96375 TX/PRO/DX INJ NEW DRUG ADDON: CPT | Performed by: EMERGENCY MEDICINE

## 2023-11-20 PROCEDURE — 93010 ELECTROCARDIOGRAM REPORT: CPT | Performed by: INTERNAL MEDICINE

## 2023-11-20 PROCEDURE — 70450 CT HEAD/BRAIN W/O DYE: CPT | Mod: TC

## 2023-11-20 PROCEDURE — 74174 CTA ABD&PLVS W/CONTRAST: CPT | Mod: TC

## 2023-11-20 PROCEDURE — 85379 FIBRIN DEGRADATION QUANT: CPT | Performed by: EMERGENCY MEDICINE

## 2023-11-20 PROCEDURE — 71045 X-RAY EXAM CHEST 1 VIEW: CPT | Mod: TC

## 2023-11-20 PROCEDURE — 96374 THER/PROPH/DIAG INJ IV PUSH: CPT | Mod: XU | Performed by: EMERGENCY MEDICINE

## 2023-11-20 PROCEDURE — 83735 ASSAY OF MAGNESIUM: CPT | Performed by: EMERGENCY MEDICINE

## 2023-11-20 PROCEDURE — 81001 URINALYSIS AUTO W/SCOPE: CPT | Performed by: EMERGENCY MEDICINE

## 2023-11-20 PROCEDURE — 85025 COMPLETE CBC W/AUTO DIFF WBC: CPT | Performed by: EMERGENCY MEDICINE

## 2023-11-20 PROCEDURE — 83690 ASSAY OF LIPASE: CPT | Performed by: EMERGENCY MEDICINE

## 2023-11-20 PROCEDURE — 93005 ELECTROCARDIOGRAM TRACING: CPT | Performed by: EMERGENCY MEDICINE

## 2023-11-20 PROCEDURE — 84484 ASSAY OF TROPONIN QUANT: CPT | Performed by: EMERGENCY MEDICINE

## 2023-11-20 RX ORDER — HYDROMORPHONE HYDROCHLORIDE 1 MG/ML
0.5 INJECTION, SOLUTION INTRAMUSCULAR; INTRAVENOUS; SUBCUTANEOUS
Status: DISCONTINUED | OUTPATIENT
Start: 2023-11-20 | End: 2023-11-20 | Stop reason: HOSPADM

## 2023-11-20 RX ORDER — MORPHINE SULFATE 2 MG/ML
2 INJECTION, SOLUTION INTRAMUSCULAR; INTRAVENOUS ONCE
Status: COMPLETED | OUTPATIENT
Start: 2023-11-20 | End: 2023-11-20

## 2023-11-20 RX ORDER — FENTANYL CITRATE 50 UG/ML
50 INJECTION, SOLUTION INTRAMUSCULAR; INTRAVENOUS ONCE
Status: COMPLETED | OUTPATIENT
Start: 2023-11-20 | End: 2023-11-20

## 2023-11-20 RX ORDER — IOPAMIDOL 755 MG/ML
100 INJECTION, SOLUTION INTRAVASCULAR ONCE
Status: COMPLETED | OUTPATIENT
Start: 2023-11-20 | End: 2023-11-20

## 2023-11-20 RX ADMIN — HYDROMORPHONE HYDROCHLORIDE 0.5 MG: 1 INJECTION, SOLUTION INTRAMUSCULAR; INTRAVENOUS; SUBCUTANEOUS at 09:24

## 2023-11-20 RX ADMIN — MORPHINE SULFATE 2 MG: 2 INJECTION, SOLUTION INTRAMUSCULAR; INTRAVENOUS at 06:01

## 2023-11-20 RX ADMIN — MORPHINE SULFATE 2 MG: 2 INJECTION, SOLUTION INTRAMUSCULAR; INTRAVENOUS at 08:13

## 2023-11-20 RX ADMIN — IOPAMIDOL 100 ML: 755 INJECTION, SOLUTION INTRAVENOUS at 05:35

## 2023-11-20 RX ADMIN — FENTANYL CITRATE 50 MCG: 50 INJECTION, SOLUTION INTRAMUSCULAR; INTRAVENOUS at 04:05

## 2023-11-20 ASSESSMENT — ENCOUNTER SYMPTOMS
ALLERGIC/IMMUNOLOGIC NEGATIVE: 1
CONSTITUTIONAL NEGATIVE: 1
MUSCULOSKELETAL NEGATIVE: 1
PSYCHIATRIC NEGATIVE: 1
NEUROLOGICAL NEGATIVE: 1
SHORTNESS OF BREATH: 0
FEVER: 0
HEMATOLOGIC/LYMPHATIC NEGATIVE: 1
GASTROINTESTINAL NEGATIVE: 1
EYES NEGATIVE: 1
CARDIOVASCULAR NEGATIVE: 1
NECK PAIN: 0
MYALGIAS: 0
SLEEP DISTURBANCE: 0
ENDOCRINE NEGATIVE: 1
NECK STIFFNESS: 0

## 2023-11-20 ASSESSMENT — ACTIVITIES OF DAILY LIVING (ADL)
ADLS_ACUITY_SCORE: 35

## 2023-11-20 NOTE — ED TRIAGE NOTES
Patient presents to ER with complaint of rib pain for 2 weeks after moving a cast iron stove that suddenly became worse after the game tonight. Pain 9/10 shallow breathing no shortness of breath. BP (!) 189/126   Pulse 57   Temp 97  F (36.1  C)   Resp 16   SpO2 98%          Triage Assessment (Adult)       Row Name 11/20/23 0337          Triage Assessment    Airway WDL WDL        Respiratory WDL    Respiratory WDL rhythm/pattern;X     Rhythm/Pattern, Respiratory shallow        Skin Circulation/Temperature WDL    Skin Circulation/Temperature WDL WDL        Cardiac WDL    Cardiac WDL WDL        Peripheral/Neurovascular WDL    Peripheral Neurovascular WDL WDL        Cognitive/Neuro/Behavioral WDL    Cognitive/Neuro/Behavioral WDL WDL

## 2023-11-20 NOTE — ED PROVIDER NOTES
History     Chief Complaint   Patient presents with    Rib Pain     HPI  Michael Martinez is a 87 year old male who presents today with complaints of rib pain.  Patient states that symptoms started about 2 weeks ago and has waxed and waned.  At that time patient had x-rays which revealed no fracture.  Patient states that pain began this evening.  Pain is over his right lower side of his rib cage.  Denies any new trauma.  Has otherwise been at baseline state of health.  Pain worse with inspiration.    Allergies:  Allergies   Allergen Reactions    Influenza Vac Split [Influenza Virus Vaccine]      guillan barre       Problem List:    Patient Active Problem List    Diagnosis Date Noted    Diabetes mellitus type 2, diet-controlled (H) 01/21/2022     Priority: Medium    Primary osteoarthritis involving multiple joints 04/23/2020     Priority: Medium    Cervical arthritis 08/06/2019     Priority: Medium    OLIVIA (generalized anxiety disorder) 04/19/2019     Priority: Medium    Mixed hyperlipidemia 06/21/2018     Priority: Medium    Peripheral arterial disease (H24) 06/21/2018     Priority: Medium    History of Guillain-Vaiden syndrome 06/04/2018     Priority: Medium    Gastroesophageal reflux disease, esophagitis presence not specified 06/04/2018     Priority: Medium     IMO Regulatory Load OCT 2020      Primary osteoarthritis of left knee 06/04/2018     Priority: Medium    Other pulmonary embolism without acute cor pulmonale 02/11/2018     Priority: Medium    Lumbar disc disease 04/25/2017     Priority: Medium    Primary osteoarthritis of right hand 04/25/2017     Priority: Medium    Spinal stenosis, lumbar 04/25/2017     Priority: Medium    Long-term (current) use of anticoagulants [Z79.01] 11/18/2016     Priority: Medium    History of pulmonary embolism 11/16/2016     Priority: Medium    Abdominal aortic aneurysm without rupture (H24) 11/16/2016     Priority: Medium     Last imaged 11/15: 3.3cm 9 when discussed with  radiology, not reflected in report) Brandy Tucker MD       Anticoagulation monitoring, INR range 2-3 11/16/2016     Priority: Medium    Personal history of DVT (deep vein thrombosis) 10/29/2016     Priority: Medium     10/29/16      Bladder outlet obstruction 10/20/2015     Priority: Medium    DJD (degenerative joint disease) of cervical spine 07/22/2015     Priority: Medium    Ischemic cardiomyopathy 07/09/2014     Priority: Medium    History of four vessel coronary artery bypass graft 04/22/2014     Priority: Medium    H/O transurethral resection of prostate 04/22/2014     Priority: Medium    S/P CABG x 4 04/22/2014     Priority: Medium    S/P TURP 04/22/2014     Priority: Medium    Lesion of ulnar nerve 05/21/2012     Priority: Medium    Diaphragmatic hernia 01/06/2004     Priority: Medium    Osteoarthrosis involving lower leg 10/15/2003     Priority: Medium     Replacing diagnoses that were inactivated after the 10/1/2021 regulatory import.      Cataract 08/19/2002     Priority: Medium        Past Medical History:    Past Medical History:   Diagnosis Date    Cerebral infarction (H)     Edema     Embolism and thrombosis of right tibial vein (H)     Enlarged prostate with lower urinary tract symptoms (LUTS)     Guillain Barré syndrome (H24)     Ischemic cardiomyopathy     Osteoarthritis     Other ill-defined and unknown causes of morbidity and mortality     Urinary tract infection        Past Surgical History:    Past Surgical History:   Procedure Laterality Date    ARTHROSCOPY SHOULDER ROTATOR CUFF REPAIR      1996    BIOPSY PROSTATE TRANSRECTAL      No Comments Provided    BYPASS GRAFT ARTERY CORONARY      December of 2013,four-vessel    COLONOSCOPY      10/31/2013,diverticulosis-no fu needed d/t age    CYSTOSCOPY, TRANSURETHRAL RESECTION (TUR) PROSTATE, COMBINED      January 2014,done in [place] -- excellent result    OTHER SURGICAL HISTORY      December 2013,79623.0,([place]) [place] INJ PROC SELECTIVE CORONARY  ANGIOGRAPHY,LAD-95% zaimpuck-26-75% distal stenosis.  Ramus-80% stenosis.  Right coronary-high-grade 90% stenosis.  EF-45%       Family History:    Family History   Problem Relation Age of Onset    Heart Disease Mother         Heart Disease    Other - See Comments Father         No Known Problems    Breast Cancer Sister         Cancer-breast,Otherwise healthy    Other - See Comments Brother         BPH otherwise healthy at 72    Arthritis Brother         Arthritis,Osteoarthritis, otherwise healthy at 66    Colon Cancer Brother         Cancer-colon    Breast Cancer Sister 59        Cancer-breast,       Social History:  Marital Status:  Single [1]  Social History     Tobacco Use    Smoking status: Former     Packs/day: 0.90     Years: 9.00     Additional pack years: 0.00     Total pack years: 8.10     Types: Cigarettes    Smokeless tobacco: Never   Vaping Use    Vaping Use: Never used   Substance Use Topics    Alcohol use: Yes     Alcohol/week: 3.0 standard drinks of alcohol     Types: 3 Cans of beer per week     Comment: beer 3 times per week     Drug use: Never        Medications:    aspirin EC 81 MG tablet  atorvastatin (LIPITOR) 10 MG tablet  Cholecalciferol (VITAMIN D-3) 1000 units CAPS  citalopram (CELEXA) 20 MG tablet  latanoprost (XALATAN) 0.005 % ophthalmic solution  metoprolol succinate ER (TOPROL XL) 25 MG 24 hr tablet  omeprazole (PRILOSEC) 20 MG DR capsule  warfarin ANTICOAGULANT (COUMADIN) 5 MG tablet  warfarin ANTICOAGULANT (COUMADIN) 7.5 MG tablet          Review of Systems   Constitutional: Negative.  Negative for fever.   HENT: Negative.     Eyes: Negative.    Respiratory:  Negative for shortness of breath.    Cardiovascular: Negative.    Gastrointestinal: Negative.    Endocrine: Negative.    Genitourinary: Negative.    Musculoskeletal: Negative.  Negative for myalgias, neck pain and neck stiffness.   Skin: Negative.    Allergic/Immunologic: Negative.    Neurological: Negative.     Hematological: Negative.    Psychiatric/Behavioral: Negative.  Negative for self-injury, sleep disturbance and suicidal ideas.        Physical Exam   BP: (!) 189/126  Pulse: 57  Temp: 97  F (36.1  C)  Resp: 16  SpO2: 98 %      Physical Exam  Constitutional:       Appearance: He is normal weight.   HENT:      Head: Normocephalic and atraumatic.   Cardiovascular:      Rate and Rhythm: Normal rate and regular rhythm.   Pulmonary:      Effort: Pulmonary effort is normal.   Abdominal:      General: Abdomen is flat.   Musculoskeletal:         General: Tenderness (Tenderness over right lower side of the posterior rib cage) present. No swelling.      Cervical back: Normal range of motion and neck supple.   Skin:     General: Skin is warm.      Capillary Refill: Capillary refill takes less than 2 seconds.   Neurological:      General: No focal deficit present.      Mental Status: He is alert and oriented to person, place, and time. Mental status is at baseline.      Cranial Nerves: No cranial nerve deficit.      Motor: No weakness.   Psychiatric:         Mood and Affect: Mood normal.         Behavior: Behavior normal.         ED Course              ED Course as of 11/20/23 0645   Mon Nov 20, 2023   0644 Case discussed with Dr. Lal of [facility].  He agrees that patient should have further work-up in the hospital.  Bradycardic episode most likely due to vasovagal event.  However patient will be monitored in the hospital.  Has not had any further events     Procedures         EKG - Sinus bradycardia with 1st degree AV block.        Results for orders placed or performed during the hospital encounter of 11/20/23 (from the past 24 hour(s))   Troponin T, High Sensitivity   Result Value Ref Range    Troponin T, High Sensitivity 16 <=22 ng/L   [place] Draw    Narrative    The following orders were created for panel order [place] Draw.  Procedure                               Abnormality         Status                      ---------                               -----------         ------                     Extra Blue Top Tube[[id number]]                              Final result               Extra Red Top Tube[[id number]]                               Final result               Extra Green Top (Lithium...[[id number]]                                                 Extra Purple Top Tube[[id number]]                            Final result               Extra Blood Bank Purple ...[[id number]]                                                 Extra Green Top (Lithium...[[id number]]                      Final result                 Please view results for these tests on the individual orders.   Extra Blue Top Tube   Result Value Ref Range    Hold Specimen JIC    Extra Red Top Tube   Result Value Ref Range    Hold Specimen JIC    Extra Purple Top Tube   Result Value Ref Range    Hold Specimen JIC    Extra Green Top (Lithium Heparin) ON ICE   Result Value Ref Range    Hold Specimen JIC    CBC with platelets differential    Narrative    The following orders were created for panel order CBC with platelets differential.  Procedure                               Abnormality         Status                     ---------                               -----------         ------                     CBC with platelets and d...[[id number]]                      Final result                 Please view results for these tests on the individual orders.   D dimer quantitative   Result Value Ref Range    D-Dimer Quantitative 6.30 (H) 0.00 - 0.50 ug/mL FEU    Narrative    This D-dimer assay is intended for use in conjunction with a clinical pretest probability assessment model to exclude pulmonary embolism (PE) and deep venous thrombosis (DVT) in outpatients suspected of PE or DVT. The cut-off value is 0.50 ug/mL FEU.    For patients 50 years of age or older, the application of age-adjusted cut-off values for D-Dimer may increase the specificity without  significant effect on sensitivity. The literature suggested calculation age adjusted cut-off in ug/L = age in years x 10 ug/L. The results in this laboratory are reported as ug/mL rather than ug/L. The calculation for age adjusted cut off in ug/mL= age in years x 0.01 ug/mL. For example, the cut off for a 76 year old male is 76 x 0.01 ug/mL = 0.76 ug/mL (760 ug/L).    M Donita et al. Age adjusted D-dimer cut-off levels to rule out pulmonary embolism: The ADJUST-PE Study. YUAN 2014;311:5927-5280.; HJ Debbie et al. Diagnostic accuracy of conventional or age adjusted D-dimer cutoff values in older patients with suspected venous thromboembolism. Systemic review and meta-analysis. BMJ 2013:346:f2492.   INR   Result Value Ref Range    INR 3.03 (H) 0.85 - 1.15   Comprehensive metabolic panel   Result Value Ref Range    Sodium 135 135 - 145 mmol/L    Potassium 4.5 3.4 - 5.3 mmol/L    Carbon Dioxide (CO2) 23 22 - 29 mmol/L    Anion Gap 12 7 - 15 mmol/L    Urea Nitrogen 28.7 (H) 8.0 - 23.0 mg/dL    Creatinine 1.00 0.67 - 1.17 mg/dL    GFR Estimate 73 >60 mL/min/1.73m2    Calcium 9.6 8.8 - 10.2 mg/dL    Chloride 100 98 - 107 mmol/L    Glucose 139 (H) 70 - 99 mg/dL    Alkaline Phosphatase 139 40 - 150 U/L    AST 28 0 - 45 U/L    ALT 16 0 - 70 U/L    Protein Total 7.5 6.4 - 8.3 g/dL    Albumin 4.2 3.5 - 5.2 g/dL    Bilirubin Total 0.5 <=1.2 mg/dL   Lipase   Result Value Ref Range    Lipase 48 13 - 60 U/L   Magnesium   Result Value Ref Range    Magnesium 1.7 1.7 - 2.3 mg/dL   CBC with platelets and differential   Result Value Ref Range    WBC Count 7.7 4.0 - 11.0 10e3/uL    RBC Count 5.05 4.40 - 5.90 10e6/uL    Hemoglobin 15.1 13.3 - 17.7 g/dL    Hematocrit 44.2 40.0 - 53.0 %    MCV 88 78 - 100 fL    MCH 29.9 26.5 - 33.0 pg    MCHC 34.2 31.5 - 36.5 g/dL    RDW 14.3 10.0 - 15.0 %    Platelet Count 213 150 - 450 10e3/uL    % Neutrophils 58 %    % Lymphocytes 28 %    % Monocytes 10 %    % Eosinophils 3 %    % Basophils 1 %    %  Immature Granulocytes 0 %    NRBCs per 100 WBC 0 <1 /100    Absolute Neutrophils 4.4 1.6 - 8.3 10e3/uL    Absolute Lymphocytes 2.2 0.8 - 5.3 10e3/uL    Absolute Monocytes 0.8 0.0 - 1.3 10e3/uL    Absolute Eosinophils 0.2 0.0 - 0.7 10e3/uL    Absolute Basophils 0.1 0.0 - 0.2 10e3/uL    Absolute Immature Granulocytes 0.0 <=0.4 10e3/uL    Absolute NRBCs 0.0 10e3/uL   XR Chest Port 1 View    Narrative    PROCEDURE INFORMATION:   Exam: XR Chest   Exam date and time: 11/20/2023 4:50 AM   Age: 87 years old   Clinical indication: Pain; Intercostal; Additional info: 87-year-old male with   complaints of right-sided posterior rib pain. Rule out pneumothorax versus lung   mass     TECHNIQUE:   Imaging protocol: Radiologic exam of the chest.   Views: 1 view.     COMPARISON:   CR XR RIBS CHEST RT 3VW 11/6/2023 1:17 PM     FINDINGS:   Lungs: Unremarkable. No consolidation.   Pleural spaces: Unremarkable. No pleural effusion. No pneumothorax.   Heart/Mediastinum: Unremarkable. No cardiomegaly.   Bones/joints: Unremarkable.       Impression    IMPRESSION:   No acute findings.     THIS DOCUMENT HAS BEEN ELECTRONICALLY SIGNED BY ELÍAS HURTADO MD   CT Head w/o Contrast    Narrative    PROCEDURE INFORMATION:   Exam: CT Head Without Contrast   Exam date and time: 11/20/2023 5:07 AM   Age: 87 years old   Clinical indication: Other: Bradycardic and hypertensive; Additional info:   87-year-old male presents today with complaints of rib pain but noted to be   bradycardic and hypertensive. Patient on coumadin. Rule out intracranial bleed     TECHNIQUE:   Imaging protocol: Computed tomography of the head without contrast.   Radiation optimization: All CT scans at this facility use at least one of these   dose optimization techniques: automated exposure control; mA and/or kV   adjustment per patient size (includes targeted exams where dose is matched to   clinical indication); or iterative reconstruction.     REPORTING DATA:   Count of  CT and Cardiac NM exams in prior 12 months: This patient has received   1 known CT and 0 known cardiac nuclear medicine studies in the 12 months prior   to the current study.     COMPARISON:   No relevant prior studies available.     FINDINGS:   Brain: There is moderate diffuse cerebral atrophy present, consistent with this   patient's age.   Cerebral ventricles: No ventriculomegaly.   Paranasal sinuses: Opacification of the left maxillary sinus.   Mastoid air cells: Visualized mastoid air cells are well aerated.   Bones/joints: Unremarkable. No acute fracture.   Soft tissues: Unremarkable.       Impression    IMPRESSION:   No acute findings.     THIS DOCUMENT HAS BEEN ELECTRONICALLY SIGNED BY ELÍAS HURTADO MD   CTA Chest Abdomen Pelvis w Contrast    Narrative    PROCEDURE INFORMATION:   Exam:   CTA Chest With Contrast  CTA Abdomen and Pelvis With Contrast   Exam date and time: 11/20/2023 5:11 AM   Age: 87 years old   Clinical indication: Pain; Additional info: 87-year-old male with complaints of   right-sided rib pain but no discrete rib findings. Has elevated d-dimer. Rule   out pe versus rib fracture versus aortic or abdominal aneurysm     TECHNIQUE:   Imaging protocol: Computed tomographic angiography of the chest with contrast.   Exam focused on the arteries. Computed tomographic angiography of the abdomen   and pelvis with contrast. Exam focused on the arteries.   3D rendering (Not supervised by radiologist): MIP and/or 3D reconstructed   images were created by the technologist.   Radiation optimization: All CT scans at this facility use at least one of these   dose optimization techniques: automated exposure control; mA and/or kV   adjustment per patient size (includes targeted exams where dose is matched to   clinical indication); or iterative reconstruction.   Contrast material: ISOVUE; Contrast volume: 100 ml; Contrast route: INTRAVENOUS   (IV);      REPORTING DATA:   Count of CT and Cardiac NM  exams in prior 12 months: This patient has received   1 known CT and 0 known cardiac nuclear medicine studies in the 12 months prior   to the current study.     COMPARISON:   CT CHEST PE STUDY 10/22/2015 4:47 PM     FINDINGS:     VASCULATURE:   Pulmonary arteries: Normal. No pulmonary emboli.   Aorta: Infrarenal abdominal aortic aneurysm measures 4.7 x 4.1 cm. There is no   evidence for aneurysm rupture.   Celiac trunk and mesenteric arteries: High-grade stenosis at the origin of the   superior mesenteric artery.   Renal arteries: High-grade stenosis at the origin of the left renal artery.   High-grade stenosis at the origin of the right renal artery.   Right iliac arteries: No occlusion or significant stenosis.   Left iliac arteries: No occlusion or significant stenosis.     CHEST:   Lungs: Scattered subpleural blebs bilaterally.   Pleural spaces: Unremarkable. No pneumothorax. No pleural effusion.   Heart: Unremarkable. No cardiomegaly. No pericardial effusion.   Coronary arteries: Coronary artery calcifications are identified, considered a   risk factor for coronary artery disease.     ABDOMEN AND PELVIS:   Liver: No mass.   Gallbladder and bile ducts: Unremarkable. No calcified stones. No ductal   dilation.   Pancreas: Unremarkable. No mass. No ductal dilation.   Spleen: Unremarkable. No splenomegaly.   Adrenal glands: Unremarkable. No mass.   Kidneys and ureters: Unremarkable. No solid mass. No hydronephrosis.   Stomach and bowel: Multiple colonic diverticula without evidence for   diverticulitis.   Appendix: No evidence of appendicitis.   Intraperitoneal space: Unremarkable. No free air. No significant fluid   collection.   Urinary bladder: Unremarkable. No mass.   Reproductive: Enlarged prostate.     Lymph nodes: Unremarkable. No enlarged lymph nodes.   Bones/joints: Mixed lytic and sclerotic lesion involving the T8 vertebral body   with adjacent soft tissue component extending into the posterior mediastinum.    Etiologies include hematoma related to compression fracture as well as   neoplasm.   Soft tissues: Unremarkable.       Impression    IMPRESSION:   1.   Consider thoracic MRI to better evaluate the T8 vertebral body.   2.   Chronic vascular changes without acute findings.     THIS DOCUMENT HAS BEEN ELECTRONICALLY SIGNED BY ELÍAS HURTADO MD    with Microscopic reflex to Culture    Specimen: Urine, Clean Catch   Result Value Ref Range    Color Urine Light Yellow Colorless, Straw, Light Yellow, Yellow    Appearance Urine Clear Clear    Glucose Urine Negative Negative mg/dL    Bilirubin Urine Negative Negative    Ketones Urine Negative Negative mg/dL    Specific Gravity Urine 1.046 (H) 1.000 - 1.030    Blood Urine Trace (A) Negative    pH Urine 6.5 5.0 - 9.0    Protein Albumin Urine Negative Negative mg/dL    Urobilinogen Urine Normal Normal, 2.0 mg/dL    Nitrite Urine Negative Negative    Leukocyte Esterase Urine Negative Negative    RBC Urine 13 (H) <=2 /HPF    WBC Urine 1 <=5 /HPF    Narrative    Urine Culture not indicated       Medications - No data to display    Assessments & Plan (with Medical Decision Making)     87-year-old male who presents today with complaints of back pain.  He presented with same complaints approximately 3 weeks ago and was diagnosed as having a possible rib fracture.  X-rays at that time did not show a fracture.  Patient states that pain worsened today prompting ER visit.  Here in the ER patient complained of rib pain but area of concern appeared diffuse without any focal areas of tenderness on rib exam.  Of note patient's was hypertensive and heart rate 49-50.    Patient had labs drawn as above and significant for hematuria as well as and INR of 3.03. Patient underwent CT of his head chest abdomen pelvis as well as labs.  CT showed T8 possible compression fracture versus hematoma versus neoplasm with extension into the posterior mediastinum.  No evidence of rib fracture.  No  evidence of PE.  No other findings to explain patient's pain.      Given findings I felt that patient should be admitted for further work-up.  However patient did have an episode of bradycardia here where his heart rate went down to 29.  Out of caution I feel patient should be admitted to a facility that has inpatient cardiology in case patient develops symptomatic bradycardia.  All explained to the admitting physician Dr. Lal of [facility] who agrees to admit.  Morphine given to patient for pain. Neurological exam normal and has remained unchanged.        New Prescriptions    No medications on file       Final diagnoses:   Closed wedge compression fracture of T8 vertebra, initial encounter (H)       11/20/2023   [facility] AND [facility]       Ivette Portillo MD  11/20/23 3103       Ivette Portillo MD  11/20/23 0196

## 2023-11-21 ENCOUNTER — TRANSFERRED RECORDS (OUTPATIENT)
Dept: HEALTH INFORMATION MANAGEMENT | Facility: OTHER | Age: 88
End: 2023-11-21
Payer: COMMERCIAL

## 2023-11-21 LAB
ATRIAL RATE - MUSE: 46 BPM
DIASTOLIC BLOOD PRESSURE - MUSE: NORMAL MMHG
EJECTION FRACTION: 59 %
INTERPRETATION ECG - MUSE: NORMAL
P AXIS - MUSE: 43 DEGREES
PR INTERVAL - MUSE: 216 MS
QRS DURATION - MUSE: 88 MS
QT - MUSE: 464 MS
QTC - MUSE: 406 MS
R AXIS - MUSE: -10 DEGREES
SYSTOLIC BLOOD PRESSURE - MUSE: NORMAL MMHG
T AXIS - MUSE: 45 DEGREES
VENTRICULAR RATE- MUSE: 46 BPM

## 2023-11-24 ENCOUNTER — TELEPHONE (OUTPATIENT)
Dept: PEDIATRICS | Facility: OTHER | Age: 88
End: 2023-11-24
Payer: COMMERCIAL

## 2023-11-24 NOTE — TELEPHONE ENCOUNTER
The patient had a biopsy on 11/24/2023  this provider will not be available to give results.  [place]   It will be in Care Everywhere, that's where you can find the pathology.   Then you will be able to follow up with the patient.        Radha Rae on 11/24/2023 at 2:54 PM

## 2023-11-27 ENCOUNTER — ANTICOAGULATION THERAPY VISIT (OUTPATIENT)
Dept: ANTICOAGULATION | Facility: OTHER | Age: 88
End: 2023-11-27
Attending: INTERNAL MEDICINE
Payer: COMMERCIAL

## 2023-11-27 DIAGNOSIS — Z86.711 HISTORY OF PULMONARY EMBOLISM: ICD-10-CM

## 2023-11-27 DIAGNOSIS — Z86.718 PERSONAL HISTORY OF DVT (DEEP VEIN THROMBOSIS): ICD-10-CM

## 2023-11-27 DIAGNOSIS — Z79.01 ANTICOAGULATION MONITORING, INR RANGE 2-3: ICD-10-CM

## 2023-11-27 DIAGNOSIS — Z79.01 LONG TERM CURRENT USE OF ANTICOAGULANT THERAPY: Primary | ICD-10-CM

## 2023-11-27 LAB — INR POINT OF CARE: 1.4 (ref 0.9–1.1)

## 2023-11-27 PROCEDURE — 36416 COLLJ CAPILLARY BLOOD SPEC: CPT | Mod: ZL

## 2023-11-27 PROCEDURE — 85610 PROTHROMBIN TIME: CPT | Mod: ZL,QW

## 2023-11-27 NOTE — TELEPHONE ENCOUNTER
I suggest their partners deliver the results.    Otherwise This is something that requires a visit.  If he has new concerns he should be seen sooner.    he may need to see one of my partners.  If no one available, he may need to present to [facility]/ER.    Isma Grant MD, FAAP, FACP  Internal Medicine & Pediatrics

## 2023-11-27 NOTE — TELEPHONE ENCOUNTER
Patient advised and transferred to Scheduling as requested. Ginger Nuñez RN on 11/27/2023 at 1:55 PM

## 2023-11-27 NOTE — PROGRESS NOTES
ANTICOAGULATION MANAGEMENT     Michael Martinez 88 year old male is on warfarin with subtherapeutic INR result. (Goal INR 2.0-3.0)    Recent labs: (last 7 days)     11/27/23  1334   INR 1.4*       ASSESSMENT     Source(s): In person     Warfarin doses taken: While hospitalized on 11/20/23/11/24/23: held warfarin due to biopsy on spine on 11/24/23  and resumed on 11/25/23.  Discharged on: home regimen continued  Diet: No new diet changes identified  New illness, injury, or hospitalization: Yes: ED to hospitalization for compression fracture of back  Medication/supplement changes:  tylenol PRN  Signs or symptoms of bleeding or clotting: Yes: Small amount of blood in urine this morning. Educated patient to let us know or someone know if bleeding gets worse or he starts to see any clots. Patient see's Dr. Martinez on 12/1/23.  Previous INR: Subtherapeutic  Additional findings: None     PLAN     Recommended plan for temporary change(s) affecting INR     Dosing Instructions: booster dose then continue your current warfarin dose with next INR in 4 days       Summary  As of 11/27/2023      Full warfarin instructions:  11/27: 11.25 mg; Otherwise 7.5 mg every day   Next INR check:  12/1/2023               In person    Lab visit scheduled    Education provided: Please call back if any changes to your diet, medications or how you've been taking warfarin and Monitoring for bleeding signs and symptoms    Plan made per [facility] anticoagulation protocol    Radha Garber RN  Anticoagulation Clinic  11/27/2023    _______________________________________________________________________     Anticoagulation Episode Summary       Current INR goal:  2.0-3.0   TTR:  69.8% (1 y)   Target end date:  Indefinite   Send INR reminders to:  [facility]    Indications    Long-term (current) use of anticoagulants [Z79.01] [Z79.01]  Anticoagulation monitoring  INR range 2-3 [Z79.01]  History of pulmonary embolism [Z86.711]  Personal  history of DVT (deep vein thrombosis) [Z86.718]             Comments:  likes to come every 4 weeks             Anticoagulation Care Providers       Provider Role Specialty Phone number    Isma Martinez MD Referring Internal Medicine [phone]    Stephane Freedman MD Referring Surgery [phone]

## 2023-11-28 ENCOUNTER — TELEPHONE (OUTPATIENT)
Dept: PEDIATRICS | Facility: OTHER | Age: 88
End: 2023-11-28
Payer: COMMERCIAL

## 2023-11-28 NOTE — TELEPHONE ENCOUNTER
Reason for call: Request for results.    Name of test or procedure: Biopsy - something on spine?    Date of test or procedure: 11/24/23    Location of test or procedure: [facility]    Preferred method for responding to this message: Telephone Call    Phone number patient can be reached at: Other phone number:  [phone]    If we can't reach you directly, may we leave a detailed response at the number you provided?Yes    Aliyah López on 11/28/2023 at 8:52 AM

## 2023-11-28 NOTE — TELEPHONE ENCOUNTER
Son reports that nobody from [facility] has called to discuss the Pathology report yet.  When family calls [facility], they are advised to call GI.      Marcy Boggs was called and this writer was transferred to the Pathology department.  The  indicated that the pathology report was not available yet.  She reports that they will call patient at primary number on file.    Son informed that the pathology report has not been completed yet and Marcy will call patient at primary number on file.      Juana Griffiths LPN 11/28/2023 11:27 AM

## 2023-11-29 ENCOUNTER — TELEPHONE (OUTPATIENT)
Dept: PEDIATRICS | Facility: OTHER | Age: 88
End: 2023-11-29
Payer: COMMERCIAL

## 2023-11-29 NOTE — TELEPHONE ENCOUNTER
Reason for call: Request for results.    Name of test or procedure: Biopsy    Date of test or procedure: 12/24/23    Location of test or procedure: [facility]    Preferred method for responding to this message: Telephone Call    Phone number patient can be reached at: Other phone number:  [phone]    If we can't reach you directly, may we leave a detailed response at the number you provided?Yes    Aliyah López on 11/29/2023 at 12:13 PM

## 2023-11-29 NOTE — TELEPHONE ENCOUNTER
I spoke to Puma and he states he called [facility] and they told him Isma Martinez MD has the Biopsy results and he they are to get the results from Isma Martinez MD.  Puma states they were told results were going to be back by Monday or Tuesday and he feels he is getting the run around.  I told Puma I can not see any results but will send Isma Martinez MD the phone note and either Isma Martinez MD or his nurse will call him tomorrow.    Eleonora Pizarro CMA ([facility])......................11/29/2023  1:54 PM

## 2023-11-30 NOTE — TELEPHONE ENCOUNTER
His specialists should be updating him on the results of this biopsy.      Signed, Isma Martinez MD, FAAP, FACP  Internal Medicine & Pediatrics

## 2023-11-30 NOTE — TELEPHONE ENCOUNTER
Spoke with patient's son and he will call [facility] and ask for the oncology dept. For the results.  Norma J. Gosselin, LPN .......  11/30/2023  9:31 AM

## 2023-12-01 ENCOUNTER — TELEPHONE (OUTPATIENT)
Dept: PEDIATRICS | Facility: OTHER | Age: 88
End: 2023-12-01

## 2023-12-01 ENCOUNTER — OFFICE VISIT (OUTPATIENT)
Dept: PEDIATRICS | Facility: OTHER | Age: 88
End: 2023-12-01
Attending: INTERNAL MEDICINE
Payer: COMMERCIAL

## 2023-12-01 ENCOUNTER — ANTICOAGULATION THERAPY VISIT (OUTPATIENT)
Dept: ANTICOAGULATION | Facility: OTHER | Age: 88
End: 2023-12-01
Attending: INTERNAL MEDICINE
Payer: COMMERCIAL

## 2023-12-01 VITALS
HEART RATE: 80 BPM | OXYGEN SATURATION: 97 % | SYSTOLIC BLOOD PRESSURE: 130 MMHG | RESPIRATION RATE: 16 BRPM | HEIGHT: 69 IN | WEIGHT: 160 LBS | DIASTOLIC BLOOD PRESSURE: 60 MMHG | BODY MASS INDEX: 23.7 KG/M2 | TEMPERATURE: 97.7 F

## 2023-12-01 DIAGNOSIS — Z79.01 ANTICOAGULATION MONITORING, INR RANGE 2-3: ICD-10-CM

## 2023-12-01 DIAGNOSIS — N40.0 BENIGN PROSTATIC HYPERPLASIA WITHOUT LOWER URINARY TRACT SYMPTOMS: ICD-10-CM

## 2023-12-01 DIAGNOSIS — Z90.79 HISTORY OF TRANSURETHRAL RESECTION OF PROSTATE: ICD-10-CM

## 2023-12-01 DIAGNOSIS — R00.1 SINUS BRADYCARDIA: ICD-10-CM

## 2023-12-01 DIAGNOSIS — Z79.01 CHRONIC ANTICOAGULATION: ICD-10-CM

## 2023-12-01 DIAGNOSIS — Z87.898 HISTORY OF GROSS HEMATURIA: ICD-10-CM

## 2023-12-01 DIAGNOSIS — M84.48XD PATHOLOGICAL FRACTURE OF THORACIC VERTEBRA WITH ROUTINE HEALING, SUBSEQUENT ENCOUNTER: ICD-10-CM

## 2023-12-01 DIAGNOSIS — Z86.711 HISTORY OF PULMONARY EMBOLISM: ICD-10-CM

## 2023-12-01 DIAGNOSIS — Z98.890 HISTORY OF TRANSURETHRAL RESECTION OF PROSTATE: ICD-10-CM

## 2023-12-01 DIAGNOSIS — Z79.01 LONG TERM CURRENT USE OF ANTICOAGULANT THERAPY: Primary | ICD-10-CM

## 2023-12-01 DIAGNOSIS — C61 PROSTATE CANCER METASTATIC TO BONE (H): ICD-10-CM

## 2023-12-01 DIAGNOSIS — Z09 HOSPITAL DISCHARGE FOLLOW-UP: Primary | ICD-10-CM

## 2023-12-01 DIAGNOSIS — I73.9 PERIPHERAL ARTERIAL DISEASE (H): ICD-10-CM

## 2023-12-01 DIAGNOSIS — C79.51 PROSTATE CANCER METASTATIC TO BONE (H): ICD-10-CM

## 2023-12-01 DIAGNOSIS — S22.060D CLOSED WEDGE COMPRESSION FRACTURE OF T8 VERTEBRA WITH ROUTINE HEALING, SUBSEQUENT ENCOUNTER: ICD-10-CM

## 2023-12-01 DIAGNOSIS — Z86.718 PERSONAL HISTORY OF DVT (DEEP VEIN THROMBOSIS): ICD-10-CM

## 2023-12-01 LAB — INR POINT OF CARE: 3.2 (ref 0.9–1.1)

## 2023-12-01 PROCEDURE — 36416 COLLJ CAPILLARY BLOOD SPEC: CPT | Mod: ZL

## 2023-12-01 PROCEDURE — G0463 HOSPITAL OUTPT CLINIC VISIT: HCPCS | Performed by: INTERNAL MEDICINE

## 2023-12-01 PROCEDURE — 99214 OFFICE O/P EST MOD 30 MIN: CPT | Performed by: INTERNAL MEDICINE

## 2023-12-01 PROCEDURE — 85610 PROTHROMBIN TIME: CPT | Mod: ZL,QW

## 2023-12-01 RX ORDER — ACETAMINOPHEN 325 MG/1
650 TABLET ORAL EVERY 6 HOURS PRN
COMMUNITY
Start: 2023-11-24

## 2023-12-01 ASSESSMENT — PATIENT HEALTH QUESTIONNAIRE - PHQ9
10. IF YOU CHECKED OFF ANY PROBLEMS, HOW DIFFICULT HAVE THESE PROBLEMS MADE IT FOR YOU TO DO YOUR WORK, TAKE CARE OF THINGS AT HOME, OR GET ALONG WITH OTHER PEOPLE: SOMEWHAT DIFFICULT
SUM OF ALL RESPONSES TO PHQ QUESTIONS 1-9: 10
SUM OF ALL RESPONSES TO PHQ QUESTIONS 1-9: 10

## 2023-12-01 ASSESSMENT — PAIN SCALES - GENERAL: PAINLEVEL: NO PAIN (0)

## 2023-12-01 NOTE — NURSING NOTE
"Chief Complaint   Patient presents with    Hospital F/U       Initial /60   Pulse 80   Temp 97.7  F (36.5  C) (Tympanic)   Resp 16   Ht 1.753 m (5' 9\")   Wt 72.6 kg (160 lb)   SpO2 97%   BMI 23.63 kg/m   Estimated body mass index is 23.63 kg/m  as calculated from the following:    Height as of this encounter: 1.753 m (5' 9\").    Weight as of this encounter: 72.6 kg (160 lb).  Medication Review: complete    The next two questions are to help us understand your food security.  If you are feeling you need any assistance in this area, we have resources available to support you today.          12/1/2023   SDOH- Food Insecurity   Within the past 12 months, did you worry that your food would run out before you got money to buy more? N   Within the past 12 months, did the food you bought just not last and you didn t have money to get more? N         Health Care Directive:  Patient does not have a Health Care Directive or Living Will: Discussed advance care planning with patient; however, patient declined at this time.    Norma J. Gosselin, LPN      "

## 2023-12-01 NOTE — PROGRESS NOTES
Assessment & Plan   1. Hospital discharge follow-up  2. Prostate cancer metastatic to bone (H)  Mr. Martinez is an 88-year-old male with several months onset of bony and muscle pain, elevated PSA and pathologic compression fracture now positive for metastatic prostate cancer.  Recommend consultations with urology and oncology for next steps.  We discussed likely neck steps which may include MRI brain, CT scan of chest abdomen and pelvis, PET scanning, cystoscopy, etc.  He expresses a desire to seek life-prolonging treatment.  - Adult Urology  Referral; Future  - Adult Oncology/Hematology  Referral; Future    3. Closed wedge compression fracture of T8 vertebra with routine healing, subsequent encounter  4. Pathological fracture of thoracic vertebra with routine healing, subsequent encounter  Wear TLSO as planned.    5. Benign prostatic hyperplasia without lower urinary tract symptoms  6. s/p greenlight prostate resection  7. History of gross hematuria  Looking back he had not had any urinary symptoms nor had he had any recurrence of hematuria    8. Sinus bradycardia  This is likely a red herring    9. Peripheral arterial disease (H24)  10. Chronic anticoagulation  At goal, no change.      Mr. Martinez was recently hospitalized for metastatic prostate cancer, appears to be doing well since discharge.  Discharge summary and recommendations for outpatient provider are reviewed. Based on what occurred in the visit today:  Previous medication(s) were discontinued or altered? No  Previous medication(s) were suspended pending consultation? No  New medication(s) started? No     -- Medication reconciliation and management was performed today    Return if symptoms worsen or fail to improve.    Signed, Isma Martinez MD, FAAP, FACP  Internal Medicine & Pediatrics    Subjective   Michael Martinez is a 88 year old male who presents for hospital discharge follow-up.    Hospital: [facility]  Date of discharge:  "11/24/2023  Date of post discharge contact: 11/24/2023    He recently presented to our emergency department.  He felt rib pain.  He was found to have a compression fracture and bradycardia so he was sent to [facility].  There he was found to have an elevated PSA level and suspected metastatic prostate cancer.  He underwent a biopsy of the lesion.  He really wants to get results at this time.    Objective   Vitals: /60   Pulse 80   Temp 97.7  F (36.5  C) (Tympanic)   Resp 16   Ht 1.753 m (5' 9\")   Wt 72.6 kg (160 lb)   SpO2 97%   BMI 23.63 kg/m      General: He is wearing his TLSO    Review and Analysis of Data   I personally reviewed the following:  External notes: Yes I reviewed the [facility] discharge summary  Results: Yes I reviewed local and remote laboratory data, imaging reports, biopsy report, pathology report  Use of an independent historian: Yes I spoke with his daughter Michelle  Independent review of a test performed by another physician: No  Discussion of management with another physician: No  High risk of morbidity from additional diagnostic testing and/or treatment.    Billing:   Type of Medical Decision Making Face-to-Face Visit within 7 Days Face-to-Face Visit within 14 days   Moderate Complexity 08410 [id number]   High Complexity 71860 [id number]         "

## 2023-12-01 NOTE — PROGRESS NOTES
ANTICOAGULATION MANAGEMENT     Michael Martinez 88 year old male is on warfarin with supratherapeutic INR result. (Goal INR 2.0-3.0)    Recent labs: (last 7 days)     12/01/23  1133   INR 3.2*       ASSESSMENT     Source(s): Chart Review     Warfarin doses taken: Warfarin taken as instructed  Diet: No new diet changes identified  New illness, injury, or hospitalization: No  Medication/supplement changes: None noted  Signs or symptoms of bleeding or clotting: No  Previous INR: Subtherapeutic  Additional findings: None       PLAN     Recommended plan for no diet, medication or health factor changes affecting INR     Dosing Instructions: decrease your warfarin dose (4.8% change) with next INR in 1 week       Summary  As of 12/1/2023      Full warfarin instructions:  5 mg every Mon; 7.5 mg all other days   Next INR check:  12/8/2023               Patient elected to schedule next visit at check out    Education provided: Written instructions provided and Importance of notifying clinic for changes in medications; a sooner lab recheck maybe needed.    Plan made per [facility] anticoagulation protocol    Carlita Schaeffer RN  Anticoagulation Clinic  12/1/2023    _______________________________________________________________________     Anticoagulation Episode Summary       Current INR goal:  2.0-3.0   TTR:  69.3% (1 y)   Target end date:  Indefinite   Send INR reminders to:  [facility]    Indications    Long-term (current) use of anticoagulants [Z79.01] [Z79.01]  Anticoagulation monitoring  INR range 2-3 [Z79.01]  History of pulmonary embolism [Z86.711]  Personal history of DVT (deep vein thrombosis) [Z86.718]             Comments:  likes to come every 4 weeks             Anticoagulation Care Providers       Provider Role Specialty Phone number    Isma Martinez MD [facility] Internal Medicine [phone]            
No.

## 2023-12-01 NOTE — TELEPHONE ENCOUNTER
Patient's son called back to discuss today's appointment. They filled out the family release form today. Please call.    Aliyah López on 12/1/2023 at 4:41 PM

## 2023-12-02 NOTE — TELEPHONE ENCOUNTER
Patient's contact is requesting results from Friday. Patient could not recall result information that provider gave. Son asking for call with information at [phone]    Lakesha Doan on 12/2/2023 at 12:41 PM

## 2023-12-04 ENCOUNTER — TELEPHONE (OUTPATIENT)
Dept: PEDIATRICS | Facility: OTHER | Age: 88
End: 2023-12-04
Payer: COMMERCIAL

## 2023-12-04 NOTE — NURSING NOTE
Chief Complaint   Patient presents with     Ear Problem     balance issues       Medication Reconciliation: complete  Fanta Louis LPN  ..................8/6/2019   9:53 AM   
s/p r hip fx

## 2023-12-04 NOTE — TELEPHONE ENCOUNTER
Please call the patients son.  He would like results and he has a lot of questions for the PCP.        Radha Rae on 12/4/2023 at 10:53 AM

## 2023-12-04 NOTE — TELEPHONE ENCOUNTER
Patients son calling and looking for information on pathology patient had in [place] on 11/24.  consent to communicate on file from 12/1/23.    Son, Puma , states that patient had told him he has cancer and it is all over.  Son wondering what type of cancer?  Reviewed OV on 12/1/23 with  with son and informed referrals placed for urology and oncology.  Son states that he would like to know when appointments are so he can come.  Informed son urology and oncology will be notified of above.  Urology and oncology notified.  Ruth Ann Chandler RN on 12/4/2023 at 2:03 PM

## 2023-12-05 ENCOUNTER — PATIENT OUTREACH (OUTPATIENT)
Dept: ONCOLOGY | Facility: OTHER | Age: 88
End: 2023-12-05
Payer: COMMERCIAL

## 2023-12-05 NOTE — PROGRESS NOTES
[facility]: Cancer Care Initial Note                                    Discussion with Patient:                                                      Spoke with daughter and patient was scheduled for consult with Raj Nj MD 12/14. He did have an appointmet with Dr. Washington that they were not aware of and will cancel that appointment as they want care close to home.    Bone bx on 11/24/23 at [facility]             Assessment:                                                      Initial  Current living arrangement:: I live alone  Type of residence:: Private home - stairs  Informal Support system:: Children  Equipment Currently Used at Home: none  Bed or wheelchair confined:: No  Mobility Status: Independent  Transportation means:: Accessible car  Medication adherence problem (GOAL):: No  Knowledgeable about how to use meds:: Yes  Medication side effects suspected:: No  Referrals Placed: None  Advanced Care Plans/Directives on file::  (in process)        Intervention/Education provided during outreach:                                                       Consult scheduled with Raj Nj MD 12/14/23 at 10 am    Patient to follow up as scheduled at next appt    Signature:  Jo Mcintosh RN

## 2023-12-08 ENCOUNTER — ANTICOAGULATION THERAPY VISIT (OUTPATIENT)
Dept: ANTICOAGULATION | Facility: OTHER | Age: 88
End: 2023-12-08
Attending: INTERNAL MEDICINE
Payer: COMMERCIAL

## 2023-12-08 DIAGNOSIS — Z86.718 PERSONAL HISTORY OF DVT (DEEP VEIN THROMBOSIS): ICD-10-CM

## 2023-12-08 DIAGNOSIS — Z86.711 HISTORY OF PULMONARY EMBOLISM: ICD-10-CM

## 2023-12-08 DIAGNOSIS — Z79.01 ANTICOAGULATION MONITORING, INR RANGE 2-3: ICD-10-CM

## 2023-12-08 DIAGNOSIS — Z79.01 LONG TERM CURRENT USE OF ANTICOAGULANT THERAPY: Primary | ICD-10-CM

## 2023-12-08 LAB — INR POINT OF CARE: 2.6 (ref 0.9–1.1)

## 2023-12-08 PROCEDURE — 85610 PROTHROMBIN TIME: CPT | Mod: ZL,QW

## 2023-12-08 PROCEDURE — 36416 COLLJ CAPILLARY BLOOD SPEC: CPT | Mod: ZL

## 2023-12-08 NOTE — PROGRESS NOTES
ANTICOAGULATION MANAGEMENT     Michael Martinez 88 year old male is on warfarin with therapeutic INR result. (Goal INR 2.0-3.0)    Recent labs: (last 7 days)     12/08/23  0859   INR 2.6*       ASSESSMENT     Source(s): In person     Warfarin doses taken: Warfarin taken as instructed  Diet: No new diet changes identified  New illness, injury, or hospitalization: No  Medication/supplement changes: None noted  Signs or symptoms of bleeding or clotting: No  Previous INR: Supratherapeutic  Additional findings: None and Recently diagnosed with cancer-no plan yet-patient given [employer] phone number to call with any med changes or treatment plan updates as needed.      PLAN     Recommended plan for no diet, medication or health factor changes affecting INR     Dosing Instructions: Continue your current warfarin dose with next INR in 2 weeks       Summary  As of 12/8/2023      Full warfarin instructions:  5 mg every Mon; 7.5 mg all other days   Next INR check:  12/22/2023               In person    Lab visit scheduled    Education provided: Please call back if any changes to your diet, medications or how you've been taking warfarin    Plan made per [facility] anticoagulation protocol    Radha Garber RN  Anticoagulation Clinic  12/8/2023    _______________________________________________________________________     Anticoagulation Episode Summary       Current INR goal:  2.0-3.0   TTR:  68.7% (1 y)   Target end date:  Indefinite   Send INR reminders to:  [facility]    Indications    Long-term (current) use of anticoagulants [Z79.01] [Z79.01]  Anticoagulation monitoring  INR range 2-3 [Z79.01]  History of pulmonary embolism [Z86.711]  Personal history of DVT (deep vein thrombosis) [Z86.718]             Comments:  likes to come every 4 weeks             Anticoagulation Care Providers       Provider Role Specialty Phone number    Isma Martinez MD [facility] Internal Medicine [phone]

## 2023-12-14 ENCOUNTER — ONCOLOGY VISIT (OUTPATIENT)
Dept: ONCOLOGY | Facility: OTHER | Age: 88
End: 2023-12-14
Attending: INTERNAL MEDICINE
Payer: COMMERCIAL

## 2023-12-14 VITALS
TEMPERATURE: 97.6 F | OXYGEN SATURATION: 96 % | BODY MASS INDEX: 23.33 KG/M2 | HEART RATE: 65 BPM | WEIGHT: 158 LBS | RESPIRATION RATE: 16 BRPM | SYSTOLIC BLOOD PRESSURE: 108 MMHG | DIASTOLIC BLOOD PRESSURE: 58 MMHG

## 2023-12-14 DIAGNOSIS — C79.51 PROSTATE CANCER METASTATIC TO BONE (H): Primary | ICD-10-CM

## 2023-12-14 DIAGNOSIS — C61 PROSTATE CANCER METASTATIC TO BONE (H): Primary | ICD-10-CM

## 2023-12-14 LAB
ALBUMIN SERPL BCG-MCNC: 4.1 G/DL (ref 3.5–5.2)
ALP SERPL-CCNC: 151 U/L (ref 40–150)
ALT SERPL W P-5'-P-CCNC: 19 U/L (ref 0–70)
ANION GAP SERPL CALCULATED.3IONS-SCNC: 10 MMOL/L (ref 7–15)
AST SERPL W P-5'-P-CCNC: 22 U/L (ref 0–45)
BASOPHILS # BLD AUTO: 0.1 10E3/UL (ref 0–0.2)
BASOPHILS NFR BLD AUTO: 1 %
BILIRUB SERPL-MCNC: 0.7 MG/DL
BUN SERPL-MCNC: 17.1 MG/DL (ref 8–23)
CALCIUM SERPL-MCNC: 9.9 MG/DL (ref 8.8–10.2)
CHLORIDE SERPL-SCNC: 100 MMOL/L (ref 98–107)
CREAT SERPL-MCNC: 0.99 MG/DL (ref 0.67–1.17)
DEPRECATED HCO3 PLAS-SCNC: 26 MMOL/L (ref 22–29)
EGFRCR SERPLBLD CKD-EPI 2021: 74 ML/MIN/1.73M2
EOSINOPHIL # BLD AUTO: 0.1 10E3/UL (ref 0–0.7)
EOSINOPHIL NFR BLD AUTO: 2 %
ERYTHROCYTE [DISTWIDTH] IN BLOOD BY AUTOMATED COUNT: 14.4 % (ref 10–15)
GLUCOSE SERPL-MCNC: 100 MG/DL (ref 70–99)
HCT VFR BLD AUTO: 43.5 % (ref 40–53)
HGB BLD-MCNC: 14.5 G/DL (ref 13.3–17.7)
IMM GRANULOCYTES # BLD: 0 10E3/UL
IMM GRANULOCYTES NFR BLD: 0 %
LDH SERPL L TO P-CCNC: 159 U/L (ref 0–250)
LYMPHOCYTES # BLD AUTO: 1.6 10E3/UL (ref 0.8–5.3)
LYMPHOCYTES NFR BLD AUTO: 26 %
MCH RBC QN AUTO: 29.5 PG (ref 26.5–33)
MCHC RBC AUTO-ENTMCNC: 33.3 G/DL (ref 31.5–36.5)
MCV RBC AUTO: 89 FL (ref 78–100)
MONOCYTES # BLD AUTO: 0.5 10E3/UL (ref 0–1.3)
MONOCYTES NFR BLD AUTO: 8 %
NEUTROPHILS # BLD AUTO: 3.9 10E3/UL (ref 1.6–8.3)
NEUTROPHILS NFR BLD AUTO: 63 %
NRBC # BLD AUTO: 0 10E3/UL
NRBC BLD AUTO-RTO: 0 /100
PLATELET # BLD AUTO: 253 10E3/UL (ref 150–450)
POTASSIUM SERPL-SCNC: 4.8 MMOL/L (ref 3.4–5.3)
PROT SERPL-MCNC: 7.5 G/DL (ref 6.4–8.3)
PSA SERPL DL<=0.01 NG/ML-MCNC: 168.6 NG/ML
RBC # BLD AUTO: 4.91 10E6/UL (ref 4.4–5.9)
SODIUM SERPL-SCNC: 136 MMOL/L (ref 135–145)
WBC # BLD AUTO: 6.2 10E3/UL (ref 4–11)

## 2023-12-14 PROCEDURE — 83615 LACTATE (LD) (LDH) ENZYME: CPT | Mod: ZL | Performed by: INTERNAL MEDICINE

## 2023-12-14 PROCEDURE — G0463 HOSPITAL OUTPT CLINIC VISIT: HCPCS

## 2023-12-14 PROCEDURE — 99205 OFFICE O/P NEW HI 60 MIN: CPT | Performed by: INTERNAL MEDICINE

## 2023-12-14 PROCEDURE — 85004 AUTOMATED DIFF WBC COUNT: CPT | Mod: ZL | Performed by: INTERNAL MEDICINE

## 2023-12-14 PROCEDURE — 80053 COMPREHEN METABOLIC PANEL: CPT | Mod: ZL | Performed by: INTERNAL MEDICINE

## 2023-12-14 PROCEDURE — 84403 ASSAY OF TOTAL TESTOSTERONE: CPT | Mod: ZL | Performed by: INTERNAL MEDICINE

## 2023-12-14 PROCEDURE — 84153 ASSAY OF PSA TOTAL: CPT | Mod: ZL | Performed by: INTERNAL MEDICINE

## 2023-12-14 PROCEDURE — 99417 PROLNG OP E/M EACH 15 MIN: CPT | Performed by: INTERNAL MEDICINE

## 2023-12-14 PROCEDURE — 36415 COLL VENOUS BLD VENIPUNCTURE: CPT | Mod: ZL | Performed by: INTERNAL MEDICINE

## 2023-12-14 RX ORDER — MEPERIDINE HYDROCHLORIDE 50 MG/ML
25 INJECTION INTRAMUSCULAR; INTRAVENOUS; SUBCUTANEOUS EVERY 30 MIN PRN
Status: CANCELLED | OUTPATIENT
Start: 2023-12-21

## 2023-12-14 RX ORDER — ALBUTEROL SULFATE 90 UG/1
1-2 AEROSOL, METERED RESPIRATORY (INHALATION)
Status: CANCELLED
Start: 2023-12-21

## 2023-12-14 RX ORDER — DIPHENHYDRAMINE HYDROCHLORIDE 50 MG/ML
50 INJECTION INTRAMUSCULAR; INTRAVENOUS
Status: CANCELLED
Start: 2023-12-21

## 2023-12-14 RX ORDER — METHYLPREDNISOLONE SODIUM SUCCINATE 125 MG/2ML
125 INJECTION, POWDER, LYOPHILIZED, FOR SOLUTION INTRAMUSCULAR; INTRAVENOUS
Status: CANCELLED
Start: 2023-12-21

## 2023-12-14 RX ORDER — EPINEPHRINE 1 MG/ML
0.3 INJECTION, SOLUTION, CONCENTRATE INTRAVENOUS EVERY 5 MIN PRN
Status: CANCELLED | OUTPATIENT
Start: 2023-12-21

## 2023-12-14 RX ORDER — ALBUTEROL SULFATE 0.83 MG/ML
2.5 SOLUTION RESPIRATORY (INHALATION)
Status: CANCELLED | OUTPATIENT
Start: 2023-12-21

## 2023-12-14 ASSESSMENT — PAIN SCALES - GENERAL: PAINLEVEL: MILD PAIN (2)

## 2023-12-14 NOTE — PATIENT INSTRUCTIONS
New treatment will include the following medications:  Lupron injection every 3 months (this will be scheduled and started after it is approved by your insurance)  Xgeva 120 mg injection every month (this will be scheduled and started after it is approved by your insurance)  Xtandi 80 mgs by mouth daily - this will be filled by [facility].  You will receive a call from them to do education for this medication and how the process works.  I did ask them to contact Michelle to set up the education time.  Any questions you have about this medication will go through their team.    Monthly lab appointments to monitor your PSA and testosterone levels - once injections are started, try to coordinate this with that appointment    Radiology scheduling will contact you to arrange the bone scan that has been ordered after it is approved by your insurance.  If you have not received a call in the next 2 weeks, please call them at [phone].    Follow up with Dr Nj about mid January.  The bone scan results will need to be back for this appointment.

## 2023-12-14 NOTE — PROGRESS NOTES
[facility] Hematology and Oncology Consult Note    Patient: Michael Maritnez  MRN: [id number]  Date of Service: Dec 14, 2023       Reason for Visit    I was consulted by Dr. Martinez      ECOG Performance Status  1    Encounter Diagnoses: Metastatic prostate cancer with bony metastases.    Problem List Items Addressed This Visit          Oncology Diagnoses    Prostate cancer metastatic to bone (H) - Primary    Relevant Orders    PSA tumor marker (Completed)    CBC with Platelets & Differential (Completed)    Comprehensive metabolic panel (Completed)    Lactate Dehydrogenase (Completed)    CBC with platelets differential    Comprehensive metabolic panel    Infusion Appointment Request    Testosterone total    PSA tumor marker    Testosterone total           ______________________________________________________________________________    Staging History  Cancer Staging   No matching staging information was found for the patient.        History of Present Illness    Mr. Michael Martinez is a 88 year old white male with a history of coronary artery diseaseWe are asked to evaluate concerning new diagnosis of metastatic prostate carcinoma with bony metastases.  He had presented to the emergency department on November 20 with rib cage pain.  He did have a prior history of prior history of presumably BPH and undergone multiple core biopsies of the prostate approxi-10 years ago at [facility] that were negative.  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  He is a retired [employer] .  He had undergone imaging in the ED on November 20 including a CT of the chest which revealed a mixed lytic and sclerotic lesion involving the T8 vertebral body with adjacent soft tissue component extending to the posterior mediastinum.  There was he was transferred to [facility] in [place] for potential pathologic fracture involving T8 as well as episode of bradycardia MRI thoracic  spine revealed multifocal osseous metastatic disease with a dominant lesion at T8.  CT chest abdomen pelvis as above reveals a T8 abnormality as well as enlarged prostate there is potential bony pelvic metastatic disease as well as rib cage metastases neurosurgery was consulted and they felt this was a nonsurgical issue and recommended thoracic orthosis and he was fitted for this by orthotics on November 22 underwent CT-guided biopsy of the T8 lesion and came back consistent with metastatic prostate carcinoma.  In terms of his bradycardia metoprolol was held and his bradycardia resolved echocardiogram performed November 21 was unremarkable.  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  Recommend annual surveillance of his aortic aneurysm.  Patient comes in today for treatment of his metastatic prostate cancer.  He has not had a recent bone scan.  Reviewed the films with Dr. Birmingham who felt there was widespread osseous metastatic disease involving thoracic spine including T8 as well as the pelvis as well as the rib cage.  Patient states he is having ongoing rib cage pain as well as soreness in his back he denies any change in bowel or bladder habits or any lower extremity or upper extremity weakness denies any fevers, night sweats or weight loss he is off metoprolol and wonders if he should be put back on it denies any julita urinary symptoms denies any nocturia urinary frequency or urinary hesitancy.  Denies hematuria.  Gets occasional dyspnea on exertion but no cough or hemoptysis.  No change in bowel habits, hematemesis, hematochezia or melena.  His PSA  was elevated  at 121.    Medications:    aspirin EC 81 MG tablet, Take 81 mg by mouth  atorvastatin (LIPITOR) 10 MG tablet, Take 1 tablet (10 mg) by mouth daily  Cholecalciferol (VITAMIN D-3) 1000 units CAPS, Take 1 capsule by mouth daily  citalopram (CELEXA) 20 MG tablet, Take 1 tablet (20 mg) by mouth daily  latanoprost (XALATAN) 0.005 % ophthalmic solution,   omeprazole (PRILOSEC) 20 MG DR capsule, Take 1 capsule (20 mg) by mouth every other day  warfarin ANTICOAGULANT (COUMADIN) 5 MG tablet, TAKE 1 TABLET BY MOUTH 3  DAYS WEEKLY OR AS DIRECTED  BY PROTIME CLINIC TAKE 7.5  MG TABLET 4 DAYS PER WEEK  acetaminophen (TYLENOL) 325 MG tablet, Take 650 mg by mouth every 6 hours as needed for pain  warfarin ANTICOAGULANT (COUMADIN) 7.5 MG tablet, TAKE 1 TABLET BY MOUTH 4   DAYS PER WEEK OR AS   DIRECTED BY PROTIME CLINIC. TAKE 5MG TABLET 3 DAYS PER  WEEK    No current facility-administered medications on file prior to visit.          Past History    Past Medical History:   Diagnosis Date    Cerebral infarction (H)     6/2014,left cerebellum--seen on MRI    Edema     11/4/2003,Edema & cramps legs, ? 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)     10/29/2016    Enlarged prostate with lower urinary tract symptoms (LUTS)     8/4/2011    Guillain Barré syndrome (H24)     Ischemic cardiomyopathy     7/9/2014    Osteoarthritis     9/5/2001    Other ill-defined and unknown causes of morbidity and mortality     see prob list    Urinary tract infection     No Comments Provided     Past Surgical History:   Procedure Laterality Date    ARTHROSCOPY SHOULDER ROTATOR CUFF REPAIR      1996    BIOPSY PROSTATE TRANSRECTAL      No Comments Provided    BYPASS GRAFT ARTERY CORONARY      December of 2013,four-vessel    COLONOSCOPY      10/31/2013,diverticulosis-no fu needed d/t age    CYSTOSCOPY, TRANSURETHRAL RESECTION (TUR) PROSTATE, COMBINED      January 2014,done in  [place] -- excellent result    OTHER SURGICAL HISTORY      2013,27776.0,([place]) [place] INJ PROC SELECTIVE CORONARY ANGIOGRAPHY,LAD-95% rsdzqpvh-14-06% distal stenosis.  Ramus-80% stenosis.  Right coronary-high-grade 90% stenosis.  EF-45%     Allergies   Allergen Reactions    Influenza Vac Split [Influenza Virus Vaccine]      paragestephanie barre     Family History   Problem Relation Age of Onset    Heart Disease Mother         Heart Disease    Other - See Comments Father         No Known Problems    Breast Cancer Sister         Cancer-breast,Otherwise healthy    Other - See Comments Brother         BPH otherwise healthy at 72    Arthritis Brother         Arthritis,Osteoarthritis, otherwise healthy at 66    Colon Cancer Brother         Cancer-colon    Breast Cancer Sister 59        Cancer-breast,     Social History     Socioeconomic History    Marital status: Single     Spouse name: None    Number of children: None    Years of education: None    Highest education level: None   Tobacco Use    Smoking status: Former     Packs/day: 0.90     Years: 9.00     Additional pack years: 0.00     Total pack years: 8.10     Types: Cigarettes     Passive exposure: Never    Smokeless tobacco: Never   Vaping Use    Vaping Use: Never used   Substance and Sexual Activity    Alcohol use: Yes     Alcohol/week: 3.0 standard drinks of alcohol     Types: 3 Cans of beer per week     Comment: beer 3 times per week     Drug use: Never    Sexual activity: Not Currently     Partners: Female   Social History Narrative     2007 (sudden cardiac death). Three children, retired in  as a .  Spends his donis in [place]     Social Determinants of Health     Financial Resource Strain: Low Risk  (2023)    Financial Resource Strain     Within the past 12 months, have you or your family members you live with been unable to get utilities (heat, electricity) when it was really needed?: No   Food Insecurity: Low  Risk  (12/1/2023)    Food Insecurity     Within the past 12 months, did you worry that your food would run out before you got money to buy more?: No     Within the past 12 months, did the food you bought just not last and you didn t have money to get more?: No   Transportation Needs: Low Risk  (12/1/2023)    Transportation Needs     Within the past 12 months, has lack of transportation kept you from medical appointments, getting your medicines, non-medical meetings or appointments, work, or from getting things that you need?: No   Interpersonal Safety: Low Risk  (11/6/2023)    Interpersonal Safety     Do you feel physically and emotionally safe where you currently live?: Yes     Within the past 12 months, have you been hit, slapped, kicked or otherwise physically hurt by someone?: No     Within the past 12 months, have you been humiliated or emotionally abused in other ways by your partner or ex-partner?: No   Housing Stability: Low Risk  (12/1/2023)    Housing Stability     Do you have housing? : Yes     Are you worried about losing your housing?: No             Review of systems.  CNS: There are no headaches, no blurred vision, no change in mental status,   ENT: There is no hearing loss.  EYES:  No visual complaints.  Respiratory: There is some dyspnea exertion but no cough or hemoptysis  Cardiac: There is no chest pain, orthopnea, PND, or ankle edema.  GI: There is no bright red blood per rectum, no hematemesis, no reflux, no diarrhea or constipation  Musculoskeletal: There is bilateral rib cage pain thoracic back pain some pelvic pain  : There is no urinary frequency, hematuria.  Constitutional: There is no fevers, night sweats, weight loss.  Endocrine: There is mild fatigue  Neuro: There is no tingling or numbness in the hands or feet.  Hematologic: There is no gingival bleeding, epistaxis, or easy bruisability.  Dermatologic: There is no skin rash.  A 14 point review of systems is otherwise  negative.            Physical Exam    /58 (BP Location: Right arm, Patient Position: Sitting, Cuff Size: Adult Regular)   Pulse 65   Temp 97.6  F (36.4  C) (Tympanic)   Resp 16   Wt 71.7 kg (158 lb)   SpO2 96%   BMI 23.33 kg/m        GENERAL: Alert and oriented to time place and person. Seated comfortably. In no distress.  He is wearing orthotic back brace    HEAD: Atraumatic and normocephalic.    EYES: REGINALDO, EOMI.  No pallor.  No icterus.    Oral cavity: no mucosal lesion or tonsillar enlargement.    NECK: supple. JVP normal.  No thyroid enlargement.    LYMPH NODES: No palpable, cervical, axillary or inguinal lymphadenopathy.    LUNGS: clear to auscultation bilaterally.  Resonant to percussion throughout bilaterally.  Symmetrical breath movements bilaterally.    HEART: S1 and S2 are heard. Regular rate and rhythm.  No murmur or gallop or rub heard.  No peripheral edema.    ABDOMEN: Soft. Not tender. Not distended.  No palpable hepatomegaly or splenomegaly.  No other mass palpable.  Bowel sounds heard.    EXTREMITIES: Trace ankle edema    SKIN: no rash, or bruising or purpura.      Lab Results    Recent Results (from the past 240 hour(s))   INR POCT    Collection Time: 12/08/23  8:59 AM   Result Value Ref Range    INR Point of Care 2.6 (A) 0.9 - 1.1   PSA tumor marker    Collection Time: 12/14/23  2:17 PM   Result Value Ref Range    PSA Tumor Marker 168.60 ng/mL   Comprehensive metabolic panel    Collection Time: 12/14/23  2:17 PM   Result Value Ref Range    Sodium 136 135 - 145 mmol/L    Potassium 4.8 3.4 - 5.3 mmol/L    Carbon Dioxide (CO2) 26 22 - 29 mmol/L    Anion Gap 10 7 - 15 mmol/L    Urea Nitrogen 17.1 8.0 - 23.0 mg/dL    Creatinine 0.99 0.67 - 1.17 mg/dL    GFR Estimate 74 >60 mL/min/1.73m2    Calcium 9.9 8.8 - 10.2 mg/dL    Chloride 100 98 - 107 mmol/L    Glucose 100 (H) 70 - 99 mg/dL    Alkaline Phosphatase 151 (H) 40 - 150 U/L    AST 22 0 - 45 U/L    ALT 19 0 - 70 U/L    Protein Total 7.5  6.4 - 8.3 g/dL    Albumin 4.1 3.5 - 5.2 g/dL    Bilirubin Total 0.7 <=1.2 mg/dL   Lactate Dehydrogenase    Collection Time: 12/14/23  2:17 PM   Result Value Ref Range    Lactate Dehydrogenase 159 0 - 250 U/L   CBC with platelets and differential    Collection Time: 12/14/23  2:17 PM   Result Value Ref Range    WBC Count 6.2 4.0 - 11.0 10e3/uL    RBC Count 4.91 4.40 - 5.90 10e6/uL    Hemoglobin 14.5 13.3 - 17.7 g/dL    Hematocrit 43.5 40.0 - 53.0 %    MCV 89 78 - 100 fL    MCH 29.5 26.5 - 33.0 pg    MCHC 33.3 31.5 - 36.5 g/dL    RDW 14.4 10.0 - 15.0 %    Platelet Count 253 150 - 450 10e3/uL    % Neutrophils 63 %    % Lymphocytes 26 %    % Monocytes 8 %    % Eosinophils 2 %    % Basophils 1 %    % Immature Granulocytes 0 %    NRBCs per 100 WBC 0 <1 /100    Absolute Neutrophils 3.9 1.6 - 8.3 10e3/uL    Absolute Lymphocytes 1.6 0.8 - 5.3 10e3/uL    Absolute Monocytes 0.5 0.0 - 1.3 10e3/uL    Absolute Eosinophils 0.1 0.0 - 0.7 10e3/uL    Absolute Basophils 0.1 0.0 - 0.2 10e3/uL    Absolute Immature Granulocytes 0.0 <=0.4 10e3/uL    Absolute NRBCs 0.0 10e3/uL       Imaging Results    CT NEEDLE BIOPSY    Result Date: 11/24/2023  This document is currently in Final Status Exam Accession# [id number] INTERVENTIONAL RADIOLOGY PROCEDURE NOTE, 11/24/2023 Michael Fuentesshakir [id number] Pre-Procedure Diagnosis: T8 compression fracture suspicions for malignancy. Here for biopsy for tissue diagnosis. Post-Procedure Diagnosis: Same Interventional Radiologist(s): Jenni Nails MD, PhD. Procedure(s): 1.  Time out / Inform consent 2.  Preliminary CT. 3.  CT guided placement of 11 g trocar needle into T8 vertebral body right side. 4.  Core biopsy x 1 using 13 g needle. 5.  Conclusion CT Medications: 1.  10 ml of 1 % lidocaine, SQ. 2.  5 ml of 0.5 % bupivacaine into periosteum. 3.  Versed 2 mg, IV. 4.  Fentanyl 150 mcg, IV.                       Approach: Posterior right . Operative report/Findings: Prone, localizing CT . Prepped and  draped. Time out was performed. 1% lidocaine was instilled for local anesthesia. Using CT guidance, a 11 g trocar needle was advanced into the vertebral body in a trans pedicular approach. Through this, an 13 g core biopsy needle was advanced and 1 core biopsies was obtained. Placed in formalin. Implanted Devices: none Sample(s): As above. EBL: Less than 10 cc. Contrast: 0 ml Fluoro dose: Please see CT report for dose in mGy-cm Complications: None. Plan: back to his room. Follow pathology. Please call with questions. Jenaro Nails MD, Ph.D. Interventional Radiology Please page me through [employer]. Electronically Signed: Lam Nails 11/24/2023 12:09 PM    ECHO CARDIAC - HIM SCAN    Result Date: 11/21/2023  ECHO ESS[facility] ECHO CARDIAC    MR THORACIC SPINE WO W CONTRAST    Result Date: 11/20/2023  This document is currently in Final Status Exam Accession# [id number] MR THORACIC SPINE WO W CONTRAST HISTORY: Bone lesion, thoracic spine, incidental. Compression fracture, thoracic. Bone mass or bone pain, thoracic spine, aggressive features on x-ray; 3 week Hx of back /rib pain with CT chest revealing new T 8 lytic sclerotic lesion. FINDINGS: There are multifocal osseous geographic marrow lesions. There is a dominant lesion in T8 with mild loss of its superior endplate and cortical retropulsion which moderately narrows the thecal sac and encroaches on the right T8-T9 neural foramina. There is also a large lesion in the anterior T7 vertebral body without significant volume loss. The thoracic spinal cord demonstrates normal signal intensity. IMPRESSION: Multifocal osseous metastatic disease with dominant lesion at T8. There is mild volume loss with cortical retropulsion which moderately narrows the thecal sac and encroaches on the right neural foramina of T8-T9. Dictated By: Manuel Hernandez MD 11/20/2023 9:47 PM Edited By: ARNOLD 11/20/2023 9:53 PM Electronically Signed: Manuel Hernandez MD 11/20/2023 9:58 PM    CT Head  w/o Contrast    Result Date: 11/20/2023  PROCEDURE INFORMATION: Exam: CT Head Without Contrast Exam date and time: 11/20/2023 5:07 AM Age: 87 years old Clinical indication: Other: Bradycardic and hypertensive; Additional info: 87-year-old male presents today with complaints of rib pain but noted to be bradycardic and hypertensive. Patient on coumadin. Rule out intracranial bleed TECHNIQUE: Imaging protocol: Computed tomography of the head without contrast. Radiation optimization: All CT scans at this facility use at least one of these dose optimization techniques: automated exposure control; mA and/or kV adjustment per patient size (includes targeted exams where dose is matched to clinical indication); or iterative reconstruction. REPORTING DATA: Count of CT and Cardiac NM exams in prior 12 months: This patient has received 1 known CT and 0 known cardiac nuclear medicine studies in the 12 months prior to the current study. COMPARISON: No relevant prior studies available. FINDINGS: Brain: There is moderate diffuse cerebral atrophy present, consistent with this patient's age. Cerebral ventricles: No ventriculomegaly. Paranasal sinuses: Opacification of the left maxillary sinus. Mastoid air cells: Visualized mastoid air cells are well aerated. Bones/joints: Unremarkable. No acute fracture. Soft tissues: Unremarkable.     IMPRESSION: No acute findings. THIS DOCUMENT HAS BEEN ELECTRONICALLY SIGNED BY ELÍAS HURTADO MD    XR Chest Port 1 View    Result Date: 11/20/2023  PROCEDURE INFORMATION: Exam: XR Chest Exam date and time: 11/20/2023 4:50 AM Age: 87 years old Clinical indication: Pain; Intercostal; Additional info: 87-year-old male with complaints of right-sided posterior rib pain. Rule out pneumothorax versus lung mass TECHNIQUE: Imaging protocol: Radiologic exam of the chest. Views: 1 view. COMPARISON: CR XR RIBS CHEST RT 3VW 11/6/2023 1:17 PM FINDINGS: Lungs: Unremarkable. No consolidation. Pleural spaces:  Unremarkable. No pleural effusion. No pneumothorax. Heart/Mediastinum: Unremarkable. No cardiomegaly. Bones/joints: Unremarkable.     IMPRESSION: No acute findings. THIS DOCUMENT HAS BEEN ELECTRONICALLY SIGNED BY ELÍAS HURTADO MD    CTA Chest Abdomen Pelvis w Contrast    Result Date: 11/20/2023  PROCEDURE INFORMATION: Exam: CTA Chest With Contrast CTA Abdomen and Pelvis With Contrast Exam date and time: 11/20/2023 5:11 AM Age: 87 years old Clinical indication: Pain; Additional info: 87-year-old male with complaints of right-sided rib pain but no discrete rib findings. Has elevated d-dimer. Rule out pe versus rib fracture versus aortic or abdominal aneurysm TECHNIQUE: Imaging protocol: Computed tomographic angiography of the chest with contrast. Exam focused on the arteries. Computed tomographic angiography of the abdomen and pelvis with contrast. Exam focused on the arteries. 3D rendering (Not supervised by radiologist): MIP and/or 3D reconstructed images were created by the technologist. Radiation optimization: All CT scans at this facility use at least one of these dose optimization techniques: automated exposure control; mA and/or kV adjustment per patient size (includes targeted exams where dose is matched to clinical indication); or iterative reconstruction. Contrast material: ISOVUE; Contrast volume: 100 ml; Contrast route: INTRAVENOUS (IV);  REPORTING DATA: Count of CT and Cardiac NM exams in prior 12 months: This patient has received 1 known CT and 0 known cardiac nuclear medicine studies in the 12 months prior to the current study. COMPARISON: CT CHEST PE STUDY 10/22/2015 4:47 PM FINDINGS: VASCULATURE: Pulmonary arteries: Normal. No pulmonary emboli. Aorta: Infrarenal abdominal aortic aneurysm measures 4.7 x 4.1 cm. There is no evidence for aneurysm rupture. Celiac trunk and mesenteric arteries: High-grade stenosis at the origin of the superior mesenteric artery. Renal arteries: High-grade stenosis  at the origin of the left renal artery. High-grade stenosis at the origin of the right renal artery. Right iliac arteries: No occlusion or significant stenosis. Left iliac arteries: No occlusion or significant stenosis. CHEST: Lungs: Scattered subpleural blebs bilaterally. Pleural spaces: Unremarkable. No pneumothorax. No pleural effusion. Heart: Unremarkable. No cardiomegaly. No pericardial effusion. Coronary arteries: Coronary artery calcifications are identified, considered a risk factor for coronary artery disease. ABDOMEN AND PELVIS: Liver: No mass. Gallbladder and bile ducts: Unremarkable. No calcified stones. No ductal dilation. Pancreas: Unremarkable. No mass. No ductal dilation. Spleen: Unremarkable. No splenomegaly. Adrenal glands: Unremarkable. No mass. Kidneys and ureters: Unremarkable. No solid mass. No hydronephrosis. Stomach and bowel: Multiple colonic diverticula without evidence for diverticulitis. Appendix: No evidence of appendicitis. Intraperitoneal space: Unremarkable. No free air. No significant fluid collection. Urinary bladder: Unremarkable. No mass. Reproductive: Enlarged prostate. Lymph nodes: Unremarkable. No enlarged lymph nodes. Bones/joints: Mixed lytic and sclerotic lesion involving the T8 vertebral body with adjacent soft tissue component extending into the posterior mediastinum. Etiologies include hematoma related to compression fracture as well as neoplasm. Soft tissues: Unremarkable.     IMPRESSION: 1.   Consider thoracic MRI to better evaluate the T8 vertebral body. 2.   Chronic vascular changes without acute findings. THIS DOCUMENT HAS BEEN ELECTRONICALLY SIGNED BY ELÍAS HURTADO MD     Assessment/Plan:    1: Metastatic prostate carcinoma with bony metastasis presenting with T8 compression fracture with biopsy proven disease with multiple bony metastases involving thoracic spine pelvis rib cage in the setting of a  Rising PSA.  Given his presentation of Denovo metastatic  prostate cancer he will need treatment immediately.  Will obtain a bone scan to complete the metastatic workup otherwise we will start the patient on androgen deprivation therapy with Lupron 22.5 mg IM every 3 months in addition to enzalutamide which is indicated in the first-line setting for metastatic prostate cancer which has shown improved disease-free survival in patients as well as overall survival.  We will start at a low dose given his cardiac history we will start 80 mg p.o. daily.  Will also add denosumab or Xgeva 120 mg subcu q. 28 days as a adjunct treatment for his bony metastatic disease.  Will see the patient approximately 1 month discuss bone scan results and also assess PSA response will monitor PSA monthly will also obtain testosterone level monthly he will wear the back brace for now.  Time spent: 122 minutes was spent on this new consultation patient visit: We spent time reviewing Minitran treatment of metastatic prostate carcinoma  Overall enzalutamide in this patient's.  Reviewed imaging results with radiology, weight performed history physical, document history physical, we ordered Lupron Xgeva as well as enzalutamide follow-up labs including CBC CMP LDH PSA testosterone.    Signed by: Raj Nj MD

## 2023-12-14 NOTE — NURSING NOTE
Chief Complaint   Patient presents with    Oncology Clinic Visit     Consult - Prostate CA     Medication Reconciliation: complete    Margarita Damian CMA ([facility]) 12/14/2023 1:15 PM

## 2023-12-15 ENCOUNTER — TELEPHONE (OUTPATIENT)
Dept: ONCOLOGY | Facility: OTHER | Age: 88
End: 2023-12-15

## 2023-12-15 NOTE — TELEPHONE ENCOUNTER
PA Initiation    Medication: XTANDI 80 MG PO TABS  Ref.# [id number]  Insurance Company: [employer] ([facility]) - Phone [phone] Fax [phone]  Pharmacy Filling the Rx:    Filling Pharmacy Phone:    Filling Pharmacy Fax:    Start Date: 12/15/2023

## 2023-12-18 NOTE — TELEPHONE ENCOUNTER
Prior Authorization Approval    Medication: XTANDI 80 MG PO TABS  Authorization Effective Date: 12/15/2023  Authorization Expiration Date: 12/31/2024  Approved Dose/Quantity: 120/30 days  Reference #: [id number]   Insurance Company: [employer] ([facility]) - Phone [phone] Fax [phone]  Expected CoPay: $ 2,188.12  CoPay Card Available:      Financial Assistance Needed: free drug will check into   Which Pharmacy is filling the prescription: will check into free drug   Pharmacy Notified: yes  Patient Notified: yes

## 2023-12-19 ENCOUNTER — TELEPHONE (OUTPATIENT)
Dept: PEDIATRICS | Facility: OTHER | Age: 88
End: 2023-12-19
Payer: COMMERCIAL

## 2023-12-19 ENCOUNTER — HOSPITAL ENCOUNTER (OUTPATIENT)
Dept: NUCLEAR MEDICINE | Facility: OTHER | Age: 88
Discharge: HOME | End: 2023-12-19
Attending: INTERNAL MEDICINE
Payer: COMMERCIAL

## 2023-12-19 ENCOUNTER — HOSPITAL ENCOUNTER (OUTPATIENT)
Dept: NUCLEAR MEDICINE | Facility: OTHER | Age: 88
Setting detail: NUCLEAR MEDICINE
Discharge: HOME | End: 2023-12-19
Attending: INTERNAL MEDICINE
Payer: COMMERCIAL

## 2023-12-19 ENCOUNTER — PATIENT OUTREACH (OUTPATIENT)
Dept: ONCOLOGY | Facility: OTHER | Age: 88
End: 2023-12-19
Payer: COMMERCIAL

## 2023-12-19 DIAGNOSIS — C61 PROSTATE CANCER METASTATIC TO BONE (H): ICD-10-CM

## 2023-12-19 DIAGNOSIS — C79.51 PROSTATE CANCER METASTATIC TO BONE (H): ICD-10-CM

## 2023-12-19 PROCEDURE — 78306 BONE IMAGING WHOLE BODY: CPT

## 2023-12-19 PROCEDURE — A9503 TC99M MEDRONATE: HCPCS | Performed by: INTERNAL MEDICINE

## 2023-12-19 PROCEDURE — 343N000001 HC RX 343: Performed by: INTERNAL MEDICINE

## 2023-12-19 RX ORDER — TC 99M MEDRONATE 20 MG/10ML
26 INJECTION, POWDER, LYOPHILIZED, FOR SOLUTION INTRAVENOUS ONCE
Status: COMPLETED | OUTPATIENT
Start: 2023-12-19 | End: 2023-12-19

## 2023-12-19 RX ADMIN — TC 99M MEDRONATE 26 MILLICURIE: 20 INJECTION, POWDER, LYOPHILIZED, FOR SOLUTION INTRAVENOUS at 09:00

## 2023-12-19 NOTE — TELEPHONE ENCOUNTER
FREE DRUG APPLICATION INITIATED    Medication: XTANDI 80 MG PO TABS  Free Drug Program Name:  [facility]  Date Submitted: 12/19/2023 10:33 AM  Phone #: [phone]  Fax #: [phone]  Additional Information: application sent off to Dr. Nj to sign

## 2023-12-19 NOTE — TELEPHONE ENCOUNTER
----- Message from Lakesha Mazariegos [facility] sent at 12/19/2023  2:24 PM CST -----  Regarding: Closer INR Monitoring  Dr. Martinez,     We have a mutual patient who is starting a new medication Xtandi (enzalutamide) for his prostate cancer. There is a category D interaction with his Warfarin. The Xtandi can decrease the effects of Warfarin. Additional monitoring of the INR is recommended. Patient will likely be starting the medication in the next month and wanted your team to be aware. Please let me know if you have any questions. Thanks!    Trenton Mazariegos, PharmD, [facility]   Oncology Pharmacy Manager   [facility]   [phone]

## 2023-12-19 NOTE — PROGRESS NOTES
Spoke with daughter and he has been to dentist in last year. He has all his teeth and has good dental health. Please place zometa if appropriate as Xgeva not covered.  Jo Mcintosh RN...........12/19/2023 9:31 AM

## 2023-12-20 DIAGNOSIS — Z86.718 PERSONAL HISTORY OF DVT (DEEP VEIN THROMBOSIS): ICD-10-CM

## 2023-12-20 LAB — TESTOST SERPL-MCNC: 543 NG/DL (ref 240–950)

## 2023-12-20 RX ORDER — WARFARIN SODIUM 7.5 MG/1
TABLET ORAL
Qty: 90 TABLET | Refills: 1 | Status: SHIPPED | OUTPATIENT
Start: 2023-12-20 | End: 2023-12-23

## 2023-12-20 NOTE — TELEPHONE ENCOUNTER
ANTICOAGULATION MANAGEMENT:  Medication Refill    Anticoagulation Summary  As of 12/8/2023      Warfarin maintenance plan:  5 mg (5 mg x 1) every Mon; 7.5 mg (7.5 mg x 1) all other days   Next INR check:  12/22/2023   Target end date:  Indefinite    Indications    Long-term (current) use of anticoagulants [Z79.01] [Z79.01]  Anticoagulation monitoring  INR range 2-3 [Z79.01]  History of pulmonary embolism [Z86.711]  Personal history of DVT (deep vein thrombosis) [Z86.718]                 Anticoagulation Care Providers       Provider Role Specialty Phone number    Isma Martinez MD [facility] Internal Medicine [phone]            Refill Criteria    Visit with referring provider/group: Meets criteria: office visit within referring provider group in the last 1 year on 12/1/23    [facility] referral signed last signed: 03/14/2023; within last year: Yes    Lab monitoring not exceeding 2 weeks overdue: Yes    Michael meets all criteria for refill. Rx instructions and quantity supplied updated to match patient's current dosing plan. Warfarin 90 day supply with 1 refill granted per [facility] protocol     Naya Diallo RN  Anticoagulation Clinic

## 2023-12-22 ENCOUNTER — NURSE TRIAGE (OUTPATIENT)
Dept: PEDIATRICS | Facility: OTHER | Age: 88
End: 2023-12-22
Payer: COMMERCIAL

## 2023-12-22 ENCOUNTER — HOSPITAL ENCOUNTER (EMERGENCY)
Facility: OTHER | Age: 88
Discharge: HOME | End: 2023-12-23
Attending: PHYSICIAN ASSISTANT | Admitting: PHYSICIAN ASSISTANT
Payer: COMMERCIAL

## 2023-12-22 ENCOUNTER — OFFICE VISIT (OUTPATIENT)
Dept: FAMILY MEDICINE | Facility: OTHER | Age: 88
End: 2023-12-22
Payer: COMMERCIAL

## 2023-12-22 VITALS
HEART RATE: 68 BPM | DIASTOLIC BLOOD PRESSURE: 66 MMHG | SYSTOLIC BLOOD PRESSURE: 124 MMHG | TEMPERATURE: 96.6 F | WEIGHT: 161 LBS | RESPIRATION RATE: 20 BRPM | BODY MASS INDEX: 24.4 KG/M2 | OXYGEN SATURATION: 99 % | HEIGHT: 68 IN

## 2023-12-22 DIAGNOSIS — C79.51 METASTASIS TO BONE (H): Primary | ICD-10-CM

## 2023-12-22 DIAGNOSIS — Z79.01 LONG TERM (CURRENT) USE OF ANTICOAGULANTS: ICD-10-CM

## 2023-12-22 DIAGNOSIS — R31.0 GROSS HEMATURIA: ICD-10-CM

## 2023-12-22 DIAGNOSIS — R31.9 HEMATURIA, UNSPECIFIED TYPE: Primary | ICD-10-CM

## 2023-12-22 DIAGNOSIS — C79.51 PROSTATE CANCER METASTATIC TO BONE (H): ICD-10-CM

## 2023-12-22 DIAGNOSIS — R79.1 ELEVATED INR: ICD-10-CM

## 2023-12-22 DIAGNOSIS — N30.01 ACUTE HEMORRHAGIC CYSTITIS: ICD-10-CM

## 2023-12-22 DIAGNOSIS — Z79.01 LONG TERM CURRENT USE OF ANTICOAGULANT THERAPY: ICD-10-CM

## 2023-12-22 DIAGNOSIS — C61 PROSTATE CANCER METASTATIC TO BONE (H): ICD-10-CM

## 2023-12-22 DIAGNOSIS — C61 MALIGNANT NEOPLASM OF PROSTATE (H): ICD-10-CM

## 2023-12-22 LAB
ALBUMIN UR-MCNC: 600 MG/DL
ANION GAP SERPL CALCULATED.3IONS-SCNC: 10 MMOL/L (ref 7–15)
APPEARANCE UR: ABNORMAL
BACTERIA #/AREA URNS HPF: ABNORMAL /HPF
BASOPHILS # BLD AUTO: 0.1 10E3/UL (ref 0–0.2)
BASOPHILS NFR BLD AUTO: 1 %
BILIRUB UR QL STRIP: NEGATIVE
BUN SERPL-MCNC: 15.8 MG/DL (ref 8–23)
CALCIUM SERPL-MCNC: 9.2 MG/DL (ref 8.8–10.2)
CHLORIDE SERPL-SCNC: 101 MMOL/L (ref 98–107)
COLOR UR AUTO: ABNORMAL
CREAT SERPL-MCNC: 0.93 MG/DL (ref 0.67–1.17)
DEPRECATED HCO3 PLAS-SCNC: 24 MMOL/L (ref 22–29)
EGFRCR SERPLBLD CKD-EPI 2021: 79 ML/MIN/1.73M2
EOSINOPHIL # BLD AUTO: 0.1 10E3/UL (ref 0–0.7)
EOSINOPHIL NFR BLD AUTO: 2 %
ERYTHROCYTE [DISTWIDTH] IN BLOOD BY AUTOMATED COUNT: 14.3 % (ref 10–15)
GLUCOSE SERPL-MCNC: 98 MG/DL (ref 70–99)
GLUCOSE UR STRIP-MCNC: 50 MG/DL
HCT VFR BLD AUTO: 37.8 % (ref 40–53)
HGB BLD-MCNC: 12 G/DL (ref 13.3–17.7)
HGB BLD-MCNC: 12.2 G/DL (ref 13.3–17.7)
HGB BLD-MCNC: 12.7 G/DL (ref 13.3–17.7)
HGB UR QL STRIP: ABNORMAL
HOLD SPECIMEN: NORMAL
IMM GRANULOCYTES # BLD: 0 10E3/UL
IMM GRANULOCYTES NFR BLD: 1 %
INR PPP: 4.61 (ref 0.85–1.15)
KETONES UR STRIP-MCNC: ABNORMAL MG/DL
LEUKOCYTE ESTERASE UR QL STRIP: ABNORMAL
LYMPHOCYTES # BLD AUTO: 1.3 10E3/UL (ref 0.8–5.3)
LYMPHOCYTES NFR BLD AUTO: 21 %
MCH RBC QN AUTO: 29.3 PG (ref 26.5–33)
MCHC RBC AUTO-ENTMCNC: 33.6 G/DL (ref 31.5–36.5)
MCV RBC AUTO: 87 FL (ref 78–100)
MONOCYTES # BLD AUTO: 0.6 10E3/UL (ref 0–1.3)
MONOCYTES NFR BLD AUTO: 9 %
NEUTROPHILS # BLD AUTO: 4.2 10E3/UL (ref 1.6–8.3)
NEUTROPHILS NFR BLD AUTO: 66 %
NITRATE UR QL: NEGATIVE
NRBC # BLD AUTO: 0 10E3/UL
NRBC BLD AUTO-RTO: 0 /100
PH UR STRIP: 7.5 [PH] (ref 5–9)
PLATELET # BLD AUTO: 183 10E3/UL (ref 150–450)
POTASSIUM SERPL-SCNC: 4.3 MMOL/L (ref 3.4–5.3)
RBC # BLD AUTO: 4.33 10E6/UL (ref 4.4–5.9)
RBC URINE: >100 /HPF
SODIUM SERPL-SCNC: 135 MMOL/L (ref 135–145)
SP GR UR STRIP: 1.03 (ref 1–1.03)
UROBILINOGEN UR STRIP-MCNC: NORMAL MG/DL
WBC # BLD AUTO: 6.3 10E3/UL (ref 4–11)
WBC URINE: 3 /HPF

## 2023-12-22 PROCEDURE — 85025 COMPLETE CBC W/AUTO DIFF WBC: CPT | Performed by: PHYSICIAN ASSISTANT

## 2023-12-22 PROCEDURE — 99207 PR NO CHARGE LOS: CPT | Performed by: NURSE PRACTITIONER

## 2023-12-22 PROCEDURE — 85610 PROTHROMBIN TIME: CPT | Performed by: PHYSICIAN ASSISTANT

## 2023-12-22 PROCEDURE — 85018 HEMOGLOBIN: CPT | Mod: 91 | Performed by: PHYSICIAN ASSISTANT

## 2023-12-22 PROCEDURE — 258N000003 HC RX IP 258 OP 636: Performed by: PHYSICIAN ASSISTANT

## 2023-12-22 PROCEDURE — 51798 US URINE CAPACITY MEASURE: CPT | Performed by: PHYSICIAN ASSISTANT

## 2023-12-22 PROCEDURE — 250N000011 HC RX IP 250 OP 636: Performed by: PHYSICIAN ASSISTANT

## 2023-12-22 PROCEDURE — 36415 COLL VENOUS BLD VENIPUNCTURE: CPT | Performed by: PHYSICIAN ASSISTANT

## 2023-12-22 PROCEDURE — 81001 URINALYSIS AUTO W/SCOPE: CPT | Performed by: PHYSICIAN ASSISTANT

## 2023-12-22 PROCEDURE — 99285 EMERGENCY DEPT VISIT HI MDM: CPT | Mod: 25 | Performed by: PHYSICIAN ASSISTANT

## 2023-12-22 PROCEDURE — 250N000013 HC RX MED GY IP 250 OP 250 PS 637: Performed by: PHYSICIAN ASSISTANT

## 2023-12-22 PROCEDURE — 96374 THER/PROPH/DIAG INJ IV PUSH: CPT | Performed by: PHYSICIAN ASSISTANT

## 2023-12-22 PROCEDURE — 96361 HYDRATE IV INFUSION ADD-ON: CPT | Performed by: PHYSICIAN ASSISTANT

## 2023-12-22 PROCEDURE — 99284 EMERGENCY DEPT VISIT MOD MDM: CPT | Performed by: PHYSICIAN ASSISTANT

## 2023-12-22 PROCEDURE — G0463 HOSPITAL OUTPT CLINIC VISIT: HCPCS | Mod: 25

## 2023-12-22 PROCEDURE — 80048 BASIC METABOLIC PNL TOTAL CA: CPT | Performed by: PHYSICIAN ASSISTANT

## 2023-12-22 PROCEDURE — 87086 URINE CULTURE/COLONY COUNT: CPT | Performed by: PHYSICIAN ASSISTANT

## 2023-12-22 RX ORDER — CEFTRIAXONE 1 G/1
1 INJECTION, POWDER, FOR SOLUTION INTRAMUSCULAR; INTRAVENOUS ONCE
Status: COMPLETED | OUTPATIENT
Start: 2023-12-22 | End: 2023-12-23

## 2023-12-22 RX ORDER — FENTANYL CITRATE 50 UG/ML
50 INJECTION, SOLUTION INTRAMUSCULAR; INTRAVENOUS ONCE
Status: COMPLETED | OUTPATIENT
Start: 2023-12-22 | End: 2023-12-22

## 2023-12-22 RX ORDER — ACETAMINOPHEN 325 MG/1
650 TABLET ORAL ONCE
Status: COMPLETED | OUTPATIENT
Start: 2023-12-22 | End: 2023-12-22

## 2023-12-22 RX ADMIN — SODIUM CHLORIDE 1000 ML: 9 INJECTION, SOLUTION INTRAVENOUS at 16:11

## 2023-12-22 RX ADMIN — FENTANYL CITRATE 50 MCG: 50 INJECTION INTRAMUSCULAR; INTRAVENOUS at 21:37

## 2023-12-22 RX ADMIN — ACETAMINOPHEN 650 MG: 325 TABLET, FILM COATED ORAL at 15:52

## 2023-12-22 ASSESSMENT — PAIN SCALES - GENERAL: PAINLEVEL: NO PAIN (0)

## 2023-12-22 ASSESSMENT — ENCOUNTER SYMPTOMS
WEAKNESS: 1
VOMITING: 0
NAUSEA: 0
HEMATURIA: 1
DYSURIA: 0
CONFUSION: 0
APPETITE CHANGE: 1
HEMATURIA: 1
SHORTNESS OF BREATH: 0
FEVER: 0
ABDOMINAL PAIN: 0

## 2023-12-22 ASSESSMENT — ACTIVITIES OF DAILY LIVING (ADL)
ADLS_ACUITY_SCORE: 35
ADLS_ACUITY_SCORE: 33
ADLS_ACUITY_SCORE: 35

## 2023-12-22 NOTE — TELEPHONE ENCOUNTER
Called and informed patient and daughter of [facility]'s message. No appointments are available in clinic. Advised to go to rapid clinic.     SAMUEL REID RN on 12/22/2023 at 12:00 PM

## 2023-12-22 NOTE — TELEPHONE ENCOUNTER
Yes, he should be evaluated today.    Signed, Isma Martinez MD, FAAP, FACP  Internal Medicine & Pediatrics

## 2023-12-22 NOTE — TELEPHONE ENCOUNTER
S-(situation): Urinating blood for the past 2 days. Last night it was more pink. Today it is julita blood again.     B-(background): States that he ate a whole jar of dill pickles otherwise diet has been the same.     A-(assessment): Says that he could pee every half hour and it's julita blood. Doesn't feel like he's retaining urine. Took Warfarin last night- 7.5mg. Denies fever and discharge. Denies dizziness or shortness of breath. States he feels pretty good. When asked about pain he said maybe a little in the bladder. BMs are normal.     R-(recommendations): Protocol says to go to office Patient's daughter ask that [facility] review this and advise as patient recently dx with cancer and she's wondering if that has something to do with this.     Reason for Disposition   Taking Coumadin (warfarin) or other strong blood thinner, or known bleeding disorder (e.g., thrombocytopenia)    Additional Information   Negative: Shock suspected (e.g., cold/pale/clammy skin, too weak to stand, low BP, rapid pulse)   Negative: Sounds like a life-threatening emergency to the triager   Negative: Urinary catheter, questions about   Negative: Recent back or abdominal injury   Negative: Recent genital injury   Negative: Unable to urinate (or only a few drops) > 4 hours and bladder feels very full (e.g., palpable bladder or strong urge to urinate)   Negative: Diffuse (all over) muscle pains in the shoulders, arms, legs, and back and dark (cola or tea-colored) or red-colored urine   Negative: Passing pure blood or large blood clots (i.e., larger than a dime or grape)   Negative: Fever > 100.4 F (38.0 C)   Negative: Patient sounds very sick or weak to the triager    Protocols used: Urine - Blood In-A-OH    SAMUEL REID RN on 12/22/2023 at 11:26 AM

## 2023-12-22 NOTE — ED TRIAGE NOTES
"Patient being evaluated for 2 day history of hematuria. He states the blood is julita red with fingernail sized clots. He states this happens every time he urinates. Patient is on coumadin. No C/O CP, SOB, or dizziness. /77   Pulse 73   Temp 97.4  F (36.3  C) (Tympanic)   Resp 20   Ht 1.753 m (5' 9\")   Wt 69.9 kg (154 lb)   SpO2 98%   BMI 22.74 kg/m         Triage Assessment (Adult)       Row Name 12/22/23 1426          Triage Assessment    Airway WDL WDL        Respiratory WDL    Respiratory WDL WDL        Skin Circulation/Temperature WDL    Skin Circulation/Temperature WDL WDL        Cardiac WDL    Cardiac WDL WDL        Peripheral/Neurovascular WDL    Peripheral Neurovascular WDL WDL        Cognitive/Neuro/Behavioral WDL    Cognitive/Neuro/Behavioral WDL WDL                     "

## 2023-12-22 NOTE — PROGRESS NOTES
"Assessment & Plan     ICD-10-CM    1. Hematuria, unspecified type  R31.9 UA Macroscopic with reflex to Microscopic and Culture      2. Prostate cancer metastatic to bone (H)  C61     C79.51       3. Long-term (current) use of anticoagulants [Z79.01]  Z79.01       4. Gross hematuria  R31.0         Patient presents for increasing hematuria since last evening. Recently diagnosed with prostate cancer with metastases to the bone. Has not started with treatment. Tried to obtain UA, but specimen is pure blood. No urine visualized. He is non-toxic appearing.     Painful urination. RLQ abdominal pain, from \"bruised kidney\".      Triaged from: [facility]    DIAGNOSIS:   1. Hematuria, unspecified type    2. Prostate cancer metastatic to bone (H)    3. Long-term (current) use of anticoagulants [Z79.01]    4. Gross hematuria        Initial /66 (BP Location: Right arm, Patient Position: Sitting, Cuff Size: Adult Regular)   Pulse 68   Temp (!) 96.6  F (35.9  C) (Tympanic)   Resp 20   Ht 1.727 m (5' 8\")   Wt 73 kg (161 lb)   SpO2 99%   BMI 24.48 kg/m   Estimated body mass index is 24.48 kg/m  as calculated from the following:    Height as of this encounter: 1.727 m (5' 8\").    Weight as of this encounter: 73 kg (161 lb).    Patient will be transferred to higher level of care.        MICH Johnson CNP  [facility]        Ritesh Barajas is a 88 year old, presenting for the following health issues:  Hematuria      Patient presents for increasing hematuria since last evening. Recently diagnosed with prostate cancer with metastases to the bone. Tried to obtain UA, but specimen is pure blood. No urine visualized. He is non-toxic appearing.            Review of Systems   Constitutional:  Positive for appetite change.   Genitourinary:  Positive for hematuria and penile pain.   Neurological:  Positive for weakness.            Objective    /66 (BP Location: Right arm, Patient Position: " "Sitting, Cuff Size: Adult Regular)   Pulse 68   Temp (!) 96.6  F (35.9  C) (Tympanic)   Resp 20   Ht 1.727 m (5' 8\")   Wt 73 kg (161 lb)   SpO2 99%   BMI 24.48 kg/m    Body mass index is 24.48 kg/m .  Physical Exam  Vitals and nursing note reviewed.   Constitutional:       Appearance: Normal appearance.   Cardiovascular:      Rate and Rhythm: Normal rate.   Pulmonary:      Effort: Pulmonary effort is normal.   Neurological:      Mental Status: He is alert.          Limited physical examination, as patient was transferred to ER.                     "

## 2023-12-22 NOTE — NURSING NOTE
"Patient presents to the clinic for hematuria over the past 24 hours.  Patient is unsure if this was caused by PRN Tylenol or the OTC \"Kidney Pills.\"  Unable to void right now due to urinating prior to coming back to the exam room.    FOOD SECURITY SCREENING QUESTIONS:    The next two questions are to help us understand your food security.  If you are feeling you need any assistance in this area, we have resources available to support you today.    Hunger Vital Signs:  Within the past 12 months we worried whether our food would run out before we got money to buy more. Never  Within the past 12 months the food we bought just didn't last and we didn't have money to get more. Never    Advance Care Directive on file? no  Advance Care Directive provided to patient? Declined.    Chief Complaint   Patient presents with    Hematuria       Initial /66 (BP Location: Right arm, Patient Position: Sitting, Cuff Size: Adult Regular)   Pulse 68   Temp (!) 96.6  F (35.9  C) (Tympanic)   Resp 20   Ht 1.727 m (5' 8\")   Wt 73 kg (161 lb)   SpO2 99%   BMI 24.48 kg/m   Estimated body mass index is 24.48 kg/m  as calculated from the following:    Height as of this encounter: 1.727 m (5' 8\").    Weight as of this encounter: 73 kg (161 lb).  Medication Reconciliation: complete        Juana Griffiths LPN     "

## 2023-12-23 VITALS
TEMPERATURE: 98.8 F | SYSTOLIC BLOOD PRESSURE: 134 MMHG | WEIGHT: 154 LBS | BODY MASS INDEX: 22.81 KG/M2 | OXYGEN SATURATION: 99 % | DIASTOLIC BLOOD PRESSURE: 75 MMHG | RESPIRATION RATE: 18 BRPM | HEART RATE: 59 BPM | HEIGHT: 69 IN

## 2023-12-23 PROBLEM — R31.0 GROSS HEMATURIA: Status: ACTIVE | Noted: 2023-12-23

## 2023-12-23 PROBLEM — N30.01 ACUTE HEMORRHAGIC CYSTITIS: Status: ACTIVE | Noted: 2023-12-23

## 2023-12-23 LAB
ANION GAP SERPL CALCULATED.3IONS-SCNC: 10 MMOL/L (ref 7–15)
BASOPHILS # BLD AUTO: 0 10E3/UL (ref 0–0.2)
BASOPHILS NFR BLD AUTO: 0 %
BUN SERPL-MCNC: 14.3 MG/DL (ref 8–23)
CALCIUM SERPL-MCNC: 9.2 MG/DL (ref 8.8–10.2)
CHLORIDE SERPL-SCNC: 98 MMOL/L (ref 98–107)
CREAT SERPL-MCNC: 0.8 MG/DL (ref 0.67–1.17)
DEPRECATED HCO3 PLAS-SCNC: 24 MMOL/L (ref 22–29)
EGFRCR SERPLBLD CKD-EPI 2021: 86 ML/MIN/1.73M2
EOSINOPHIL # BLD AUTO: 0 10E3/UL (ref 0–0.7)
EOSINOPHIL NFR BLD AUTO: 1 %
ERYTHROCYTE [DISTWIDTH] IN BLOOD BY AUTOMATED COUNT: 14.1 % (ref 10–15)
GLUCOSE SERPL-MCNC: 143 MG/DL (ref 70–99)
HCT VFR BLD AUTO: 33.7 % (ref 40–53)
HGB BLD-MCNC: 11.5 G/DL (ref 13.3–17.7)
HGB BLD-MCNC: 11.7 G/DL (ref 13.3–17.7)
IMM GRANULOCYTES # BLD: 0 10E3/UL
IMM GRANULOCYTES NFR BLD: 1 %
INR PPP: 3.68 (ref 0.85–1.15)
LYMPHOCYTES # BLD AUTO: 1.4 10E3/UL (ref 0.8–5.3)
LYMPHOCYTES NFR BLD AUTO: 16 %
MCH RBC QN AUTO: 30.2 PG (ref 26.5–33)
MCHC RBC AUTO-ENTMCNC: 34.7 G/DL (ref 31.5–36.5)
MCV RBC AUTO: 87 FL (ref 78–100)
MONOCYTES # BLD AUTO: 0.7 10E3/UL (ref 0–1.3)
MONOCYTES NFR BLD AUTO: 8 %
NEUTROPHILS # BLD AUTO: 6.6 10E3/UL (ref 1.6–8.3)
NEUTROPHILS NFR BLD AUTO: 74 %
NRBC # BLD AUTO: 0 10E3/UL
NRBC BLD AUTO-RTO: 0 /100
PLATELET # BLD AUTO: 161 10E3/UL (ref 150–450)
POTASSIUM SERPL-SCNC: 4 MMOL/L (ref 3.4–5.3)
RBC # BLD AUTO: 3.88 10E6/UL (ref 4.4–5.9)
SODIUM SERPL-SCNC: 132 MMOL/L (ref 135–145)
WBC # BLD AUTO: 8.7 10E3/UL (ref 4–11)

## 2023-12-23 PROCEDURE — 250N000013 HC RX MED GY IP 250 OP 250 PS 637: Performed by: FAMILY MEDICINE

## 2023-12-23 PROCEDURE — 36415 COLL VENOUS BLD VENIPUNCTURE: CPT | Performed by: PHYSICIAN ASSISTANT

## 2023-12-23 PROCEDURE — 250N000013 HC RX MED GY IP 250 OP 250 PS 637: Performed by: STUDENT IN AN ORGANIZED HEALTH CARE EDUCATION/TRAINING PROGRAM

## 2023-12-23 PROCEDURE — 85025 COMPLETE CBC W/AUTO DIFF WBC: CPT | Performed by: PHYSICIAN ASSISTANT

## 2023-12-23 PROCEDURE — 85018 HEMOGLOBIN: CPT | Mod: XU | Performed by: STUDENT IN AN ORGANIZED HEALTH CARE EDUCATION/TRAINING PROGRAM

## 2023-12-23 PROCEDURE — 96375 TX/PRO/DX INJ NEW DRUG ADDON: CPT | Performed by: PHYSICIAN ASSISTANT

## 2023-12-23 PROCEDURE — 120N000001 HC R&B MED SURG/OB

## 2023-12-23 PROCEDURE — 96367 TX/PROPH/DG ADDL SEQ IV INF: CPT | Performed by: PHYSICIAN ASSISTANT

## 2023-12-23 PROCEDURE — 51798 US URINE CAPACITY MEASURE: CPT | Performed by: PHYSICIAN ASSISTANT

## 2023-12-23 PROCEDURE — 250N000011 HC RX IP 250 OP 636: Performed by: STUDENT IN AN ORGANIZED HEALTH CARE EDUCATION/TRAINING PROGRAM

## 2023-12-23 PROCEDURE — 80048 BASIC METABOLIC PNL TOTAL CA: CPT | Performed by: STUDENT IN AN ORGANIZED HEALTH CARE EDUCATION/TRAINING PROGRAM

## 2023-12-23 PROCEDURE — 36415 COLL VENOUS BLD VENIPUNCTURE: CPT | Performed by: STUDENT IN AN ORGANIZED HEALTH CARE EDUCATION/TRAINING PROGRAM

## 2023-12-23 PROCEDURE — 85610 PROTHROMBIN TIME: CPT | Performed by: STUDENT IN AN ORGANIZED HEALTH CARE EDUCATION/TRAINING PROGRAM

## 2023-12-23 RX ORDER — HYDROMORPHONE HYDROCHLORIDE 1 MG/ML
0.5 INJECTION, SOLUTION INTRAMUSCULAR; INTRAVENOUS; SUBCUTANEOUS ONCE
Status: COMPLETED | OUTPATIENT
Start: 2023-12-23 | End: 2023-12-23

## 2023-12-23 RX ORDER — OXYCODONE HYDROCHLORIDE 5 MG/1
10 TABLET ORAL EVERY 4 HOURS PRN
Status: DISCONTINUED | OUTPATIENT
Start: 2023-12-23 | End: 2023-12-23 | Stop reason: HOSPADM

## 2023-12-23 RX ORDER — OXYCODONE HYDROCHLORIDE 5 MG/1
10 TABLET ORAL ONCE
Status: COMPLETED | OUTPATIENT
Start: 2023-12-23 | End: 2023-12-23

## 2023-12-23 RX ADMIN — OXYCODONE HYDROCHLORIDE 10 MG: 5 TABLET ORAL at 01:11

## 2023-12-23 RX ADMIN — HYDROMORPHONE HYDROCHLORIDE 0.5 MG: 1 INJECTION, SOLUTION INTRAMUSCULAR; INTRAVENOUS; SUBCUTANEOUS at 04:49

## 2023-12-23 RX ADMIN — OXYCODONE HYDROCHLORIDE 10 MG: 5 TABLET ORAL at 10:05

## 2023-12-23 RX ADMIN — CEFTRIAXONE SODIUM 1 G: 1 INJECTION, POWDER, FOR SOLUTION INTRAMUSCULAR; INTRAVENOUS at 00:15

## 2023-12-23 ASSESSMENT — ACTIVITIES OF DAILY LIVING (ADL)
ADLS_ACUITY_SCORE: 35

## 2023-12-23 NOTE — ED PROVIDER NOTES
"12/23/23   7:00 AM    I am accepting the care of this patient from Dr Gomez at change of shift.  Michael Martinez is a 89 yo male here with gross hematuria.  He has prostate cancer with metastases to bone.  This was recently diagnosed and he is not on cancer treatment at this time. He has not had urine outlet obstruction. He has a history of DVT and PE on coumadin (INR today 3.68, down from 4.61 yesterday- goal is about 2.4).     Per Dr. Silver, urologist at [facility] in [place]: He feels that the elevated INR and prostate cancer is most likely contributing to the gross hematuria at this time.  Is felt that the patient would be safe for discharge and to hold his Coumadin and that the gross hematuria should gradually resolve.     Patient did not look well.  He is getting repeat labs this morning. This is likely hemorrhagic cystitis.    ED Course    8:25 AM  I spoke with Michelle, his daughter: he lives by her alone here in [place].  She is his caregiver. Yesterday he got a bit \"crazy\" and they were not comfortable with him going home.  She feels that he is pretty safe at home. He thinks that he wants to stay at home, and she feels this is not likely to work out.    11:04 AM  Michelle is here. His urine is still red but less julita blood. We will get him home with an INR draw planned for tomorrow. I am working in the ED tomorrow and lab can call me with results.     Diagnosis    (R31.0) Gross hematuria    (N30.01) Acute hemorrhagic cystitis    (R79.1) Elevated INR      Plan: discharge            Ellis Sahni MD  12/23/23 1105    "

## 2023-12-23 NOTE — PROGRESS NOTES
Pt continues to void bright red.  Post void bladder scan 272.  Complains of pain intermittently.  Cindy Powell RN.............................12/23/2023 12:55 AM

## 2023-12-23 NOTE — DISCHARGE INSTRUCTIONS
Michael    Do not take coumadin today.     I recommend you come to the hospital for an INR tomorrow.  Ask them to call me in the ED when they have the results. We will figure out your coumadin dose tomorrow.     Thank you for choosing our Emergency Department for your care.     You may receive a phone call or letter for a survey about your care in our ED.  Please complete this as this is how we improve care for our patients.     If you have any questions after leaving the ED you can call or text me on my cell phone at [phone].  This does not mean that I am on call, but I will get back to you.  If you are not doing well please return to the ED.     Sincerely,    Dr Bhavik Sahni M.D.

## 2023-12-23 NOTE — ED PROVIDER NOTES
History     Chief Complaint   Patient presents with    Hematuria     X2 days     HPI  Michael Martinez is a 88 year old male who presents to the ED for evaluation of hematuria. Patient being evaluated for 2 day history of hematuria. He states the blood is julita red with fingernail sized clots. He states this happens every time he urinates. Patient is on coumadin. No C/O CP, SOB, or dizziness.     Allergies:  Allergies   Allergen Reactions    Influenza Vac Split [Influenza Virus Vaccine]      guillan barre       Problem List:    Patient Active Problem List    Diagnosis Date Noted    Benign prostatic hyperplasia without lower urinary tract symptoms 12/01/2023     Priority: Medium    Pathological fracture of thoracic vertebra with routine healing, subsequent encounter 12/01/2023     Priority: Medium    Closed wedge compression fracture of T8 vertebra with routine healing, subsequent encounter 12/01/2023     Priority: Medium    Prostate cancer metastatic to bone (H) 12/01/2023     Priority: Medium    History of gross hematuria 12/01/2023     Priority: Medium    Chronic anticoagulation 12/01/2023     Priority: Medium    Diabetes mellitus type 2, diet-controlled (H) 01/21/2022     Priority: Medium    Primary osteoarthritis involving multiple joints 04/23/2020     Priority: Medium    Cervical arthritis 08/06/2019     Priority: Medium    OLIVIA (generalized anxiety disorder) 04/19/2019     Priority: Medium    Mixed hyperlipidemia 06/21/2018     Priority: Medium    Peripheral arterial disease (H24) 06/21/2018     Priority: Medium    History of Guillain-Carthage syndrome 06/04/2018     Priority: Medium    Gastroesophageal reflux disease, esophagitis presence not specified 06/04/2018     Priority: Medium     IMO Regulatory Load OCT 2020      Primary osteoarthritis of left knee 06/04/2018     Priority: Medium    Other pulmonary embolism without acute cor pulmonale 02/11/2018     Priority: Medium    Lumbar disc disease 04/25/2017      Priority: Medium    Primary osteoarthritis of right hand 04/25/2017     Priority: Medium    Spinal stenosis, lumbar 04/25/2017     Priority: Medium    Long-term (current) use of anticoagulants [Z79.01] 11/18/2016     Priority: Medium    History of pulmonary embolism 11/16/2016     Priority: Medium    Abdominal aortic aneurysm without rupture (H24) 11/16/2016     Priority: Medium     Last imaged 11/15: 3.3cm 9 when discussed with radiology, not reflected in report) Brandy Tucker MD       Anticoagulation monitoring, INR range 2-3 11/16/2016     Priority: Medium    Personal history of DVT (deep vein thrombosis) 10/29/2016     Priority: Medium     10/29/16      Bladder outlet obstruction 10/20/2015     Priority: Medium    DJD (degenerative joint disease) of cervical spine 07/22/2015     Priority: Medium    Ischemic cardiomyopathy 07/09/2014     Priority: Medium    History of four vessel coronary artery bypass graft 04/22/2014     Priority: Medium    H/O transurethral resection of prostate 04/22/2014     Priority: Medium    S/P CABG x 4 04/22/2014     Priority: Medium    History of transurethral resection of prostate 04/22/2014     Priority: Medium    Lesion of ulnar nerve 05/21/2012     Priority: Medium    Diaphragmatic hernia 01/06/2004     Priority: Medium    Osteoarthrosis involving lower leg 10/15/2003     Priority: Medium     Replacing diagnoses that were inactivated after the 10/1/2021 regulatory import.      Cataract 08/19/2002     Priority: Medium        Past Medical History:    Past Medical History:   Diagnosis Date    Cerebral infarction (H)     Edema     Embolism and thrombosis of right tibial vein (H)     Enlarged prostate with lower urinary tract symptoms (LUTS)     Guillain Barré syndrome (H24)     Ischemic cardiomyopathy     Osteoarthritis     Other ill-defined and unknown causes of morbidity and mortality     Urinary tract infection        Past Surgical History:    Past Surgical History:   Procedure  Laterality Date    ARTHROSCOPY SHOULDER ROTATOR CUFF REPAIR          BIOPSY PROSTATE TRANSRECTAL      No Comments Provided    BYPASS GRAFT ARTERY CORONARY      2013,four-vessel    COLONOSCOPY      10/31/2013,diverticulosis-no fu needed d/t age    CYSTOSCOPY, TRANSURETHRAL RESECTION (TUR) PROSTATE, COMBINED      2014,done in [place] -- excellent result    OTHER SURGICAL HISTORY      2013,24220.0,([place]) [place] INJ PROC SELECTIVE CORONARY ANGIOGRAPHY,LAD-95% srldbkqr-73-55% distal stenosis.  [place]amus-80% stenosis.  Right coronary-high-grade 90% stenosis.  EF-45%       Family History:    Family History   Problem Relation Age of Onset    Heart Disease Mother         Heart Disease    Other - See Comments Father         No Known Problems    Breast Cancer Sister         Cancer-breast,Otherwise healthy    Other - See Comments Brother         BPH otherwise healthy at 72    Arthritis Brother         Arthritis,Osteoarthritis, otherwise healthy at 66    Colon Cancer Brother         Cancer-colon    Breast Cancer Sister 59        Cancer-breast,       Social History:  Marital Status:  Single [1]  Social History     Tobacco Use    Smoking status: Former     Packs/day: 0.90     Years: 9.00     Additional pack years: 0.00     Total pack years: 8.10     Types: Cigarettes     Passive exposure: Never    Smokeless tobacco: Never   Vaping Use    Vaping Use: Never used   Substance Use Topics    Alcohol use: Yes     Alcohol/week: 3.0 standard drinks of alcohol     Types: 3 Cans of beer per week     Comment: beer 3 times per week     Drug use: Never        Medications:    acetaminophen (TYLENOL) 325 MG tablet  aspirin EC 81 MG tablet  atorvastatin (LIPITOR) 10 MG tablet  Cholecalciferol (VITAMIN D-3) 1000 units CAPS  citalopram (CELEXA) 20 MG tablet  latanoprost (XALATAN) 0.005 % ophthalmic solution  omeprazole (PRILOSEC) 20 MG DR capsule  warfarin ANTICOAGULANT (COUMADIN) 5 MG tablet  warfarin ANTICOAGULANT  "(COUMADIN) 7.5 MG tablet          Review of Systems   Constitutional:  Negative for fever.   HENT:  Negative for congestion.    Eyes:  Negative for visual disturbance.   Respiratory:  Negative for shortness of breath.    Cardiovascular:  Negative for chest pain.   Gastrointestinal:  Negative for abdominal pain, nausea and vomiting.   Genitourinary:  Positive for hematuria. Negative for dysuria.   Psychiatric/Behavioral:  Negative for confusion.        Physical Exam   BP: 134/77  Pulse: 73  Temp: 97.4  F (36.3  C)  Resp: 20  Height: 175.3 cm (5' 9\")  Weight: 69.9 kg (154 lb)  SpO2: 98 %      Physical Exam  Constitutional:       General: He is not in acute distress.     Appearance: He is well-developed. He is not diaphoretic.   HENT:      Head: Normocephalic and atraumatic.   Eyes:      General: No scleral icterus.     Conjunctiva/sclera: Conjunctivae normal.   Cardiovascular:      Rate and Rhythm: Normal rate and regular rhythm.   Pulmonary:      Effort: Pulmonary effort is normal.      Breath sounds: Normal breath sounds.   Abdominal:      Palpations: Abdomen is soft.      Tenderness: There is no abdominal tenderness.   Genitourinary:     Rectum: Normal.   Musculoskeletal:         General: No deformity.      Cervical back: Neck supple.   Lymphadenopathy:      Cervical: No cervical adenopathy.   Skin:     General: Skin is warm and dry.      Coloration: Skin is not jaundiced.      Findings: No rash.   Neurological:      General: No focal deficit present.      Mental Status: He is alert. Mental status is at baseline.   Psychiatric:         Mood and Affect: Mood normal.         Behavior: Behavior normal.         ED Course                 Procedures              Critical Care time:  none               Results for orders placed or performed during the hospital encounter of 12/22/23 (from the past 24 hour(s))   CBC with platelets differential    Narrative    The following orders were created for panel order CBC with " platelets differential.  Procedure                               Abnormality         Status                     ---------                               -----------         ------                     CBC with platelets and d...[[id number]]  Abnormal            Final result                 Please view results for these tests on the individual orders.   Basic metabolic panel   Result Value Ref Range    Sodium 135 135 - 145 mmol/L    Potassium 4.3 3.4 - 5.3 mmol/L    Chloride 101 98 - 107 mmol/L    Carbon Dioxide (CO2) 24 22 - 29 mmol/L    Anion Gap 10 7 - 15 mmol/L    Urea Nitrogen 15.8 8.0 - 23.0 mg/dL    Creatinine 0.93 0.67 - 1.17 mg/dL    GFR Estimate 79 >60 mL/min/1.73m2    Calcium 9.2 8.8 - 10.2 mg/dL    Glucose 98 70 - 99 mg/dL   INR   Result Value Ref Range    INR 4.61 (H) 0.85 - 1.15   CBC with platelets and differential   Result Value Ref Range    WBC Count 6.3 4.0 - 11.0 10e3/uL    RBC Count 4.33 (L) 4.40 - 5.90 10e6/uL    Hemoglobin 12.7 (L) 13.3 - 17.7 g/dL    Hematocrit 37.8 (L) 40.0 - 53.0 %    MCV 87 78 - 100 fL    MCH 29.3 26.5 - 33.0 pg    MCHC 33.6 31.5 - 36.5 g/dL    RDW 14.3 10.0 - 15.0 %    Platelet Count 183 150 - 450 10e3/uL    % Neutrophils 66 %    % Lymphocytes 21 %    % Monocytes 9 %    % Eosinophils 2 %    % Basophils 1 %    % Immature Granulocytes 1 %    NRBCs per 100 WBC 0 <1 /100    Absolute Neutrophils 4.2 1.6 - 8.3 10e3/uL    Absolute Lymphocytes 1.3 0.8 - 5.3 10e3/uL    Absolute Monocytes 0.6 0.0 - 1.3 10e3/uL    Absolute Eosinophils 0.1 0.0 - 0.7 10e3/uL    Absolute Basophils 0.1 0.0 - 0.2 10e3/uL    Absolute Immature Granulocytes 0.0 <=0.4 10e3/uL    Absolute NRBCs 0.0 10e3/uL   UA with Microscopic reflex to Culture    Specimen: Urine, Midstream   Result Value Ref Range    Color Urine Dark Brown (A) Colorless, Straw, Light Yellow, Yellow    Appearance Urine Slightly Cloudy (A) Clear    Glucose Urine 50 (A) Negative mg/dL    Bilirubin Urine Negative Negative    Ketones Urine Trace (A)  Negative mg/dL    Specific Gravity Urine 1.026 1.000 - 1.030    Blood Urine Large (A) Negative    pH Urine 7.5 5.0 - 9.0    Protein Albumin Urine 600 (A) Negative mg/dL    Urobilinogen Urine Normal Normal, 2.0 mg/dL    Nitrite Urine Negative Negative    Leukocyte Esterase Urine Moderate (A) Negative    Bacteria Urine Moderate (A) None Seen /HPF    RBC Urine >100 (H) <=2 /HPF    WBC Urine 3 <=5 /HPF    Narrative    Urine Culture ordered based on laboratory criteria   Extra Tube    Narrative    The following orders were created for panel order Extra Tube.  Procedure                               Abnormality         Status                     ---------                               -----------         ------                     Extra Green Top (Lithium...[[id number]]                      Final result                 Please view results for these tests on the individual orders.   Extra Green Top (Lithium Heparin) Tube   Result Value Ref Range    Hold Specimen [facility]    Hemoglobin   Result Value Ref Range    Hemoglobin 12.0 (L) 13.3 - 17.7 g/dL   Hemoglobin   Result Value Ref Range    Hemoglobin 12.2 (L) 13.3 - 17.7 g/dL       Medications   HOLD MEDICATION (has no administration in time range)   acetaminophen (TYLENOL) tablet 650 mg (650 mg Oral $Given 12/22/23 1552)   sodium chloride 0.9% BOLUS 1,000 mL (0 mLs Intravenous Stopped 12/22/23 1942)   fentaNYL (PF) (SUBLIMAZE) injection 50 mcg (50 mcg Intravenous $Given 12/22/23 2137)       Assessments & Plan (with Medical Decision Making)   Patient is nontoxic in no acute distress.  Does have prostate cancer with metastases to bone.  This was recently diagnosed and he is not on cancer treatment at this time.  His vital signs are stable and he is afebrile.    Patient reports gross hematuria with some clots.  It sounds like he is urinating quite often with small amounts of output.  He tells me he feels like he may be retaining urine.  Postvoid residual in the ED measured 7  cc.    He is on Coumadin.  He was found to be supratherapeutic at 4.6.    His hemoglobin is 12.7, this is down from 14.58 days ago.    I discussed the case with Dr. Silver, urologist at [facility] in [place].  He feels that the elevated INR and prostate cancer is most likely contributing to the gross hematuria at this time.  Is felt that the patient would be safe for discharge and to hold his Coumadin and that the gross hematuria should gradually resolve.    Unfortunately, while in the ED the patient began to have quite a bit of full body discomfort quite a bit of pain while urinating and felt like he was going to pass out.  a repeat hemoglobin was at 12.  He maintained good vital signs.  A postvoid residual was at 215 cc.    I discussed the case with Dr. Silver again.  It is felt that risk outweighs benefits of placing a Bermeo catheter as this can cause increased bleeding or irritate patient's prostate.     I am concerned he may continue to have increased bleeding or he may continue to have difficulty with more urine retention.  We will board him in the ED tonight with serial hemoglobin measurements and ensure that he continues to empty his bladder.  If he continues to maintain stable vitals and hemoglobin that I think he would be safe for discharge in the morning with close INR follow-up.    His care did occur during shift change, report given and care transferred to Dr. Gomez.    Beau Gates PA-C    I have reviewed the nursing notes.    I have reviewed the findings, diagnosis, plan and need for follow up with the patient.          New Prescriptions    No medications on file       Final diagnoses:   Gross hematuria       12/22/2023   [facility]       Beau Gates PA  12/22/23 [id number]

## 2023-12-23 NOTE — ED PROVIDER NOTES
Emergency Department Transfer of Care Note    Assessment / Plan / Medical Decision Making   Agree with previous provider's plan and exam.     Transfer of Care Plan:  88-year-old gentleman with known prostate cancer on anticoagulation who presents with julita hematuria.  Supratherapeutic with an INR greater than 4 today.  Does not appear to be retaining urine despite hematuria.  Urinalysis appears potentially infectious with moderate leuk esterase and bacteria.  Patient is planning on boarding overnight in the ER to ensure stability and the patient is feeling comfortable prior to ultimate discharge home.      New Prescriptions    No medications on file       Final diagnoses:   Gross hematuria   Acute hemorrhagic cystitis       Raymond Gomez MD  12/22/2023   [facility]     Raymodn Gomez MD  12/23/23 0049       Raymond Gomez MD  12/23/23 0108

## 2023-12-24 ENCOUNTER — LAB (OUTPATIENT)
Dept: LAB | Facility: OTHER | Age: 88
End: 2023-12-24
Attending: FAMILY MEDICINE
Payer: COMMERCIAL

## 2023-12-24 ENCOUNTER — HOSPITAL ENCOUNTER (EMERGENCY)
Facility: OTHER | Age: 88
End: 2023-12-24
Payer: COMMERCIAL

## 2023-12-24 DIAGNOSIS — R31.0 GROSS HEMATURIA: ICD-10-CM

## 2023-12-24 LAB — INR PPP: 1.9 (ref 0.85–1.15)

## 2023-12-24 PROCEDURE — 85610 PROTHROMBIN TIME: CPT | Mod: ZL

## 2023-12-24 PROCEDURE — 36415 COLL VENOUS BLD VENIPUNCTURE: CPT | Mod: ZL

## 2023-12-25 ENCOUNTER — NURSE TRIAGE (OUTPATIENT)
Dept: NURSING | Facility: CLINIC | Age: 88
End: 2023-12-25
Payer: COMMERCIAL

## 2023-12-25 LAB — BACTERIA UR CULT: NO GROWTH

## 2023-12-25 NOTE — TELEPHONE ENCOUNTER
Caller not currently with the patient. Reports patient was discharged from ICU on Saturday, is on Warfarin, was told to go to the ED yesterday for a lab draw. No one has gotten back to them with the results. Wondering if we can instruct them on results?     Consent to communicate with caller on file.     Lab results are in the chart but have not yet been reviewed by a physician. Family member reports the ED physician gave them his phone number as he stated things can get delayed over holidays; wondering if she should call the number? Advised that because no provider has reviewed the results, triager unable to instruct family member.  Contacting the ED physician, as he instructed, would be the best course of action. Family member agrees and will call.    Reason for Disposition   Lab result questions   Caller requesting lab results  (Exception: Routine or non-urgent lab result.)    Protocols used: Information Only Call - No Triage-A-AH, PCP Call - No Triage-A-AH

## 2023-12-26 ENCOUNTER — TELEPHONE (OUTPATIENT)
Dept: PEDIATRICS | Facility: OTHER | Age: 88
End: 2023-12-26
Payer: COMMERCIAL

## 2023-12-26 ENCOUNTER — OFFICE VISIT (OUTPATIENT)
Dept: PEDIATRICS | Facility: OTHER | Age: 88
End: 2023-12-26
Attending: INTERNAL MEDICINE
Payer: COMMERCIAL

## 2023-12-26 VITALS
TEMPERATURE: 98.1 F | DIASTOLIC BLOOD PRESSURE: 70 MMHG | BODY MASS INDEX: 23.16 KG/M2 | WEIGHT: 156.4 LBS | SYSTOLIC BLOOD PRESSURE: 116 MMHG | HEIGHT: 69 IN | HEART RATE: 48 BPM | RESPIRATION RATE: 16 BRPM | OXYGEN SATURATION: 94 %

## 2023-12-26 DIAGNOSIS — C79.51 PROSTATE CANCER METASTATIC TO BONE (H): Primary | ICD-10-CM

## 2023-12-26 DIAGNOSIS — Z79.01 LONG TERM CURRENT USE OF ANTICOAGULANT THERAPY: ICD-10-CM

## 2023-12-26 DIAGNOSIS — Z86.711 HISTORY OF PULMONARY EMBOLISM: ICD-10-CM

## 2023-12-26 DIAGNOSIS — C61 PROSTATE CANCER METASTATIC TO BONE (H): Primary | ICD-10-CM

## 2023-12-26 DIAGNOSIS — Z86.718 PERSONAL HISTORY OF DVT (DEEP VEIN THROMBOSIS): ICD-10-CM

## 2023-12-26 DIAGNOSIS — R31.0 GROSS HEMATURIA: Primary | ICD-10-CM

## 2023-12-26 DIAGNOSIS — C79.51 PROSTATE CANCER METASTATIC TO BONE (H): ICD-10-CM

## 2023-12-26 DIAGNOSIS — Z79.01 CHRONIC ANTICOAGULATION: ICD-10-CM

## 2023-12-26 DIAGNOSIS — C61 PROSTATE CANCER METASTATIC TO BONE (H): ICD-10-CM

## 2023-12-26 PROBLEM — I26.99 OTHER PULMONARY EMBOLISM WITHOUT ACUTE COR PULMONALE: Status: RESOLVED | Noted: 2018-02-11 | Resolved: 2023-12-26

## 2023-12-26 PROCEDURE — 99214 OFFICE O/P EST MOD 30 MIN: CPT | Performed by: INTERNAL MEDICINE

## 2023-12-26 PROCEDURE — G0463 HOSPITAL OUTPT CLINIC VISIT: HCPCS

## 2023-12-26 ASSESSMENT — PAIN SCALES - GENERAL: PAINLEVEL: NO PAIN (0)

## 2023-12-26 NOTE — PROGRESS NOTES
Assessment & Plan       ICD-10-CM    1. Gross hematuria  R31.0       2. Chronic anticoagulation  Z79.01 apixaban ANTICOAGULANT (ELIQUIS ANTICOAGULANT) 2.5 MG tablet     apixaban ANTICOAGULANT (ELIQUIS ANTICOAGULANT) 2.5 MG tablet      3. Prostate cancer metastatic to bone (H)  C61     C79.51       4. Long-term (current) use of anticoagulants [Z79.01]  Z79.01 apixaban ANTICOAGULANT (ELIQUIS ANTICOAGULANT) 2.5 MG tablet     apixaban ANTICOAGULANT (ELIQUIS ANTICOAGULANT) 2.5 MG tablet      5. Personal history of DVT (deep vein thrombosis)  Z86.718 apixaban ANTICOAGULANT (ELIQUIS ANTICOAGULANT) 2.5 MG tablet     apixaban ANTICOAGULANT (ELIQUIS ANTICOAGULANT) 2.5 MG tablet      6. History of pulmonary embolism  Z86.711 apixaban ANTICOAGULANT (ELIQUIS ANTICOAGULANT) 2.5 MG tablet     apixaban ANTICOAGULANT (ELIQUIS ANTICOAGULANT) 2.5 MG tablet        He has been using warfarin anticoagulation for his history of DVT and PE.  However the risk of bleeding is starting to increase with his bladder cancer and Bermeo catheter.  We discussed continuing warfarin, discontinuing warfarin, switching to DOAC.  Ultimately we decided to switch to DOAC.  Risks and benefits were discussed.  Warning signs discussed and prompt repeat evaluation recommended if symptoms persist or worsen.      Patient Instructions    -- Stop warfarin   -- Start Eliquis   -- If bleeding problems continue would stop Eliquis   -- I don't agree with you taking Ivermectin as I don't think it will help you and could cause problems      Return if symptoms worsen or fail to improve.    Signed, Isma Martinez MD, FAAP, FACP  Internal Medicine & Pediatrics    Subjective   Michael Martinez is a 88 year old male who presents for blood in the urine.  Started a week ago.  Due to the nature he presented to the ER on December 22.  He is still working towards seeing our local urologist.    Objective   Vitals: /70   Pulse (!) 48   Temp 98.1  F (36.7  C) (Tympanic)    "Resp 16   Ht 1.753 m (5' 9\")   Wt 70.9 kg (156 lb 6.4 oz)   SpO2 94%   BMI 23.10 kg/m      General: well appearing  Psychiatry: Normal affect and insight.    Review and Analysis of Data   I personally reviewed the following:  External notes: No  Results: Yes lab work is reviewed  Use of an independent historian: Yes spoke with daughter  Independent review of a test performed by another physician: No  Discussion of management with another physician: No  Moderate risk of morbidity from additional diagnostic testing and/or treatment.    "

## 2023-12-26 NOTE — PATIENT INSTRUCTIONS
-- Stop warfarin   -- Start Eliquis   -- If bleeding problems continue would stop Eliquis   -- I don't agree with you taking Ivermectin as I don't think it will help you and could cause problems

## 2023-12-26 NOTE — TELEPHONE ENCOUNTER
Patient in the ED 12/23/23 with hematuria and supra therapeutic INR Warfarin was held with last INR done on 12/24/23. Would you like patient to continue to hold warfarin or ok to resume? Appt with urology is 1/4/24.

## 2023-12-26 NOTE — TELEPHONE ENCOUNTER
Indication is history of DVT     -- Hold warfarin due to significant bleeding event   -- Schedule OV to discuss    Signed, Isma Martinez MD, FAAP, FACP  Internal Medicine & Pediatrics

## 2023-12-26 NOTE — TELEPHONE ENCOUNTER
Spoke to Shelbie at [employer]. Application is in, Benefit is starting after 1/2/24 and it will take a week once it starts  Ph: [phone]

## 2023-12-26 NOTE — NURSING NOTE
"Chief Complaint   Patient presents with    Hospital F/U     Blood in urine       Initial /70   Pulse (!) 48   Temp 98.1  F (36.7  C) (Tympanic)   Resp 16   Ht 1.753 m (5' 9\")   Wt 70.9 kg (156 lb 6.4 oz)   SpO2 94%   BMI 23.10 kg/m   Estimated body mass index is 23.1 kg/m  as calculated from the following:    Height as of this encounter: 1.753 m (5' 9\").    Weight as of this encounter: 70.9 kg (156 lb 6.4 oz).  Medication Review: complete    The next two questions are to help us understand your food security.  If you are feeling you need any assistance in this area, we have resources available to support you today.          12/26/2023   SDOH- Food Insecurity   Within the past 12 months, did you worry that your food would run out before you got money to buy more? N   Within the past 12 months, did the food you bought just not last and you didn t have money to get more? N         Health Care Directive:  Patient does not have a Health Care Directive or Living Will: Discussed advance care planning with patient; however, patient declined at this time.    Norma J. Gosselin, LPN      "

## 2023-12-26 NOTE — TELEPHONE ENCOUNTER
Spoke with daughter who says Dr Anderson called them yesterday and told patient to resume warfarin and have INR done tomorrow 12/27. Do not see this in his chart. Spoke with Michelle and she was told per Dr Martinez patient to continue to hold warfarin. Patient to have appt with Dr Martinez to discuss this significant bleeding event. Daughter Michelle transferred to appt line to make appt. She will make sure patient is holding warfarin until OV.

## 2023-12-27 ENCOUNTER — ANTICOAGULATION THERAPY VISIT (OUTPATIENT)
Dept: PEDIATRICS | Facility: OTHER | Age: 88
End: 2023-12-27
Payer: COMMERCIAL

## 2023-12-28 DIAGNOSIS — E78.2 MIXED HYPERLIPIDEMIA: ICD-10-CM

## 2023-12-28 RX ORDER — ATORVASTATIN CALCIUM 10 MG/1
10 TABLET, FILM COATED ORAL DAILY
Qty: 90 TABLET | Refills: 3 | Status: SHIPPED | OUTPATIENT
Start: 2023-12-28 | End: 2024-09-12

## 2023-12-28 NOTE — TELEPHONE ENCOUNTER
[facility] sent Rx request for the following:      Requested Prescriptions   Pending Prescriptions Disp Refills    atorvastatin (LIPITOR) 10 MG tablet [Pharmacy Med Name: Atorvastatin Calcium 10 MG Oral Tablet] 60 tablet 5     Sig: TAKE 1 TABLET BY MOUTH DAILY   Last Prescription Date:   2/9/23  Last Fill Qty/Refills:         90, R-3    Last Office Visit:              12/26/23   Future Office visit:           None    Prescription approved per [facility] Refill Protocol.    Adore Baker RN .............. 12/28/2023  11:41 AM

## 2024-01-03 NOTE — TELEPHONE ENCOUNTER
Daughter called Michelle, and she would to be in the loop on what is going on. Please call her also

## 2024-01-04 ENCOUNTER — OFFICE VISIT (OUTPATIENT)
Dept: UROLOGY | Facility: OTHER | Age: 89
End: 2024-01-04
Attending: INTERNAL MEDICINE
Payer: COMMERCIAL

## 2024-01-04 VITALS
SYSTOLIC BLOOD PRESSURE: 130 MMHG | RESPIRATION RATE: 16 BRPM | BODY MASS INDEX: 22.86 KG/M2 | DIASTOLIC BLOOD PRESSURE: 80 MMHG | OXYGEN SATURATION: 98 % | WEIGHT: 154.8 LBS | HEART RATE: 55 BPM

## 2024-01-04 DIAGNOSIS — C79.51 PROSTATE CANCER METASTATIC TO BONE (H): ICD-10-CM

## 2024-01-04 DIAGNOSIS — C61 PROSTATE CANCER METASTATIC TO BONE (H): ICD-10-CM

## 2024-01-04 DIAGNOSIS — N40.0 BPH WITHOUT OBSTRUCTION/LOWER URINARY TRACT SYMPTOMS: ICD-10-CM

## 2024-01-04 DIAGNOSIS — R31.0 GROSS HEMATURIA: Primary | ICD-10-CM

## 2024-01-04 LAB
ALBUMIN UR-MCNC: 10 MG/DL
APPEARANCE UR: CLEAR
BILIRUB UR QL STRIP: NEGATIVE
COLOR UR AUTO: YELLOW
GLUCOSE UR STRIP-MCNC: NEGATIVE MG/DL
HGB UR QL STRIP: ABNORMAL
HYALINE CASTS: 1 /LPF
KETONES UR STRIP-MCNC: NEGATIVE MG/DL
LEUKOCYTE ESTERASE UR QL STRIP: NEGATIVE
MUCOUS THREADS #/AREA URNS LPF: PRESENT /LPF
NITRATE UR QL: NEGATIVE
PH UR STRIP: 5.5 [PH] (ref 5–9)
RBC URINE: 2 /HPF
SP GR UR STRIP: 1.02 (ref 1–1.03)
UROBILINOGEN UR STRIP-MCNC: NORMAL MG/DL
WBC URINE: 2 /HPF

## 2024-01-04 PROCEDURE — 88112 CYTOPATH CELL ENHANCE TECH: CPT

## 2024-01-04 PROCEDURE — 81001 URINALYSIS AUTO W/SCOPE: CPT | Mod: ZL | Performed by: UROLOGY

## 2024-01-04 PROCEDURE — G0463 HOSPITAL OUTPT CLINIC VISIT: HCPCS

## 2024-01-04 PROCEDURE — 99214 OFFICE O/P EST MOD 30 MIN: CPT | Performed by: UROLOGY

## 2024-01-04 ASSESSMENT — PAIN SCALES - GENERAL: PAINLEVEL: MILD PAIN (3)

## 2024-01-04 NOTE — NURSING NOTE
"Chief Complaint   Patient presents with    Consult     Prostate cancer   Patient presents to the clinic today for a consult for prostate cancer  Post-Void Residual  A post-void residual was measured by ultrasonic bladder scanner.  73 mL  Celsa Alvarez LPN  1/4/2024 2:48 PM      Review Of Systems  Skin: negative  Eyes: glasses  Ears/Nose/Throat: negative  Respiratory: No shortness of breath, dyspnea on exertion, cough, or hemoptysis  Cardiovascular: negative  Gastrointestinal: negative  Genitourinary: negative  Musculoskeletal: back pain  Neurologic: negative  Psychiatric: anxiety and depression  Hematologic/Lymphatic/Immunologic: night sweats  Endocrine: negative    Initial There were no vitals taken for this visit. Estimated body mass index is 23.1 kg/m  as calculated from the following:    Height as of 12/26/23: 1.753 m (5' 9\").    Weight as of 12/26/23: 70.9 kg (156 lb 6.4 oz).  Meds Reconciled: complete      Celsa Alvarez LPN, LPN on 1/4/2024 at 2:20 PM  Ext. [phone]        Celsa Alvarez LPN  "

## 2024-01-04 NOTE — RESULT ENCOUNTER NOTE
Trace of blood.  Still planning on cystoscopy, cytology.  Workup for hematuria we will proceed.  Dr. Taylor

## 2024-01-04 NOTE — PATIENT INSTRUCTIONS
Calcium 500 to 600 mg by mouth twice a day  Vit D 400 international unit(s) to 600 international unit(s) twice a day  Hold Eliquis 3 days prior to your cystoscopy with Dr. Taylor  Hold Aspirin 5 days prior to your cystoscopy with Dr. Taylor

## 2024-01-04 NOTE — PROGRESS NOTES
Chief Complaint: No chief complaint on file.  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 Michael Martinez is a 88 year old year old male with a history of elevated PSA with a history of multiple benign biopsies of his prostate done at the [facility] and [place] greater than 10 years ago who presents today January 4, 2024 for evaluation of newly diagnosed metastatic prostate cancer.    Underwent CT of the chest in November 2023 for right-sided rib pain which revealed a mixed lytic and sclerotic lesion involving the T8 vertebral body with adjacent soft tissue component extending to the posterior mediastinum.  He was transferred to [facility] in [place] for potential pathologic fracture involving T8 and for evaluation of his bradycardia which eventually resolved.    While at [facility] he underwent CT-guided bone biopsy of T8 on 11/24/2023 demonstrating metastatic prostate cancer.    He has since been seen by  of medical oncology where it was recommended he start on Lupron, enzalutamide and Xgeva.  This has been denied by insurance and has yet to be started.  Dr. Nj's office is working with the patient to get this approved by insurance according to the patient's daughter.    Whole-body nuclear medicine bone scan has demonstrated findings consistent with metastatic bony disease.  Also recent PSA on record was 168.60 ng/mL on 12/14/2023.    Reports gross hematuria 3 weeks ago while on warfarin.  Was switched to eliquis with no further bleeding.  Baseline Luts include nocturia x 2, stream is okay.   No frequency/urgency.  Occasional and limited coffee.       Past Medical History:   Diagnosis Date    Cerebral infarction (H)     6/2014,left cerebellum--seen on MRI    Edema     11/4/2003,Edema & cramps legs, ? 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)     10/29/2016    Enlarged prostate with lower urinary tract  symptoms (LUTS)     8/4/2011    Guillain Barré syndrome (H24)     Ischemic cardiomyopathy     7/9/2014    Osteoarthritis     9/5/2001    Other ill-defined and unknown causes of morbidity and mortality     see prob list    Urinary tract infection     No Comments Provided       Past Surgical History:   Procedure Laterality Date    ARTHROSCOPY SHOULDER ROTATOR CUFF REPAIR      1996    BIOPSY PROSTATE TRANSRECTAL      No Comments Provided    BYPASS GRAFT ARTERY CORONARY      December of 2013,four-vessel    COLONOSCOPY      10/31/2013,diverticulosis-no fu needed d/t age    CYSTOSCOPY, TRANSURETHRAL RESECTION (TUR) PROSTATE, COMBINED      January 2014,done in [place] -- excellent result    OTHER SURGICAL HISTORY      December 2013,86609.0,([place]) [place] INJ PROC SELECTIVE CORONARY ANGIOGRAPHY,LAD-95% zsnfejpx-21-45% distal stenosis.  [place]amus-80% stenosis.  Right coronary-high-grade 90% stenosis.  EF-45%       FAMILY HISTORY: Denies a family history of prostate cancer.      SOCIAL HISTORY:    reports that he has quit smoking. His smoking use included cigarettes. He has a 8.1 pack-year smoking history. He has never been exposed to tobacco smoke. He has never used smokeless tobacco.    Current Outpatient Medications   Medication Sig Dispense Refill    acetaminophen (TYLENOL) 325 MG tablet Take 650 mg by mouth every 6 hours as needed for pain      apixaban ANTICOAGULANT (ELIQUIS ANTICOAGULANT) 2.5 MG tablet Take 1 tablet (2.5 mg) by mouth 2 times daily 180 tablet 3    apixaban ANTICOAGULANT (ELIQUIS ANTICOAGULANT) 2.5 MG tablet Take 1 tablet (2.5 mg) by mouth 2 times daily 60 tablet 0    aspirin EC 81 MG tablet Take 81 mg by mouth      atorvastatin (LIPITOR) 10 MG tablet TAKE 1 TABLET BY MOUTH DAILY 90 tablet 3    Cholecalciferol (VITAMIN D-3) 1000 units CAPS Take 1 capsule by mouth daily      citalopram (CELEXA) 20 MG tablet Take 1 tablet (20 mg) by mouth daily 90 tablet 3    latanoprost (XALATAN) 0.005 % ophthalmic solution        omeprazole (PRILOSEC) 20 MG DR capsule Take 1 capsule (20 mg) by mouth every other day 45 capsule 3       ALLERGIES: Influenza vac split [influenza virus vaccine]      REVIEW OF SYSTEMS:  Skin: negative  Eyes: glasses  Ears/Nose/Throat: negative  Respiratory: No shortness of breath, dyspnea on exertion, cough, or hemoptysis  Cardiovascular: negative  Gastrointestinal: negative  Genitourinary: negative  Musculoskeletal: back pain  Neurologic: negative  Psychiatric: anxiety and depression  Hematologic/Lymphatic/Immunologic: night sweats  Endocrine: negative  Celsa Alvarez LPN  1/4/2024 2:48 PM   GENERAL PHYSICAL EXAM:   Vitals: There were no vitals taken for this visit.  There is no height or weight on file to calculate BMI.    GENERAL: Well groomed, well developed, well nourished male in NAD.  HEENT:  Normal   CV:  Warm extremities   RESPIRATORY: Normal respiratory effort.    GI: Soft, NT, ND, no flank pain  MS: Moving all 4  NEURO: Alert and oriented x 3.  PSYCH: Normal mood and affect, pleasant and agreeable during interview and exam.    : Prostate: Greater than 150 g, firm, irregular and suspicious for prostate cancer      PVR: Residual urine by ultrasound was 73 ml.           RADIOLOGY: The following tests were reviewed:   NM BONE SCAN WHOLE BODY  Whole-body bone scan, 12/19/2023 2:00 PM      HISTORY: prostate cancer:  Tspine lesion with biopsy, pelvis, and rib  suspicious for metastatic disease; Prostate cancer metastatic to bone  (H); Prostate cancer metastatic to bone (H)      ADDITIONAL INFORMATION: none     COMPARISON: CT dated 11/20/2023     TECHNIQUE: The patient received 26 mCi of Tc-99m MDP intravenously.  Whole body bone images were obtained at 3 hours.     FINDINGS: There is a large area of abnormal, intense radiotracer  uptake within the T8 vertebral body correlating with the abnormal  findings on the recent prior study.     There is a focus of abnormal uptake in the right pelvis in the  superior  acetabular region measuring approximately 2 to 3 cm in  dimension. There are 2 small foci of abnormal uptake in the left  hemipelvis.     There is abnormal uptake within the lateral left fifth rib correlating  with an area of osteosclerosis on the prior CT, series 6 image 66     Smaller foci of abnormal uptake are present at T12, L2 and L3. Small  foci of abnormal uptake are present within the lower cervical spine.  As uncertain if all of these areas of additional spinal uptake  represent metastatic disease, degenerative change or combination of  the above.                                                                      IMPRESSION: Findings supporting suspicion of multifocal bony  metastatic disease as described above.      JAUN VAZQUEZ MD     LABS: The last test results for Ms. Michael Martinez were reviewed.   No results found for this or any previous visit (from the past 24 hour(s)).    PSA -   Lab Results   Component Value Date    .60 12/14/2023     BMP -   Recent Labs   Lab Test 12/23/23  0649 12/22/23  1502 12/14/23  1417 11/20/23  0351 04/17/17  0836 10/05/16  1326   * 135 136 135   < >  --    POTASSIUM 4.0 4.3 4.8 4.5   < >  --    CHLORIDE 98 101 100 100   < >  --    CO2 24 24 26 23   < >  --    BUN 14.3 15.8 17.1 28.7*   < >  --    CR 0.80 0.93 0.99 1.00   < >  --    * 98 100* 139*   < >  --    OLIVER 9.2 9.2 9.9 9.6   < >  --    MAG  --   --   --  1.7  --  2.0    < > = values in this interval not displayed.       CBC -   Recent Labs   Lab Test 12/23/23  0649 12/23/23  0316 12/22/23  2051 12/22/23  1712 12/22/23  1502 12/14/23  1417   WBC 8.7  --   --   --  6.3 6.2   HGB 11.7* 11.5* 12.2*   < > 12.7* 14.5     --   --   --  183 253    < > = values in this interval not displayed.       ASSESSMENT:   Metastatic prostate cancer to bone  Gross Hematuria   BPH with Luts     PLAN:   Patient with metastatic prostate cancer with multiple metastasis to bone.  I agree with  's recommendation for ADT with Lupron, enzalutamide and Xgeva for his bone health.    Vitamin D and calcium were encouraged twice daily.    Treatment will be primarily medical with medical oncology.  We will follow him as well.    Regarding his gross hematuria, guidelines would warrant CT imaging with a CT urogram, cytology and cystoscopy.  Given that he had a CT chest, abdomen and pelvis I am reluctant to repeat the CT even though delayed images were not done.    I would encourage cystoscopy and cytology and that will be scheduled.  We will need to hold his Eliquis and aspirin for the cystoscopy portion of the procedure.  Risks were discussed including bleeding, infection, thrombus, stroke or cardiac event from the holding of his medications and blood thinners.    Despite his large prostate the patient is doing well off of all urologic medications.  Could consider the addition of tamsulosin if urinary symptoms were to worsen or retention became an issue.    Patient and daughter voiced an understanding.  Follow-up next cystoscopy.    47 minutes spent on the date of this encounter doing chart review, history and exam, documentation and further activities as noted above.      Guanakito Taylor MD   [facility]

## 2024-01-05 ENCOUNTER — TRANSFERRED RECORDS (OUTPATIENT)
Dept: HEALTH INFORMATION MANAGEMENT | Facility: OTHER | Age: 89
End: 2024-01-05
Payer: COMMERCIAL

## 2024-01-05 ENCOUNTER — TELEPHONE (OUTPATIENT)
Dept: ONCOLOGY | Facility: OTHER | Age: 89
End: 2024-01-05
Payer: COMMERCIAL

## 2024-01-05 NOTE — TELEPHONE ENCOUNTER
Spoke to Sloane at [employer]. Application is in, Benefit is done and approved 1/5/24-12/31/24  Ph: [phone]      FREE DRUG APPLICATION APPROVED    Medication: XTANDI 80 MG PO TABS  Program Name:  [employer]  Effective Date: 1/5/2024  Expiration Date: 12/31/2024  [facility] [place] [id number] [facility] [address]  Patient Notified: yes  Additional Information: fax will be over today and [employer] will be calling by Tuesday at the latest  Ph: [phone] opt. 2      Called Daughter Michelle, to let her know about it too

## 2024-01-08 ENCOUNTER — TELEPHONE (OUTPATIENT)
Dept: ONCOLOGY | Facility: OTHER | Age: 89
End: 2024-01-08
Payer: COMMERCIAL

## 2024-01-08 ENCOUNTER — TELEPHONE (OUTPATIENT)
Dept: PEDIATRICS | Facility: OTHER | Age: 89
End: 2024-01-08
Payer: COMMERCIAL

## 2024-01-08 DIAGNOSIS — C79.51 PROSTATE CANCER METASTATIC TO BONE (H): Primary | ICD-10-CM

## 2024-01-08 DIAGNOSIS — Z86.711 HISTORY OF PULMONARY EMBOLISM: ICD-10-CM

## 2024-01-08 DIAGNOSIS — C61 PROSTATE CANCER METASTATIC TO BONE (H): Primary | ICD-10-CM

## 2024-01-08 DIAGNOSIS — Z79.01 LONG TERM CURRENT USE OF ANTICOAGULANT THERAPY: Primary | ICD-10-CM

## 2024-01-08 LAB — PATH REPORT.FINAL DX SPEC: NORMAL

## 2024-01-08 RX ORDER — PROCHLORPERAZINE MALEATE 10 MG
10 TABLET ORAL EVERY 6 HOURS PRN
Qty: 30 TABLET | Refills: 2 | Status: SHIPPED | OUTPATIENT
Start: 2024-01-08 | End: 2024-09-12

## 2024-01-08 NOTE — TELEPHONE ENCOUNTER
"Oral Chemotherapy Monitoring Program    Primary Oncologist: Dr. Nj   Primary Oncology Clinic: [facility]  Cancer Diagnosis: Prostate Cancer    Drug: Xtandi (enzalutamide)  Start Date: TBD  Expected duration of therapy: Until disease progression or unacceptable toxicity    Drug Interaction Assessment: Upon review of medication list, the following potential drug interactions were identified: Category D interaction with patients Warfarin. Decreases effectiveness of the Warfarin. Extra monitoring of patient INR recommended. Sent message to Dr. Nj and care team to ensure how to proceed.         - Switch to Eliquis, start Xtandi at 80 mg, and monitor closely for bleeding per Dr. Nj 1/8/24    Lab Monitoring Plan  Monthly per treatment plan    Subjective/Objective:  Michael Martinez is a 88 year old male contacted by phone for an initial visit for oral chemotherapy education.          1/8/2024     2:00 PM   ORAL CHEMOTHERAPY   Assessment Type New Teach   Diagnosis Code Prostate Cancer   Providers Ondina   Clinic Name/Location [facility]   Drug Name Xtandi (enzalutamide)   Dose 80 mg   Current Schedule Daily   Cycle Details Continuous       Vitals:  BP:   BP Readings from Last 1 Encounters:   01/04/24 130/80     Wt Readings from Last 1 Encounters:   01/04/24 70.2 kg (154 lb 12.8 oz)     Estimated body surface area is 1.85 meters squared as calculated from the following:    Height as of 12/26/23: 1.753 m (5' 9\").    Weight as of 1/4/24: 70.2 kg (154 lb 12.8 oz).    Labs:  _  Result Component Current Result Ref Range   Sodium 132 (L) (12/23/2023) 135 - 145 mmol/L     _  Result Component Current Result Ref Range   Potassium 4.0 (12/23/2023) 3.4 - 5.3 mmol/L     _  Result Component Current Result Ref Range   Calcium 9.2 (12/23/2023) 8.8 - 10.2 mg/dL     No results found for Mag within last 30 days.     No results found for Phos within last 30 days.     _  Result Component Current Result Ref Range   Albumin " 4.1 (12/14/2023) 3.5 - 5.2 g/dL     _  Result Component Current Result Ref Range   Urea Nitrogen 14.3 (12/23/2023) 8.0 - 23.0 mg/dL     _  Result Component Current Result Ref Range   Creatinine 0.80 (12/23/2023) 0.67 - 1.17 mg/dL       _  Result Component Current Result Ref Range   AST 22 (12/14/2023) 0 - 45 U/L     _  Result Component Current Result Ref Range   ALT 19 (12/14/2023) 0 - 70 U/L     _  Result Component Current Result Ref Range   Bilirubin Total 0.7 (12/14/2023) <=1.2 mg/dL       _  Result Component Current Result Ref Range   WBC Count 8.7 (12/23/2023) 4.0 - 11.0 10e3/uL     _  Result Component Current Result Ref Range   Hemoglobin 11.7 (L) (12/23/2023) 13.3 - 17.7 g/dL     _  Result Component Current Result Ref Range   Platelet Count 161 (12/23/2023) 150 - 450 10e3/uL     No results found for ANC within last 30 days.         Assessment:  Patient is appropriate to start therapy.    Plan:  Basic chemotherapy teaching was reviewed with the patients daughter, Michelle, including indication, start date of therapy, dose, administration, adverse effects, missed doses, food and drug interactions, monitoring, side effect management, office contact information, and safe handling. Written materials were provided and all questions answered.    Follow-Up:  1/19 1 week f/u call     Mor CaiD, BCOP  Oncology Pharmacy Manager  [facility]  [phone]

## 2024-01-08 NOTE — TELEPHONE ENCOUNTER
States that pt would like to substitute a prescription for Eliquis.  It is Xarelto 2.5 mg and it is 1/3 of the price. Please call.    Benito Monson on 1/8/2024 at 2:56 PM

## 2024-01-08 NOTE — TELEPHONE ENCOUNTER
ICD-10-CM    1. Long-term (current) use of anticoagulants [Z79.01]  Z79.01 rivaroxaban ANTICOAGULANT (XARELTO) 20 MG TABS tablet      2. History of pulmonary embolism  Z86.711 rivaroxaban ANTICOAGULANT (XARELTO) 20 MG TABS tablet         -- The dose is 20 mg daily     Signed, Isma Martinez MD, FAAP, FACP  Internal Medicine & Pediatrics

## 2024-01-09 ENCOUNTER — MEDICAL CORRESPONDENCE (OUTPATIENT)
Dept: HEALTH INFORMATION MANAGEMENT | Facility: OTHER | Age: 89
End: 2024-01-09
Payer: COMMERCIAL

## 2024-01-09 NOTE — TELEPHONE ENCOUNTER
After verifying patient's name and date of birth, Michelle was given the below information.  Norma J. Gosselin, LPN....1/9/2024 2:52 PM

## 2024-01-12 ENCOUNTER — TELEPHONE (OUTPATIENT)
Dept: PEDIATRICS | Facility: OTHER | Age: 89
End: 2024-01-12
Payer: COMMERCIAL

## 2024-01-12 DIAGNOSIS — Z79.01 LONG TERM CURRENT USE OF ANTICOAGULANT THERAPY: ICD-10-CM

## 2024-01-12 DIAGNOSIS — Z86.711 HISTORY OF PULMONARY EMBOLISM: ICD-10-CM

## 2024-01-12 DIAGNOSIS — Z86.718 PERSONAL HISTORY OF DVT (DEEP VEIN THROMBOSIS): ICD-10-CM

## 2024-01-12 DIAGNOSIS — Z79.01 CHRONIC ANTICOAGULATION: Primary | ICD-10-CM

## 2024-01-12 NOTE — TELEPHONE ENCOUNTER
Reason for call: Medication or medication refill    Name of medication requested: Eliquis    How many days of medication do you have left? Pt is out    What pharmacy do you use? [facility]    Preferred method for responding to this message: Telephone Call    Phone number patient can be reached at: Other phone number:  [phone]    If we cannot reach you directly, may we leave a detailed response at the number you provided? Yes      States they're waiting on a new prescription for a different medication. They're needing a prescription for two weeks worth of eliquis sent to the pharmacy until they get the new medication

## 2024-01-12 NOTE — TELEPHONE ENCOUNTER
Reason for call: Medication or medication refill    Name of medication requested: xarelto     How many days of medication do you have left? out    What pharmacy do you use? [facility] mail order     Preferred method for responding to this message: Telephone Call    Phone number patient can be reached at: Other phone number:  [phone]    If we cannot reach you directly, may we leave a detailed response at the number you provided? Yes    Patient's daughter Michelle called and would like this to be filled today. She said there was an issue with paperwork.       Laquita Mann on 1/12/2024 at 10:23 AM

## 2024-01-12 NOTE — TELEPHONE ENCOUNTER
apixaban ANTICOAGULANT (ELIQUIS ANTICOAGULANT) 2.5 MG tablet 180 tablet 3 2023 -- No   Sig - Route: Take 1 tablet (2.5 mg) by mouth 2 times daily - Oral   Sent to pharmacy as: Apixaban 2.5 MG Oral Tablet (ELIQUIS ANTICOAGULANT)   Class: E-Prescribe   Order: [id number]   E-Prescribing Status: Receipt confirmed by pharmacy (2023  4:50 PM CST)     Counts include 234 beds at the [facility] [address]     2-week supply sent to [facility], per above written order.       Warnings Override History for apixaban ANTICOAGULANT (ELIQUIS ANTICOAGULANT) 2.5 MG tablet [[id number]]    Overridden by Margarito Avila [facility] on 2024 8:14 AM   Drug-Drug   1. APIXABAN / STRONG CY INDUCERS [Level: Major] [Reason: Will monitor drug levels/drug effects ]   Other Orders: enzalutamide (XTANDI) 80 MG tablet         Overridden by Isma Martinez MD on Dec 26, 2023 4:50 PM   Drug-Drug   1. ASPIRIN / ORAL INHIBITORS OF FACTOR X [Level: Major]   Other Orders: aspirin EC 81 MG tablet        Adore Baker RN .............. 2024  10:59 AM

## 2024-01-19 ENCOUNTER — TELEPHONE (OUTPATIENT)
Dept: PHARMACY | Facility: CLINIC | Age: 89
End: 2024-01-19
Payer: COMMERCIAL

## 2024-01-19 NOTE — TELEPHONE ENCOUNTER
Oral Chemotherapy Monitoring Program     Placed call to patient for initial follow up of oral chemotherapy (enzalutamide). Left message requesting call back. No drug names were mentioned. Will update when response received.     Renny Turcios, PharmD  PGY-2 Oncology Pharmacy Resident  Oral Chemotherapy Monitoring Program  [phone]

## 2024-01-22 DIAGNOSIS — C79.51 PROSTATE CANCER METASTATIC TO BONE (H): Primary | ICD-10-CM

## 2024-01-22 DIAGNOSIS — C61 PROSTATE CANCER METASTATIC TO BONE (H): Primary | ICD-10-CM

## 2024-01-22 RX ORDER — ALBUTEROL SULFATE 0.83 MG/ML
2.5 SOLUTION RESPIRATORY (INHALATION)
Status: CANCELLED | OUTPATIENT
Start: 2024-01-29

## 2024-01-22 RX ORDER — ZOLEDRONIC ACID 0.04 MG/ML
4 INJECTION, SOLUTION INTRAVENOUS ONCE
Status: CANCELLED | OUTPATIENT
Start: 2024-01-29 | End: 2024-01-29

## 2024-01-22 RX ORDER — MEPERIDINE HYDROCHLORIDE 25 MG/ML
25 INJECTION INTRAMUSCULAR; INTRAVENOUS; SUBCUTANEOUS EVERY 30 MIN PRN
Status: CANCELLED | OUTPATIENT
Start: 2024-01-29

## 2024-01-22 RX ORDER — HEPARIN SODIUM,PORCINE 10 UNIT/ML
5-20 VIAL (ML) INTRAVENOUS DAILY PRN
Status: CANCELLED | OUTPATIENT
Start: 2024-01-29

## 2024-01-22 RX ORDER — METHYLPREDNISOLONE SODIUM SUCCINATE 125 MG/2ML
125 INJECTION, POWDER, LYOPHILIZED, FOR SOLUTION INTRAMUSCULAR; INTRAVENOUS
Status: CANCELLED
Start: 2024-01-29

## 2024-01-22 RX ORDER — HEPARIN SODIUM (PORCINE) LOCK FLUSH IV SOLN 100 UNIT/ML 100 UNIT/ML
5 SOLUTION INTRAVENOUS
Status: CANCELLED | OUTPATIENT
Start: 2024-01-29

## 2024-01-22 RX ORDER — DIPHENHYDRAMINE HYDROCHLORIDE 50 MG/ML
50 INJECTION INTRAMUSCULAR; INTRAVENOUS
Status: CANCELLED
Start: 2024-01-29

## 2024-01-22 RX ORDER — ALBUTEROL SULFATE 90 UG/1
1-2 AEROSOL, METERED RESPIRATORY (INHALATION)
Status: CANCELLED
Start: 2024-01-29

## 2024-01-22 RX ORDER — EPINEPHRINE 1 MG/ML
0.3 INJECTION, SOLUTION, CONCENTRATE INTRAVENOUS EVERY 5 MIN PRN
Status: CANCELLED | OUTPATIENT
Start: 2024-01-29

## 2024-01-23 ENCOUNTER — DOCUMENTATION ONLY (OUTPATIENT)
Dept: ONCOLOGY | Facility: OTHER | Age: 89
End: 2024-01-23
Payer: COMMERCIAL

## 2024-01-23 NOTE — PROGRESS NOTES
"Oral Chemotherapy Monitoring Program    Primary Oncologist: Dr. Nj  Drug: Enzalutamide (Xtandi) 80 mg daily; 30-days cycle  Start Date: 1/10/2024    Subjective/Objective:  Michael aMrtinez is a 88 year old male contacted by phone for a follow-up visit for oral chemotherapy.          1/8/2024     2:00 PM 1/19/2024     9:00 AM 1/23/2024     2:00 PM   ORAL CHEMOTHERAPY   Assessment Type New Teach Left Voicemail Initial Follow up   Diagnosis Code Prostate Cancer Prostate Cancer Prostate Cancer   Providers Ondina Nj   Clinic Name/Location [facility]   Drug Name Xtandi (enzalutamide) Xtandi (enzalutamide) Xtandi (enzalutamide)   Dose 80 mg 80 mg 80 mg   Current Schedule Daily Daily Daily   Cycle Details Continuous Continuous Continuous   Start Date of Last Cycle   1/10/2024   Planned next cycle start date   2/9/2024   Doses missed in last 2 weeks   0   Adherence Assessment   Adherent   Adverse Effects   No AE identified during assessment   Home BPs   Not applicable   Any new drug interactions?   No   Is the dose as ordered appropriate for the patient?   Yes   Is the patient currently in pain?   Yes   Does the patient feel the pain is currently being managed by a provider?   Yes   Has the patient been assessed within the past 6 months for depression?   Yes   Has the patient missed any days of school, work, or other routine activity?   No   Since the last time we talked, have you been hospitalized or used the emergency room?   No       Vitals:  BP:   BP Readings from Last 1 Encounters:   01/04/24 130/80     Wt Readings from Last 1 Encounters:   01/04/24 70.2 kg (154 lb 12.8 oz)     Estimated body surface area is 1.85 meters squared as calculated from the following:    Height as of 12/26/23: 1.753 m (5' 9\").    Weight as of 1/4/24: 70.2 kg (154 lb 12.8 oz).    Labs:  No results found for NA within last 30 days.     No results found for K within last 30 days.     No results " found for CA within last 30 days.     No results found for Mag within last 30 days.     No results found for Phos within last 30 days.     No results found for ALBUMIN within last 30 days.     No results found for BUN within last 30 days.     No results found for CR within last 30 days.       No results found for AST within last 30 days.     No results found for ALT within last 30 days.     No results found for BILITOTAL within last 30 days.       No results found for WBC within last 30 days.     No results found for HGB within last 30 days.     No results found for PLT within last 30 days.     No results found for ANC within last 30 days.         Assessment/Plan:  I spoke with patient and patient's daughter Michelle, today. Patient reports doing well on therapy. Patient reports feeling an off and on sharp pain in his ribs, left hip, and left knee. When asked to rate his pain on a scale of 0-10, with 0 being no pain and 10 being severe pain, patient rates his pain as a 7-8 when he does experience it. Patients states that his pain has been off and on before starting Xtandi and he mostly experiences it when sitting or laying down. Patient states that the pain goes away when he stands up or walks. Patient was instructed to notify his provider if noticing worsening pain that impacts his daily activity. Patient verbalized understanding and is in agreement to plan. Patient is compliant with therapy and no missed doses. Future appointments have been confirmed with patient. We will continue to follow.      Follow-Up:  1/24/2024 - Lab appointment  1/25/2024 - Infusion appointment      Vanessa Iniguez  Oncology Pharmacy [facility]  [phone]

## 2024-01-24 ENCOUNTER — DOCUMENTATION ONLY (OUTPATIENT)
Dept: ONCOLOGY | Facility: OTHER | Age: 89
End: 2024-01-24

## 2024-01-24 ENCOUNTER — LAB (OUTPATIENT)
Dept: LAB | Facility: OTHER | Age: 89
End: 2024-01-24
Attending: INTERNAL MEDICINE
Payer: COMMERCIAL

## 2024-01-24 DIAGNOSIS — C61 PROSTATE CANCER METASTATIC TO BONE (H): ICD-10-CM

## 2024-01-24 DIAGNOSIS — C79.51 PROSTATE CANCER METASTATIC TO BONE (H): ICD-10-CM

## 2024-01-24 LAB
ALBUMIN SERPL BCG-MCNC: 4.3 G/DL (ref 3.5–5.2)
ALP SERPL-CCNC: 162 U/L (ref 40–150)
ALT SERPL W P-5'-P-CCNC: 17 U/L (ref 0–70)
ANION GAP SERPL CALCULATED.3IONS-SCNC: 11 MMOL/L (ref 7–15)
AST SERPL W P-5'-P-CCNC: 26 U/L (ref 0–45)
BASOPHILS # BLD AUTO: 0.1 10E3/UL (ref 0–0.2)
BASOPHILS NFR BLD AUTO: 1 %
BILIRUB SERPL-MCNC: 0.5 MG/DL
BUN SERPL-MCNC: 25.8 MG/DL (ref 8–23)
CALCIUM SERPL-MCNC: 10 MG/DL (ref 8.8–10.2)
CHLORIDE SERPL-SCNC: 99 MMOL/L (ref 98–107)
CREAT SERPL-MCNC: 1.09 MG/DL (ref 0.67–1.17)
DEPRECATED HCO3 PLAS-SCNC: 26 MMOL/L (ref 22–29)
EGFRCR SERPLBLD CKD-EPI 2021: 66 ML/MIN/1.73M2
EOSINOPHIL # BLD AUTO: 0.2 10E3/UL (ref 0–0.7)
EOSINOPHIL NFR BLD AUTO: 2 %
ERYTHROCYTE [DISTWIDTH] IN BLOOD BY AUTOMATED COUNT: 15 % (ref 10–15)
GLUCOSE SERPL-MCNC: 142 MG/DL (ref 70–99)
HCT VFR BLD AUTO: 41.8 % (ref 40–53)
HGB BLD-MCNC: 14.1 G/DL (ref 13.3–17.7)
IMM GRANULOCYTES # BLD: 0 10E3/UL
IMM GRANULOCYTES NFR BLD: 0 %
LYMPHOCYTES # BLD AUTO: 2 10E3/UL (ref 0.8–5.3)
LYMPHOCYTES NFR BLD AUTO: 24 %
MCH RBC QN AUTO: 29.7 PG (ref 26.5–33)
MCHC RBC AUTO-ENTMCNC: 33.7 G/DL (ref 31.5–36.5)
MCV RBC AUTO: 88 FL (ref 78–100)
MONOCYTES # BLD AUTO: 0.7 10E3/UL (ref 0–1.3)
MONOCYTES NFR BLD AUTO: 8 %
NEUTROPHILS # BLD AUTO: 5.2 10E3/UL (ref 1.6–8.3)
NEUTROPHILS NFR BLD AUTO: 65 %
NRBC # BLD AUTO: 0 10E3/UL
NRBC BLD AUTO-RTO: 0 /100
PLATELET # BLD AUTO: 234 10E3/UL (ref 150–450)
POTASSIUM SERPL-SCNC: 4.5 MMOL/L (ref 3.4–5.3)
PROT SERPL-MCNC: 7.8 G/DL (ref 6.4–8.3)
PSA SERPL DL<=0.01 NG/ML-MCNC: 72.54 NG/ML
RBC # BLD AUTO: 4.75 10E6/UL (ref 4.4–5.9)
SODIUM SERPL-SCNC: 136 MMOL/L (ref 135–145)
WBC # BLD AUTO: 8.2 10E3/UL (ref 4–11)

## 2024-01-24 PROCEDURE — 85025 COMPLETE CBC W/AUTO DIFF WBC: CPT | Mod: ZL

## 2024-01-24 PROCEDURE — 80053 COMPREHEN METABOLIC PANEL: CPT | Mod: ZL

## 2024-01-24 PROCEDURE — 36415 COLL VENOUS BLD VENIPUNCTURE: CPT | Mod: ZL

## 2024-01-24 PROCEDURE — 84153 ASSAY OF PSA TOTAL: CPT | Mod: ZL

## 2024-01-24 PROCEDURE — 84403 ASSAY OF TOTAL TESTOSTERONE: CPT | Mod: ZL

## 2024-01-24 NOTE — PROGRESS NOTES
Oral Chemotherapy Program  Lab review     Reviewed CMP and CBC from 1/24/24.      Labs are unremarkable and do not require any dose adjustments at this time.     Follow-up/plan  Continue monthly labs - needs scheduled  Needs provider appt scheduled     David Crowell, PharmD  Hematology/Oncology Clinical Pharmacist  [facility]  [phone]

## 2024-01-25 ENCOUNTER — HOSPITAL ENCOUNTER (OUTPATIENT)
Dept: INFUSION THERAPY | Facility: OTHER | Age: 89
Discharge: HOME | End: 2024-01-25
Attending: INTERNAL MEDICINE | Admitting: INTERNAL MEDICINE
Payer: COMMERCIAL

## 2024-01-25 ENCOUNTER — TELEPHONE (OUTPATIENT)
Dept: PEDIATRICS | Facility: OTHER | Age: 89
End: 2024-01-25
Payer: COMMERCIAL

## 2024-01-25 VITALS
RESPIRATION RATE: 18 BRPM | WEIGHT: 156.6 LBS | OXYGEN SATURATION: 97 % | DIASTOLIC BLOOD PRESSURE: 56 MMHG | TEMPERATURE: 96.6 F | SYSTOLIC BLOOD PRESSURE: 110 MMHG | HEART RATE: 68 BPM | HEIGHT: 69 IN | BODY MASS INDEX: 23.19 KG/M2

## 2024-01-25 DIAGNOSIS — Z79.01 LONG TERM CURRENT USE OF ANTICOAGULANT THERAPY: ICD-10-CM

## 2024-01-25 DIAGNOSIS — C79.51 PROSTATE CANCER METASTATIC TO BONE (H): Primary | ICD-10-CM

## 2024-01-25 DIAGNOSIS — C61 PROSTATE CANCER METASTATIC TO BONE (H): Primary | ICD-10-CM

## 2024-01-25 DIAGNOSIS — Z86.711 HISTORY OF PULMONARY EMBOLISM: ICD-10-CM

## 2024-01-25 PROCEDURE — 258N000003 HC RX IP 258 OP 636: Performed by: INTERNAL MEDICINE

## 2024-01-25 PROCEDURE — 96365 THER/PROPH/DIAG IV INF INIT: CPT

## 2024-01-25 PROCEDURE — 96402 CHEMO HORMON ANTINEOPL SQ/IM: CPT

## 2024-01-25 PROCEDURE — 250N000011 HC RX IP 250 OP 636: Mod: JZ | Performed by: INTERNAL MEDICINE

## 2024-01-25 RX ORDER — DIPHENHYDRAMINE HYDROCHLORIDE 50 MG/ML
50 INJECTION INTRAMUSCULAR; INTRAVENOUS
Status: CANCELLED
Start: 2024-01-29

## 2024-01-25 RX ORDER — HEPARIN SODIUM,PORCINE 10 UNIT/ML
5-20 VIAL (ML) INTRAVENOUS DAILY PRN
Status: CANCELLED | OUTPATIENT
Start: 2024-01-29

## 2024-01-25 RX ORDER — HEPARIN SODIUM (PORCINE) LOCK FLUSH IV SOLN 100 UNIT/ML 100 UNIT/ML
5 SOLUTION INTRAVENOUS
Status: CANCELLED | OUTPATIENT
Start: 2024-01-29

## 2024-01-25 RX ORDER — ZOLEDRONIC ACID 0.04 MG/ML
4 INJECTION, SOLUTION INTRAVENOUS ONCE
Status: CANCELLED | OUTPATIENT
Start: 2024-01-29 | End: 2024-01-29

## 2024-01-25 RX ORDER — ALBUTEROL SULFATE 0.83 MG/ML
2.5 SOLUTION RESPIRATORY (INHALATION)
Status: CANCELLED | OUTPATIENT
Start: 2024-01-29

## 2024-01-25 RX ORDER — ALBUTEROL SULFATE 90 UG/1
1-2 AEROSOL, METERED RESPIRATORY (INHALATION)
Status: CANCELLED
Start: 2024-01-29

## 2024-01-25 RX ORDER — EPINEPHRINE 1 MG/ML
0.3 INJECTION, SOLUTION, CONCENTRATE INTRAVENOUS EVERY 5 MIN PRN
Status: CANCELLED | OUTPATIENT
Start: 2024-01-29

## 2024-01-25 RX ORDER — METHYLPREDNISOLONE SODIUM SUCCINATE 125 MG/2ML
125 INJECTION, POWDER, LYOPHILIZED, FOR SOLUTION INTRAMUSCULAR; INTRAVENOUS
Status: CANCELLED
Start: 2024-01-29

## 2024-01-25 RX ORDER — MEPERIDINE HYDROCHLORIDE 50 MG/ML
25 INJECTION INTRAMUSCULAR; INTRAVENOUS; SUBCUTANEOUS EVERY 30 MIN PRN
Status: CANCELLED | OUTPATIENT
Start: 2024-01-29

## 2024-01-25 RX ADMIN — ZOLEDRONIC ACID 3.3 MG: 4 INJECTION, SOLUTION, CONCENTRATE INTRAVENOUS at 10:58

## 2024-01-25 RX ADMIN — SODIUM CHLORIDE 250 ML: 9 INJECTION, SOLUTION INTRAVENOUS at 10:58

## 2024-01-25 RX ADMIN — LEUPROLIDE ACETATE 22.5 MG: KIT at 11:03

## 2024-01-25 NOTE — NURSING NOTE
Infusion Nursing Note:  Michael JOSE RAUL Martinez presents today for Zometa/Lupron.    Patient seen by provider today: No   present during visit today: Not Applicable.    Note: N/A.    Intravenous Access:  Labs drawn without difficulty.  Peripheral IV placed.    Treatment Conditions:  Lab Results   Component Value Date    HGB 14.1 01/24/2024    WBC 8.2 01/24/2024    ANEU 5.3 05/24/2021    ANEUTAUTO 5.2 01/24/2024     01/24/2024        Lab Results   Component Value Date     01/24/2024    POTASSIUM 4.5 01/24/2024    MAG 1.7 11/20/2023    CR 1.09 01/24/2024    OLIVER 10.0 01/24/2024    BILITOTAL 0.5 01/24/2024    ALBUMIN 4.3 01/24/2024    ALT 17 01/24/2024    AST 26 01/24/2024       Results reviewed, labs MET treatment parameters, ok to proceed with treatment.    Post Infusion Assessment:  Patient tolerated infusion without incident.  Patient tolerated injection without incident.  Blood return noted pre and post infusion.  Site patent and intact, free from redness, edema or discomfort.  No evidence of extravasations.  Access discontinued per protocol.     Discharge Plan:   Discharge instructions reviewed with: Patient.  Patient and/or family verbalized understanding of discharge instructions and all questions answered.  Copy of AVS reviewed with patient and/or family.  Patient will return 4/18/24 lupron 7/25/24 Zometa for next appointment.  AVS to patient via [employer].    Patient discharged in stable condition accompanied by: self.  Departure Mode: Ambulatory.      Alison Poe RN

## 2024-01-25 NOTE — PHARMACY-MEDICATION REGIMEN REVIEW
Pharmacy: NEW INFUSION MEDICATION EDUCATION    Michael BLUNT Juan  [id number]  [place] [address] [phone] (home)   88 year old male  PCP:Isma Martinez    Allergies   Allergen Reactions    Influenza Vac Split [Influenza Virus Vaccine]      omari oakes         Summary of Education:   Met with patient and daughter today to review new medications to be administered in infusion including Lupron and zoledronic acid. Discussed cycle length, and home use of as needed medications including acetaminophen. Discussed interaction between n/a. Patient asked a few questions and all concerns were addressed.    Materials Provided:   Drug information handouts printed from https://www.[email or web address]/lupronuro_pi.pdf on: Lupron    Thank you for the consult.     Fannie Alan [facility] ....................  1/25/2024   12:16 PM

## 2024-01-25 NOTE — TELEPHONE ENCOUNTER
Patients daughter sid would like to speak to [facility] or his nurse . They would like to switch from Eliquis to Xarelto due to cost of medication.       Laquita Mann on 1/25/2024 at 10:15 AM

## 2024-01-26 NOTE — TELEPHONE ENCOUNTER
Left message on voicemail that medication was sent to pharmacy and to give the pharmacy a call.  Norma J. Gosselin, LPN .......  1/26/2024  9:04 AM

## 2024-01-27 LAB — TESTOST SERPL-MCNC: 788 NG/DL (ref 240–950)

## 2024-01-30 DIAGNOSIS — R31.0 GROSS HEMATURIA: Primary | ICD-10-CM

## 2024-02-20 ENCOUNTER — LAB (OUTPATIENT)
Dept: LAB | Facility: OTHER | Age: 89
End: 2024-02-20
Attending: INTERNAL MEDICINE
Payer: COMMERCIAL

## 2024-02-20 DIAGNOSIS — C61 PROSTATE CANCER METASTATIC TO BONE (H): ICD-10-CM

## 2024-02-20 DIAGNOSIS — C79.51 PROSTATE CANCER METASTATIC TO BONE (H): ICD-10-CM

## 2024-02-20 LAB — PSA SERPL DL<=0.01 NG/ML-MCNC: 10.86 NG/ML

## 2024-02-20 PROCEDURE — 84153 ASSAY OF PSA TOTAL: CPT | Mod: ZL

## 2024-02-20 PROCEDURE — 36415 COLL VENOUS BLD VENIPUNCTURE: CPT | Mod: ZL

## 2024-02-20 PROCEDURE — 84403 ASSAY OF TOTAL TESTOSTERONE: CPT | Mod: ZL

## 2024-02-23 LAB — TESTOST SERPL-MCNC: 19 NG/DL (ref 240–950)

## 2024-02-27 ENCOUNTER — ONCOLOGY VISIT (OUTPATIENT)
Dept: ONCOLOGY | Facility: OTHER | Age: 89
End: 2024-02-27
Attending: INTERNAL MEDICINE
Payer: COMMERCIAL

## 2024-02-27 VITALS
RESPIRATION RATE: 18 BRPM | OXYGEN SATURATION: 96 % | DIASTOLIC BLOOD PRESSURE: 74 MMHG | HEART RATE: 81 BPM | HEIGHT: 69 IN | WEIGHT: 188.25 LBS | BODY MASS INDEX: 27.88 KG/M2 | SYSTOLIC BLOOD PRESSURE: 136 MMHG | TEMPERATURE: 97.4 F

## 2024-02-27 DIAGNOSIS — C61 PROSTATE CANCER METASTATIC TO BONE (H): Primary | ICD-10-CM

## 2024-02-27 DIAGNOSIS — C79.51 PROSTATE CANCER METASTATIC TO BONE (H): Primary | ICD-10-CM

## 2024-02-27 LAB
ALBUMIN SERPL BCG-MCNC: 4.3 G/DL (ref 3.5–5.2)
ALP SERPL-CCNC: 221 U/L (ref 40–150)
ALT SERPL W P-5'-P-CCNC: 17 U/L (ref 0–70)
ANION GAP SERPL CALCULATED.3IONS-SCNC: 7 MMOL/L (ref 7–15)
AST SERPL W P-5'-P-CCNC: 21 U/L (ref 0–45)
BASOPHILS # BLD AUTO: 0.1 10E3/UL (ref 0–0.2)
BASOPHILS NFR BLD AUTO: 1 %
BILIRUB SERPL-MCNC: 0.6 MG/DL
BUN SERPL-MCNC: 17.3 MG/DL (ref 8–23)
CALCIUM SERPL-MCNC: 9.7 MG/DL (ref 8.8–10.2)
CHLORIDE SERPL-SCNC: 104 MMOL/L (ref 98–107)
CREAT SERPL-MCNC: 0.93 MG/DL (ref 0.67–1.17)
DEPRECATED HCO3 PLAS-SCNC: 27 MMOL/L (ref 22–29)
EGFRCR SERPLBLD CKD-EPI 2021: 79 ML/MIN/1.73M2
EOSINOPHIL # BLD AUTO: 0.3 10E3/UL (ref 0–0.7)
EOSINOPHIL NFR BLD AUTO: 5 %
ERYTHROCYTE [DISTWIDTH] IN BLOOD BY AUTOMATED COUNT: 14.8 % (ref 10–15)
GLUCOSE SERPL-MCNC: 112 MG/DL (ref 70–99)
HCT VFR BLD AUTO: 41 % (ref 40–53)
HGB BLD-MCNC: 13.9 G/DL (ref 13.3–17.7)
IMM GRANULOCYTES # BLD: 0 10E3/UL
IMM GRANULOCYTES NFR BLD: 0 %
LYMPHOCYTES # BLD AUTO: 1.8 10E3/UL (ref 0.8–5.3)
LYMPHOCYTES NFR BLD AUTO: 25 %
MCH RBC QN AUTO: 29.6 PG (ref 26.5–33)
MCHC RBC AUTO-ENTMCNC: 33.9 G/DL (ref 31.5–36.5)
MCV RBC AUTO: 87 FL (ref 78–100)
MONOCYTES # BLD AUTO: 0.7 10E3/UL (ref 0–1.3)
MONOCYTES NFR BLD AUTO: 9 %
NEUTROPHILS # BLD AUTO: 4.3 10E3/UL (ref 1.6–8.3)
NEUTROPHILS NFR BLD AUTO: 60 %
NRBC # BLD AUTO: 0 10E3/UL
NRBC BLD AUTO-RTO: 0 /100
PLATELET # BLD AUTO: 232 10E3/UL (ref 150–450)
POTASSIUM SERPL-SCNC: 4.5 MMOL/L (ref 3.4–5.3)
PROT SERPL-MCNC: 7.5 G/DL (ref 6.4–8.3)
RBC # BLD AUTO: 4.69 10E6/UL (ref 4.4–5.9)
SODIUM SERPL-SCNC: 138 MMOL/L (ref 135–145)
WBC # BLD AUTO: 7.1 10E3/UL (ref 4–11)

## 2024-02-27 PROCEDURE — 99417 PROLNG OP E/M EACH 15 MIN: CPT | Performed by: INTERNAL MEDICINE

## 2024-02-27 PROCEDURE — 36415 COLL VENOUS BLD VENIPUNCTURE: CPT | Mod: ZL | Performed by: INTERNAL MEDICINE

## 2024-02-27 PROCEDURE — G0463 HOSPITAL OUTPT CLINIC VISIT: HCPCS

## 2024-02-27 PROCEDURE — 99215 OFFICE O/P EST HI 40 MIN: CPT | Performed by: INTERNAL MEDICINE

## 2024-02-27 PROCEDURE — 82040 ASSAY OF SERUM ALBUMIN: CPT | Mod: ZL | Performed by: INTERNAL MEDICINE

## 2024-02-27 PROCEDURE — 85025 COMPLETE CBC W/AUTO DIFF WBC: CPT | Mod: ZL | Performed by: INTERNAL MEDICINE

## 2024-02-27 ASSESSMENT — PAIN SCALES - GENERAL: PAINLEVEL: NO PAIN (0)

## 2024-02-27 NOTE — PROGRESS NOTES
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          History of Present Illness    Mr. Michael Martinez returns for follow-up of metastatic prostate carcinoma with bony metastases.  For details of history see previous noteBriefly, the patient had been transferred to [facility] in [place] after he presented to the emergency department with a CT scan that revealed mixed lytic and sclerotic lesion involving the T8 vertebral body with adjacent soft tissue component extending into the posterior mediastinum.  This was felt to be a pathologic fracture and the patient was transferred for appropriate care from [place] to use [facility] in [place].  MRI thoracic spine revealed multifocal osseous metastatic disease with a dominant lesion at T8.  CT chest and CT abdomen/pelvis revealed T8 abnormality as well as enlarged prostate with potential bony pelvic metastatic disease as well as rib cage metastases.  Neurosurgery was consulted and they felt this was a nonsurgical issue and recommended thoracic orthosis and was fitted for this by orthotics November 22.  He underwent CT-guided biopsy of the T8 lesion came back consistent metastatic prostate carcinoma.  Review of thoracic films indicated that he had essentially widespread osseous metastatic disease involving thoracic spine including T8 as well as the pelvis as well as the rib cage.  Patient did complain of significant pain involving the thoracic spine as well as the pelvis as well as rib cage..  His PSA was extremely elevated at 121.  [place] the patient would be a candidate for treatment for metastatic prostate cancer with  biopsy-proven T8 bone metastases.  We elected to treat the patient with Lupron 22.5 mg IM every 3 months in addition to enzalutamide which was indicated in the first-line setting for metastatic prostate cancer.  I also recommended Xgeva monthly.  Patient underwent a bone scan on 7/19/2023 and the findings were that there was multifocal bony metastatic disease with a large area of abnormal intense radiotracer uptake within the T8 vertebral body there was a focus of abnormal uptake in the right pelvis and the superior acetabular region measuring 2 to 3 cm dimension.  There are 2 small foci of abnormal uptake in the left hemipelvis.  There was uptake in the lateral left fifth sixth rib.  There are small foci of uptake at T12, L2 and L3.There were problems with insurance coverage and the patient was unable to start this regimen immediately.  His PSA had risen to 168 on December 12, 2023.  He presented to the emergency room with gross hematuria on December 22, 2023.  It was felt this was likely due to warfarin at patient was switched to Eliquis 2.5 mg p.o. twice daily 12/26/2023.A urine for cytology was sent by Dr. Shiraz Taylor and he was negative for malignancy.  Patient was able to start treatment beginning on January 8, 2024.Insurance would not cover Xgeva and therefore we had to switch the patient to zoledronic acid 4 mg IV q. 28 days.  The patient received his first treatment on January 29, 2024.  Since starting Lupron Xtandi and Zometa patient had had a remarkable response and as of today's PSA has dropped to 10.2.  He says his symptoms of all resolved he has no further urinary symptoms no urgency or frequency.  His back pain and hip pain and pelvic pain have all resolved overall he is doing well.  He denies any hot flashes.  He denies any fevers, night sweats or weight loss.  I have a abdominal pain.  Denies any change in bowel habits.  Denies any palpitations.  There is no bright blood per rectum, hematemesis or  melena.      ______________________________________________________________________________    Past History    Past Medical History:   Diagnosis Date    Cerebral infarction (H)     6/2014,left cerebellum--seen on MRI    Edema     11/4/2003,Edema & cramps legs, ? secondary to Norvasc(row983.3) symptom, edema-pedal (icd 782.3)    Embolism and thrombosis of right tibial vein (H)     10/29/2016    Enlarged prostate with lower urinary tract symptoms (LUTS)     8/4/2011    Guillain Barré syndrome (H24)     Ischemic cardiomyopathy     7/9/2014    Osteoarthritis     9/5/2001    Other ill-defined and unknown causes of morbidity and mortality     see prob list    Urinary tract infection     No Comments Provided       Past Surgical History:   Procedure Laterality Date    ARTHROSCOPY SHOULDER ROTATOR CUFF REPAIR      1996    BIOPSY PROSTATE TRANSRECTAL      No Comments Provided    BYPASS GRAFT ARTERY CORONARY      December of 2013,four-vessel    COLONOSCOPY      10/31/2013,diverticulosis-no fu needed d/t age    CYSTOSCOPY, TRANSURETHRAL RESECTION (TUR) PROSTATE, COMBINED      January 2014,done in [place] -- excellent result    OTHER SURGICAL HISTORY      December 2013,11935.0,([place]) AR INJ PROC SELECTIVE CORONARY ANGIOGRAPHY,LAD-95% [id number]% distal stenosis.  [place]-80% stenosis.  Right coronary-high-grade 90% stenosis.  EF-45%           Review of systems.  CNS: There are no headaches, no blurred vision, no change in mental status,   ENT: There is no hearing loss.  Respiratory: There is no cough, shortness of breath or hemoptysis  Cardiac: There is no chest pain, orthopnea, PND, or ankle edema.  GI: There is no bright red blood per rectum, no hematemesis, no reflux, no diarrhea or constipation  Musculoskeletal:no back pain or joint pain no bone pain, no rib cage pain  : There is no urinary frequency, hematuria.  Constitutional: There is no fevers, night sweats, weight loss.  Endocrine: There is no fatigue or hot  "flashes  Neuro: There is no tingling or numbness in the hands or feet.  Hematologic: There is no gingival bleeding, epistaxis, or easy bruisability.  Dermatologic: There is no skin rash.  A 14 point review of systems is otherwise negative.          Physical Exam    /74 (BP Location: Right arm, Patient Position: Sitting, Cuff Size: Adult Regular)   Pulse 81   Temp 97.4  F (36.3  C) (Tympanic)   Resp 18   Ht 1.753 m (5' 9\")   Wt 85.4 kg (188 lb 4 oz)   SpO2 96%   BMI 27.80 kg/m        GENERAL: Alert and oriented to time place and person. Seated comfortably. In no distress.    HEAD: Atraumatic and normocephalic.    EYES: REGINALDO, EOMI.  No pallor.  No icterus.    Oral cavity: no mucosal lesion or tonsillar enlargement.    NECK: supple. JVP normal.  No thyroid enlargement.    LYMPH NODES: There are no palpable cervical, supraclavicular, axillary, or inguinal nodes.    LUNGS: clear to auscultation bilaterally.  Resonant to percussion throughout bilaterally.  Symmetrical breath movements bilaterally.    HEART: S1 and S2 are heard. Regular rate and rhythm.  No murmur or gallop or rub heard.  No peripheral edema.    ABDOMEN: Soft. Not tender. Not distended.  No palpable hepatomegaly or splenomegaly.  No other mass palpable.  Bowel sounds heard.    EXTREMITIES: There is no ankle edema.  SKIN: no rash, or bruising or purpura.    NEURO: Grossly non-focal.    Lab Results    Recent Results (from the past 240 hour(s))   PSA tumor marker    Collection Time: 02/20/24 12:58 PM   Result Value Ref Range    PSA Tumor Marker 10.86 ng/mL   Testosterone total    Collection Time: 02/20/24 12:58 PM   Result Value Ref Range    Testosterone Total 19 (L) 240 - 950 ng/dL   Comprehensive metabolic panel    Collection Time: 02/27/24  9:40 AM   Result Value Ref Range    Sodium 138 135 - 145 mmol/L    Potassium 4.5 3.4 - 5.3 mmol/L    Carbon Dioxide (CO2) 27 22 - 29 mmol/L    Anion Gap 7 7 - 15 mmol/L    Urea Nitrogen 17.3 8.0 - 23.0 " mg/dL    Creatinine 0.93 0.67 - 1.17 mg/dL    GFR Estimate 79 >60 mL/min/1.73m2    Calcium 9.7 8.8 - 10.2 mg/dL    Chloride 104 98 - 107 mmol/L    Glucose 112 (H) 70 - 99 mg/dL    Alkaline Phosphatase 221 (H) 40 - 150 U/L    AST 21 0 - 45 U/L    ALT 17 0 - 70 U/L    Protein Total 7.5 6.4 - 8.3 g/dL    Albumin 4.3 3.5 - 5.2 g/dL    Bilirubin Total 0.6 <=1.2 mg/dL   CBC with platelets and differential    Collection Time: 02/27/24  9:40 AM   Result Value Ref Range    WBC Count 7.1 4.0 - 11.0 10e3/uL    RBC Count 4.69 4.40 - 5.90 10e6/uL    Hemoglobin 13.9 13.3 - 17.7 g/dL    Hematocrit 41.0 40.0 - 53.0 %    MCV 87 78 - 100 fL    MCH 29.6 26.5 - 33.0 pg    MCHC 33.9 31.5 - 36.5 g/dL    RDW 14.8 10.0 - 15.0 %    Platelet Count 232 150 - 450 10e3/uL    % Neutrophils 60 %    % Lymphocytes 25 %    % Monocytes 9 %    % Eosinophils 5 %    % Basophils 1 %    % Immature Granulocytes 0 %    NRBCs per 100 WBC 0 <1 /100    Absolute Neutrophils 4.3 1.6 - 8.3 10e3/uL    Absolute Lymphocytes 1.8 0.8 - 5.3 10e3/uL    Absolute Monocytes 0.7 0.0 - 1.3 10e3/uL    Absolute Eosinophils 0.3 0.0 - 0.7 10e3/uL    Absolute Basophils 0.1 0.0 - 0.2 10e3/uL    Absolute Immature Granulocytes 0.0 <=0.4 10e3/uL    Absolute NRBCs 0.0 10e3/uL       Imaging    No results found.    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  Patient was deemed a candidate for androgen deprivation therapy plus enzalutamide plus zoledronic acid.  Patient was started on therapy beginning on January 8, 2024 and has had a remarkable response clinically his symptoms are improved there is no bone pain, no urinary complaints no urgency or frequency.  PSA is dropped from 168 down to 10.4.  Plan is to continue current regimen.  Will continue with monthly labs including testosterone and PSA tumor marker.  Otherwise we will see the patient in 3 months  obtain CBC CMP LDH PSA tumor marker and testosterone level.  Time spent: 70 minutes was spent on this patient visit: We spent time reviewing multiple physician provider notes including Dr. Taylor's notes, nursing notes, pharmacy notes, reviewing imaging results with the patient, reviewing lab results the patient, performing a history physical, documenting history physical, and ordering ongoing treatment with enzalutamide 80 mg p.o. daily Lupron 22.5 mg IM every 3 months, and zoledronic acid 4 mg IV q. 28 days, we also ordered follow-up appointments and follow-up labs.  Cancer Staging   No matching staging information was found for the patient.        Signed by: Raj Nj MD    CC: Isma Martinez MD

## 2024-02-27 NOTE — NURSING NOTE
"Oncology Rooming Note    February 27, 2024 8:38 AM   Michael Martinez is a 88 year old male who presents for:    Chief Complaint   Patient presents with    Oncology Clinic Visit     Bone scan, prostate cancer     Initial Vitals: /74 (BP Location: Right arm, Patient Position: Sitting, Cuff Size: Adult Regular)   Pulse 81   Temp 97.4  F (36.3  C) (Tympanic)   Resp 18   Ht 1.753 m (5' 9\")   Wt 85.4 kg (188 lb 4 oz)   SpO2 96%   BMI 27.80 kg/m   Estimated body mass index is 27.8 kg/m  as calculated from the following:    Height as of this encounter: 1.753 m (5' 9\").    Weight as of this encounter: 85.4 kg (188 lb 4 oz). Body surface area is 2.04 meters squared.  No Pain (0) Comment: Data Unavailable   No LMP for male patient.  Allergies reviewed: Yes  Medications reviewed: Yes    Medications: Medication refills not needed today.  Pharmacy name entered into [employer]:    [id number] - [place], [place] - [address]  [facility] - [place], [place] - [address]  [facility] - [place], [place] - [facility] [facility] [id number]    Frailty Screening:   Is the patient here for a new oncology consult visit in cancer care? 2. No      Clinical concerns: weight gain  Dr. Nj was notified.  Adore Jerry LPN              "

## 2024-03-01 ENCOUNTER — HOSPITAL ENCOUNTER (OUTPATIENT)
Dept: INFUSION THERAPY | Facility: OTHER | Age: 89
Discharge: HOME | End: 2024-03-01
Attending: INTERNAL MEDICINE | Admitting: INTERNAL MEDICINE
Payer: COMMERCIAL

## 2024-03-01 VITALS
WEIGHT: 159.8 LBS | BODY MASS INDEX: 23.6 KG/M2 | HEART RATE: 50 BPM | TEMPERATURE: 97 F | RESPIRATION RATE: 16 BRPM | DIASTOLIC BLOOD PRESSURE: 73 MMHG | SYSTOLIC BLOOD PRESSURE: 134 MMHG

## 2024-03-01 DIAGNOSIS — C61 PROSTATE CANCER METASTATIC TO BONE (H): Primary | ICD-10-CM

## 2024-03-01 DIAGNOSIS — C79.51 PROSTATE CANCER METASTATIC TO BONE (H): Primary | ICD-10-CM

## 2024-03-01 PROCEDURE — 96365 THER/PROPH/DIAG IV INF INIT: CPT

## 2024-03-01 PROCEDURE — 250N000011 HC RX IP 250 OP 636: Mod: JZ | Performed by: INTERNAL MEDICINE

## 2024-03-01 PROCEDURE — 258N000003 HC RX IP 258 OP 636: Performed by: INTERNAL MEDICINE

## 2024-03-01 RX ORDER — ALBUTEROL SULFATE 90 UG/1
1-2 AEROSOL, METERED RESPIRATORY (INHALATION)
Status: CANCELLED
Start: 2024-03-29

## 2024-03-01 RX ORDER — HEPARIN SODIUM (PORCINE) LOCK FLUSH IV SOLN 100 UNIT/ML 100 UNIT/ML
5 SOLUTION INTRAVENOUS
Status: CANCELLED | OUTPATIENT
Start: 2024-03-29

## 2024-03-01 RX ORDER — ALBUTEROL SULFATE 0.83 MG/ML
2.5 SOLUTION RESPIRATORY (INHALATION)
Status: CANCELLED | OUTPATIENT
Start: 2024-03-29

## 2024-03-01 RX ORDER — METHYLPREDNISOLONE SODIUM SUCCINATE 125 MG/2ML
125 INJECTION, POWDER, LYOPHILIZED, FOR SOLUTION INTRAMUSCULAR; INTRAVENOUS
Status: CANCELLED
Start: 2024-03-29

## 2024-03-01 RX ORDER — HEPARIN SODIUM,PORCINE 10 UNIT/ML
5-20 VIAL (ML) INTRAVENOUS DAILY PRN
Status: CANCELLED | OUTPATIENT
Start: 2024-03-29

## 2024-03-01 RX ORDER — ZOLEDRONIC ACID 0.04 MG/ML
4 INJECTION, SOLUTION INTRAVENOUS ONCE
Status: COMPLETED | OUTPATIENT
Start: 2024-03-01 | End: 2024-03-01

## 2024-03-01 RX ORDER — ZOLEDRONIC ACID 0.04 MG/ML
4 INJECTION, SOLUTION INTRAVENOUS ONCE
Status: CANCELLED | OUTPATIENT
Start: 2024-03-29 | End: 2024-03-29

## 2024-03-01 RX ORDER — DIPHENHYDRAMINE HYDROCHLORIDE 50 MG/ML
50 INJECTION INTRAMUSCULAR; INTRAVENOUS
Status: CANCELLED
Start: 2024-03-29

## 2024-03-01 RX ORDER — MEPERIDINE HYDROCHLORIDE 50 MG/ML
25 INJECTION INTRAMUSCULAR; INTRAVENOUS; SUBCUTANEOUS EVERY 30 MIN PRN
Status: CANCELLED | OUTPATIENT
Start: 2024-03-29

## 2024-03-01 RX ORDER — EPINEPHRINE 1 MG/ML
0.3 INJECTION, SOLUTION, CONCENTRATE INTRAVENOUS EVERY 5 MIN PRN
Status: CANCELLED | OUTPATIENT
Start: 2024-03-29

## 2024-03-01 RX ADMIN — ZOLEDRONIC ACID 4 MG: 0.04 INJECTION, SOLUTION INTRAVENOUS at 14:22

## 2024-03-01 RX ADMIN — SODIUM CHLORIDE 250 ML: 9 INJECTION, SOLUTION INTRAVENOUS at 14:17

## 2024-03-01 NOTE — NURSING NOTE
Infusion Nursing Note:  Michael Martinez presents today for Zometa.    Patient seen by provider today: No   present during visit today: Not Applicable.    Note: N/A.      Intravenous Access:  Peripheral IV placed.    Treatment Conditions:  Lab Results   Component Value Date     02/27/2024    POTASSIUM 4.5 02/27/2024    MAG 1.7 11/20/2023    CR 0.93 02/27/2024    OLIVER 9.7 02/27/2024    BILITOTAL 0.6 02/27/2024    ALBUMIN 4.3 02/27/2024    ALT 17 02/27/2024    AST 21 02/27/2024       Results reviewed, labs MET treatment parameters, ok to proceed with treatment.      Post Infusion Assessment:  Patient tolerated infusion without incident.  Blood return noted pre and post infusion.  Site patent and intact, free from redness, edema or discomfort.  No evidence of extravasations.  Access discontinued per protocol.       Discharge Plan:   Discharge instructions reviewed with: Patient and Family.  Patient and/or family verbalized understanding of discharge instructions and all questions answered.  Copy of AVS reviewed with patient and/or family.  Patient will return 3/28/2024 for next appointment.  Patient discharged in stable condition accompanied by: daughter.  Departure Mode: Ambulatory.      Charlene Marmolejo RN

## 2024-03-06 DIAGNOSIS — F39 MOOD DISORDER (H): ICD-10-CM

## 2024-03-06 DIAGNOSIS — F41.1 GAD (GENERALIZED ANXIETY DISORDER): ICD-10-CM

## 2024-03-11 RX ORDER — CITALOPRAM HYDROBROMIDE 20 MG/1
20 TABLET ORAL DAILY
Qty: 90 TABLET | Refills: 3 | Status: SHIPPED | OUTPATIENT
Start: 2024-03-11 | End: 2024-09-12

## 2024-03-11 NOTE — TELEPHONE ENCOUNTER
Last Office Visit:              12/26/23 Juan   Future Office visit:           none    Requested Prescriptions   Pending Prescriptions Disp Refills    citalopram (CELEXA) 20 MG tablet [Pharmacy Med Name: Citalopram Hydrobromide 20 MG Oral Tablet] 90 tablet 3     Sig: TAKE 1 TABLET BY MOUTH DAILY       SSRIs Protocol Passed - 3/6/2024  6:12 AM        Passed - Recent (12 mo) or future (30 days) visit within the authorizing provider's specialty     The patient must have completed an in-person or virtual visit within the past 12 months or has a future visit scheduled within the next 90 days with the authorizing provider s specialty.  Urgent care and e-visits do not quality as an office visit for this protocol.          Passed - Medication is active on med list        Passed - Patient is age 18 or older           Last Prescription Date:   2/9/23  Last Fill Qty/Refills:         90  /  3

## 2024-03-28 ENCOUNTER — LAB (OUTPATIENT)
Dept: LAB | Facility: OTHER | Age: 89
End: 2024-03-28
Attending: INTERNAL MEDICINE
Payer: COMMERCIAL

## 2024-03-28 ENCOUNTER — HOSPITAL ENCOUNTER (OUTPATIENT)
Dept: INFUSION THERAPY | Facility: OTHER | Age: 89
Discharge: HOME | End: 2024-03-28
Attending: INTERNAL MEDICINE
Payer: COMMERCIAL

## 2024-03-28 VITALS
RESPIRATION RATE: 18 BRPM | WEIGHT: 163.4 LBS | HEIGHT: 69 IN | TEMPERATURE: 97.2 F | SYSTOLIC BLOOD PRESSURE: 133 MMHG | DIASTOLIC BLOOD PRESSURE: 76 MMHG | HEART RATE: 52 BPM | BODY MASS INDEX: 24.2 KG/M2

## 2024-03-28 DIAGNOSIS — C61 PROSTATE CANCER METASTATIC TO BONE (H): Primary | ICD-10-CM

## 2024-03-28 DIAGNOSIS — C79.51 PROSTATE CANCER METASTATIC TO BONE (H): ICD-10-CM

## 2024-03-28 DIAGNOSIS — C79.51 PROSTATE CANCER METASTATIC TO BONE (H): Primary | ICD-10-CM

## 2024-03-28 DIAGNOSIS — C61 PROSTATE CANCER METASTATIC TO BONE (H): ICD-10-CM

## 2024-03-28 LAB
ALBUMIN SERPL BCG-MCNC: 4.2 G/DL (ref 3.5–5.2)
ALP SERPL-CCNC: 110 U/L (ref 40–150)
ALT SERPL W P-5'-P-CCNC: 17 U/L (ref 0–70)
ANION GAP SERPL CALCULATED.3IONS-SCNC: 12 MMOL/L (ref 7–15)
AST SERPL W P-5'-P-CCNC: 24 U/L (ref 0–45)
BILIRUB SERPL-MCNC: 0.5 MG/DL
BUN SERPL-MCNC: 17.7 MG/DL (ref 8–23)
CALCIUM SERPL-MCNC: 9.3 MG/DL (ref 8.8–10.2)
CALCIUM SERPL-MCNC: 9.4 MG/DL (ref 8.8–10.2)
CHLORIDE SERPL-SCNC: 101 MMOL/L (ref 98–107)
CREAT SERPL-MCNC: 0.94 MG/DL (ref 0.67–1.17)
CREAT SERPL-MCNC: 0.95 MG/DL (ref 0.67–1.17)
DEPRECATED HCO3 PLAS-SCNC: 24 MMOL/L (ref 22–29)
EGFRCR SERPLBLD CKD-EPI 2021: 77 ML/MIN/1.73M2
EGFRCR SERPLBLD CKD-EPI 2021: 78 ML/MIN/1.73M2
GLUCOSE SERPL-MCNC: 92 MG/DL (ref 70–99)
POTASSIUM SERPL-SCNC: 4.5 MMOL/L (ref 3.4–5.3)
PROT SERPL-MCNC: 7.3 G/DL (ref 6.4–8.3)
PSA SERPL DL<=0.01 NG/ML-MCNC: 0.63 NG/ML
SODIUM SERPL-SCNC: 137 MMOL/L (ref 135–145)

## 2024-03-28 PROCEDURE — 96365 THER/PROPH/DIAG IV INF INIT: CPT

## 2024-03-28 PROCEDURE — 258N000003 HC RX IP 258 OP 636: Performed by: INTERNAL MEDICINE

## 2024-03-28 PROCEDURE — 36415 COLL VENOUS BLD VENIPUNCTURE: CPT | Mod: ZL

## 2024-03-28 PROCEDURE — 84153 ASSAY OF PSA TOTAL: CPT | Mod: ZL

## 2024-03-28 PROCEDURE — 250N000011 HC RX IP 250 OP 636: Mod: JZ | Performed by: INTERNAL MEDICINE

## 2024-03-28 PROCEDURE — 82310 ASSAY OF CALCIUM: CPT | Performed by: INTERNAL MEDICINE

## 2024-03-28 PROCEDURE — 82247 BILIRUBIN TOTAL: CPT | Mod: ZL | Performed by: INTERNAL MEDICINE

## 2024-03-28 PROCEDURE — 82565 ASSAY OF CREATININE: CPT | Performed by: INTERNAL MEDICINE

## 2024-03-28 PROCEDURE — 84403 ASSAY OF TOTAL TESTOSTERONE: CPT | Mod: ZL

## 2024-03-28 RX ORDER — METHYLPREDNISOLONE SODIUM SUCCINATE 125 MG/2ML
125 INJECTION, POWDER, LYOPHILIZED, FOR SOLUTION INTRAMUSCULAR; INTRAVENOUS
Status: CANCELLED
Start: 2024-03-29

## 2024-03-28 RX ORDER — ZOLEDRONIC ACID 0.04 MG/ML
4 INJECTION, SOLUTION INTRAVENOUS ONCE
Status: DISCONTINUED | OUTPATIENT
Start: 2024-03-28 | End: 2024-03-28

## 2024-03-28 RX ORDER — ZOLEDRONIC ACID 0.04 MG/ML
4 INJECTION, SOLUTION INTRAVENOUS ONCE
Status: CANCELLED | OUTPATIENT
Start: 2024-04-30 | End: 2024-03-29

## 2024-03-28 RX ORDER — ALBUTEROL SULFATE 0.83 MG/ML
2.5 SOLUTION RESPIRATORY (INHALATION)
Status: CANCELLED | OUTPATIENT
Start: 2024-03-29

## 2024-03-28 RX ORDER — HEPARIN SODIUM,PORCINE 10 UNIT/ML
5-20 VIAL (ML) INTRAVENOUS DAILY PRN
Status: CANCELLED | OUTPATIENT
Start: 2024-03-29

## 2024-03-28 RX ORDER — ZOLEDRONIC ACID 0.04 MG/ML
4 INJECTION, SOLUTION INTRAVENOUS ONCE
Status: COMPLETED | OUTPATIENT
Start: 2024-03-28 | End: 2024-03-28

## 2024-03-28 RX ORDER — MEPERIDINE HYDROCHLORIDE 50 MG/ML
25 INJECTION INTRAMUSCULAR; INTRAVENOUS; SUBCUTANEOUS EVERY 30 MIN PRN
Status: CANCELLED | OUTPATIENT
Start: 2024-03-29

## 2024-03-28 RX ORDER — DIPHENHYDRAMINE HYDROCHLORIDE 50 MG/ML
50 INJECTION INTRAMUSCULAR; INTRAVENOUS
Status: CANCELLED
Start: 2024-03-29

## 2024-03-28 RX ORDER — HEPARIN SODIUM (PORCINE) LOCK FLUSH IV SOLN 100 UNIT/ML 100 UNIT/ML
5 SOLUTION INTRAVENOUS
Status: CANCELLED | OUTPATIENT
Start: 2024-03-29

## 2024-03-28 RX ORDER — ALBUTEROL SULFATE 90 UG/1
1-2 AEROSOL, METERED RESPIRATORY (INHALATION)
Status: CANCELLED
Start: 2024-03-29

## 2024-03-28 RX ORDER — EPINEPHRINE 1 MG/ML
0.3 INJECTION, SOLUTION, CONCENTRATE INTRAVENOUS EVERY 5 MIN PRN
Status: CANCELLED | OUTPATIENT
Start: 2024-03-29

## 2024-03-28 RX ADMIN — ZOLEDRONIC ACID 4 MG: 0.04 INJECTION, SOLUTION INTRAVENOUS at 13:46

## 2024-03-28 RX ADMIN — SODIUM CHLORIDE 250 ML: 9 INJECTION, SOLUTION INTRAVENOUS at 13:45

## 2024-03-28 NOTE — NURSING NOTE
Infusion Nursing Note:  Michael Martinez presents today for Zometa.    Patient seen by provider today: No   present during visit today: Not Applicable.    Note: N/A.      Intravenous Access:  Labs drawn without difficulty.  Peripheral IV placed.    Treatment Conditions:  Lab Results   Component Value Date     02/27/2024    POTASSIUM 4.5 02/27/2024    MAG 1.7 11/20/2023    CR 0.95 03/28/2024    OLIVER 9.3 03/28/2024    BILITOTAL 0.6 02/27/2024    ALBUMIN 4.3 02/27/2024    ALT 17 02/27/2024    AST 21 02/27/2024       Results reviewed, labs MET treatment parameters, ok to proceed with treatment.      Post Infusion Assessment:  Patient tolerated infusion without incident.  Blood return noted pre and post infusion.  Site patent and intact, free from redness, edema or discomfort.  No evidence of extravasations.  Access discontinued per protocol.       Discharge Plan:   Discharge instructions reviewed with: Patient and daughter.  Patient and/or family verbalized understanding of discharge instructions and all questions answered.  Copy of AVS reviewed with patient and/or family.  Patient will return 4/25/24 for next appointment.  Patient discharged in stable condition accompanied by: daughter.  Departure Mode: Ambulatory.      Elizabeth Weiner RN

## 2024-03-29 ENCOUNTER — TELEPHONE (OUTPATIENT)
Dept: ONCOLOGY | Facility: OTHER | Age: 89
End: 2024-03-29
Payer: COMMERCIAL

## 2024-03-29 ENCOUNTER — DOCUMENTATION ONLY (OUTPATIENT)
Dept: ONCOLOGY | Facility: OTHER | Age: 89
End: 2024-03-29
Payer: COMMERCIAL

## 2024-03-29 NOTE — TELEPHONE ENCOUNTER
----- Message from Raj Nj MD sent at 3/28/2024  7:00 PM CDT -----  Regarding: RE: Labs  WE can skip CBC this cycle  ----- Message -----  From: Smita Rivera, [facility]  Sent: 3/28/2024   4:42 PM CDT  To: Raj Nj MD; Jo Mcintosh RN; #  Subject: Labs                                             Hello Karl Cárdenas had labs drawn for Xtandi today and they did not draw a CBC. He is on Eliquis which interacts with Xtandi and due to this we have been monitoring him closely for any bleeding events. Would you like him to return to clinic for a CBC as soon as possible or go without for this cycle?    Thank you,  Smita Rivera, PharmD, MPH, BCPS  Hematology/Oncology Clinical Pharmacist  [facility] [place]  [phone]

## 2024-03-29 NOTE — PROGRESS NOTES
Oral Chemotherapy Program  Lab review     Reviewed labs from 3/28/49237     Labs are unremarkable and do not require any dose adjustments at this time. CBC was not drawn today and per Dr. Nj we are okay to omit this cycle.      Follow-up/plan  4/30 Lab appt     Trenton Mazariegos, PharmD, [facility]  Oncology Pharmacy Manager  [facility]  [phone]

## 2024-03-31 LAB — TESTOST SERPL-MCNC: 7 NG/DL (ref 240–950)

## 2024-04-09 ENCOUNTER — TELEPHONE (OUTPATIENT)
Dept: ONCOLOGY | Facility: OTHER | Age: 89
End: 2024-04-09
Payer: COMMERCIAL

## 2024-04-09 DIAGNOSIS — C61 PROSTATE CANCER METASTATIC TO BONE (H): Primary | ICD-10-CM

## 2024-04-09 DIAGNOSIS — C79.51 PROSTATE CANCER METASTATIC TO BONE (H): Primary | ICD-10-CM

## 2024-04-09 NOTE — TELEPHONE ENCOUNTER
He had his labs done 3/28/24 including CMP, PSA, Testosterone, Calcium and Creatinine(??).  According to the last visit on 2/27/24: Dr Nj stated:  Will continue with monthly labs including testosterone and PSA tumor marker. Otherwise we will see the patient in 3 months obtain CBC CMP LDH PSA tumor marker and testosterone level.     I don't see any documentation stating why he would be coming in for just a CBC as noted in the appointment note (unless it was requested by Oral Chemo).    He has standing orders for PSA and Testosterone, plus CBC and CMP in the therapy plan.     Dr Nj wants him to have the LDH done prior to his visit at the end of May.    According to the Teams message from Infusion and Oral Chemo group, we can skip the CBC until next cycle, the end of April (already scheduled).    Margarita Damian CMA([facility])..................4/9/2024   1:44 PM

## 2024-04-09 NOTE — PROGRESS NOTES
Patient has lab appt in oncology 4/12. Appt note states CBC needed. Please place order if appropriate. Thanks!

## 2024-04-30 ENCOUNTER — HOSPITAL ENCOUNTER (OUTPATIENT)
Dept: INFUSION THERAPY | Facility: OTHER | Age: 89
Discharge: HOME | End: 2024-04-30
Attending: INTERNAL MEDICINE | Admitting: INTERNAL MEDICINE
Payer: COMMERCIAL

## 2024-04-30 VITALS
DIASTOLIC BLOOD PRESSURE: 72 MMHG | HEART RATE: 57 BPM | RESPIRATION RATE: 16 BRPM | BODY MASS INDEX: 24.51 KG/M2 | OXYGEN SATURATION: 94 % | SYSTOLIC BLOOD PRESSURE: 130 MMHG | TEMPERATURE: 96.7 F | WEIGHT: 166 LBS

## 2024-04-30 DIAGNOSIS — C61 PROSTATE CANCER METASTATIC TO BONE (H): Primary | ICD-10-CM

## 2024-04-30 DIAGNOSIS — Z85.51 PERSONAL HISTORY OF MALIGNANT NEOPLASM OF BLADDER: ICD-10-CM

## 2024-04-30 DIAGNOSIS — C79.51 PROSTATE CANCER METASTATIC TO BONE (H): Primary | ICD-10-CM

## 2024-04-30 LAB
ALBUMIN SERPL BCG-MCNC: 3.9 G/DL (ref 3.5–5.2)
ALBUMIN UR-MCNC: NEGATIVE MG/DL
ALP SERPL-CCNC: 80 U/L (ref 40–150)
ALT SERPL W P-5'-P-CCNC: 15 U/L (ref 0–70)
ANION GAP SERPL CALCULATED.3IONS-SCNC: 11 MMOL/L (ref 7–15)
APPEARANCE UR: CLEAR
AST SERPL W P-5'-P-CCNC: 21 U/L (ref 0–45)
BILIRUB SERPL-MCNC: 0.4 MG/DL
BILIRUB UR QL STRIP: NEGATIVE
BUN SERPL-MCNC: 18.3 MG/DL (ref 8–23)
CALCIUM SERPL-MCNC: 9.2 MG/DL (ref 8.8–10.2)
CALCIUM SERPL-MCNC: 9.4 MG/DL (ref 8.8–10.2)
CHLORIDE SERPL-SCNC: 102 MMOL/L (ref 98–107)
COLOR UR AUTO: YELLOW
CREAT SERPL-MCNC: 0.93 MG/DL (ref 0.67–1.17)
CREAT SERPL-MCNC: 0.95 MG/DL (ref 0.67–1.17)
DEPRECATED HCO3 PLAS-SCNC: 23 MMOL/L (ref 22–29)
EGFRCR SERPLBLD CKD-EPI 2021: 77 ML/MIN/1.73M2
EGFRCR SERPLBLD CKD-EPI 2021: 79 ML/MIN/1.73M2
GLUCOSE SERPL-MCNC: 139 MG/DL (ref 70–99)
GLUCOSE UR STRIP-MCNC: NEGATIVE MG/DL
HGB UR QL STRIP: ABNORMAL
KETONES UR STRIP-MCNC: NEGATIVE MG/DL
LDH SERPL L TO P-CCNC: 173 U/L (ref 0–250)
LEUKOCYTE ESTERASE UR QL STRIP: NEGATIVE
NITRATE UR QL: NEGATIVE
PH UR STRIP: 5.5 [PH] (ref 5–9)
POTASSIUM SERPL-SCNC: 4.3 MMOL/L (ref 3.4–5.3)
PROT SERPL-MCNC: 7.2 G/DL (ref 6.4–8.3)
PSA SERPL DL<=0.01 NG/ML-MCNC: 0.21 NG/ML
SODIUM SERPL-SCNC: 136 MMOL/L (ref 135–145)
SP GR UR STRIP: 1.02 (ref 1–1.03)
UROBILINOGEN UR STRIP-MCNC: NORMAL MG/DL

## 2024-04-30 PROCEDURE — 81003 URINALYSIS AUTO W/O SCOPE: CPT | Performed by: UROLOGY

## 2024-04-30 PROCEDURE — 258N000003 HC RX IP 258 OP 636: Performed by: INTERNAL MEDICINE

## 2024-04-30 PROCEDURE — 83615 LACTATE (LD) (LDH) ENZYME: CPT | Performed by: INTERNAL MEDICINE

## 2024-04-30 PROCEDURE — 80053 COMPREHEN METABOLIC PANEL: CPT | Performed by: INTERNAL MEDICINE

## 2024-04-30 PROCEDURE — 96365 THER/PROPH/DIAG IV INF INIT: CPT

## 2024-04-30 PROCEDURE — 96402 CHEMO HORMON ANTINEOPL SQ/IM: CPT

## 2024-04-30 PROCEDURE — 82565 ASSAY OF CREATININE: CPT | Performed by: INTERNAL MEDICINE

## 2024-04-30 PROCEDURE — 250N000011 HC RX IP 250 OP 636: Mod: JZ | Performed by: INTERNAL MEDICINE

## 2024-04-30 PROCEDURE — 84403 ASSAY OF TOTAL TESTOSTERONE: CPT | Performed by: INTERNAL MEDICINE

## 2024-04-30 PROCEDURE — 82040 ASSAY OF SERUM ALBUMIN: CPT | Mod: ZL | Performed by: INTERNAL MEDICINE

## 2024-04-30 PROCEDURE — 84153 ASSAY OF PSA TOTAL: CPT | Performed by: INTERNAL MEDICINE

## 2024-04-30 PROCEDURE — 36415 COLL VENOUS BLD VENIPUNCTURE: CPT | Performed by: INTERNAL MEDICINE

## 2024-04-30 RX ORDER — ZOLEDRONIC ACID 0.04 MG/ML
4 INJECTION, SOLUTION INTRAVENOUS ONCE
Status: CANCELLED | OUTPATIENT
Start: 2024-05-29 | End: 2024-05-29

## 2024-04-30 RX ORDER — ZOLEDRONIC ACID 0.04 MG/ML
4 INJECTION, SOLUTION INTRAVENOUS ONCE
Status: COMPLETED | OUTPATIENT
Start: 2024-04-30 | End: 2024-04-30

## 2024-04-30 RX ORDER — ALBUTEROL SULFATE 90 UG/1
1-2 AEROSOL, METERED RESPIRATORY (INHALATION)
Status: CANCELLED
Start: 2024-05-29

## 2024-04-30 RX ORDER — DIPHENHYDRAMINE HYDROCHLORIDE 50 MG/ML
50 INJECTION INTRAMUSCULAR; INTRAVENOUS
Status: CANCELLED
Start: 2024-05-29

## 2024-04-30 RX ORDER — EPINEPHRINE 1 MG/ML
0.3 INJECTION, SOLUTION, CONCENTRATE INTRAVENOUS EVERY 5 MIN PRN
Status: CANCELLED | OUTPATIENT
Start: 2024-05-29

## 2024-04-30 RX ORDER — METHYLPREDNISOLONE SODIUM SUCCINATE 125 MG/2ML
125 INJECTION, POWDER, LYOPHILIZED, FOR SOLUTION INTRAMUSCULAR; INTRAVENOUS
Status: CANCELLED
Start: 2024-05-29

## 2024-04-30 RX ORDER — ALBUTEROL SULFATE 0.83 MG/ML
2.5 SOLUTION RESPIRATORY (INHALATION)
Status: CANCELLED | OUTPATIENT
Start: 2024-05-29

## 2024-04-30 RX ORDER — HEPARIN SODIUM (PORCINE) LOCK FLUSH IV SOLN 100 UNIT/ML 100 UNIT/ML
5 SOLUTION INTRAVENOUS
Status: CANCELLED | OUTPATIENT
Start: 2024-05-29

## 2024-04-30 RX ORDER — HEPARIN SODIUM,PORCINE 10 UNIT/ML
5-20 VIAL (ML) INTRAVENOUS DAILY PRN
Status: CANCELLED | OUTPATIENT
Start: 2024-05-29

## 2024-04-30 RX ORDER — MEPERIDINE HYDROCHLORIDE 50 MG/ML
25 INJECTION INTRAMUSCULAR; INTRAVENOUS; SUBCUTANEOUS EVERY 30 MIN PRN
Status: CANCELLED | OUTPATIENT
Start: 2024-05-29

## 2024-04-30 RX ADMIN — ZOLEDRONIC ACID 4 MG: 0.04 INJECTION, SOLUTION INTRAVENOUS at 14:35

## 2024-04-30 RX ADMIN — SODIUM CHLORIDE 250 ML: 9 INJECTION, SOLUTION INTRAVENOUS at 14:33

## 2024-04-30 RX ADMIN — LEUPROLIDE ACETATE 22.5 MG: KIT at 15:17

## 2024-04-30 NOTE — PROGRESS NOTES
Need to replace therapy orders for Enzalutamide with standing orders so they are not missed with his monthly labs.

## 2024-04-30 NOTE — NURSING NOTE
Infusion Nursing Note:  Michael Martinez presents today for Zometa/Lupron.    Patient seen by provider today: No   present during visit today: Not Applicable.    Note: N/A.    Intravenous Access:  Labs drawn by .  Peripheral IV placed.    Treatment Conditions:  Lab Results   Component Value Date    HGB 13.9 02/27/2024    WBC 7.1 02/27/2024    ANEU 5.3 05/24/2021    ANEUTAUTO 4.3 02/27/2024     02/27/2024        Lab Results   Component Value Date     03/28/2024    POTASSIUM 4.5 03/28/2024    MAG 1.7 11/20/2023    CR 0.95 04/30/2024    OLIVER 9.2 04/30/2024    BILITOTAL 0.5 03/28/2024    ALBUMIN 4.2 03/28/2024    ALT 17 03/28/2024    AST 24 03/28/2024     Estimated Creatinine Clearance: 58.3 mL/min (based on SCr of 0.95 mg/dL).    Results reviewed, labs MET treatment parameters, ok to proceed with treatment.    Post Infusion Assessment:  Patient tolerated infusion without incident.  Patient tolerated injection without incident.   Blood return noted pre and post infusion.  Site patent and intact, free from redness, edema or discomfort.  No evidence of extravasations.  Access discontinued per protocol.     Discharge Plan:   Discharge instructions reviewed with: Patient.  Patient and/or family verbalized understanding of discharge instructions and all questions answered.  Copy of AVS declined by patient and/or family.  Patient will return 5/29/24 for next appointment.  AVS to patient via MYCHART.    Patient discharged in stable condition accompanied by: self.  Departure Mode: Ambulatory.      Alison Poe RN

## 2024-05-03 LAB — TESTOST SERPL-MCNC: 7 NG/DL (ref 240–950)

## 2024-05-29 ENCOUNTER — HOSPITAL ENCOUNTER (OUTPATIENT)
Dept: INFUSION THERAPY | Facility: OTHER | Age: 89
Discharge: HOME | End: 2024-05-29
Attending: INTERNAL MEDICINE
Payer: COMMERCIAL

## 2024-05-29 ENCOUNTER — APPOINTMENT (OUTPATIENT)
Dept: LAB | Facility: OTHER | Age: 89
End: 2024-05-29
Attending: INTERNAL MEDICINE
Payer: COMMERCIAL

## 2024-05-29 ENCOUNTER — ONCOLOGY VISIT (OUTPATIENT)
Dept: ONCOLOGY | Facility: OTHER | Age: 89
End: 2024-05-29
Attending: INTERNAL MEDICINE
Payer: COMMERCIAL

## 2024-05-29 VITALS
OXYGEN SATURATION: 97 % | SYSTOLIC BLOOD PRESSURE: 149 MMHG | TEMPERATURE: 97.6 F | DIASTOLIC BLOOD PRESSURE: 71 MMHG | HEART RATE: 62 BPM

## 2024-05-29 DIAGNOSIS — C61 PROSTATE CANCER METASTATIC TO BONE (H): Primary | ICD-10-CM

## 2024-05-29 DIAGNOSIS — C79.51 PROSTATE CANCER METASTATIC TO BONE (H): Primary | ICD-10-CM

## 2024-05-29 LAB
ALBUMIN SERPL BCG-MCNC: 4 G/DL (ref 3.5–5.2)
ALP SERPL-CCNC: 84 U/L (ref 40–150)
ALT SERPL W P-5'-P-CCNC: 13 U/L (ref 0–70)
ANION GAP SERPL CALCULATED.3IONS-SCNC: 12 MMOL/L (ref 7–15)
AST SERPL W P-5'-P-CCNC: 19 U/L (ref 0–45)
BASOPHILS # BLD AUTO: 0.1 10E3/UL (ref 0–0.2)
BASOPHILS NFR BLD AUTO: 1 %
BILIRUB SERPL-MCNC: 0.7 MG/DL
BUN SERPL-MCNC: 18.3 MG/DL (ref 8–23)
CALCIUM SERPL-MCNC: 10 MG/DL (ref 8.8–10.2)
CHLORIDE SERPL-SCNC: 104 MMOL/L (ref 98–107)
CREAT SERPL-MCNC: 1.13 MG/DL (ref 0.67–1.17)
DEPRECATED HCO3 PLAS-SCNC: 23 MMOL/L (ref 22–29)
EGFRCR SERPLBLD CKD-EPI 2021: 63 ML/MIN/1.73M2
EOSINOPHIL # BLD AUTO: 0.2 10E3/UL (ref 0–0.7)
EOSINOPHIL NFR BLD AUTO: 3 %
ERYTHROCYTE [DISTWIDTH] IN BLOOD BY AUTOMATED COUNT: 13.9 % (ref 10–15)
GLUCOSE SERPL-MCNC: 145 MG/DL (ref 70–99)
HCT VFR BLD AUTO: 38.9 % (ref 40–53)
HGB BLD-MCNC: 13 G/DL (ref 13.3–17.7)
IMM GRANULOCYTES # BLD: 0 10E3/UL
IMM GRANULOCYTES NFR BLD: 0 %
LYMPHOCYTES # BLD AUTO: 1.5 10E3/UL (ref 0.8–5.3)
LYMPHOCYTES NFR BLD AUTO: 22 %
MCH RBC QN AUTO: 29.4 PG (ref 26.5–33)
MCHC RBC AUTO-ENTMCNC: 33.4 G/DL (ref 31.5–36.5)
MCV RBC AUTO: 88 FL (ref 78–100)
MONOCYTES # BLD AUTO: 0.6 10E3/UL (ref 0–1.3)
MONOCYTES NFR BLD AUTO: 9 %
NEUTROPHILS # BLD AUTO: 4.5 10E3/UL (ref 1.6–8.3)
NEUTROPHILS NFR BLD AUTO: 65 %
NRBC # BLD AUTO: 0 10E3/UL
NRBC BLD AUTO-RTO: 0 /100
PLATELET # BLD AUTO: 227 10E3/UL (ref 150–450)
POTASSIUM SERPL-SCNC: 4.5 MMOL/L (ref 3.4–5.3)
PROT SERPL-MCNC: 7.4 G/DL (ref 6.4–8.3)
PSA SERPL DL<=0.01 NG/ML-MCNC: 0.1 NG/ML
RBC # BLD AUTO: 4.42 10E6/UL (ref 4.4–5.9)
SODIUM SERPL-SCNC: 139 MMOL/L (ref 135–145)
WBC # BLD AUTO: 6.9 10E3/UL (ref 4–11)

## 2024-05-29 PROCEDURE — 36415 COLL VENOUS BLD VENIPUNCTURE: CPT | Performed by: INTERNAL MEDICINE

## 2024-05-29 PROCEDURE — 84403 ASSAY OF TOTAL TESTOSTERONE: CPT | Performed by: INTERNAL MEDICINE

## 2024-05-29 PROCEDURE — 250N000011 HC RX IP 250 OP 636: Mod: JZ | Performed by: INTERNAL MEDICINE

## 2024-05-29 PROCEDURE — 258N000003 HC RX IP 258 OP 636: Performed by: INTERNAL MEDICINE

## 2024-05-29 PROCEDURE — 99215 OFFICE O/P EST HI 40 MIN: CPT | Performed by: INTERNAL MEDICINE

## 2024-05-29 PROCEDURE — 84153 ASSAY OF PSA TOTAL: CPT | Performed by: INTERNAL MEDICINE

## 2024-05-29 PROCEDURE — 99417 PROLNG OP E/M EACH 15 MIN: CPT | Performed by: INTERNAL MEDICINE

## 2024-05-29 PROCEDURE — 80053 COMPREHEN METABOLIC PANEL: CPT | Performed by: INTERNAL MEDICINE

## 2024-05-29 PROCEDURE — 96365 THER/PROPH/DIAG IV INF INIT: CPT

## 2024-05-29 PROCEDURE — 85048 AUTOMATED LEUKOCYTE COUNT: CPT | Performed by: INTERNAL MEDICINE

## 2024-05-29 PROCEDURE — G0463 HOSPITAL OUTPT CLINIC VISIT: HCPCS | Mod: 25 | Performed by: INTERNAL MEDICINE

## 2024-05-29 RX ORDER — MEPERIDINE HYDROCHLORIDE 50 MG/ML
25 INJECTION INTRAMUSCULAR; INTRAVENOUS; SUBCUTANEOUS EVERY 30 MIN PRN
Status: CANCELLED | OUTPATIENT
Start: 2024-06-29

## 2024-05-29 RX ORDER — HEPARIN SODIUM (PORCINE) LOCK FLUSH IV SOLN 100 UNIT/ML 100 UNIT/ML
5 SOLUTION INTRAVENOUS
Status: CANCELLED | OUTPATIENT
Start: 2024-06-29

## 2024-05-29 RX ORDER — HEPARIN SODIUM,PORCINE 10 UNIT/ML
5-20 VIAL (ML) INTRAVENOUS DAILY PRN
Status: CANCELLED | OUTPATIENT
Start: 2024-06-29

## 2024-05-29 RX ORDER — DIPHENHYDRAMINE HYDROCHLORIDE 50 MG/ML
50 INJECTION INTRAMUSCULAR; INTRAVENOUS
Status: CANCELLED
Start: 2024-06-29

## 2024-05-29 RX ORDER — ZOLEDRONIC ACID 0.04 MG/ML
4 INJECTION, SOLUTION INTRAVENOUS ONCE
Status: CANCELLED | OUTPATIENT
Start: 2024-06-29 | End: 2024-06-29

## 2024-05-29 RX ORDER — EPINEPHRINE 1 MG/ML
0.3 INJECTION, SOLUTION, CONCENTRATE INTRAVENOUS EVERY 5 MIN PRN
Status: CANCELLED | OUTPATIENT
Start: 2024-06-29

## 2024-05-29 RX ORDER — METHYLPREDNISOLONE SODIUM SUCCINATE 125 MG/2ML
125 INJECTION, POWDER, LYOPHILIZED, FOR SOLUTION INTRAMUSCULAR; INTRAVENOUS
Status: CANCELLED
Start: 2024-06-29

## 2024-05-29 RX ORDER — ALBUTEROL SULFATE 90 UG/1
1-2 AEROSOL, METERED RESPIRATORY (INHALATION)
Status: CANCELLED
Start: 2024-06-29

## 2024-05-29 RX ORDER — ALBUTEROL SULFATE 0.83 MG/ML
2.5 SOLUTION RESPIRATORY (INHALATION)
Status: CANCELLED | OUTPATIENT
Start: 2024-06-29

## 2024-05-29 RX ADMIN — SODIUM CHLORIDE 250 ML: 9 INJECTION, SOLUTION INTRAVENOUS at 14:37

## 2024-05-29 RX ADMIN — ZOLEDRONIC ACID 3.3 MG: 4 INJECTION, SOLUTION, CONCENTRATE INTRAVENOUS at 14:59

## 2024-05-29 NOTE — PHARMACY
Pharmacy- Renal Dose Adjustment    Patient Active Problem List   Diagnosis    Personal history of DVT (deep vein thrombosis)    History of pulmonary embolism    Abdominal aortic aneurysm without rupture (H24)    Bladder outlet obstruction    Ischemic cardiomyopathy    History of four vessel coronary artery bypass graft    H/O transurethral resection of prostate    Anticoagulation monitoring, INR range 2-3    Long-term (current) use of anticoagulants [Z79.01]    Cataract    DJD (degenerative joint disease) of cervical spine    Diaphragmatic hernia    Lumbar disc disease    Osteoarthrosis involving lower leg    Primary osteoarthritis of right hand    S/P CABG x 4    History of transurethral resection of prostate    Spinal stenosis, lumbar    Lesion of ulnar nerve    History of Guillain-[place]oke syndrome    Gastroesophageal reflux disease, esophagitis presence not specified    Primary osteoarthritis of left knee    Mixed hyperlipidemia    Peripheral arterial disease (H24)    OLIVIA (generalized anxiety disorder)    Cervical arthritis    Primary osteoarthritis involving multiple joints    Diabetes mellitus type 2, diet-controlled (H)    Benign prostatic hyperplasia without lower urinary tract symptoms    Pathological fracture of thoracic vertebra with routine healing, subsequent encounter    Closed wedge compression fracture of T8 vertebra with routine healing, subsequent encounter    Prostate cancer metastatic to bone (H)    History of gross hematuria    Chronic anticoagulation    Acute hemorrhagic cystitis    Gross hematuria        Relevant Labs:  Recent Labs   Lab Test 05/29/24  1336 02/27/24  0940   WBC 6.9 7.1   HGB 13.0* 13.9    232        CrCl: 49.1 mL/min (47.2 mL based on adjusted body weight of 72.5 mg)    No intake or output data in the 24 hours ending 05/29/24 1501       Per Renal Dose Adjustment Protocol, will adjust:  -Zoledronic acid from 4 mg to 3.3 mg for CrCl < 50 mL/min.      Will continue to follow  and make adjustments accordingly. Thank You.    Musa Farmer, [facility] ....................  5/29/2024   3:01 PM

## 2024-05-29 NOTE — NURSING NOTE
Infusion Nursing Note:  Michael Martinez presents today for Zometa.    Patient seen by provider today: Yes: Dr. Nj   present during visit today: Not Applicable.    Note: N/A.      Intravenous Access:  Labs drawn without difficulty.  Peripheral IV placed.    Treatment Conditions:  Lab Results   Component Value Date     05/29/2024    POTASSIUM 4.5 05/29/2024    MAG 1.7 11/20/2023    CR 1.13 05/29/2024    OLIVER 10.0 05/29/2024    BILITOTAL 0.7 05/29/2024    ALBUMIN 4.0 05/29/2024    ALT 13 05/29/2024    AST 19 05/29/2024     Estimated CrCl: 49.1 mL/min   49.1 mL/min = (140 - 87 yrs) x 75.3 kg / (72 x 1.13 mg/dL)   Last serum creatinine (SCr): 1.13 mg/dL              Post Infusion Assessment:  Patient tolerated infusion without incident.  Blood return noted pre and post infusion.  Site patent and intact, free from redness, edema or discomfort.  No evidence of extravasations.  Access discontinued per protocol.       Discharge Plan:   Discharge instructions reviewed with: Patient.  Patient and/or family verbalized understanding of discharge instructions and all questions answered.  Patient discharged in stable condition accompanied by: self and daughter.  Departure Mode: Ambulatory.  Declined AVS will return 6/27/24 for lab and next Zometa  .      Constance Chandler RN

## 2024-05-29 NOTE — PROGRESS NOTES
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 Michael Martinez returns for follow-up of metastatic hormone sensitive prostate cancer with bony metastasesFor details of history see previous noteBriefly, the patient had been transferred to [facility] in [place] after he presented to the emergency department with a CT scan that revealed mixed lytic and sclerotic lesion involving the T8 vertebral body with adjacent soft tissue component extending into the posterior mediastinum.  This was felt to be a pathologic fracture and the patient was transferred for appropriate care from [place] to use [facility] in [place].  MRI thoracic spine revealed multifocal osseous metastatic disease with a dominant lesion at T8.  CT chest and CT abdomen/pelvis revealed T8 abnormality as well as enlarged prostate with potential bony pelvic metastatic disease as well as rib cage metastases.  Neurosurgery was consulted and they felt this was a nonsurgical issue and recommended thoracic orthosis and was fitted for this by orthotics November 22.  He underwent CT-guided biopsy of the T8 lesion came back consistent metastatic prostate carcinoma.  Review of thoracic films indicated that he had essentially widespread osseous metastatic disease involving thoracic spine including T8 as well as the pelvis as well as the rib cage.  Patient did complain of significant pain involving the thoracic spine as well as the pelvis as well as rib cage..  His PSA was extremely elevated at 121.  Fulda the patient would be a candidate for treatment for metastatic prostate  cancer with biopsy-proven T8 bone metastases.  We elected to treat the patient with Lupron 22.5 mg IM every 3 months in addition to enzalutamide which was indicated in the first-line setting for metastatic prostate cancer.  I also recommended Xgeva monthly.  Patient underwent a bone scan on 7/19/2023 and the findings were that there was multifocal bony metastatic disease with a large area of abnormal intense radiotracer uptake within the T8 vertebral body there was a focus of abnormal uptake in the right pelvis and the superior acetabular region measuring 2 to 3 cm dimension.  There are 2 small foci of abnormal uptake in the left hemipelvis.  There was uptake in the lateral left fifth sixth rib.  There are small foci of uptake at T12, L2 and L3.There were problems with insurance coverage and the patient was unable to start this regimen immediately.  His PSA had risen to 168 on December 12, 2023.  He presented to the emergency room with gross hematuria on December 22, 2023.  It was felt this was likely due to warfarin at patient was switched to Eliquis 2.5 mg p.o. twice daily 12/26/2023.A urine for cytology was sent by Dr. Shiraz Taylor and he was negative for malignancy.  Patient was able to start treatment beginning on January 8, 2024.Insurance would not cover Xgeva and therefore we had to switch the patient to zoledronic acid 4 mg IV q. 28 days.  The patient received his first treatment on January 29, 2024.  Since starting Lupron Xtandi and Zometa patient had had a remarkable response and as of today's PSA has dropped to 10.2.  He says his symptoms of all resolved he has no further urinary symptoms no urgency or frequency.  His back pain and hip pain and pelvic pain have all resolved overall he is doing well.  He denies any hot flashes.  He denies any fevers, night sweats or weight loss.  I have a abdominal pain.  Denies any change in bowel habits.  Denies any palpitations.  There is no bright blood per rectum,  hematemesis or melena.His PSA dropped from 150 down to 10.4 when we saw him in February of this year.  He is here to receive zoledronic acid otherwise he is tolerating Lupron and enzalutamide he gets occasional hot flashes but these are tolerable.  Denies any bone pain shortness of breath fevers night sweats weight loss change in bowel habits there are no bleeding episodes no bright red blood per rectum hematemesis or melena.  Denies any dysuria urinary frequency or hematuria      ______________________________________________________________________________    Past History    Past Medical History:   Diagnosis Date    Cerebral infarction (H)     6/2014,left cerebellum--seen on MRI    Edema     11/4/2003,Edema & cramps legs, ? secondary to Norvasc(awn953.3) symptom, edema-pedal (icd 782.3)    Embolism and thrombosis of right tibial vein (H)     10/29/2016    Enlarged prostate with lower urinary tract symptoms (LUTS)     8/4/2011    Guillain Barré syndrome (H24)     Ischemic cardiomyopathy     7/9/2014    Osteoarthritis     9/5/2001    Other ill-defined and unknown causes of morbidity and mortality     see prob list    Urinary tract infection     No Comments Provided       Past Surgical History:   Procedure Laterality Date    ARTHROSCOPY SHOULDER ROTATOR CUFF REPAIR      1996    BIOPSY PROSTATE TRANSRECTAL      No Comments Provided    BYPASS GRAFT ARTERY CORONARY      December of 2013,four-vessel    COLONOSCOPY      10/31/2013,diverticulosis-no fu needed d/t age    CYSTOSCOPY, TRANSURETHRAL RESECTION (TUR) PROSTATE, COMBINED      January 2014,done in [place] -- excellent result    OTHER SURGICAL HISTORY      December 2013,05028.0,(IA) IN INJ PROC SELECTIVE CORONARY ANGIOGRAPHY,LAD-95% kgeykhsi-22-32% distal stenosis.  Ramus-80% stenosis.  Right coronary-high-grade 90% stenosis.  EF-45%           Review of systems.  CNS: There are no headaches, no blurred vision, no change in mental status,   ENT: There is no hearing  loss.  Respiratory: There is no cough shortness of breath or hemoptysis  Cardiac: There is no chest pain, orthopnea, PND, or ankle edema.  GI: There is no bright red blood per rectum, no hematemesis, no reflux, no diarrhea or constipation  Musculoskeletal: There are no joint pains or bone pain  : There is no urinary frequency, hematuria.  Constitutional: There is no fevers, night sweats, weight loss.  Endocrine: There is minimal fatigue  Neuro: There are no neuropathic symptoms  Hematologic: There is no gingival bleeding, epistaxis, or easy bruisability.  Dermatologic: There is no skin rash.  A 14 point review of systems is otherwise negative.          Physical Exam    Vital signs: Stable; afebrile      GENERAL: Alert and oriented to time place and person. Seated comfortably. In no distress.    HEAD: Atraumatic and normocephalic.    EYES: REGINALDO, EOMI.  No pallor.  No icterus.    Oral cavity: no mucosal lesion or tonsillar enlargement.    NECK: supple. JVP normal.  No thyroid enlargement.    LYMPH NODES: There are no palpable cervical, supraclavicular, axillary, or inguinal nodes.    LUNGS: clear to auscultation bilaterally.    HEART: S1 and S2 are heard. Regular rate and rhythm.  No murmur or gallop or rub heard.    ABDOMEN: Soft. Not tender. Not distended.  No palpable hepatomegaly or splenomegaly.  No other mass palpable.  Bowel sounds heard.    EXTREMITIES: There is trace ankle edema    SKIN: no rash, or bruising or purpura.    NEURO: Grossly non-focal.    Lab Results    Recent Results (from the past 240 hour(s))   Comprehensive metabolic panel    Collection Time: 05/29/24  1:36 PM   Result Value Ref Range    Sodium 139 135 - 145 mmol/L    Potassium 4.5 3.4 - 5.3 mmol/L    Carbon Dioxide (CO2) 23 22 - 29 mmol/L    Anion Gap 12 7 - 15 mmol/L    Urea Nitrogen 18.3 8.0 - 23.0 mg/dL    Creatinine 1.13 0.67 - 1.17 mg/dL    GFR Estimate 63 >60 mL/min/1.73m2    Calcium 10.0 8.8 - 10.2 mg/dL    Chloride 104 98 - 107  mmol/L    Glucose 145 (H) 70 - 99 mg/dL    Alkaline Phosphatase 84 40 - 150 U/L    AST 19 0 - 45 U/L    ALT 13 0 - 70 U/L    Protein Total 7.4 6.4 - 8.3 g/dL    Albumin 4.0 3.5 - 5.2 g/dL    Bilirubin Total 0.7 <=1.2 mg/dL   PSA tumor marker    Collection Time: 05/29/24  1:36 PM   Result Value Ref Range    PSA Tumor Marker 0.10 ng/mL   CBC with platelets and differential    Collection Time: 05/29/24  1:36 PM   Result Value Ref Range    WBC Count 6.9 4.0 - 11.0 10e3/uL    RBC Count 4.42 4.40 - 5.90 10e6/uL    Hemoglobin 13.0 (L) 13.3 - 17.7 g/dL    Hematocrit 38.9 (L) 40.0 - 53.0 %    MCV 88 78 - 100 fL    MCH 29.4 26.5 - 33.0 pg    MCHC 33.4 31.5 - 36.5 g/dL    RDW 13.9 10.0 - 15.0 %    Platelet Count 227 150 - 450 10e3/uL    % Neutrophils 65 %    % Lymphocytes 22 %    % Monocytes 9 %    % Eosinophils 3 %    % Basophils 1 %    % Immature Granulocytes 0 %    NRBCs per 100 WBC 0 <1 /100    Absolute Neutrophils 4.5 1.6 - 8.3 10e3/uL    Absolute Lymphocytes 1.5 0.8 - 5.3 10e3/uL    Absolute Monocytes 0.6 0.0 - 1.3 10e3/uL    Absolute Eosinophils 0.2 0.0 - 0.7 10e3/uL    Absolute Basophils 0.1 0.0 - 0.2 10e3/uL    Absolute Immature Granulocytes 0.0 <=0.4 10e3/uL    Absolute NRBCs 0.0 10e3/uL       Imaging    No results found.    Assessment and Plan  : #1 metastatic hormone sensitive prostate cancer with bony metastasis involving T8 thoracic spine pelvis rib cage: Confirmed by T8 bone biopsy initiated on androgen deprivation therapy with enzalutamide and zoledronic acid.  Patient has had a remarkable response with PSA dropping from 168 down to 0.1.  The plan is to continue Lupron 22.5 mg IM q. 3 months zoledronic acid 4 mg IV q. 28 days and enzalutamideAt a dose of 80 mg p.o. daily.  Will see the patient in 3 months repeat CBC CMP LDH and PSA tumor marker  Time spent: 68 minutes was spent on this patient visit: We spent time reviewing labs with the patient, performing a history/physical documenting history/physical  ordering follow-up labs ordering Lupron zoledronic acid and enzalutamide and follow-up appointments.  Cancer Staging   No matching staging information was found for the patient.        Signed by: Raj Nj MD    CC: Isma Martinez MD

## 2024-05-29 NOTE — NURSING NOTE
Patient is being seen by provider in infusion where part of required rooming assessments and vitals were done by the infusion RN.  Jo Mcintosh RN on 5/29/2024 at 1:31 PM

## 2024-06-01 LAB — TESTOST SERPL-MCNC: 10 NG/DL (ref 240–950)

## 2024-06-27 ENCOUNTER — HOSPITAL ENCOUNTER (OUTPATIENT)
Dept: INFUSION THERAPY | Facility: OTHER | Age: 89
Discharge: HOME | End: 2024-06-27
Attending: INTERNAL MEDICINE
Payer: COMMERCIAL

## 2024-06-27 ENCOUNTER — DOCUMENTATION ONLY (OUTPATIENT)
Dept: ONCOLOGY | Facility: OTHER | Age: 89
End: 2024-06-27

## 2024-06-27 ENCOUNTER — LAB (OUTPATIENT)
Dept: LAB | Facility: OTHER | Age: 89
End: 2024-06-27
Attending: INTERNAL MEDICINE
Payer: COMMERCIAL

## 2024-06-27 VITALS
BODY MASS INDEX: 24.43 KG/M2 | WEIGHT: 165.4 LBS | TEMPERATURE: 97.4 F | DIASTOLIC BLOOD PRESSURE: 68 MMHG | HEART RATE: 67 BPM | SYSTOLIC BLOOD PRESSURE: 143 MMHG

## 2024-06-27 DIAGNOSIS — C61 PROSTATE CANCER METASTATIC TO BONE (H): ICD-10-CM

## 2024-06-27 DIAGNOSIS — C79.51 PROSTATE CANCER METASTATIC TO BONE (H): ICD-10-CM

## 2024-06-27 DIAGNOSIS — C61 PROSTATE CANCER METASTATIC TO BONE (H): Primary | ICD-10-CM

## 2024-06-27 DIAGNOSIS — C79.51 PROSTATE CANCER METASTATIC TO BONE (H): Primary | ICD-10-CM

## 2024-06-27 LAB
ALBUMIN SERPL BCG-MCNC: 4 G/DL (ref 3.5–5.2)
ALP SERPL-CCNC: 80 U/L (ref 40–150)
ALT SERPL W P-5'-P-CCNC: 12 U/L (ref 0–70)
ANION GAP SERPL CALCULATED.3IONS-SCNC: 10 MMOL/L (ref 7–15)
AST SERPL W P-5'-P-CCNC: 20 U/L (ref 0–45)
BASOPHILS # BLD AUTO: 0.1 10E3/UL (ref 0–0.2)
BASOPHILS NFR BLD AUTO: 1 %
BILIRUB SERPL-MCNC: 0.7 MG/DL
BUN SERPL-MCNC: 19.8 MG/DL (ref 8–23)
CALCIUM SERPL-MCNC: 9.8 MG/DL (ref 8.8–10.2)
CALCIUM SERPL-MCNC: 9.8 MG/DL (ref 8.8–10.2)
CHLORIDE SERPL-SCNC: 101 MMOL/L (ref 98–107)
CREAT SERPL-MCNC: 1.06 MG/DL (ref 0.67–1.17)
DEPRECATED HCO3 PLAS-SCNC: 26 MMOL/L (ref 22–29)
EGFRCR SERPLBLD CKD-EPI 2021: 68 ML/MIN/1.73M2
EOSINOPHIL # BLD AUTO: 0.3 10E3/UL (ref 0–0.7)
EOSINOPHIL NFR BLD AUTO: 5 %
ERYTHROCYTE [DISTWIDTH] IN BLOOD BY AUTOMATED COUNT: 13.8 % (ref 10–15)
GLUCOSE SERPL-MCNC: 191 MG/DL (ref 70–99)
HCT VFR BLD AUTO: 37.8 % (ref 40–53)
HGB BLD-MCNC: 12.4 G/DL (ref 13.3–17.7)
HOLD SPECIMEN: NORMAL
IMM GRANULOCYTES # BLD: 0 10E3/UL
IMM GRANULOCYTES NFR BLD: 0 %
LYMPHOCYTES # BLD AUTO: 1.7 10E3/UL (ref 0.8–5.3)
LYMPHOCYTES NFR BLD AUTO: 27 %
MCH RBC QN AUTO: 29.1 PG (ref 26.5–33)
MCHC RBC AUTO-ENTMCNC: 32.8 G/DL (ref 31.5–36.5)
MCV RBC AUTO: 89 FL (ref 78–100)
MONOCYTES # BLD AUTO: 0.5 10E3/UL (ref 0–1.3)
MONOCYTES NFR BLD AUTO: 7 %
NEUTROPHILS # BLD AUTO: 3.8 10E3/UL (ref 1.6–8.3)
NEUTROPHILS NFR BLD AUTO: 60 %
NRBC # BLD AUTO: 0 10E3/UL
NRBC BLD AUTO-RTO: 0 /100
PLATELET # BLD AUTO: 197 10E3/UL (ref 150–450)
POTASSIUM SERPL-SCNC: 4.7 MMOL/L (ref 3.4–5.3)
PROT SERPL-MCNC: 7.1 G/DL (ref 6.4–8.3)
PSA SERPL DL<=0.01 NG/ML-MCNC: 0.06 NG/ML
RBC # BLD AUTO: 4.26 10E6/UL (ref 4.4–5.9)
SODIUM SERPL-SCNC: 137 MMOL/L (ref 135–145)
WBC # BLD AUTO: 6.3 10E3/UL (ref 4–11)

## 2024-06-27 PROCEDURE — 250N000011 HC RX IP 250 OP 636: Mod: JZ | Performed by: INTERNAL MEDICINE

## 2024-06-27 PROCEDURE — 84153 ASSAY OF PSA TOTAL: CPT | Mod: ZL

## 2024-06-27 PROCEDURE — 36415 COLL VENOUS BLD VENIPUNCTURE: CPT | Mod: ZL

## 2024-06-27 PROCEDURE — 96365 THER/PROPH/DIAG IV INF INIT: CPT

## 2024-06-27 PROCEDURE — 84403 ASSAY OF TOTAL TESTOSTERONE: CPT | Mod: ZL

## 2024-06-27 PROCEDURE — 85025 COMPLETE CBC W/AUTO DIFF WBC: CPT | Mod: ZL

## 2024-06-27 PROCEDURE — 82310 ASSAY OF CALCIUM: CPT | Performed by: INTERNAL MEDICINE

## 2024-06-27 PROCEDURE — 82247 BILIRUBIN TOTAL: CPT | Mod: ZL

## 2024-06-27 PROCEDURE — 258N000003 HC RX IP 258 OP 636: Performed by: INTERNAL MEDICINE

## 2024-06-27 RX ORDER — EPINEPHRINE 1 MG/ML
0.3 INJECTION, SOLUTION, CONCENTRATE INTRAVENOUS EVERY 5 MIN PRN
Status: DISCONTINUED | OUTPATIENT
Start: 2024-06-27 | End: 2024-06-28 | Stop reason: HOSPADM

## 2024-06-27 RX ORDER — ZOLEDRONIC ACID 0.04 MG/ML
4 INJECTION, SOLUTION INTRAVENOUS ONCE
Status: DISCONTINUED | OUTPATIENT
Start: 2024-06-27 | End: 2024-06-27 | Stop reason: DRUGHIGH

## 2024-06-27 RX ORDER — ALBUTEROL SULFATE 0.83 MG/ML
2.5 SOLUTION RESPIRATORY (INHALATION)
Status: DISCONTINUED | OUTPATIENT
Start: 2024-06-27 | End: 2024-06-28 | Stop reason: HOSPADM

## 2024-06-27 RX ORDER — HEPARIN SODIUM (PORCINE) LOCK FLUSH IV SOLN 100 UNIT/ML 100 UNIT/ML
5 SOLUTION INTRAVENOUS
Status: CANCELLED | OUTPATIENT
Start: 2024-06-29

## 2024-06-27 RX ORDER — DIPHENHYDRAMINE HYDROCHLORIDE 50 MG/ML
50 INJECTION INTRAMUSCULAR; INTRAVENOUS
Status: CANCELLED
Start: 2024-06-29

## 2024-06-27 RX ORDER — METHYLPREDNISOLONE SODIUM SUCCINATE 125 MG/2ML
125 INJECTION, POWDER, LYOPHILIZED, FOR SOLUTION INTRAMUSCULAR; INTRAVENOUS
Status: DISCONTINUED | OUTPATIENT
Start: 2024-06-27 | End: 2024-06-28 | Stop reason: HOSPADM

## 2024-06-27 RX ORDER — ALBUTEROL SULFATE 0.83 MG/ML
2.5 SOLUTION RESPIRATORY (INHALATION)
Status: CANCELLED | OUTPATIENT
Start: 2024-06-29

## 2024-06-27 RX ORDER — HEPARIN SODIUM,PORCINE 10 UNIT/ML
5-20 VIAL (ML) INTRAVENOUS DAILY PRN
Status: CANCELLED | OUTPATIENT
Start: 2024-06-29

## 2024-06-27 RX ORDER — ALBUTEROL SULFATE 90 UG/1
1-2 AEROSOL, METERED RESPIRATORY (INHALATION)
Status: DISCONTINUED | OUTPATIENT
Start: 2024-06-27 | End: 2024-06-28 | Stop reason: HOSPADM

## 2024-06-27 RX ORDER — ALBUTEROL SULFATE 90 UG/1
1-2 AEROSOL, METERED RESPIRATORY (INHALATION)
Status: CANCELLED
Start: 2024-06-29

## 2024-06-27 RX ORDER — HEPARIN SODIUM,PORCINE 10 UNIT/ML
5-20 VIAL (ML) INTRAVENOUS DAILY PRN
Status: DISCONTINUED | OUTPATIENT
Start: 2024-06-27 | End: 2024-06-28 | Stop reason: HOSPADM

## 2024-06-27 RX ORDER — HEPARIN SODIUM (PORCINE) LOCK FLUSH IV SOLN 100 UNIT/ML 100 UNIT/ML
5 SOLUTION INTRAVENOUS
Status: DISCONTINUED | OUTPATIENT
Start: 2024-06-27 | End: 2024-06-28 | Stop reason: HOSPADM

## 2024-06-27 RX ORDER — DIPHENHYDRAMINE HYDROCHLORIDE 50 MG/ML
50 INJECTION INTRAMUSCULAR; INTRAVENOUS
Status: DISCONTINUED | OUTPATIENT
Start: 2024-06-27 | End: 2024-06-28 | Stop reason: HOSPADM

## 2024-06-27 RX ORDER — ZOLEDRONIC ACID 0.04 MG/ML
4 INJECTION, SOLUTION INTRAVENOUS ONCE
Status: CANCELLED | OUTPATIENT
Start: 2024-06-29 | End: 2024-06-29

## 2024-06-27 RX ORDER — METHYLPREDNISOLONE SODIUM SUCCINATE 125 MG/2ML
125 INJECTION, POWDER, LYOPHILIZED, FOR SOLUTION INTRAMUSCULAR; INTRAVENOUS
Status: CANCELLED
Start: 2024-06-29

## 2024-06-27 RX ORDER — MEPERIDINE HYDROCHLORIDE 50 MG/ML
25 INJECTION INTRAMUSCULAR; INTRAVENOUS; SUBCUTANEOUS EVERY 30 MIN PRN
Status: DISCONTINUED | OUTPATIENT
Start: 2024-06-27 | End: 2024-06-28 | Stop reason: HOSPADM

## 2024-06-27 RX ORDER — MEPERIDINE HYDROCHLORIDE 50 MG/ML
25 INJECTION INTRAMUSCULAR; INTRAVENOUS; SUBCUTANEOUS EVERY 30 MIN PRN
Status: CANCELLED | OUTPATIENT
Start: 2024-06-29

## 2024-06-27 RX ORDER — EPINEPHRINE 1 MG/ML
0.3 INJECTION, SOLUTION, CONCENTRATE INTRAVENOUS EVERY 5 MIN PRN
Status: CANCELLED | OUTPATIENT
Start: 2024-06-29

## 2024-06-27 RX ADMIN — ZOLEDRONIC ACID 3.5 MG: 4 INJECTION, SOLUTION, CONCENTRATE INTRAVENOUS at 10:17

## 2024-06-27 RX ADMIN — SODIUM CHLORIDE 250 ML: 9 INJECTION, SOLUTION INTRAVENOUS at 09:47

## 2024-06-27 NOTE — PROGRESS NOTES
Oral Chemotherapy Program  Lab review     Reviewed labs from 6/27/2024     Labs are unremarkable and do not require any dose adjustments at this time     Follow-up/plan  7/25/2024 - Lab appointment and infusion     Vanessa Iniguez  Oncology Pharmacy Intern  [facility] [place]  [phone]

## 2024-06-27 NOTE — NURSING NOTE
Infusion Nursing Note:  Michael Martinez presents today for Zometa.    Patient seen by provider today: No   present during visit today: Not Applicable.    Note: N/A.      Intravenous Access:  Labs drawn without difficulty with  assist.  Peripheral IV placed to left arm with blood return .    Treatment Conditions:  Lab Results   Component Value Date     06/27/2024    POTASSIUM 4.7 06/27/2024    MAG 1.7 11/20/2023    CR 1.06 06/27/2024    OLIVER 9.8 06/27/2024    OLIVER 9.8 06/27/2024    BILITOTAL 0.7 06/27/2024    ALBUMIN 4.0 06/27/2024    ALT 12 06/27/2024    AST 20 06/27/2024       Results reviewed, labs MET treatment parameters, ok to proceed with treatment.      Post Infusion Assessment:  Patient tolerated infusion without incident.  Blood return noted pre and post infusion.  Site patent and intact, free from redness, edema or discomfort.  No evidence of extravasations.  Access discontinued per protocol.       Discharge Plan:   Discharge instructions reviewed with: Patient.  Patient and/or family verbalized understanding of discharge instructions and all questions answered.  Copy of AVS reviewed with patient and/or family.  Patient will return 1 month as scheduled for next appointment.  Patient discharged in stable condition accompanied by: self and daughter.  Departure Mode: Ambulatory.      Ela Sanders RN

## 2024-06-27 NOTE — PHARMACY
Pharmacy- Renal Dose Adjustment    Patient Active Problem List   Diagnosis    Personal history of DVT (deep vein thrombosis)    History of pulmonary embolism    Abdominal aortic aneurysm without rupture (H24)    Bladder outlet obstruction    Ischemic cardiomyopathy    History of four vessel coronary artery bypass graft    H/O transurethral resection of prostate    Anticoagulation monitoring, INR range 2-3    Long-term (current) use of anticoagulants [Z79.01]    Cataract    DJD (degenerative joint disease) of cervical spine    Diaphragmatic hernia    Lumbar disc disease    Osteoarthrosis involving lower leg    Primary osteoarthritis of right hand    S/P CABG x 4    History of transurethral resection of prostate    Spinal stenosis, lumbar    Lesion of ulnar nerve    History of Guillain-[place] syndrome    Gastroesophageal reflux disease, esophagitis presence not specified    Primary osteoarthritis of left knee    Mixed hyperlipidemia    Peripheral arterial disease (H24)    OLIVIA (generalized anxiety disorder)    Cervical arthritis    Primary osteoarthritis involving multiple joints    Diabetes mellitus type 2, diet-controlled (H)    Benign prostatic hyperplasia without lower urinary tract symptoms    Pathological fracture of thoracic vertebra with routine healing, subsequent encounter    Closed wedge compression fracture of T8 vertebra with routine healing, subsequent encounter    Prostate cancer metastatic to bone (H)    History of gross hematuria    Chronic anticoagulation    Acute hemorrhagic cystitis    Gross hematuria        Relevant Labs:  Recent Labs   Lab Test 06/27/24  0921 05/29/24  1336   WBC 6.3 6.9   HGB 12.4* 13.0*    227        CrCl: 52.1 mL/min    No intake or output data in the 24 hours ending 06/27/24 1019       Per Renal Dose Adjustment Protocol, will adjust:  -Zoledronic acid to 3.5 mg (from 4 mg) due to CrCl 50-60 mL/min.      Will continue to follow and make adjustments accordingly. Thank  You.    Musa Farmer [facility] ....................  6/27/2024   10:19 AM

## 2024-06-30 LAB — TESTOST SERPL-MCNC: 7 NG/DL (ref 240–950)

## 2024-07-07 DIAGNOSIS — E11.9 DIABETES MELLITUS TYPE 2, DIET-CONTROLLED (H): ICD-10-CM

## 2024-07-07 DIAGNOSIS — K21.9 GASTROESOPHAGEAL REFLUX DISEASE, UNSPECIFIED WHETHER ESOPHAGITIS PRESENT: ICD-10-CM

## 2024-07-11 RX ORDER — LISINOPRIL 2.5 MG/1
2.5 TABLET ORAL DAILY
Qty: 100 TABLET | Refills: 2 | OUTPATIENT
Start: 2024-07-11

## 2024-07-11 NOTE — TELEPHONE ENCOUNTER
-- Schedule mcw/med mgmt    Signed, Isma Martinez MD, FAAP, FACP  Internal Medicine & Pediatrics

## 2024-07-11 NOTE — TELEPHONE ENCOUNTER
Requested Prescriptions   Pending Prescriptions Disp Refills    lisinopril (ZESTRIL) 2.5 MG tablet [Pharmacy Med Name: Lisinopril 2.5 MG Oral Tablet] 100 tablet 2     Sig: TAKE 1 TABLET BY MOUTH DAILY       ACE Inhibitors (Including Combos) Protocol Failed - 7/7/2024  4:15 PM        Failed - Blood pressure under 140/90 in past 12 months- Clinicial or Patient Reported     BP Readings from Last 3 Encounters:   06/27/24 (!) 143/68   05/29/24 (!) 149/71   04/30/24 130/72       No data recorded            Failed - Medication is active on med list        Passed - Medication indicated for associated diagnosis     Medication is associated with one or more of the following diagnoses:     Chronic Kidney Disease (CKD)   Coronary Artery Disease (CAD)   Diabetes   Heart Failure (HF)   Hypertension (HTN)   Nephropathy            Passed - Has GFR on file in past 12 months and most recent value is normal        Passed - Recent (12 mo) or future (90 days) visit within the authorizing provider's specialty     The patient must have completed an in-person or virtual visit within the past 12 months or has a future visit scheduled within the next 90 days with the authorizing provider s specialty.  Urgent care and e-visits do not quality as an office visit for this protocol.          Passed - Patient is age 18 or older        Passed - Normal serum potassium on file in past 12 months     Recent Labs   Lab Test 06/27/24  0921   POTASSIUM 4.7        Order Status Reason By On   Discontinued Med Rec(No AVS / No eCancel) Ana Koch APRN CNP 8/1/23 1038         Last Prescription Date:   2/9/2023 (DISCONTINUED ON 8/1/2023)  Last Fill Qty/Refills:         90, R-3       omeprazole (PRILOSEC) 20 MG DR capsule [Pharmacy Med Name: Omeprazole 20 MG Oral Capsule Delayed Release] 50 capsule 2     Sig: TAKE 1 CAPSULE BY MOUTH EVERY  OTHER DAY       PPI Protocol Passed - 7/7/2024  4:15 PM        Passed - Medication is active on med list         Passed - Medication indicated for associated diagnosis     The medication is prescribed for one or more of the following conditions:     Erosive esophagitis    Gastritis   Gastric hypersecretion   Gastric ulcer   Gastroesophageal reflux disease   Helicobacter pylori gastrointestinal tract infection   Ulcer of duodenum   Drug-induced peptic ulcer   Esophageal stricture   Gastrointestinal hemorrhage   Indigestion   Stress ulcer   Zollinger-Murphy syndrome   Darden s esophagus   Laryngopharyngeal reflux          Passed - Recent (12 mo) or future (90 days) visit within the authorizing provider's specialty     The patient must have completed an in-person or virtual visit within the past 12 months or has a future visit scheduled within the next 90 days with the authorizing provider s specialty.  Urgent care and e-visits do not quality as an office visit for this protocol.          Passed - Patient is age 18 or older              LOV: 12/1/2023  Future Office visit:    Next 5 appointments (look out 90 days)      Aug 29, 2024 9:30 AM  Return Visit with Raj Nj MD  [facility] [address]  [place] [address]  [phone]     Last Prescription Date:   2/9/2023  Last Fill Qty/Refills:         45, R-3        Routing refill request to provider for review/approval. Unsure why medication was discontinued.    Cata Devlin RN  ....................  7/11/2024   7:09 AM

## 2024-07-16 DIAGNOSIS — E11.9 DIABETES MELLITUS TYPE 2, DIET-CONTROLLED (H): Primary | ICD-10-CM

## 2024-07-16 RX ORDER — LISINOPRIL 2.5 MG/1
2.5 TABLET ORAL DAILY
Qty: 30 TABLET | Refills: 0 | Status: SHIPPED | OUTPATIENT
Start: 2024-07-16 | End: 2024-09-12

## 2024-07-16 RX ORDER — LISINOPRIL 2.5 MG/1
2.5 TABLET ORAL DAILY
Qty: 90 TABLET | Refills: 0 | Status: SHIPPED | OUTPATIENT
Start: 2024-07-16 | End: 2024-09-12

## 2024-07-16 NOTE — TELEPHONE ENCOUNTER
Reason for call: Medication or medication refill    Have you contacted your pharmacy regarding this refill? yes    If No, direct the patient to contact the Pharmacy and discontinue telephone note using Erroneous Encounter.  If Yes, continue.    Name of medication requested: lisinopril     How many days of medication do you have left? Only has Thursday's dose     What pharmacy do you use? [facility] but may need to send to [facility] so he does not have to be without.      Preferred method for responding to this message: Telephone Call    Phone number patient can be reached at: Other phone number:  [phone] (kims number if there is a problem)    If we cannot reach you directly, may we leave a detailed response at the number you provided? Yes

## 2024-07-16 NOTE — TELEPHONE ENCOUNTER
-- Schedule MCW/med mgmt    Signed, Isma Martinez MD, FAAP, FACP  Internal Medicine & Pediatrics

## 2024-07-16 NOTE — TELEPHONE ENCOUNTER
Pt's daughter states Pt is taking this and has not bee discontinued. Pt has 1 dose for Thursday.    lisinopril (ZESTRIL) 2.5 MG tablet (Discontinued) 90 tablet 3 2/9/2023 8/1/2023 No   Sig - Route: Take 1 tablet (2.5 mg) by mouth daily - Oral   Patient not taking: Reported on 8/1/2023     Physical scheduled for 9/12.    Unable to complete prescription refill per RN Medication Refill Policy. Adore Baker RN .............. 7/16/2024  3:30 PM

## 2024-07-17 NOTE — TELEPHONE ENCOUNTER
Per note below, Pt is already scheduled for annual physical 9/12. Adore Baker RN .............. 7/17/2024  8:25 AM

## 2024-07-25 ENCOUNTER — LAB (OUTPATIENT)
Dept: LAB | Facility: OTHER | Age: 89
End: 2024-07-25
Attending: INTERNAL MEDICINE
Payer: COMMERCIAL

## 2024-07-25 ENCOUNTER — HOSPITAL ENCOUNTER (OUTPATIENT)
Dept: INFUSION THERAPY | Facility: OTHER | Age: 89
Discharge: HOME | End: 2024-07-25
Attending: INTERNAL MEDICINE
Payer: COMMERCIAL

## 2024-07-25 VITALS
HEART RATE: 55 BPM | SYSTOLIC BLOOD PRESSURE: 138 MMHG | BODY MASS INDEX: 24.45 KG/M2 | WEIGHT: 165.6 LBS | DIASTOLIC BLOOD PRESSURE: 71 MMHG | TEMPERATURE: 97.7 F | RESPIRATION RATE: 18 BRPM

## 2024-07-25 DIAGNOSIS — C79.51 PROSTATE CANCER METASTATIC TO BONE (H): Primary | ICD-10-CM

## 2024-07-25 DIAGNOSIS — C61 PROSTATE CANCER METASTATIC TO BONE (H): ICD-10-CM

## 2024-07-25 DIAGNOSIS — C79.51 PROSTATE CANCER METASTATIC TO BONE (H): ICD-10-CM

## 2024-07-25 DIAGNOSIS — C61 PROSTATE CANCER METASTATIC TO BONE (H): Primary | ICD-10-CM

## 2024-07-25 LAB
ALBUMIN SERPL BCG-MCNC: 4 G/DL (ref 3.5–5.2)
ALP SERPL-CCNC: 72 U/L (ref 40–150)
ALT SERPL W P-5'-P-CCNC: 10 U/L (ref 0–70)
ANION GAP SERPL CALCULATED.3IONS-SCNC: 11 MMOL/L (ref 7–15)
AST SERPL W P-5'-P-CCNC: 17 U/L (ref 0–45)
BASOPHILS # BLD AUTO: 0.1 10E3/UL (ref 0–0.2)
BASOPHILS NFR BLD AUTO: 1 %
BILIRUB SERPL-MCNC: 0.6 MG/DL
BUN SERPL-MCNC: 21.1 MG/DL (ref 8–23)
CALCIUM SERPL-MCNC: 9.7 MG/DL (ref 8.8–10.4)
CALCIUM SERPL-MCNC: 9.7 MG/DL (ref 8.8–10.4)
CHLORIDE SERPL-SCNC: 102 MMOL/L (ref 98–107)
CREAT SERPL-MCNC: 1.12 MG/DL (ref 0.67–1.17)
EGFRCR SERPLBLD CKD-EPI 2021: 64 ML/MIN/1.73M2
EOSINOPHIL # BLD AUTO: 0.3 10E3/UL (ref 0–0.7)
EOSINOPHIL NFR BLD AUTO: 5 %
ERYTHROCYTE [DISTWIDTH] IN BLOOD BY AUTOMATED COUNT: 13.4 % (ref 10–15)
GLUCOSE SERPL-MCNC: 173 MG/DL (ref 70–99)
HCO3 SERPL-SCNC: 25 MMOL/L (ref 22–29)
HCT VFR BLD AUTO: 40.4 % (ref 40–53)
HGB BLD-MCNC: 13.2 G/DL (ref 13.3–17.7)
IMM GRANULOCYTES # BLD: 0 10E3/UL
IMM GRANULOCYTES NFR BLD: 0 %
LYMPHOCYTES # BLD AUTO: 1.8 10E3/UL (ref 0.8–5.3)
LYMPHOCYTES NFR BLD AUTO: 27 %
MCH RBC QN AUTO: 29.7 PG (ref 26.5–33)
MCHC RBC AUTO-ENTMCNC: 32.7 G/DL (ref 31.5–36.5)
MCV RBC AUTO: 91 FL (ref 78–100)
MONOCYTES # BLD AUTO: 0.5 10E3/UL (ref 0–1.3)
MONOCYTES NFR BLD AUTO: 8 %
NEUTROPHILS # BLD AUTO: 4 10E3/UL (ref 1.6–8.3)
NEUTROPHILS NFR BLD AUTO: 59 %
NRBC # BLD AUTO: 0 10E3/UL
NRBC BLD AUTO-RTO: 0 /100
PLATELET # BLD AUTO: 217 10E3/UL (ref 150–450)
POTASSIUM SERPL-SCNC: 4.4 MMOL/L (ref 3.4–5.3)
PROT SERPL-MCNC: 7.2 G/DL (ref 6.4–8.3)
PSA SERPL DL<=0.01 NG/ML-MCNC: 0.04 NG/ML
RBC # BLD AUTO: 4.45 10E6/UL (ref 4.4–5.9)
SODIUM SERPL-SCNC: 138 MMOL/L (ref 135–145)
WBC # BLD AUTO: 6.8 10E3/UL (ref 4–11)

## 2024-07-25 PROCEDURE — 36415 COLL VENOUS BLD VENIPUNCTURE: CPT | Mod: ZL

## 2024-07-25 PROCEDURE — 258N000003 HC RX IP 258 OP 636: Performed by: INTERNAL MEDICINE

## 2024-07-25 PROCEDURE — 80053 COMPREHEN METABOLIC PANEL: CPT | Performed by: INTERNAL MEDICINE

## 2024-07-25 PROCEDURE — 84403 ASSAY OF TOTAL TESTOSTERONE: CPT | Mod: ZL

## 2024-07-25 PROCEDURE — 96365 THER/PROPH/DIAG IV INF INIT: CPT

## 2024-07-25 PROCEDURE — 80053 COMPREHEN METABOLIC PANEL: CPT | Mod: ZL

## 2024-07-25 PROCEDURE — 84153 ASSAY OF PSA TOTAL: CPT | Mod: ZL

## 2024-07-25 PROCEDURE — 96402 CHEMO HORMON ANTINEOPL SQ/IM: CPT

## 2024-07-25 PROCEDURE — 250N000011 HC RX IP 250 OP 636: Performed by: INTERNAL MEDICINE

## 2024-07-25 PROCEDURE — 85049 AUTOMATED PLATELET COUNT: CPT | Mod: ZL

## 2024-07-25 RX ORDER — METHYLPREDNISOLONE SODIUM SUCCINATE 125 MG/2ML
125 INJECTION, POWDER, LYOPHILIZED, FOR SOLUTION INTRAMUSCULAR; INTRAVENOUS
Status: CANCELLED
Start: 2024-07-29

## 2024-07-25 RX ORDER — ZOLEDRONIC ACID 0.04 MG/ML
4 INJECTION, SOLUTION INTRAVENOUS ONCE
Status: DISCONTINUED | OUTPATIENT
Start: 2024-07-25 | End: 2024-07-25 | Stop reason: DRUGHIGH

## 2024-07-25 RX ORDER — ALBUTEROL SULFATE 0.83 MG/ML
2.5 SOLUTION RESPIRATORY (INHALATION)
Status: CANCELLED | OUTPATIENT
Start: 2024-07-29

## 2024-07-25 RX ORDER — ALBUTEROL SULFATE 90 UG/1
1-2 AEROSOL, METERED RESPIRATORY (INHALATION)
Status: CANCELLED
Start: 2024-07-29

## 2024-07-25 RX ORDER — MEPERIDINE HYDROCHLORIDE 50 MG/ML
25 INJECTION INTRAMUSCULAR; INTRAVENOUS; SUBCUTANEOUS EVERY 30 MIN PRN
Status: CANCELLED | OUTPATIENT
Start: 2024-07-29

## 2024-07-25 RX ORDER — EPINEPHRINE 1 MG/ML
0.3 INJECTION, SOLUTION, CONCENTRATE INTRAVENOUS EVERY 5 MIN PRN
Status: CANCELLED | OUTPATIENT
Start: 2024-07-29

## 2024-07-25 RX ORDER — HEPARIN SODIUM,PORCINE 10 UNIT/ML
5-20 VIAL (ML) INTRAVENOUS DAILY PRN
Status: CANCELLED | OUTPATIENT
Start: 2024-07-29

## 2024-07-25 RX ORDER — DIPHENHYDRAMINE HYDROCHLORIDE 50 MG/ML
50 INJECTION INTRAMUSCULAR; INTRAVENOUS
Status: CANCELLED
Start: 2024-07-29

## 2024-07-25 RX ORDER — HEPARIN SODIUM (PORCINE) LOCK FLUSH IV SOLN 100 UNIT/ML 100 UNIT/ML
5 SOLUTION INTRAVENOUS
Status: CANCELLED | OUTPATIENT
Start: 2024-07-29

## 2024-07-25 RX ORDER — ZOLEDRONIC ACID 0.04 MG/ML
4 INJECTION, SOLUTION INTRAVENOUS ONCE
Status: CANCELLED | OUTPATIENT
Start: 2024-08-29 | End: 2024-07-29

## 2024-07-25 RX ADMIN — ZOLEDRONIC ACID 3.3 MG: 4 INJECTION, SOLUTION, CONCENTRATE INTRAVENOUS at 10:55

## 2024-07-25 RX ADMIN — LEUPROLIDE ACETATE 22.5 MG: KIT at 10:17

## 2024-07-25 RX ADMIN — SODIUM CHLORIDE 250 ML: 9 INJECTION, SOLUTION INTRAVENOUS at 10:55

## 2024-07-25 NOTE — NURSING NOTE
Infusion Nursing Note:  Michael JOSE RAUL Martinez presents today for Zometa/Lupron.    Patient seen by provider today: No   present during visit today: Not Applicable.    Note: N/A.    Intravenous Access:  Labs drawn by .  Peripheral IV placed.    Treatment Conditions:  Estimated Creatinine Clearance: 49.4 mL/min (based on SCr of 1.12 mg/dL).    Post Infusion Assessment:  Patient tolerated infusion without incident.  Patient tolerated injection without incident.  Blood return noted pre and post infusion.  Site patent and intact, free from redness, edema or discomfort.  No evidence of extravasations.  Access discontinued per protocol.     Discharge Plan:   Discharge instructions reviewed with: Patient.  Patient and/or family verbalized understanding of discharge instructions and all questions answered.  Copy of AVS reviewed with patient and/or family.  Patient will return 10/3/24  for Lupron and  next appointment.  AVS to patient via [employer].  Patient will return 8/29/24 for next appointment.   Patient discharged in stable condition accompanied by: self and daughter.  Departure Mode: Ambulatory.      Alison Poe RN

## 2024-07-25 NOTE — PHARMACY
Pharmacy- Renal Dose Adjustment    Patient Active Problem List   Diagnosis    Personal history of DVT (deep vein thrombosis)    History of pulmonary embolism    Abdominal aortic aneurysm without rupture (H24)    Bladder outlet obstruction    Ischemic cardiomyopathy    History of four vessel coronary artery bypass graft    H/O transurethral resection of prostate    Anticoagulation monitoring, INR range 2-3    Long-term (current) use of anticoagulants [Z79.01]    Cataract    DJD (degenerative joint disease) of cervical spine    Diaphragmatic hernia    Lumbar disc disease    Osteoarthrosis involving lower leg    Primary osteoarthritis of right hand    S/P CABG x 4    History of transurethral resection of prostate    Spinal stenosis, lumbar    Lesion of ulnar nerve    History of Guillain-[place] syndrome    Gastroesophageal reflux disease, esophagitis presence not specified    Primary osteoarthritis of left knee    Mixed hyperlipidemia    Peripheral arterial disease (H24)    OLIVIA (generalized anxiety disorder)    Cervical arthritis    Primary osteoarthritis involving multiple joints    Diabetes mellitus type 2, diet-controlled (H)    Benign prostatic hyperplasia without lower urinary tract symptoms    Pathological fracture of thoracic vertebra with routine healing, subsequent encounter    Closed wedge compression fracture of T8 vertebra with routine healing, subsequent encounter    Prostate cancer metastatic to bone (H)    History of gross hematuria    Chronic anticoagulation    Acute hemorrhagic cystitis    Gross hematuria        Relevant Labs:  Recent Labs   Lab Test 07/25/24  1006 06/27/24  0921   WBC 6.8 6.3   HGB 13.2* 12.4*    197        CrCl: 49.4 mL/min    No intake or output data in the 24 hours ending 07/25/24 1047       Per Renal Dose Adjustment Protocol, will adjust:  -Zoledronic acid to 3.3 mg (from 4 mg) due to CrCl 40-50 mL/min.      Will continue to follow and make adjustments accordingly. Thank  You.    Musa Farmer [facility] ....................  7/25/2024   10:47 AM

## 2024-07-28 LAB — TESTOST SERPL-MCNC: 5 NG/DL (ref 240–950)

## 2024-08-11 ENCOUNTER — TRANSFERRED RECORDS (OUTPATIENT)
Dept: MULTI SPECIALTY CLINIC | Facility: CLINIC | Age: 89
End: 2024-08-11

## 2024-08-11 LAB — RETINOPATHY: NORMAL

## 2024-08-29 ENCOUNTER — ONCOLOGY VISIT (OUTPATIENT)
Dept: ONCOLOGY | Facility: OTHER | Age: 89
End: 2024-08-29
Attending: INTERNAL MEDICINE
Payer: COMMERCIAL

## 2024-08-29 ENCOUNTER — APPOINTMENT (OUTPATIENT)
Dept: LAB | Facility: OTHER | Age: 89
End: 2024-08-29
Attending: INTERNAL MEDICINE
Payer: COMMERCIAL

## 2024-08-29 ENCOUNTER — HOSPITAL ENCOUNTER (OUTPATIENT)
Dept: INFUSION THERAPY | Facility: OTHER | Age: 89
Discharge: HOME | End: 2024-08-29
Attending: INTERNAL MEDICINE
Payer: COMMERCIAL

## 2024-08-29 VITALS
WEIGHT: 166.2 LBS | BODY MASS INDEX: 24.54 KG/M2 | TEMPERATURE: 97.6 F | OXYGEN SATURATION: 97 % | SYSTOLIC BLOOD PRESSURE: 140 MMHG | HEART RATE: 51 BPM | DIASTOLIC BLOOD PRESSURE: 73 MMHG | RESPIRATION RATE: 14 BRPM

## 2024-08-29 DIAGNOSIS — C79.51 PROSTATE CANCER METASTATIC TO BONE (H): Primary | ICD-10-CM

## 2024-08-29 DIAGNOSIS — C61 PROSTATE CANCER METASTATIC TO BONE (H): Primary | ICD-10-CM

## 2024-08-29 LAB
ALBUMIN SERPL BCG-MCNC: 4.2 G/DL (ref 3.5–5.2)
ALP SERPL-CCNC: 74 U/L (ref 40–150)
ALT SERPL W P-5'-P-CCNC: 11 U/L (ref 0–70)
ANION GAP SERPL CALCULATED.3IONS-SCNC: 12 MMOL/L (ref 7–15)
AST SERPL W P-5'-P-CCNC: 19 U/L (ref 0–45)
BASOPHILS # BLD AUTO: 0.1 10E3/UL (ref 0–0.2)
BASOPHILS NFR BLD AUTO: 1 %
BILIRUB SERPL-MCNC: 0.5 MG/DL
BUN SERPL-MCNC: 19.1 MG/DL (ref 8–23)
CALCIUM SERPL-MCNC: 10.3 MG/DL (ref 8.8–10.4)
CHLORIDE SERPL-SCNC: 102 MMOL/L (ref 98–107)
CREAT SERPL-MCNC: 1.15 MG/DL (ref 0.67–1.17)
EGFRCR SERPLBLD CKD-EPI 2021: 62 ML/MIN/1.73M2
EOSINOPHIL # BLD AUTO: 0.4 10E3/UL (ref 0–0.7)
EOSINOPHIL NFR BLD AUTO: 6 %
ERYTHROCYTE [DISTWIDTH] IN BLOOD BY AUTOMATED COUNT: 13.6 % (ref 10–15)
GLUCOSE SERPL-MCNC: 89 MG/DL (ref 70–99)
HCO3 SERPL-SCNC: 26 MMOL/L (ref 22–29)
HCT VFR BLD AUTO: 40.7 % (ref 40–53)
HGB BLD-MCNC: 13.4 G/DL (ref 13.3–17.7)
IMM GRANULOCYTES # BLD: 0 10E3/UL
IMM GRANULOCYTES NFR BLD: 0 %
LYMPHOCYTES # BLD AUTO: 2 10E3/UL (ref 0.8–5.3)
LYMPHOCYTES NFR BLD AUTO: 29 %
MCH RBC QN AUTO: 29.5 PG (ref 26.5–33)
MCHC RBC AUTO-ENTMCNC: 32.9 G/DL (ref 31.5–36.5)
MCV RBC AUTO: 90 FL (ref 78–100)
MONOCYTES # BLD AUTO: 0.6 10E3/UL (ref 0–1.3)
MONOCYTES NFR BLD AUTO: 9 %
NEUTROPHILS # BLD AUTO: 3.7 10E3/UL (ref 1.6–8.3)
NEUTROPHILS NFR BLD AUTO: 55 %
NRBC # BLD AUTO: 0 10E3/UL
NRBC BLD AUTO-RTO: 0 /100
PLATELET # BLD AUTO: 240 10E3/UL (ref 150–450)
POTASSIUM SERPL-SCNC: 4.6 MMOL/L (ref 3.4–5.3)
PROT SERPL-MCNC: 7.7 G/DL (ref 6.4–8.3)
PSA SERPL DL<=0.01 NG/ML-MCNC: 0.02 NG/ML
RBC # BLD AUTO: 4.54 10E6/UL (ref 4.4–5.9)
SODIUM SERPL-SCNC: 140 MMOL/L (ref 135–145)
WBC # BLD AUTO: 6.7 10E3/UL (ref 4–11)

## 2024-08-29 PROCEDURE — 250N000011 HC RX IP 250 OP 636: Performed by: INTERNAL MEDICINE

## 2024-08-29 PROCEDURE — 80053 COMPREHEN METABOLIC PANEL: CPT | Performed by: INTERNAL MEDICINE

## 2024-08-29 PROCEDURE — 84403 ASSAY OF TOTAL TESTOSTERONE: CPT | Performed by: INTERNAL MEDICINE

## 2024-08-29 PROCEDURE — 96365 THER/PROPH/DIAG IV INF INIT: CPT

## 2024-08-29 PROCEDURE — 85025 COMPLETE CBC W/AUTO DIFF WBC: CPT | Performed by: INTERNAL MEDICINE

## 2024-08-29 PROCEDURE — G0463 HOSPITAL OUTPT CLINIC VISIT: HCPCS | Mod: 25 | Performed by: NURSE PRACTITIONER

## 2024-08-29 PROCEDURE — 36415 COLL VENOUS BLD VENIPUNCTURE: CPT | Performed by: INTERNAL MEDICINE

## 2024-08-29 PROCEDURE — 258N000003 HC RX IP 258 OP 636: Performed by: INTERNAL MEDICINE

## 2024-08-29 PROCEDURE — 99214 OFFICE O/P EST MOD 30 MIN: CPT | Performed by: NURSE PRACTITIONER

## 2024-08-29 PROCEDURE — 84153 ASSAY OF PSA TOTAL: CPT | Performed by: INTERNAL MEDICINE

## 2024-08-29 RX ORDER — EPINEPHRINE 1 MG/ML
0.3 INJECTION, SOLUTION, CONCENTRATE INTRAVENOUS EVERY 5 MIN PRN
Status: CANCELLED | OUTPATIENT
Start: 2024-09-29

## 2024-08-29 RX ORDER — ZOLEDRONIC ACID 0.04 MG/ML
4 INJECTION, SOLUTION INTRAVENOUS ONCE
Status: DISCONTINUED | OUTPATIENT
Start: 2024-08-29 | End: 2024-08-29 | Stop reason: ALTCHOICE

## 2024-08-29 RX ORDER — ALBUTEROL SULFATE 0.83 MG/ML
2.5 SOLUTION RESPIRATORY (INHALATION)
Status: CANCELLED | OUTPATIENT
Start: 2024-09-29

## 2024-08-29 RX ORDER — MEPERIDINE HYDROCHLORIDE 50 MG/ML
25 INJECTION INTRAMUSCULAR; INTRAVENOUS; SUBCUTANEOUS EVERY 30 MIN PRN
Status: CANCELLED | OUTPATIENT
Start: 2024-09-29

## 2024-08-29 RX ORDER — HEPARIN SODIUM (PORCINE) LOCK FLUSH IV SOLN 100 UNIT/ML 100 UNIT/ML
5 SOLUTION INTRAVENOUS
Status: CANCELLED | OUTPATIENT
Start: 2024-09-29

## 2024-08-29 RX ORDER — DIPHENHYDRAMINE HYDROCHLORIDE 50 MG/ML
50 INJECTION INTRAMUSCULAR; INTRAVENOUS
Status: CANCELLED
Start: 2024-09-29

## 2024-08-29 RX ORDER — ALBUTEROL SULFATE 90 UG/1
1-2 AEROSOL, METERED RESPIRATORY (INHALATION)
Status: CANCELLED
Start: 2024-09-29

## 2024-08-29 RX ORDER — METHYLPREDNISOLONE SODIUM SUCCINATE 125 MG/2ML
125 INJECTION, POWDER, LYOPHILIZED, FOR SOLUTION INTRAMUSCULAR; INTRAVENOUS
Status: CANCELLED
Start: 2024-09-29

## 2024-08-29 RX ORDER — HEPARIN SODIUM,PORCINE 10 UNIT/ML
5-20 VIAL (ML) INTRAVENOUS DAILY PRN
Status: CANCELLED | OUTPATIENT
Start: 2024-09-29

## 2024-08-29 RX ADMIN — ZOLEDRONIC ACID 3.3 MG: 4 INJECTION, SOLUTION, CONCENTRATE INTRAVENOUS at 10:02

## 2024-08-29 RX ADMIN — SODIUM CHLORIDE 250 ML: 9 INJECTION, SOLUTION INTRAVENOUS at 10:01

## 2024-08-29 NOTE — PROGRESS NOTES
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 For details of history see previous notes. Patient had presented to the emergency department on 11/20/23 with rib cage pain. He did have a prior history of prior history of presumably BPH and had undergone multiple core biopsies of the prostate approxi-10 years ago at [facility] that were negative. He had undergone imaging in the ED on November 20 including CT scan that revealed mixed lytic and sclerotic lesion involving the T8 vertebral body with adjacent soft tissue component extending into the posterior mediastinum.  This was felt to be a pathologic fracture and the patient was transferred for appropriate care from [place] to [facility] in [place].  MRI thoracic spine revealed multifocal osseous metastatic disease with a dominant lesion at T8.  CT chest and CT abdomen/pelvis revealed T8 abnormality as well as enlarged prostate with potential bony pelvic metastatic disease as well as rib cage metastases. He underwent CT-guided biopsy of the T8 lesion came back consistent metastatic prostate carcinoma.  Review of thoracic films indicated that he had essentially widespread osseous metastatic disease involving thoracic spine including T8 as well as the pelvis as well as the rib cage. His PSA was extremely elevated at 121. It was felt that the patient would be a candidate for treatment for metastatic prostate cancer with biopsy-proven T8 bone metastases.  We elected to treat the patient with Lupron 22.5 mg IM every 3 months in addition to enzalutamide, which was indicated in the first-line setting for metastatic prostate cancer. Dr Nj recommended Xgeva monthly.  Patient underwent a bone scan on 12/19/2023 and the findings were that there was multifocal bony metastatic disease with a large area of abnormal  intense radiotracer uptake within the T8 vertebral body. There was a focus of abnormal uptake in the right pelvis and the superior acetabular region measuring 2 to 3 cm dimension. There are 2 small foci of abnormal uptake in the left hemipelvis.  There was uptake in the lateral left fifth sixth rib.  There are small foci of uptake at T12, L2 and L3.There were problems with insurance coverage and the patient was unable to start this regimen immediately.  His PSA had risen to 168. He presented to the emergency room with gross hematuria on December 22, 2023.  It was felt this was likely due to warfarin at patient was switched to Eliquis 2.5 mg p.o. twice daily 1.  Patient was able to start treatment beginning on January 8, 2024. Insurance would not cover Xgeva and therefore we had to switch the patient to zoledronic acid 4 mg IV q. 28 days.  The patient received his first treatment on January 29, 2024.  Since starting Lupron, Xtandi and Zometa, the patient had had a remarkable response. His PSA dropped from 150 down to 10.4 when we saw him in February 2024. Patient here today for his Zometa infusion. He is feeling well. He does have some ongoing fatigue. He is otherwise feeling well. Patient continues on Xtandi and is tolerating this. He had his Lupron injection on 7/25/24. Labs done today show a PSA of 0.02. All other labs are stable. Patient does have ongoing hot flashes, stable. Patient has no new concerns at this time.       Review Of Systems:  Review Of Systems  Respiratory: No shortness of breath, dyspnea on exertion, cough  Cardiovascular: denies chest pain  Gastrointestinal: denies abdominal pain, no bowel changes  Genitourinary: denies dysuria or hematuria  Musculoskeletal: reports generalized aches and pains, denies new bone pain  Neurologic: denies headaches, no dizziness  Hematologic/Lymphatic/Immunologic: denies fevers, no recent illness      Nursing Notes:   Margarita Damian, [facility]  8/29/2024  9:28 AM   Signed  Patient is being seen by provider in infusion: part of rooming assessments and vitals were done by the infusion RN.    Margarita Damian CMA ([facility])................ 8/29/2024 9:27 AM       Past medical, social, surgical, and family histories reviewed.    Allergies:  Allergies as of 08/29/2024 - Reviewed 08/29/2024   Allergen Reaction Noted    Influenza vac split [influenza virus vaccine]  11/16/2016       Current Medications:  Current Outpatient Medications   Medication Sig Dispense Refill    acetaminophen (TYLENOL) 325 MG tablet Take 650 mg by mouth every 6 hours as needed for pain      apixaban ANTICOAGULANT (ELIQUIS) 2.5 MG tablet Take 1 tablet (2.5 mg) by mouth 2 times daily (Patient not taking: Reported on 6/27/2024) 28 tablet 0    aspirin EC 81 MG tablet Take 81 mg by mouth      atorvastatin (LIPITOR) 10 MG tablet TAKE 1 TABLET BY MOUTH DAILY 90 tablet 3    Cholecalciferol (VITAMIN D-3) 1000 units CAPS Take 1 capsule by mouth daily      citalopram (CELEXA) 20 MG tablet TAKE 1 TABLET BY MOUTH DAILY (Patient not taking: Reported on 4/30/2024) 90 tablet 3    enzalutamide (XTANDI) 80 MG tablet Take 1 tablet (80 mg) by mouth daily 60 tablet 11    enzalutamide (XTANDI) 80 MG tablet Take 1 tablet (80 mg) by mouth daily 60 tablet 0    latanoprost (XALATAN) 0.005 % ophthalmic solution       lisinopril (ZESTRIL) 2.5 MG tablet Take 1 tablet (2.5 mg) by mouth daily 30 tablet 0    lisinopril (ZESTRIL) 2.5 MG tablet Take 1 tablet (2.5 mg) by mouth daily 90 tablet 0    omeprazole (PRILOSEC) 20 MG DR capsule TAKE 1 CAPSULE BY MOUTH EVERY  OTHER DAY (Patient not taking: Reported on 8/29/2024) 50 capsule 2    prochlorperazine (COMPAZINE) 10 MG tablet Take 1 tablet (10 mg) by mouth every 6 hours as needed for nausea or vomiting (Patient not taking: Reported on 5/29/2024) 30 tablet 2    rivaroxaban ANTICOAGULANT (XARELTO) 20 MG TABS tablet Take 1 tablet (20 mg) by mouth daily (with dinner) 90 tablet 3        Physical  Exam:  There were no vitals taken for this visit.    GENERAL APPEARANCE: 88 year old male, alert and no distress     RESP: lungs clear to auscultation - no rales, rhonchi or wheezes     CARDIOVASCULAR: regular rates and rhythm, normal S1 S2     ABDOMEN:  soft, nontender, bowel sounds normal     MUSCULOSKELETAL: extremities normal- no gross deformities noted, No edema b/l LE.     SKIN: no suspicious lesions or rashes on exposed skin     PSYCHIATRIC: mentation appears normal and affect normal    Laboratory/Imaging Studies  No visits with results within 2 Week(s) from this visit.   Latest known visit with results is:   Lab on 07/25/2024   Component Date Value Ref Range Status    PSA Tumor Marker 07/25/2024 0.04  ng/mL Final    No reference ranges have been established for patients over 80 years.    Testosterone Total 07/25/2024 5 (L)  240 - 950 ng/dL Final    Sodium 07/25/2024 138  135 - 145 mmol/L Final    Potassium 07/25/2024 4.4  3.4 - 5.3 mmol/L Final    Carbon Dioxide (CO2) 07/25/2024 25  22 - 29 mmol/L Final    Anion Gap 07/25/2024 11  7 - 15 mmol/L Final    Urea Nitrogen 07/25/2024 21.1  8.0 - 23.0 mg/dL Final    Creatinine 07/25/2024 1.12  0.67 - 1.17 mg/dL Final    GFR Estimate 07/25/2024 64  >60 mL/min/1.73m2 Final    eGFR calculated using 2021 CKD-EPI equation.    Calcium 07/25/2024 9.7  8.8 - 10.4 mg/dL Final    Reference intervals for this test were updated on 7/16/2024 to reflect our healthy population more accurately. There may be differences in the flagging of prior results with similar values performed with this method. Those prior results can be interpreted in the context of the updated reference intervals.    Chloride 07/25/2024 102  98 - 107 mmol/L Final    Glucose 07/25/2024 173 (H)  70 - 99 mg/dL Final    Alkaline Phosphatase 07/25/2024 72  40 - 150 U/L Final    AST 07/25/2024 17  0 - 45 U/L Final    ALT 07/25/2024 10  0 - 70 U/L Final    Protein Total 07/25/2024 7.2  6.4 - 8.3 g/dL Final     Albumin 07/25/2024 4.0  3.5 - 5.2 g/dL Final    Bilirubin Total 07/25/2024 0.6  <=1.2 mg/dL Final    WBC Count 07/25/2024 6.8  4.0 - 11.0 10e3/uL Final    RBC Count 07/25/2024 4.45  4.40 - 5.90 10e6/uL Final    Hemoglobin 07/25/2024 13.2 (L)  13.3 - 17.7 g/dL Final    Hematocrit 07/25/2024 40.4  40.0 - 53.0 % Final    MCV 07/25/2024 91  78 - 100 fL Final    MCH 07/25/2024 29.7  26.5 - 33.0 pg Final    MCHC 07/25/2024 32.7  31.5 - 36.5 g/dL Final    RDW 07/25/2024 13.4  10.0 - 15.0 % Final    Platelet Count 07/25/2024 217  150 - 450 10e3/uL Final    % Neutrophils 07/25/2024 59  % Final    % Lymphocytes 07/25/2024 27  % Final    % Monocytes 07/25/2024 8  % Final    % Eosinophils 07/25/2024 5  % Final    % Basophils 07/25/2024 1  % Final    % Immature Granulocytes 07/25/2024 0  % Final    NRBCs per 100 WBC 07/25/2024 0  <1 /100 Final    Absolute Neutrophils 07/25/2024 4.0  1.6 - 8.3 10e3/uL Final    Absolute Lymphocytes 07/25/2024 1.8  0.8 - 5.3 10e3/uL Final    Absolute Monocytes 07/25/2024 0.5  0.0 - 1.3 10e3/uL Final    Absolute Eosinophils 07/25/2024 0.3  0.0 - 0.7 10e3/uL Final    Absolute Basophils 07/25/2024 0.1  0.0 - 0.2 10e3/uL Final    Absolute Immature Granulocytes 07/25/2024 0.0  <=0.4 10e3/uL Final    Absolute NRBCs 07/25/2024 0.0  10e3/uL Final        ASSESSMENT/PLAN:  Metastatic hormone sensitive prostate cancer with bony metastasis involving T8 thoracic spine pelvis rib cage. This was confirmed by T8 bone biopsy. Patient was initiated on androgen deprivation therapy with enzalutamide and zoledronic acid.  Patient has had a positive response with PSA dropping. Labs done today show a PSA of 0.02. All other resulted labs are stable. The plan is to continue Lupron 22.5 mg IM q. 3 months, zoledronic acid 4 mg IV q. 28 days and enzalutamide at a dose of 80 mg p.o. daily. Plan is to see the patient in 3 months. Patient will continue with monthly standing labs.     Thirty two minutes spent with this encounter  with time spent reviewing patient records, counseling patient regarding disease process, interpretation and review of labs with patient, discussing plan for ongoing treatment, obtaining a review of systems, performing exam, documenting in EHR and coordination of care

## 2024-08-29 NOTE — NURSING NOTE
Infusion Nursing Note:  Michael Martinez presents today for Zometa.    Patient seen by provider today: Yes Jacklyn Arce NP   present during visit today: Not Applicable.    Note: N/A.      Intravenous Access:  Labs drawn without difficulty.  Peripheral IV placed.    Treatment Conditions:  Lab Results   Component Value Date     08/29/2024    POTASSIUM 4.6 08/29/2024    MAG 1.7 11/20/2023    CR 1.15 08/29/2024    OLIVER 10.3 08/29/2024    BILITOTAL 0.5 08/29/2024    ALBUMIN 4.2 08/29/2024    ALT 11 08/29/2024    AST 19 08/29/2024       Results reviewed, labs MET treatment parameters, ok to proceed with treatment.      Post Infusion Assessment:  Patient tolerated infusion without incident.  Blood return noted pre and post infusion.  Site patent and intact, free from redness, edema or discomfort.  No evidence of extravasations.  Access discontinued per protocol.       Discharge Plan:   Discharge instructions reviewed with: Patient.  Patient and/or family verbalized understanding of discharge instructions and all questions answered.  Copy of AVS reviewed with patient and/or family.  Patient will return 10/3/24 for next appointment.  Patient discharged in stable condition accompanied by: self and daughter.  Departure Mode: Ambulatory.      Constance Chandler RN

## 2024-08-29 NOTE — NURSING NOTE
Patient is being seen by provider in infusion: part of rooming assessments and vitals were done by the infusion RN.    Margarita Damian CMA ([facility])................ 8/29/2024 9:27 AM

## 2024-09-03 LAB — TESTOST SERPL-MCNC: 7 NG/DL (ref 240–950)

## 2024-09-12 ENCOUNTER — OFFICE VISIT (OUTPATIENT)
Dept: PEDIATRICS | Facility: OTHER | Age: 89
End: 2024-09-12
Attending: INTERNAL MEDICINE
Payer: COMMERCIAL

## 2024-09-12 VITALS
DIASTOLIC BLOOD PRESSURE: 83 MMHG | OXYGEN SATURATION: 98 % | HEIGHT: 69 IN | RESPIRATION RATE: 20 BRPM | SYSTOLIC BLOOD PRESSURE: 139 MMHG | HEART RATE: 73 BPM | WEIGHT: 165.5 LBS | BODY MASS INDEX: 24.51 KG/M2 | TEMPERATURE: 98 F

## 2024-09-12 DIAGNOSIS — Z95.1 S/P CABG X 4: ICD-10-CM

## 2024-09-12 DIAGNOSIS — E78.2 MIXED HYPERLIPIDEMIA: ICD-10-CM

## 2024-09-12 DIAGNOSIS — Z86.69 HISTORY OF GUILLAIN-BARRE SYNDROME: Chronic | ICD-10-CM

## 2024-09-12 DIAGNOSIS — Z00.00 ENCOUNTER FOR MEDICARE ANNUAL WELLNESS EXAM: Primary | ICD-10-CM

## 2024-09-12 DIAGNOSIS — Z86.711 HISTORY OF PULMONARY EMBOLISM: ICD-10-CM

## 2024-09-12 DIAGNOSIS — Z86.718 PERSONAL HISTORY OF DVT (DEEP VEIN THROMBOSIS): ICD-10-CM

## 2024-09-12 DIAGNOSIS — Z79.01 CHRONIC ANTICOAGULATION: ICD-10-CM

## 2024-09-12 DIAGNOSIS — Z29.11 NEED FOR VACCINATION AGAINST RESPIRATORY SYNCYTIAL VIRUS: ICD-10-CM

## 2024-09-12 DIAGNOSIS — I25.5 ISCHEMIC CARDIOMYOPATHY: ICD-10-CM

## 2024-09-12 DIAGNOSIS — Z23 NEED FOR VACCINATION: ICD-10-CM

## 2024-09-12 DIAGNOSIS — Z23 NEED FOR SHINGLES VACCINE: ICD-10-CM

## 2024-09-12 DIAGNOSIS — Z79.01 LONG TERM CURRENT USE OF ANTICOAGULANT THERAPY: ICD-10-CM

## 2024-09-12 DIAGNOSIS — C79.51 PROSTATE CANCER METASTATIC TO BONE (H): ICD-10-CM

## 2024-09-12 DIAGNOSIS — E11.9 DIABETES MELLITUS TYPE 2, DIET-CONTROLLED (H): ICD-10-CM

## 2024-09-12 DIAGNOSIS — C61 PROSTATE CANCER METASTATIC TO BONE (H): ICD-10-CM

## 2024-09-12 DIAGNOSIS — Z23 NEED FOR TDAP VACCINATION: ICD-10-CM

## 2024-09-12 PROBLEM — N30.01 ACUTE HEMORRHAGIC CYSTITIS: Status: RESOLVED | Noted: 2023-12-23 | Resolved: 2024-09-12

## 2024-09-12 PROBLEM — R31.0 GROSS HEMATURIA: Status: RESOLVED | Noted: 2023-12-23 | Resolved: 2024-09-12

## 2024-09-12 PROCEDURE — G0439 PPPS, SUBSEQ VISIT: HCPCS | Performed by: INTERNAL MEDICINE

## 2024-09-12 PROCEDURE — 99214 OFFICE O/P EST MOD 30 MIN: CPT | Mod: 25 | Performed by: INTERNAL MEDICINE

## 2024-09-12 PROCEDURE — G0463 HOSPITAL OUTPT CLINIC VISIT: HCPCS

## 2024-09-12 RX ORDER — ATORVASTATIN CALCIUM 10 MG/1
10 TABLET, FILM COATED ORAL DAILY
Qty: 90 TABLET | Refills: 4 | Status: SHIPPED | OUTPATIENT
Start: 2024-09-12

## 2024-09-12 RX ORDER — LISINOPRIL 2.5 MG/1
2.5 TABLET ORAL DAILY
Qty: 90 TABLET | Refills: 4 | Status: SHIPPED | OUTPATIENT
Start: 2024-09-12

## 2024-09-12 ASSESSMENT — PAIN SCALES - GENERAL: PAINLEVEL: NO PAIN (0)

## 2024-09-12 NOTE — NURSING NOTE
"Chief Complaint   Patient presents with    Medicare Visit   Patient presents to the Clinic today for his medicare wellness visit.    Initial /83 (BP Location: Right arm, Patient Position: Sitting, Cuff Size: Adult Regular)   Pulse 73   Temp 98  F (36.7  C) (Tympanic)   Resp 20   Ht 1.753 m (5' 9\")   Wt 75.1 kg (165 lb 8 oz)   SpO2 98%   BMI 24.44 kg/m   Estimated body mass index is 24.44 kg/m  as calculated from the following:    Height as of this encounter: 1.753 m (5' 9\").    Weight as of this encounter: 75.1 kg (165 lb 8 oz).  Medication Review: complete    The next two questions are to help us understand your food security.  If you are feeling you need any assistance in this area, we have resources available to support you today.          9/12/2024   SDOH- Food Insecurity   Within the past 12 months, did you worry that your food would run out before you got money to buy more? N   Within the past 12 months, did the food you bought just not last and you didn t have money to get more? N            Health Care Directive:  Patient does not have a Health Care Directive or Living Will: Patient states has Advance Directive and will bring in a copy to clinic.    Lakesha Doan      "

## 2024-09-12 NOTE — PATIENT INSTRUCTIONS
Patient Education   Preventive Care Advice   This is general advice given by our system to help you stay healthy. However, your care team may have specific advice just for you. Please talk to your care team about your preventive care needs.  Nutrition  Eat 5 or more servings of fruits and vegetables each day.  Try wheat bread, brown rice and whole grain pasta (instead of white bread, rice, and pasta).  Get enough calcium and vitamin D. Check the label on foods and aim for 100% of the RDA (recommended daily allowance).  Lifestyle  Exercise at least 150 minutes each week  (30 minutes a day, 5 days a week).  Do muscle strengthening activities 2 days a week. These help control your weight and prevent disease.  No smoking.  Wear sunscreen to prevent skin cancer.  Have a dental exam and cleaning every 6 months.  Yearly exams  See your health care team every year to talk about:  Any changes in your health.  Any medicines your care team has prescribed.  Preventive care, family planning, and ways to prevent chronic diseases.  Shots (vaccines)   HPV shots (up to age 26), if you've never had them before.  Hepatitis B shots (up to age 59), if you've never had them before.  COVID-19 shot: Get this shot when it's due.  Flu shot: Get a flu shot every year.  Tetanus shot: Get a tetanus shot every 10 years.  Pneumococcal, hepatitis A, and RSV shots: Ask your care team if you need these based on your risk.  Shingles shot (for age 50 and up)  General health tests  Diabetes screening:  Starting at age 35, Get screened for diabetes at least every 3 years.  If you are younger than age 35, ask your care team if you should be screened for diabetes.  Cholesterol test: At age 39, start having a cholesterol test every 5 years, or more often if advised.  Bone density scan (DEXA): At age 50, ask your care team if you should have this scan for osteoporosis (brittle bones).  Hepatitis C: Get tested at least once in your life.  STIs (sexually  transmitted infections)  Before age 24: Ask your care team if you should be screened for STIs.  After age 24: Get screened for STIs if you're at risk. You are at risk for STIs (including HIV) if:  You are sexually active with more than one person.  You don't use condoms every time.  You or a partner was diagnosed with a sexually transmitted infection.  If you are at risk for HIV, ask about PrEP medicine to prevent HIV.  Get tested for HIV at least once in your life, whether you are at risk for HIV or not.  Cancer screening tests  Cervical cancer screening: If you have a cervix, begin getting regular cervical cancer screening tests starting at age 21.  Breast cancer scan (mammogram): If you've ever had breasts, begin having regular mammograms starting at age 40. This is a scan to check for breast cancer.  Colon cancer screening: It is important to start screening for colon cancer at age 45.  Have a colonoscopy test every 10 years (or more often if you're at risk) Or, ask your provider about stool tests like a FIT test every year or Cologuard test every 3 years.  To learn more about your testing options, visit:   .  For help making a decision, visit:   https://bit.ly/rj19949.  Prostate cancer screening test: If you have a prostate, ask your care team if a prostate cancer screening test (PSA) at age 55 is right for you.  Lung cancer screening: If you are a current or former smoker ages 50 to 80, ask your care team if ongoing lung cancer screenings are right for you.  For informational purposes only. Not to replace the advice of your health care provider. Copyright   2023 [facility]. All rights reserved. Clinically reviewed by the [facility]. [employer] 160290 - REV 01/24.  Preventing Falls: Care Instructions  Injuries and health problems such as trouble walking or poor eyesight can increase your risk of falling. So can some medicines. But there are things you can do to help  "prevent falls. You can exercise to get stronger. You can also arrange your home to make it safer.    Talk to your doctor about the medicines you take. Ask if any of them increase the risk of falls and whether they can be changed or stopped.   Try to exercise regularly. It can help improve your strength and balance. This can help lower your risk of falling.     Practice fall safety and prevention.    Wear low-heeled shoes that fit well and give your feet good support. Talk to your doctor if you have foot problems that make this hard.  Carry a cellphone or wear a medical alert device that you can use to call for help.  Use stepladders instead of chairs to reach high objects. Don't climb if you're at risk for falls. Ask for help, if needed.  Wear the correct eyeglasses, if you need them.    Make your home safer.    Remove rugs, cords, clutter, and furniture from walkways.  Keep your house well lit. Use night-lights in hallways and bathrooms.  Install and use sturdy handrails on stairways.  Wear nonskid footwear, even inside. Don't walk barefoot or in socks without shoes.    Be safe outside.    Use handrails, curb cuts, and ramps whenever possible.  Keep your hands free by using a shoulder bag or backpack.  Try to walk in well-lit areas. Watch out for uneven ground, changes in pavement, and debris.  Be careful in the winter. Walk on the grass or gravel when sidewalks are slippery. Use de-icer on steps and walkways. Add non-slip devices to shoes.    Put grab bars and nonskid mats in your shower or tub and near the toilet. Try to use a shower chair or bath bench when bathing.   Get into a tub or shower by putting in your weaker leg first. Get out with your strong side first. Have a phone or medical alert device in the bathroom with you.   Where can you learn more?  Go to https://www.[employer].net/patiented  Enter G117 in the search box to learn more about \"Preventing Falls: Care Instructions.\"  Current as of: July 17, " 2023               Content Version: 14.0    4969-5882 [employer].   Care instructions adapted under license by your healthcare professional. If you have questions about a medical condition or this instruction, always ask your healthcare professional. [employer] disclaims any warranty or liability for your use of this information.      Hearing Loss: Care Instructions  Overview     Hearing loss is a sudden or slow decrease in how well you hear. It can range from slight to profound. Permanent hearing loss can occur with aging. It also can happen when you are exposed long-term to loud noise. Examples include listening to loud music, riding motorcycles, or being around other loud machines.  Hearing loss can affect your work and home life. It can make you feel lonely or depressed. You may feel that you have lost your independence. But hearing aids and other devices can help you hear better and feel connected to others.  Follow-up care is a key part of your treatment and safety. Be sure to make and go to all appointments, and call your doctor if you are having problems. It's also a good idea to know your test results and keep a list of the medicines you take.  How can you care for yourself at home?  Avoid loud noises whenever possible. This helps keep your hearing from getting worse.  Always wear hearing protection around loud noises.  Wear a hearing aid as directed.  A professional can help you pick a hearing aid that will work best for you.  You can also get hearing aids over the counter for mild to moderate hearing loss.  Have hearing tests as your doctor suggests. They can show whether your hearing has changed. Your hearing aid may need to be adjusted.  Use other devices as needed. These may include:  Telephone amplifiers and hearing aids that can connect to a television, stereo, radio, or microphone.  Devices that use lights or vibrations. These alert you to the doorbell, a ringing  "telephone, or a baby monitor.  Television closed-captioning. This shows the words at the bottom of the screen. Most new TVs can do this.  TTY (text telephone). This lets you type messages back and forth on the telephone instead of talking or listening. These devices are also called TDD. When messages are typed on the keyboard, they are sent over the phone line to a receiving TTY. The message is shown on a monitor.  Use text messaging, social media, and email if it is hard for you to communicate by telephone.  Try to learn a listening technique called speechreading. It is not lipreading. You pay attention to people's gestures, expressions, posture, and tone of voice. These clues can help you understand what a person is saying. Face the person you are talking to, and have them face you. Make sure the lighting is good. You need to see the other person's face clearly.  Think about counseling if you need help to adjust to your hearing loss.  When should you call for help?  Watch closely for changes in your health, and be sure to contact your doctor if:    You think your hearing is getting worse.     You have new symptoms, such as dizziness or nausea.   Where can you learn more?  Go to https://www.[employer].net/patiented  Enter R798 in the search box to learn more about \"Hearing Loss: Care Instructions.\"  Current as of: September 27, 2023               Content Version: 14.0    8720-3420 [employer].   Care instructions adapted under license by your healthcare professional. If you have questions about a medical condition or this instruction, always ask your healthcare professional. [employer] disclaims any warranty or liability for your use of this information.      Learning About Sleeping Well  What does sleeping well mean?     Sleeping well means getting enough sleep to feel good and stay healthy. How much sleep is enough varies among people.  The number of hours you sleep and how you feel " when you wake up are both important. If you do not feel refreshed, you probably need more sleep. Another sign of not getting enough sleep is feeling tired during the day.  Experts recommend that adults get at least 7 or more hours of sleep per day. Children and older adults need more sleep.  Why is getting enough sleep important?  Getting enough quality sleep is a basic part of good health. When your sleep suffers, your physical health, mood, and your thoughts can suffer too. You may find yourself feeling more grumpy or stressed. Not getting enough sleep also can lead to serious problems, including injury, accidents, anxiety, and depression.  What might cause poor sleeping?  Many things can cause sleep problems, including:  Changes to your sleep schedule.  Stress. Stress can be caused by fear about a single event, such as giving a speech. Or you may have ongoing stress, such as worry about work or school.  Depression, anxiety, and other mental or emotional conditions.  Changes in your sleep habits or surroundings. This includes changes that happen where you sleep, such as noise, light, or sleeping in a different bed. It also includes changes in your sleep pattern, such as having jet lag or working a late shift.  Health problems, such as pain, breathing problems, and restless legs syndrome.  Lack of regular exercise.  Using alcohol, nicotine, or caffeine before bed.  How can you help yourself?  Here are some tips that may help you sleep more soundly and wake up feeling more refreshed.  Your sleeping area   Use your bedroom only for sleeping and sex. A bit of light reading may help you fall asleep. But if it doesn't, do your reading elsewhere in the house. Try not to use your TV, computer, smartphone, or tablet while you are in bed.  Be sure your bed is big enough to stretch out comfortably, especially if you have a sleep partner.  Keep your bedroom quiet, dark, and cool. Use curtains, blinds, or a sleep mask to block  "out light. To block out noise, use earplugs, soothing music, or a \"white noise\" machine.  Your evening and bedtime routine   Create a relaxing bedtime routine. You might want to take a warm shower or bath, or listen to soothing music.  Go to bed at the same time every night. And get up at the same time every morning, even if you feel tired.  What to avoid   Limit caffeine (coffee, tea, caffeinated sodas) during the day, and don't have any for at least 6 hours before bedtime.  Avoid drinking alcohol before bedtime. Alcohol can cause you to wake up more often during the night.  Try not to smoke or use tobacco, especially in the evening. Nicotine can keep you awake.  Limit naps during the day, especially close to bedtime.  Avoid lying in bed awake for too long. If you can't fall asleep or if you wake up in the middle of the night and can't get back to sleep within about 20 minutes, get out of bed and go to another room until you feel sleepy.  Avoid taking medicine right before bed that may keep you awake or make you feel hyper or energized. Your doctor can tell you if your medicine may do this and if you can take it earlier in the day.  If you can't sleep   Imagine yourself in a peaceful, pleasant scene. Focus on the details and feelings of being in a place that is relaxing.  Get up and do a quiet or boring activity until you feel sleepy.  Avoid drinking any liquids before going to bed to help prevent waking up often to use the bathroom.  Where can you learn more?  Go to https://www.[facility].net/patiented  Enter J942 in the search box to learn more about \"Learning About Sleeping Well.\"  Current as of: July 10, 2023  Content Version: 14.1 2006-2024 [employer].   Care instructions adapted under license by your healthcare professional. If you have questions about a medical condition or this instruction, always ask your healthcare professional. [employer] disclaims any warranty or liability " for your use of this information.

## 2024-09-12 NOTE — PROGRESS NOTES
Preventive Care Visit  [facility]  Isma Martinez MD, Internal Medicine  Sep 12, 2024      1. Encounter for Medicare annual wellness exam    2. Long-term (current) use of anticoagulants [Z79.01]  3. History of pulmonary embolism  At goal, no change.  - rivaroxaban ANTICOAGULANT (XARELTO) 20 MG TABS tablet; Take 1 tablet (20 mg) by mouth daily (with dinner).  Dispense: 90 tablet; Refill: 4    4. Diabetes mellitus type 2, diet-controlled (H)  At goal, no change.  The patient declines lab work  - lisinopril (ZESTRIL) 2.5 MG tablet; Take 1 tablet (2.5 mg) by mouth daily.  Dispense: 90 tablet; Refill: 4    5. Mixed hyperlipidemia  At goal, no change.  The patient declines lab work  - atorvastatin (LIPITOR) 10 MG tablet; Take 1 tablet (10 mg) by mouth daily.  Dispense: 90 tablet; Refill: 4    6. History of Guillain-[place] syndrome    7. S/P CABG x 4  Symptoms are stable    8. Prostate cancer metastatic to bone (H)  Patient declines lab work    9. Chronic anticoagulation  10. Personal history of DVT (deep vein thrombosis)  11. Ischemic cardiomyopathy    12. Need for shingles vaccine  13. Need for Tdap vaccination  14. Need for vaccination against respiratory syncytial virus  15. Need for vaccination  - zoster vaccine recombinant adjuvanted (SHINGRIX) injection; Inject 0.5 mLs into the muscle once for 1 dose. Pharmacist administered  Dispense: 0.5 mL; Refill: 0  - Tdap, tetanus-diptheria-acell pertussis, (BOOSTRIX) 5-2.5-18.5 LF-MCG/0.5 SWETA injection; Inject 0.5 mLs into the muscle once for 1 dose.  Dispense: 0.5 mL; Refill: 0  - RSV vaccine, bivalent, ABRYSVO, injection; Inject 0.5 mLs into the muscle once for 1 dose. Pharmacist administered  Dispense: 0.5 mL; Refill: 0          Signed, Isma Martinez MD, FAAP, FACP  Internal Medicine & Pediatrics      Subjective   Karl is a 88 year old, presenting for the following:  Medicare Visit  Medication management request        9/12/2024    10:52 AM    Additional Questions   Roomed by JORGE Ramos   Accompanied by Daughter         Via the Health Maintenance questionnaire, the patient has reported the following services have been completed -Eye Exam: dr arevalo 2024-08-11, this information has been sent to the abstraction team.  Health Care Directive  Patient does not have a Health Care Directive or Living Will:     HPI              9/12/2024   General Health   How would you rate your overall physical health? (!) DECLINE   Feel stress (tense, anxious, or unable to sleep) To some extent      (!) STRESS CONCERN      9/12/2024   Nutrition   Diet: I don't know            2/9/2023   Exercise   Frequency of exercise: 6-7 days/week            9/12/2024   Social Factors   Frequency of gathering with friends or relatives More than three times a week   Worry food won't last until get money to buy more No   Food not last or not have enough money for food? No   Do you have housing? (Housing is defined as stable permanent housing and does not include staying ouside in a car, in a tent, in an abandoned building, in an overnight shelter, or couch-surfing.) Yes   Are you worried about losing your housing? No   Lack of transportation? No   Unable to get utilities (heat,electricity)? No            9/12/2024   Fall Risk   Fallen 2 or more times in the past year? No   Trouble with walking or balance? Yes   Gait Speed Test (Document in seconds) 3.66   Gait Speed Test Interpretation Less than or equal to 5.00 seconds - PASS             9/12/2024   Activities of Daily Living- Home Safety   Needs help with the following daily activites None of the above   Safety concerns in the home None of the above            9/12/2024   Dental   Dentist two times every year? (!) NO            9/12/2024   Hearing Screening   Hearing concerns? (!) I FEEL THAT PEOPLE ARE MUMBLING OR NOT SPEAKING CLEARLY.            9/12/2024   Driving Risk Screening   Patient/family members have concerns about driving  No            9/12/2024   General Alertness/Fatigue Screening   Have you been more tired than usual lately? (!) YES            9/12/2024   Urinary Incontinence Screening   Bothered by leaking urine in past 6 months No            9/12/2024   TB Screening   Were you born outside of the [place]? No            Today's PHQ-2 Score:       9/12/2024    10:46 AM   PHQ-2 ( 1999 Pfizer)   Q1: Little interest or pleasure in doing things 0   Q2: Feeling down, depressed or hopeless 0   PHQ-2 Score 0   Q1: Little interest or pleasure in doing things Not at all   Q2: Feeling down, depressed or hopeless Not at all   PHQ-2 Score 0           9/12/2024   Substance Use   Alcohol more than 3/day or more than 7/wk Not Applicable   Do you have a current opioid prescription? No   How severe/bad is pain from 1 to 10? 5/10   Do you use any other substances recreationally? No        Social History     Tobacco Use    Smoking status: Former     Current packs/day: 0.90     Average packs/day: 0.9 packs/day for 9.0 years (8.1 ttl pk-yrs)     Types: Cigarettes     Passive exposure: Never    Smokeless tobacco: Never   Vaping Use    Vaping status: Never Used   Substance Use Topics    Alcohol use: Yes     Alcohol/week: 3.0 standard drinks of alcohol     Types: 3 Cans of beer per week     Comment: beer 3 times per week     Drug use: Never             Reviewed and updated as needed this visit by Provider                      Current providers sharing in care for this patient include:  Patient Care Team:  Isma Martinez MD as PCP - General (Pediatrics)  Javier Lanier LICSW as Assigned Behavioral Health Provider  Raj Nj MD as Assigned Cancer Care Provider  Isma Martinez MD as Assigned PCP  Alin West MD as Assigned Surgical Provider    The following health maintenance items are reviewed in Epic and correct as of today:  Health Maintenance   Topic Date Due    ZOSTER IMMUNIZATION (1 of 2) Never done    RSV VACCINE (1 -  "1-dose 75+ series) Never done    DTAP/TDAP/TD IMMUNIZATION (1 - Tdap) 02/04/2018    COVID-19 Vaccine (3 - Pfizer risk series) 05/12/2021    DIABETIC FOOT EXAM  09/15/2023    A1C  11/01/2023    EYE EXAM  12/01/2023    MEDICARE ANNUAL WELLNESS VISIT  02/09/2024    LIPID  02/09/2024    MICROALBUMIN  08/01/2024    INFLUENZA VACCINE (1) 09/01/2024    BMP  08/29/2025    FALL RISK ASSESSMENT  09/12/2025    ADVANCE CARE PLANNING  09/12/2029    PHQ-2 (once per calendar year)  Completed    Pneumococcal Vaccine: 65+ Years  Completed    HPV IMMUNIZATION  Aged Out    MENINGITIS IMMUNIZATION  Aged Out    RSV MONOCLONAL ANTIBODY  Aged Out            Objective    Exam  /83 (BP Location: Right arm, Patient Position: Sitting, Cuff Size: Adult Regular)   Pulse 73   Temp 98  F (36.7  C) (Tympanic)   Resp 20   Ht 1.753 m (5' 9\")   Wt 75.1 kg (165 lb 8 oz)   SpO2 98%   BMI 24.44 kg/m     Estimated body mass index is 24.44 kg/m  as calculated from the following:    Height as of this encounter: 1.753 m (5' 9\").    Weight as of this encounter: 75.1 kg (165 lb 8 oz).    Physical Exam  Gen: Alert, NAD.  Neck: No carotid bruits  CV: RRR no m/r/g  Pulm: CTAB, no w/r/r. No increased work of breathing  Msk: No LE edema  Neuro: Grossly intact  Skin: No concerning lesions.  Psychiatric: Normal affect and insight. Does not appear anxious or depressed.          9/12/2024   Mini Cog   Clock Draw Score 2 Normal   3 Item Recall 3 objects recalled   Mini Cog Total Score 5                 Signed Electronically by: Isma Martinez MD    "

## 2024-09-25 DIAGNOSIS — C79.51 PROSTATE CANCER METASTATIC TO BONE (H): Primary | ICD-10-CM

## 2024-09-25 DIAGNOSIS — C61 PROSTATE CANCER METASTATIC TO BONE (H): Primary | ICD-10-CM

## 2024-10-03 ENCOUNTER — APPOINTMENT (OUTPATIENT)
Dept: LAB | Facility: OTHER | Age: 89
End: 2024-10-03
Attending: INTERNAL MEDICINE
Payer: COMMERCIAL

## 2024-10-03 ENCOUNTER — DOCUMENTATION ONLY (OUTPATIENT)
Dept: ONCOLOGY | Facility: OTHER | Age: 89
End: 2024-10-03

## 2024-10-03 ENCOUNTER — HOSPITAL ENCOUNTER (OUTPATIENT)
Dept: INFUSION THERAPY | Facility: OTHER | Age: 89
Discharge: HOME | End: 2024-10-03
Attending: INTERNAL MEDICINE
Payer: COMMERCIAL

## 2024-10-03 VITALS
BODY MASS INDEX: 24.66 KG/M2 | TEMPERATURE: 98.1 F | DIASTOLIC BLOOD PRESSURE: 75 MMHG | SYSTOLIC BLOOD PRESSURE: 149 MMHG | RESPIRATION RATE: 20 BRPM | WEIGHT: 167 LBS | HEART RATE: 55 BPM

## 2024-10-03 DIAGNOSIS — C79.51 PROSTATE CANCER METASTATIC TO BONE (H): Primary | ICD-10-CM

## 2024-10-03 DIAGNOSIS — C61 PROSTATE CANCER METASTATIC TO BONE (H): Primary | ICD-10-CM

## 2024-10-03 LAB
ALBUMIN SERPL BCG-MCNC: 4.2 G/DL (ref 3.5–5.2)
ALBUMIN SERPL BCG-MCNC: 4.3 G/DL (ref 3.5–5.2)
ALP SERPL-CCNC: 74 U/L (ref 40–150)
ALT SERPL W P-5'-P-CCNC: 16 U/L (ref 0–70)
ANION GAP SERPL CALCULATED.3IONS-SCNC: 12 MMOL/L (ref 7–15)
AST SERPL W P-5'-P-CCNC: 24 U/L (ref 0–45)
BASOPHILS # BLD AUTO: 0.1 10E3/UL (ref 0–0.2)
BASOPHILS NFR BLD AUTO: 1 %
BILIRUB SERPL-MCNC: 0.7 MG/DL
BUN SERPL-MCNC: 22.6 MG/DL (ref 8–23)
CALCIUM SERPL-MCNC: 10.1 MG/DL (ref 8.8–10.4)
CALCIUM SERPL-MCNC: 10.1 MG/DL (ref 8.8–10.4)
CHLORIDE SERPL-SCNC: 102 MMOL/L (ref 98–107)
CREAT SERPL-MCNC: 1.21 MG/DL (ref 0.67–1.17)
CREAT SERPL-MCNC: 1.22 MG/DL (ref 0.67–1.17)
EGFRCR SERPLBLD CKD-EPI 2021: 57 ML/MIN/1.73M2
EGFRCR SERPLBLD CKD-EPI 2021: 58 ML/MIN/1.73M2
EOSINOPHIL # BLD AUTO: 0.3 10E3/UL (ref 0–0.7)
EOSINOPHIL NFR BLD AUTO: 5 %
ERYTHROCYTE [DISTWIDTH] IN BLOOD BY AUTOMATED COUNT: 13.6 % (ref 10–15)
GLUCOSE SERPL-MCNC: 147 MG/DL (ref 70–99)
HCO3 SERPL-SCNC: 25 MMOL/L (ref 22–29)
HCT VFR BLD AUTO: 40.2 % (ref 40–53)
HGB BLD-MCNC: 13.2 G/DL (ref 13.3–17.7)
IMM GRANULOCYTES # BLD: 0 10E3/UL
IMM GRANULOCYTES NFR BLD: 0 %
LYMPHOCYTES # BLD AUTO: 1.6 10E3/UL (ref 0.8–5.3)
LYMPHOCYTES NFR BLD AUTO: 28 %
MCH RBC QN AUTO: 29.4 PG (ref 26.5–33)
MCHC RBC AUTO-ENTMCNC: 32.8 G/DL (ref 31.5–36.5)
MCV RBC AUTO: 90 FL (ref 78–100)
MONOCYTES # BLD AUTO: 0.5 10E3/UL (ref 0–1.3)
MONOCYTES NFR BLD AUTO: 9 %
NEUTROPHILS # BLD AUTO: 3.2 10E3/UL (ref 1.6–8.3)
NEUTROPHILS NFR BLD AUTO: 57 %
NRBC # BLD AUTO: 0 10E3/UL
NRBC BLD AUTO-RTO: 0 /100
PLATELET # BLD AUTO: 248 10E3/UL (ref 150–450)
POTASSIUM SERPL-SCNC: 5.1 MMOL/L (ref 3.4–5.3)
PROT SERPL-MCNC: 7.9 G/DL (ref 6.4–8.3)
PSA SERPL DL<=0.01 NG/ML-MCNC: <0.01 NG/ML
RBC # BLD AUTO: 4.49 10E6/UL (ref 4.4–5.9)
SODIUM SERPL-SCNC: 139 MMOL/L (ref 135–145)
WBC # BLD AUTO: 5.7 10E3/UL (ref 4–11)

## 2024-10-03 PROCEDURE — 84403 ASSAY OF TOTAL TESTOSTERONE: CPT | Performed by: INTERNAL MEDICINE

## 2024-10-03 PROCEDURE — 96365 THER/PROPH/DIAG IV INF INIT: CPT

## 2024-10-03 PROCEDURE — 82310 ASSAY OF CALCIUM: CPT | Mod: ZL | Performed by: INTERNAL MEDICINE

## 2024-10-03 PROCEDURE — 82565 ASSAY OF CREATININE: CPT | Performed by: INTERNAL MEDICINE

## 2024-10-03 PROCEDURE — 250N000011 HC RX IP 250 OP 636: Performed by: INTERNAL MEDICINE

## 2024-10-03 PROCEDURE — 36415 COLL VENOUS BLD VENIPUNCTURE: CPT | Performed by: INTERNAL MEDICINE

## 2024-10-03 PROCEDURE — 84153 ASSAY OF PSA TOTAL: CPT | Performed by: INTERNAL MEDICINE

## 2024-10-03 PROCEDURE — 82040 ASSAY OF SERUM ALBUMIN: CPT | Performed by: INTERNAL MEDICINE

## 2024-10-03 PROCEDURE — 85025 COMPLETE CBC W/AUTO DIFF WBC: CPT | Mod: ZL | Performed by: INTERNAL MEDICINE

## 2024-10-03 PROCEDURE — 258N000003 HC RX IP 258 OP 636: Performed by: INTERNAL MEDICINE

## 2024-10-03 PROCEDURE — 82310 ASSAY OF CALCIUM: CPT | Performed by: INTERNAL MEDICINE

## 2024-10-03 PROCEDURE — 96402 CHEMO HORMON ANTINEOPL SQ/IM: CPT

## 2024-10-03 RX ORDER — ZOLEDRONIC ACID 0.04 MG/ML
4 INJECTION, SOLUTION INTRAVENOUS ONCE
Status: CANCELLED | OUTPATIENT
Start: 2024-10-29 | End: 2024-10-29

## 2024-10-03 RX ORDER — MEPERIDINE HYDROCHLORIDE 50 MG/ML
25 INJECTION INTRAMUSCULAR; INTRAVENOUS; SUBCUTANEOUS EVERY 30 MIN PRN
OUTPATIENT
Start: 2024-10-29

## 2024-10-03 RX ORDER — ALBUTEROL SULFATE 0.83 MG/ML
2.5 SOLUTION RESPIRATORY (INHALATION)
OUTPATIENT
Start: 2024-10-29

## 2024-10-03 RX ORDER — ALBUTEROL SULFATE 90 UG/1
1-2 INHALANT RESPIRATORY (INHALATION)
Start: 2024-10-29

## 2024-10-03 RX ORDER — HEPARIN SODIUM (PORCINE) LOCK FLUSH IV SOLN 100 UNIT/ML 100 UNIT/ML
5 SOLUTION INTRAVENOUS
OUTPATIENT
Start: 2024-10-29

## 2024-10-03 RX ORDER — EPINEPHRINE 1 MG/ML
0.3 INJECTION, SOLUTION, CONCENTRATE INTRAVENOUS EVERY 5 MIN PRN
OUTPATIENT
Start: 2024-10-29

## 2024-10-03 RX ORDER — DIPHENHYDRAMINE HYDROCHLORIDE 50 MG/ML
50 INJECTION INTRAMUSCULAR; INTRAVENOUS
Start: 2024-10-29

## 2024-10-03 RX ORDER — HEPARIN SODIUM,PORCINE 10 UNIT/ML
5-20 VIAL (ML) INTRAVENOUS DAILY PRN
OUTPATIENT
Start: 2024-10-29

## 2024-10-03 RX ORDER — METHYLPREDNISOLONE SODIUM SUCCINATE 125 MG/2ML
125 INJECTION INTRAMUSCULAR; INTRAVENOUS
Start: 2024-10-29

## 2024-10-03 RX ADMIN — LEUPROLIDE ACETATE 22.5 MG: KIT at 11:13

## 2024-10-03 RX ADMIN — ZOLEDRONIC ACID 3.3 MG: 4 INJECTION, SOLUTION, CONCENTRATE INTRAVENOUS at 10:28

## 2024-10-03 RX ADMIN — SODIUM CHLORIDE 250 ML: 9 INJECTION, SOLUTION INTRAVENOUS at 10:23

## 2024-10-03 NOTE — NURSING NOTE
Infusion Nursing Note:  Michael JOSE RAUL Martinez presents today for Zometa and Lupron.    Patient seen by provider today: No   present during visit today: Not Applicable.    Note: N/A.      Intravenous Access:  Peripheral IV placed.    Treatment Conditions:  Lab Results   Component Value Date     10/03/2024    POTASSIUM 5.1 10/03/2024    MAG 1.7 11/20/2023    CR 1.22 (H) 10/03/2024    CR 1.21 (H) 10/03/2024    OLIVER 10.1 10/03/2024    OLIVER 10.1 10/03/2024    BILITOTAL 0.7 10/03/2024    ALBUMIN 4.3 10/03/2024    ALBUMIN 4.2 10/03/2024    ALT 16 10/03/2024    AST 24 10/03/2024       Results reviewed, labs MET treatment parameters, ok to proceed with treatment.      Post Infusion Assessment:  Patient tolerated infusion without incident.  Patient tolerated injection without incident.  Blood return noted pre and post infusion.  Site patent and intact, free from redness, edema or discomfort.  No evidence of extravasations.  Access discontinued per protocol.       Discharge Plan:   Discharge instructions reviewed with: Patient.  Patient and/or family verbalized understanding of discharge instructions and all questions answered.  Copy of AVS reviewed with patient and/or family.  Patient will return 10/25 for next appointment.  Patient discharged in stable condition accompanied by: self.  Departure Mode: Ambulatory.      Jean S. Hammann, RN

## 2024-10-03 NOTE — PROGRESS NOTES
Oral Chemotherapy Program  Lab review     Reviewed labs from 10/3/2024.     Labs are unremarkable and do not require any dose adjustments at this time     Follow-up/plan  11/21/24: Appointment with Dr. Ondina Rivera, PharmD, MPH, BCPS  October 3, 2024

## 2024-10-04 ENCOUNTER — TELEPHONE (OUTPATIENT)
Dept: ONCOLOGY | Facility: OTHER | Age: 89
End: 2024-10-04
Payer: COMMERCIAL

## 2024-10-06 LAB — TESTOST SERPL-MCNC: 12 NG/DL (ref 240–950)

## 2024-10-07 DIAGNOSIS — E11.9 DIABETES MELLITUS TYPE 2, DIET-CONTROLLED (H): ICD-10-CM

## 2024-10-09 RX ORDER — LISINOPRIL 2.5 MG/1
2.5 TABLET ORAL DAILY
Qty: 90 TABLET | Refills: 3 | Status: SHIPPED | OUTPATIENT
Start: 2024-10-09

## 2024-10-09 NOTE — TELEPHONE ENCOUNTER
[facility], is requesting a 1 year supply of the following:    lisinopril (ZESTRIL) 2.5 MG tablet 90 tablet 4 9/12/2024 -- No   Sig - Route: Take 1 tablet (2.5 mg) by mouth daily. - Oral   Sent to pharmacy as: Lisinopril 2.5 MG Oral Tablet (ZESTRIL)   Class: E-Prescribe   Order: [id number]   E-Prescribing Status: Receipt confirmed by pharmacy (9/12/2024 11:21 AM CDT)     [id number] - [place], [place] - [address]     New prescription sent to mail order pharmacy, per above written order. Adore Baker RN .............. 10/9/2024  4:31 PM

## 2024-10-17 ENCOUNTER — TELEPHONE (OUTPATIENT)
Dept: PEDIATRICS | Facility: OTHER | Age: 89
End: 2024-10-17
Payer: COMMERCIAL

## 2024-10-17 DIAGNOSIS — I25.5 ISCHEMIC CARDIOMYOPATHY: Primary | ICD-10-CM

## 2024-10-17 NOTE — TELEPHONE ENCOUNTER
It would be best to know his blood pressure and pulse while taking the medication.  Most likely we can just continue it.    Signed, Isma Martinez MD, FAAP, FACP  Internal Medicine & Pediatrics

## 2024-10-17 NOTE — TELEPHONE ENCOUNTER
Called and spoke with daughter, Michelle, consent to communicate on file.    Michelle states that patient has been taking Metoprolol succinate 25 mg tablet taking 1/2 tablet daily all along.    States they have been getting refills through [facility] but they now want Rx sent to [facility] in [place].    Informed Michelle Metoprolol no longer on current med , noted as discontinued on 12/1/23.  Noted as stopped at hospital discharge on 11/20/23.    Michelle states patient has been taking all along.  Patient had OV on 9/12/24.    Informed Michelle will route message to Dr. Martinez for review and advise how to proceed.    Ruth Ann Chandler RN on 10/17/2024 at 4:18 PM

## 2024-10-17 NOTE — TELEPHONE ENCOUNTER
Reason for call: Medication or medication refill    Have you contacted your pharmacy regarding this refill? yes    Name of medication requested: metoprolol    How many days of medication do you have left? 4-5 days    What [facility] do you use? [facility]    Preferred method for responding to this message: Telephone Call    Phone number patient can be reached at: Other phone number:  Daughter's phone #: [phone]    If we cannot reach you directly, may we leave a detailed response at the number you provided? Yes    Madiha Plascencia on 10/17/2024 at 3:33 PM

## 2024-10-18 RX ORDER — METOPROLOL SUCCINATE 25 MG/1
12.5 TABLET, EXTENDED RELEASE ORAL DAILY
Qty: 45 TABLET | Refills: 4 | Status: SHIPPED | OUTPATIENT
Start: 2024-10-18

## 2024-10-18 NOTE — TELEPHONE ENCOUNTER
Per daughter, patient gets his blood pressures checked monthly at infusion visits. Last blood pressure/ pulse were 132/85 pulse 53, 139/83 pulse 73, 149/75 pulse 55, 147/83, pulse 62.    Rosie Tarango RN on 10/18/2024 at 2:34 PM

## 2024-11-01 ENCOUNTER — LAB (OUTPATIENT)
Dept: LAB | Facility: OTHER | Age: 89
End: 2024-11-01
Attending: INTERNAL MEDICINE
Payer: COMMERCIAL

## 2024-11-01 ENCOUNTER — HOSPITAL ENCOUNTER (OUTPATIENT)
Dept: INFUSION THERAPY | Facility: OTHER | Age: 89
Discharge: HOME | End: 2024-11-01
Attending: INTERNAL MEDICINE
Payer: COMMERCIAL

## 2024-11-01 VITALS
BODY MASS INDEX: 25.67 KG/M2 | HEART RATE: 62 BPM | TEMPERATURE: 97.6 F | SYSTOLIC BLOOD PRESSURE: 120 MMHG | HEIGHT: 68 IN | RESPIRATION RATE: 16 BRPM | WEIGHT: 169.4 LBS | DIASTOLIC BLOOD PRESSURE: 64 MMHG

## 2024-11-01 DIAGNOSIS — C79.51 PROSTATE CANCER METASTATIC TO BONE (H): Primary | ICD-10-CM

## 2024-11-01 DIAGNOSIS — C61 PROSTATE CANCER METASTATIC TO BONE (H): Primary | ICD-10-CM

## 2024-11-01 DIAGNOSIS — C61 PROSTATE CANCER METASTATIC TO BONE (H): ICD-10-CM

## 2024-11-01 DIAGNOSIS — C79.51 PROSTATE CANCER METASTATIC TO BONE (H): ICD-10-CM

## 2024-11-01 LAB
ALBUMIN SERPL BCG-MCNC: 4.1 G/DL (ref 3.5–5.2)
ALP SERPL-CCNC: 71 U/L (ref 40–150)
ALT SERPL W P-5'-P-CCNC: 11 U/L (ref 0–70)
ANION GAP SERPL CALCULATED.3IONS-SCNC: 10 MMOL/L (ref 7–15)
AST SERPL W P-5'-P-CCNC: 18 U/L (ref 0–45)
BASOPHILS # BLD AUTO: 0.1 10E3/UL (ref 0–0.2)
BASOPHILS NFR BLD AUTO: 1 %
BILIRUB SERPL-MCNC: 0.6 MG/DL
BUN SERPL-MCNC: 15.7 MG/DL (ref 8–23)
CALCIUM SERPL-MCNC: 9.7 MG/DL (ref 8.8–10.4)
CALCIUM SERPL-MCNC: 9.7 MG/DL (ref 8.8–10.4)
CHLORIDE SERPL-SCNC: 102 MMOL/L (ref 98–107)
CREAT SERPL-MCNC: 1.16 MG/DL (ref 0.67–1.17)
EGFRCR SERPLBLD CKD-EPI 2021: 61 ML/MIN/1.73M2
EOSINOPHIL # BLD AUTO: 0.3 10E3/UL (ref 0–0.7)
EOSINOPHIL NFR BLD AUTO: 4 %
ERYTHROCYTE [DISTWIDTH] IN BLOOD BY AUTOMATED COUNT: 13.6 % (ref 10–15)
GLUCOSE SERPL-MCNC: 168 MG/DL (ref 70–99)
HCO3 SERPL-SCNC: 26 MMOL/L (ref 22–29)
HCT VFR BLD AUTO: 39.6 % (ref 40–53)
HGB BLD-MCNC: 12.8 G/DL (ref 13.3–17.7)
IMM GRANULOCYTES # BLD: 0 10E3/UL
IMM GRANULOCYTES NFR BLD: 0 %
LYMPHOCYTES # BLD AUTO: 1.8 10E3/UL (ref 0.8–5.3)
LYMPHOCYTES NFR BLD AUTO: 31 %
MCH RBC QN AUTO: 29.2 PG (ref 26.5–33)
MCHC RBC AUTO-ENTMCNC: 32.3 G/DL (ref 31.5–36.5)
MCV RBC AUTO: 90 FL (ref 78–100)
MONOCYTES # BLD AUTO: 0.4 10E3/UL (ref 0–1.3)
MONOCYTES NFR BLD AUTO: 7 %
NEUTROPHILS # BLD AUTO: 3.4 10E3/UL (ref 1.6–8.3)
NEUTROPHILS NFR BLD AUTO: 57 %
NRBC # BLD AUTO: 0 10E3/UL
NRBC BLD AUTO-RTO: 0 /100
PLATELET # BLD AUTO: 243 10E3/UL (ref 150–450)
POTASSIUM SERPL-SCNC: 4.7 MMOL/L (ref 3.4–5.3)
PROT SERPL-MCNC: 7.3 G/DL (ref 6.4–8.3)
PSA SERPL DL<=0.01 NG/ML-MCNC: <0.01 NG/ML
RBC # BLD AUTO: 4.38 10E6/UL (ref 4.4–5.9)
SODIUM SERPL-SCNC: 138 MMOL/L (ref 135–145)
WBC # BLD AUTO: 5.9 10E3/UL (ref 4–11)

## 2024-11-01 PROCEDURE — 84403 ASSAY OF TOTAL TESTOSTERONE: CPT | Mod: ZL

## 2024-11-01 PROCEDURE — 84153 ASSAY OF PSA TOTAL: CPT | Mod: ZL

## 2024-11-01 PROCEDURE — 85004 AUTOMATED DIFF WBC COUNT: CPT | Mod: ZL

## 2024-11-01 PROCEDURE — 36415 COLL VENOUS BLD VENIPUNCTURE: CPT | Performed by: INTERNAL MEDICINE

## 2024-11-01 PROCEDURE — 84295 ASSAY OF SERUM SODIUM: CPT | Mod: ZL

## 2024-11-01 PROCEDURE — 258N000003 HC RX IP 258 OP 636: Performed by: INTERNAL MEDICINE

## 2024-11-01 PROCEDURE — 250N000011 HC RX IP 250 OP 636: Performed by: INTERNAL MEDICINE

## 2024-11-01 PROCEDURE — 82310 ASSAY OF CALCIUM: CPT | Performed by: INTERNAL MEDICINE

## 2024-11-01 PROCEDURE — 96365 THER/PROPH/DIAG IV INF INIT: CPT

## 2024-11-01 RX ORDER — METHYLPREDNISOLONE SODIUM SUCCINATE 125 MG/2ML
125 INJECTION INTRAMUSCULAR; INTRAVENOUS
Start: 2024-11-29

## 2024-11-01 RX ORDER — DIPHENHYDRAMINE HYDROCHLORIDE 50 MG/ML
50 INJECTION INTRAMUSCULAR; INTRAVENOUS
Start: 2024-11-29

## 2024-11-01 RX ORDER — ALBUTEROL SULFATE 0.83 MG/ML
2.5 SOLUTION RESPIRATORY (INHALATION)
OUTPATIENT
Start: 2024-11-29

## 2024-11-01 RX ORDER — ZOLEDRONIC ACID 0.04 MG/ML
4 INJECTION, SOLUTION INTRAVENOUS ONCE
Status: DISCONTINUED | OUTPATIENT
Start: 2024-11-01 | End: 2024-11-01

## 2024-11-01 RX ORDER — HEPARIN SODIUM,PORCINE 10 UNIT/ML
5-20 VIAL (ML) INTRAVENOUS DAILY PRN
OUTPATIENT
Start: 2024-11-29

## 2024-11-01 RX ORDER — ZOLEDRONIC ACID 0.04 MG/ML
4 INJECTION, SOLUTION INTRAVENOUS ONCE
OUTPATIENT
Start: 2024-11-29 | End: 2024-11-29

## 2024-11-01 RX ORDER — MEPERIDINE HYDROCHLORIDE 50 MG/ML
25 INJECTION INTRAMUSCULAR; INTRAVENOUS; SUBCUTANEOUS EVERY 30 MIN PRN
OUTPATIENT
Start: 2024-11-29

## 2024-11-01 RX ORDER — EPINEPHRINE 1 MG/ML
0.3 INJECTION, SOLUTION, CONCENTRATE INTRAVENOUS EVERY 5 MIN PRN
OUTPATIENT
Start: 2024-11-29

## 2024-11-01 RX ORDER — ALBUTEROL SULFATE 90 UG/1
1-2 INHALANT RESPIRATORY (INHALATION)
Start: 2024-11-29

## 2024-11-01 RX ORDER — HEPARIN SODIUM (PORCINE) LOCK FLUSH IV SOLN 100 UNIT/ML 100 UNIT/ML
5 SOLUTION INTRAVENOUS
OUTPATIENT
Start: 2024-11-29

## 2024-11-01 RX ADMIN — ZOLEDRONIC ACID 3.3 MG: 4 INJECTION, SOLUTION, CONCENTRATE INTRAVENOUS at 10:16

## 2024-11-01 NOTE — NURSING NOTE
Infusion Nursing Note:  Michael JOSE RAUL Martinez presents today for Zometa.    Patient seen by provider today: No   present during visit today: Not Applicable.    Note: N/A    Intravenous Access:  Labs drawn by .    Treatment Conditions:  Lab Results   Component Value Date    HGB 12.8 (L) 11/01/2024    WBC 5.9 11/01/2024    ANEU 5.3 05/24/2021    ANEUTAUTO 3.4 11/01/2024     11/01/2024        Lab Results   Component Value Date     11/01/2024    POTASSIUM 4.7 11/01/2024    MAG 1.7 11/20/2023    CR 1.16 11/01/2024    OLIVER 9.7 11/01/2024    OLIVER 9.7 11/01/2024    BILITOTAL 0.6 11/01/2024    ALBUMIN 4.1 11/01/2024    ALT 11 11/01/2024    AST 18 11/01/2024     Results reviewed, labs MET treatment parameters, ok to proceed with treatment.    Post Infusion Assessment:  Patient tolerated injection without incident.  Site patent and intact, free from redness, edema or discomfort.     Discharge Plan:   Discharge instructions reviewed with: Patient.  Patient and/or family verbalized understanding of discharge instructions and all questions answered.  Copy of AVS declined by patient and/or family.  Patient will return 12/3/24 for next appointment.  AVS to patient via [employer].    Patient discharged in stable condition accompanied by: self.  Departure Mode: Ambulatory.      Alison Poe RN

## 2024-11-05 LAB — TESTOST SERPL-MCNC: 9 NG/DL (ref 240–950)

## 2024-11-27 ENCOUNTER — TELEPHONE (OUTPATIENT)
Dept: PEDIATRICS | Facility: OTHER | Age: 89
End: 2024-11-27
Payer: COMMERCIAL

## 2024-11-27 DIAGNOSIS — E11.9 DIABETES MELLITUS TYPE 2, DIET-CONTROLLED (H): ICD-10-CM

## 2024-11-27 RX ORDER — LISINOPRIL 2.5 MG/1
2.5 TABLET ORAL DAILY
Qty: 100 TABLET | Refills: 3 | Status: SHIPPED | OUTPATIENT
Start: 2024-11-27

## 2024-11-27 NOTE — TELEPHONE ENCOUNTER
Attempted to reach Michael Martinez in regards to their lisinopril being overdue for refill. Left voicemail, with call back number, requesting return call. Per our records last filled 9/17/24 for 30 day supply and is 14 days late to refill.     Patient has [facility] coverage and with this insurance plan, the patient is eligible to receive certain prescriptions as a 100-day supply at the 90-day supply cost.      Prescriptions updated to 100-day supply per protocol: lisinopril      Ginger Carrera, PharmD, BCACP  Population Health Pharmacist  [phone]

## 2024-12-03 ENCOUNTER — ONCOLOGY VISIT (OUTPATIENT)
Dept: ONCOLOGY | Facility: OTHER | Age: 89
End: 2024-12-03
Attending: NURSE PRACTITIONER
Payer: COMMERCIAL

## 2024-12-03 ENCOUNTER — HOSPITAL ENCOUNTER (OUTPATIENT)
Dept: INFUSION THERAPY | Facility: OTHER | Age: 89
Discharge: HOME | End: 2024-12-03
Attending: INTERNAL MEDICINE
Payer: COMMERCIAL

## 2024-12-03 ENCOUNTER — APPOINTMENT (OUTPATIENT)
Dept: LAB | Facility: OTHER | Age: 89
End: 2024-12-03
Attending: INTERNAL MEDICINE
Payer: COMMERCIAL

## 2024-12-03 VITALS
RESPIRATION RATE: 16 BRPM | BODY MASS INDEX: 25.06 KG/M2 | WEIGHT: 164.8 LBS | HEART RATE: 61 BPM | DIASTOLIC BLOOD PRESSURE: 80 MMHG | SYSTOLIC BLOOD PRESSURE: 145 MMHG | TEMPERATURE: 96.5 F

## 2024-12-03 DIAGNOSIS — C79.51 PROSTATE CANCER METASTATIC TO BONE (H): Primary | ICD-10-CM

## 2024-12-03 DIAGNOSIS — C61 PROSTATE CANCER METASTATIC TO BONE (H): Primary | ICD-10-CM

## 2024-12-03 LAB
ALBUMIN SERPL BCG-MCNC: 4.3 G/DL (ref 3.5–5.2)
CALCIUM SERPL-MCNC: 10.4 MG/DL (ref 8.8–10.4)
CREAT SERPL-MCNC: 1.26 MG/DL (ref 0.67–1.17)
EGFRCR SERPLBLD CKD-EPI 2021: 55 ML/MIN/1.73M2

## 2024-12-03 PROCEDURE — 36415 COLL VENOUS BLD VENIPUNCTURE: CPT | Performed by: INTERNAL MEDICINE

## 2024-12-03 PROCEDURE — G0463 HOSPITAL OUTPT CLINIC VISIT: HCPCS | Mod: 25 | Performed by: INTERNAL MEDICINE

## 2024-12-03 PROCEDURE — 250N000011 HC RX IP 250 OP 636: Performed by: INTERNAL MEDICINE

## 2024-12-03 PROCEDURE — 96365 THER/PROPH/DIAG IV INF INIT: CPT

## 2024-12-03 PROCEDURE — 258N000003 HC RX IP 258 OP 636: Performed by: INTERNAL MEDICINE

## 2024-12-03 PROCEDURE — 82040 ASSAY OF SERUM ALBUMIN: CPT | Performed by: INTERNAL MEDICINE

## 2024-12-03 PROCEDURE — 82565 ASSAY OF CREATININE: CPT | Performed by: INTERNAL MEDICINE

## 2024-12-03 PROCEDURE — 82310 ASSAY OF CALCIUM: CPT | Performed by: INTERNAL MEDICINE

## 2024-12-03 RX ORDER — DIPHENHYDRAMINE HYDROCHLORIDE 50 MG/ML
50 INJECTION INTRAMUSCULAR; INTRAVENOUS
Start: 2024-12-29

## 2024-12-03 RX ORDER — METHYLPREDNISOLONE SODIUM SUCCINATE 125 MG/2ML
125 INJECTION INTRAMUSCULAR; INTRAVENOUS
Start: 2024-12-29

## 2024-12-03 RX ORDER — ALBUTEROL SULFATE 0.83 MG/ML
2.5 SOLUTION RESPIRATORY (INHALATION)
OUTPATIENT
Start: 2024-12-29

## 2024-12-03 RX ORDER — ZOLEDRONIC ACID 0.04 MG/ML
4 INJECTION, SOLUTION INTRAVENOUS ONCE
OUTPATIENT
Start: 2024-12-29 | End: 2024-12-29

## 2024-12-03 RX ORDER — HEPARIN SODIUM,PORCINE 10 UNIT/ML
5-20 VIAL (ML) INTRAVENOUS DAILY PRN
OUTPATIENT
Start: 2024-12-29

## 2024-12-03 RX ORDER — EPINEPHRINE 1 MG/ML
0.3 INJECTION, SOLUTION, CONCENTRATE INTRAVENOUS EVERY 5 MIN PRN
OUTPATIENT
Start: 2024-12-29

## 2024-12-03 RX ORDER — MEPERIDINE HYDROCHLORIDE 25 MG/ML
25 INJECTION INTRAMUSCULAR; INTRAVENOUS; SUBCUTANEOUS EVERY 30 MIN PRN
OUTPATIENT
Start: 2024-12-29

## 2024-12-03 RX ORDER — HEPARIN SODIUM (PORCINE) LOCK FLUSH IV SOLN 100 UNIT/ML 100 UNIT/ML
5 SOLUTION INTRAVENOUS
OUTPATIENT
Start: 2024-12-29

## 2024-12-03 RX ORDER — ALBUTEROL SULFATE 90 UG/1
1-2 INHALANT RESPIRATORY (INHALATION)
Start: 2024-12-29

## 2024-12-03 RX ADMIN — ZOLEDRONIC ACID 3.3 MG: 4 INJECTION, SOLUTION, CONCENTRATE INTRAVENOUS at 13:56

## 2024-12-03 ASSESSMENT — ENCOUNTER SYMPTOMS
GASTROINTESTINAL NEGATIVE: 1
HEMATOLOGIC/LYMPHATIC NEGATIVE: 1
PSYCHIATRIC NEGATIVE: 1
RESPIRATORY NEGATIVE: 1
CONSTITUTIONAL NEGATIVE: 1
CARDIOVASCULAR NEGATIVE: 1
ENDOCRINE NEGATIVE: 1
ALLERGIC/IMMUNOLOGIC NEGATIVE: 1
MUSCULOSKELETAL NEGATIVE: 1
EYES NEGATIVE: 1
NEUROLOGICAL NEGATIVE: 1

## 2024-12-03 NOTE — PROGRESS NOTES
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 Michael Martinez returns for follow-up of metastatic hormone sensitive prostate cancer.  For details of history see previous notes.  Briefly,the patient had been transferred to [facility] in [place] after he presented to the emergency department with a CT scan that revealed mixed lytic and sclerotic lesion involving the T8 vertebral body with adjacent soft tissue component extending into the posterior mediastinum. This was felt to be a pathologic fracture and the patient was transferred for appropriate care from [place] to use [facility] in [place]. MRI thoracic spine revealed multifocal osseous metastatic disease with a dominant lesion at T8. CT chest and CT abdomen/pelvis revealed T8 abnormality as well as enlarged prostate with potential bony pelvic metastatic disease as well as rib cage metastases. Neurosurgery was consulted and they felt this was a nonsurgical issue and recommended thoracic orthosis and was fitted for this by orthotics November 22. He underwent CT-guided biopsy of the T8 lesion came back consistent metastatic prostate carcinoma. Review of thoracic films indicated that he had essentially widespread osseous metastatic disease involving thoracic spine including T8 as well as the pelvis as well as the rib cage. Patient did complain of significant pain involving the thoracic spine as well as the pelvis as well as rib cage.. His PSA was extremely elevated at 121. Jackson the patient would be a candidate for treatment for metastatic prostate cancer with biopsy-proven T8 bone metastases. We  elected to treat the patient with Lupron 22.5 mg IM every 3 months in addition to enzalutamide which was indicated in the first-line setting for metastatic prostate cancer. I also recommended Xgeva monthly. Patient underwent a bone scan on 7/19/2023 and the findings were that there was multifocal bony metastatic disease with a large area of abnormal intense radiotracer uptake within the T8 vertebral body there was a focus of abnormal uptake in the right pelvis and the superior acetabular region measuring 2 to 3 cm dimension. There are 2 small foci of abnormal uptake in the left hemipelvis. There was uptake in the lateral left fifth sixth rib. There are small foci of uptake at T12, L2 and L3.There were problems with insurance coverage and the patient was unable to start this regimen immediately. His PSA had risen to 168 on December 12, 2023. He presented to the emergency room with gross hematuria on December 22, 2023. It was felt this was likely due to warfarin at patient was switched to Eliquis 2.5 mg p.o. twice daily 12/26/2023.A urine for cytology was sent by Dr. Shiraz Taylor and he was negative for malignancy. Patient was able to start treatment beginning on January 8, 2024.Insurance would not cover Xgeva and therefore we had to switch the patient to zoledronic acid 4 mg IV q. 28 days. The patient received his first treatment on January 29, 2024. Since starting Lupron Xtandi and Zometa patient had had a remarkable response and as of today's PSA has dropped to 10.2. He says his symptoms of all resolved he has no further urinary symptoms no urgency or frequency. His back pain and hip pain and pelvic pain have all resolved overall he is doing well.He is PSA continues to drop and was down to 0.1 on May 29.  PSA tumor marker has continued to slowly drop and is currently 0.01 as of November 1.  He is here now to receive Zometa.  He is overall doing well he denies any significant hot flashes or urinary complaints he  denies hematuria.  He is tolerating enzalutamide well without any significant complaints of headaches or dizziness.  Denies fevers, night sweats or weight loss.  Denies shortness of breath, cough or hemoptysis.  Denies abdominal pain or change in bowel habits.  Denies any specific bone pain denies bright red blood per rectum hematemesis or melena.         ______________________________________________________________________________    Past History    Past Medical History:   Diagnosis Date    Cerebral infarction (H)     6/2014,left cerebellum--seen on MRI    Edema     11/4/2003,Edema & cramps legs, ? 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/29/2016    Enlarged prostate with lower urinary tract symptoms (LUTS)     8/4/2011    Guillain Barré syndrome (H)     Ischemic cardiomyopathy     7/9/2014    Osteoarthritis     9/5/2001    Other ill-defined and unknown causes of morbidity and mortality     see prob list    Urinary tract infection     No Comments Provided       Past Surgical History:   Procedure Laterality Date    ARTHROSCOPY SHOULDER ROTATOR CUFF REPAIR      1996    BIOPSY PROSTATE TRANSRECTAL      No Comments Provided    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  [place]-80% stenosis.  Right coronary-high-grade 90% stenosis.  EF-45%       Review of Systems   Constitutional: Negative.    HENT: Negative.     Eyes: Negative.    Respiratory: Negative.     Cardiovascular: Negative.    Gastrointestinal: Negative.    Endocrine: Negative.    Genitourinary: Negative.    Musculoskeletal: Negative.     Skin: Negative.    Allergic/Immunologic: Negative.    Neurological: Negative.    Hematological: Negative.    Psychiatric/Behavioral: Negative.     All other systems reviewed and are negative.         Physical Exam    There were no vitals taken for this visit.    Physical Exam  Vitals and nursing note reviewed.   Constitutional:       Appearance: Normal appearance. He is normal weight.   HENT:      Head: Normocephalic and atraumatic.      Nose: Nose normal.      Mouth/Throat:      Pharynx: Oropharynx is clear.   Eyes:      Extraocular Movements: Extraocular movements intact.      Conjunctiva/sclera: Conjunctivae normal.      Pupils: Pupils are equal, round, and reactive to light.   Cardiovascular:      Rate and Rhythm: Normal rate and regular rhythm.      Pulses: Normal pulses.      Heart sounds: Normal heart sounds.   Pulmonary:      Effort: Pulmonary effort is normal.      Breath sounds: Normal breath sounds.   Abdominal:      General: Bowel sounds are normal. There is no distension.      Palpations: Abdomen is soft. There is no mass.      Tenderness: There is no abdominal tenderness.   Musculoskeletal:         General: Normal range of motion.      Cervical back: Normal range of motion and neck supple.      Comments: No ankle edema.   Lymphadenopathy:      Cervical: No cervical adenopathy.   Skin:     General: Skin is warm and dry.   Neurological:      General: No focal deficit present.      Mental Status: He is alert and oriented to person, place, and time.   Psychiatric:         Mood and Affect: Mood normal.         Behavior: Behavior normal.          Lab Results    Recent Results (from the past 240 hours)   Creatinine    Collection Time: 12/03/24 12:49 PM   Result Value Ref Range    Creatinine 1.26 (H) 0.67 - 1.17 mg/dL    GFR Estimate 55 (L) >60 mL/min/1.73m2   Calcium    Collection Time: 12/03/24 12:49 PM   Result Value Ref Range    Calcium 10.4 8.8 - 10.4 mg/dL   Albumin level    Collection Time: 12/03/24  12:49 PM   Result Value Ref Range    Albumin 4.3 3.5 - 5.2 g/dL       Imaging    No results found.    Assessment and Plan: #1 :.  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  Plan is to continue enzalutamide 80 mg p.o. daily, Lupron 22.5 mg IM every 3 months, zoledronic acid 4 mg IV q. 28 days.  We will continue to monitor PSA monthly we will see the patient in 3 months obtain CBC CMP LDH PSA tumor marker and testosterone level.  Time spent: 55 minutes was spent on this patient visit : we spent time reviewing multiple lab results, reviewing previous physician provider notes, discussing lab results with the patient, performing history/physical, documenting history/physical, ordering enzalutamide/Lupron/zoledronic acid as well as follow-up labs and appointments.  The longitudinal plan of care for the diagnosis(es)/condition(s) as documented were addressed during this visit. Due to the added complexity in care, I will continue to support Karl in the subsequent management and with ongoing continuity of care.    Cancer Staging   No matching staging information was found for the patient.        Signed by: Raj Nj MD    CC: Isma Martinez MD

## 2024-12-03 NOTE — NURSING NOTE
Infusion Nursing Note:  Michael Martinez presents today for Zometa.    Patient seen by provider today: Yes: Raj Nj MD   present during visit today: Not Applicable.    Note: N/A.    Intravenous Access:  Labs drawn by .  Peripheral IV placed.    Treatment Conditions:  Lab Results   Component Value Date    HGB 12.8 (L) 11/01/2024    WBC 5.9 11/01/2024    ANEU 5.3 05/24/2021    ANEUTAUTO 3.4 11/01/2024     11/01/2024        Lab Results   Component Value Date     11/01/2024    POTASSIUM 4.7 11/01/2024    MAG 1.7 11/20/2023    CR 1.26 (H) 12/03/2024    OLIVER 10.4 12/03/2024    BILITOTAL 0.6 11/01/2024    ALBUMIN 4.3 12/03/2024    ALT 11 11/01/2024    AST 18 11/01/2024     Results reviewed, labs MET treatment parameters, ok to proceed with treatment.    Post Infusion Assessment:  Patient tolerated infusion without incident.  Blood return noted pre and post infusion.  Site patent and intact, free from redness, edema or discomfort.  No evidence of extravasations.  Access discontinued per protocol.     Discharge Plan:   Discharge instructions reviewed with: Patient.  Patient and/or family verbalized understanding of discharge instructions and all questions answered.  Copy of AVS declined by patient and/or family.  Patient will return 12/31/24 for next appointment.  AVS to patient via [employer].    Patient discharged in stable condition accompanied by: self and daughter.  Departure Mode: Ambulatory.      Alison Poe RN

## 2024-12-03 NOTE — PHARMACY-CONSULT NOTE
ENDOCRINE PROGRESS NOTE    ADMISSION DATE:  3/11/2022  DATE:  3/25/2022  CURRENT HOSPITAL DAY:  Hospital Day: 15  CONSULTING PHYSICIAN:  Devika Blue MD  ATTENDING PHYSICIAN:  Maureen Rivera MD  CODE STATUS:  Full Resuscitation    ASSESSMENT & PLAN: Natalia Harris a 63year oldÂ maleÂ with PMHx of CVAÂ 14 yrs ago w/ L sided residual deficitsÂ , HTN, DM who presented to the ED 3/9 w/ SOB. In the ED was diagnosed with bilateral PEÂ with moderate clot burden, started on FD lovenox and admitted to the floor. An RRT was called for tachycardia with HR in the 260s, that did not respond to adenosine. He wasÂ transferred to the ICU for possible CV. There he was intubated and given metoprolol, HR improved to 120s and was diagnosed with aflutter therefore was given amiodarone bolus + gtt. Â Bedside ECHO at that time showed LVEF 20% so he was taken for cardiac cath which showed normal coronary arteries. IABP was inserted and he was transferred to [facility] for further care. On 3/21, Hgb dropped, patient became unstable and was intubated. Imagined not large left Rp and pelvis hematoma. Endocrinology was consulted for LVAD/OHT workup. Diabetes Mellitus Type II  A1C 6.5 % on 3/11/22, because of anemia, A1c unreliable   Home regimen; lantus 12 U qd, SSI, glipizide 10mg qd  Â   3/24: BG was reviewed for last 24 hr. BG was 176 this am, 166 at noon. Creatinine is 1.42 today. Patient required 19U IV insulin overnight. Will start Lispro MDSS qid. Discontinue IV insulin. 3/25: BG was reviewed for last 24 hr. BG was 154 at 3am, 106 at 5:30am. Noon BG 54. Creatinine is 1.21 today. He is on HDSS qid currently, not requiring insulin. Will decrease SS to LDSS qid. If BG is less than 70, confirm with stat BMP before treating.      #Acute respiratory failure/Cardiogenic shock  Currently patient is intubated on vent management  Â   NICM/LVAD/OHT EVAL /Cardiogenic shock  A1c: 6.5%, because of anemia, A1c unreliable      TSH: 0.722  Vitamin D: Pharmacy- Renal Dose Adjustment    Patient Active Problem List   Diagnosis    Personal history of DVT (deep vein thrombosis)    History of pulmonary embolism    Abdominal aortic aneurysm without rupture (H)    Ischemic cardiomyopathy    History of four vessel coronary artery bypass graft    H/O transurethral resection of prostate    Anticoagulation monitoring, INR range 2-3    Long-term (current) use of anticoagulants [Z79.01]    Cataract    DJD (degenerative joint disease) of cervical spine    Diaphragmatic hernia    Lumbar disc disease    Osteoarthrosis involving lower leg    Primary osteoarthritis of right hand    S/P CABG x 4    History of transurethral resection of prostate    Spinal stenosis, lumbar    Lesion of ulnar nerve    History of Guillain-Rosamond syndrome    Gastroesophageal reflux disease, esophagitis presence not specified    Primary osteoarthritis of left knee    Mixed hyperlipidemia    Peripheral arterial disease (H)    OLIVIA (generalized anxiety disorder)    Cervical arthritis    Primary osteoarthritis involving multiple joints    Diabetes mellitus type 2, diet-controlled (H)    Benign prostatic hyperplasia without lower urinary tract symptoms    Pathological fracture of thoracic vertebra with routine healing, subsequent encounter    Closed wedge compression fracture of T8 vertebra with routine healing, subsequent encounter    Prostate cancer metastatic to bone (H)    History of gross hematuria    Chronic anticoagulation        Relevant Labs:  Recent Labs   Lab Test 11/01/24  0922 10/03/24  1017   WBC 5.9 5.7   HGB 12.8* 13.2*    248        CrCl: ~ 42.9 mL/min    No intake or output data in the 24 hours ending 12/03/24 1342       Per Renal Dose Adjustment Protocol, will adjust:  Zoledronic acid 3.3 mg IV per CrCl 40-49 mL/min      Will continue to follow and make adjustments accordingly. Thank You.    Deb Casey [facility] ....................  12/3/2024   1:42 PM     19.8  Corrected calcium: 8.5; patient started on cholecalciferol 80006 IU weekly  AM cortisol: 24.9, adequate for given stress level   CT chest/abdomen/pelvis on 3/22/22 the spleen and adrenal glands are within normal limits. CT head was completed on 3/21 and result does not report any pituitary abnormality. No endocrine contraindications for LVAD / Heart transplant. Current lab shows A1C is well controlled. If pt undergoes heart transplant; with long term steroid and immunosuppression theres is a risk of further worsening of glycemic  control. In that situation need for oral hypoglycemic agent/s and or subcutaneous insulin therapy to improve glycemia has also been discussed with pt. Pt has vitamin D deficiency, for now continue Vitamin D replacement. If paln for OHT, recommend DEXA scan as an outpt as with long term steroid use there is increased risk of steroid induced bone loss.  Pt to follow up outpt clinic to address bone health     Other problems this admision   Bilateral PE, DVT, LAAA thrombus  Aflutter with RVR  [place] on CKD  Metabolic acidosis   - management per primary team             SUBJECTIVE:    Pt seen lying in bed  Intubated and sedated  Not requiring pressors   On amio drip  NG to LIS    Review of Systems  ROS unobtainable due to pt status     OBJECTIVE:    PROBLEM LIST:    Patient Active Problem List   Diagnosis   â¢ Pulmonary embolism, bilateral (CMS/HCC)   â¢ V-tach (CMS/HCC)   â¢ Heart failure (CMS/HCC)   â¢ Cardiogenic shock (CMS/HCC)   â¢ Acute systolic CHF (congestive heart failure) (CMS/HCC)   â¢ NICM (nonischemic cardiomyopathy) (CMS/HCC)   â¢ Acute respiratory failure with hypoxia (CMS/HCC)   â¢ Atrial flutter with rapid ventricular response (CMS/HCC)   â¢ Thrombus of left atrial appendage without antecedent myocardial infarction   â¢ Acute deep vein thrombosis (DVT) of femoral vein of both lower extremities (CMS/HCC)   â¢ Encounter for palliative care   â¢ Advanced care planning/counseling discussion HISTORIES:    Past Medical History:      Diabetes mellitus (CMS/[facility])                                   Essential (primary) hypertension                              Cerebral infarction (CMS/[facility])                                 Melena                                                        Hyperlipidemia                                                CHF (congestive heart failure) (CMS/[facility])                      Pulmonary embolism (CMS/[facility])                                  Atrial flutter (CMS/[facility])                                      CKD (chronic kidney disease)                                Past Surgical History:   Procedure Laterality Date   â¢ Colonoscopy  08/19/2021   â¢ Iabp insertion       History reviewed. No pertinent family history.     Social History:  Social History     Socioeconomic History   â¢ Marital status: Single     Spouse name: Not on file   â¢ Number of children: Not on file   â¢ Years of education: Not on file   â¢ Highest education level: Not on file   Occupational History   â¢ Not on file   Tobacco Use   â¢ Smoking status: Never Smoker   â¢ Smokeless tobacco: Never Used   Vaping Use   â¢ Vaping Use: never used   Substance and Sexual Activity   â¢ Alcohol use: Never   â¢ Drug use: Never   â¢ Sexual activity: Not on file   Other Topics Concern   â¢ Not on file   Social History Narrative   â¢ Not on file     Social Determinants of Health     Financial Resource Strain: Not on file   Food Insecurity: Not on file   Transportation Needs: Not on file   Physical Activity: Not on file   Stress: Not on file   Social Connections: Not on file   Intimate Partner Violence: Not At Risk   â¢ Social Determinants: Intimate Partner Violence Past Fear: No   â¢ Social Determinants: Intimate Partner Violence Current Fear: No     ALLERGIES:   Allergen Reactions   â¢ Aspirin ANAPHYLAXIS   â¢ Milk    (Food Or Med) Other (See Comments)     unrecalled       Home medications:  Medications Prior to Admission   Medication Sig Dispense Refill   â¢ ALPRAZolam (XANAX) 0.25 MG tablet Take 0.25 mg by mouth nightly as needed for Sleep. â¢ baclofen (LIORESAL) 10 MG tablet Take 10 mg by mouth 2 times daily. â¢ escitalopram (LEXAPRO) 5 MG tablet Take 5 mg by mouth daily. â¢ HYDROcodone-acetaminophen (NORCO) 5-325 MG per tablet Take 1 tablet by mouth every 8 hours as needed for Pain. â¢ insulin glargine (LANTUS) 100 UNIT/ML vial solution Inject 12 Units into the skin daily. â¢ lisinopril (ZESTRIL) 40 MG tablet Take 40 mg by mouth daily. â¢ metoPROLOL succinate (TOPROL-XL) 50 MG 24 hr tablet Take 50 mg by mouth every 12 hours. â¢ omeprazole (PrilOSEC) 20 MG capsule Take 20 mg by mouth daily. â¢ simvastatin (ZOCOR) 10 MG tablet Take 10 mg by mouth nightly. â¢ spironolactone (ALDACTONE) 25 MG tablet Take 25 mg by mouth daily. â¢ glipiZIDE (GLUCOTROL) 10 MG tablet Take 10 mg by mouth daily (before breakfast). â¢ traZODone (DESYREL) 50 MG tablet Take 50 mg by mouth nightly. â¢ insulin lispro 100 UNIT/ML injectable solution Inject into the skin 3 times daily as needed for High Blood Sugar.          Hospital medications:  â¢ calcium gluconate IVPB  1 g Intravenous Once   â¢ furosemide  20 mg Intravenous Q8H   â¢ insulin lispro   Subcutaneous 4 times per day   â¢ piperacillin-tazobactam (ZOSYN) extended interval IVPB  3.375 g Intravenous Q12H   â¢ polyethylene glycol  17 g Oral BID   â¢ sucralfate  1 g Oral BID   â¢ dextrose  25 g Intravenous Once   â¢ melatonin  3 mg Oral Nightly   â¢ pantoprazole  40 mg Oral BID   â¢ vitamin D3  1.25 mg Oral Once per day on Sun   â¢ docusate sodium-sennosides  1 tablet Oral Nightly   â¢ sodium chloride (PF)  2 mL Intracatheter 2 times per day   â¢ Potassium Standard Replacement Protocol   Does not apply See Admin Instructions   â¢ Magnesium Standard Replacement Protocol   Does not apply See Admin Instructions       VITAL SIGNS:    Vital Last Value 24 Hour Range   Temperature 99.3 Â°F (37.4 Â°C) (03/25/22 0700) "Temp  Min: 98.1 Â°F (36.7 Â°C)  Max: 102 Â°F (38.9 Â°C)   Pulse (!) 110 (03/25/22 0700) Pulse  Min: 104  Max: 127   Respiratory 20 (03/25/22 0700) Resp  Min: 17  Max: 26   Non-Invasive  Blood Pressure 131/48 (03/24/22 1045) BP  Min: 131/48  Max: 133/35   Pulse Oximetry 100 % (03/25/22 0720) SpO2  Min: 97 %  Max: 100 %     Vital Today Admitted   Weight 84.6 kg (186 lb 8.2 oz) (03/20/22 0700) Weight: 88.3 kg (194 lb 10.7 oz) (03/11/22 0330)   Height N/A Height: 6' 0.01"" (182.9 cm) (03/11/22 0245)   BMI N/A BMI (Calculated): 26.4 (03/11/22 0330)     INTAKE/OUTPUT:      Intake/Output Summary (Last 24 hours) at 3/25/2022 0850  Last data filed at 3/25/2022 0700  Gross per 24 hour   Intake 2111.7 ml   Output 4045 ml   Net -1933.3 ml        PHYSICAL EXAM:    VITAL SIGNS:    Visit Vitals  /48   Pulse (!) 110   Temp 99.3 Â°F (37.4 Â°C) (PA Catheter)   Resp 20   Ht 6' 0.01\"" (1.829 m)   Wt 84.6 kg (186 lb 8.2 oz)   SpO2 100%   BMI 25.29 kg/mÂ²     Body mass index is 25.29 kg/mÂ².     WEIGHTS:   Wt Readings from Last 4 Encounters:   03/20/22 84.6 kg (186 lb 8.2 oz)   03/10/22 88.4 kg (194 lb 14.2 oz)   08/19/21 90.7 kg (200 lb)       HEENT: Normal cephalic, atraumatic, intubated, NG to lis  Neck: No visible thyroid enlargement   Chest: No labored respiration  CVS: Tachycardia, IABP present  Abdomen: No distended abdomen, soft  Neurologic: Sedated, unable to assess neurologic status   Skin: No visible rash      LABORATORY DATA:    Hemoglobin A1C (%)   Date Value   03/11/2022 6.5 (H)     No components found for: CHOLHDL  Triglycerides (mg/dL)   Date Value   03/23/2022 130     HDL (mg/dL)   Date Value   03/11/2022 44     LDL (mg/dL)   Date Value   03/11/2022 41     No components found for: NONHDLCALC  Sodium (mmol/L)   Date Value   03/25/2022 133 (L)     Potassium (mmol/L)   Date Value   03/25/2022 3.5     Chloride (mmol/L)   Date Value   03/25/2022 99     Glucose (mg/dL)   Date Value   03/25/2022 154 (H)     Calcium (mg/dL)   Date " Value   03/25/2022 7.6 (L)     Carbon Dioxide (mmol/L)   Date Value   03/25/2022 29     BUN (mg/dL)   Date Value   03/25/2022 18     Creatinine (mg/dL)   Date Value   03/25/2022 1.21 (H)     TSH (mcUnits/mL)   Date Value   03/10/2022 0.722     WBC (K/mcL)   Date Value   03/25/2022 13.5 (H)     RBC (mil/mcL)   Date Value   03/25/2022 2.51 (L)     HCT (%)   Date Value   03/25/2022 22.4 (L)     HGB (g/dL)   Date Value   03/25/2022 7.7 (L)     PLT (K/mcL)   Date Value   03/25/2022 84 (L)     GOT/AST (Units/L)   Date Value   03/24/2022 42 (H)     GPT/ALT (Units/L)   Date Value   03/24/2022 47     No results found for: GGTP  Alkaline Phosphatase (Units/L)   Date Value   03/24/2022 31 (L)     Bilirubin, Total (mg/dL)   Date Value   03/24/2022 1.2 (H)        Recent Labs   Lab 03/24/22  0904 03/24/22  1008 03/24/22  1125 03/24/22  1200 03/24/22  1739 03/24/22  2333 03/25/22  0533   GLUCOSE BEDSIDE 109* 129* 98 104* 133* 95 106*        IMAGING STUDIES:    Imaging studies reviewed. [facility]. Sebastian East MD, F.A.C.E;F.A. C. P   Associate Professor of Medicine, [facility]   Staff Endocrinologist, [address] [place] [address].   3/25/2022     Scribe Attestation: Entered by Chu Boyd, acting as scribe for Dr. Albania Fay. Provider Attestation: The documentation recorded by the scribe accurately reflects the service I personally performed and the decisions made by me, Janie Branch    Patient has been seen and examined personally by me. Patient has also been discussed with NP, Lulu Fry. The majority of the visit, including the assessment and plan, has been performed by me. Cathy Cameron

## 2024-12-03 NOTE — NURSING NOTE
Patient is being seen by provider in infusion: part of rooming assessments and vitals were done by the infusion RN.    Margarita Damian CMA ([facility])................ 12/3/2024 12:58 PM

## 2024-12-09 ENCOUNTER — TELEPHONE (OUTPATIENT)
Dept: ONCOLOGY | Facility: CLINIC | Age: 89
End: 2024-12-09
Payer: COMMERCIAL

## 2024-12-09 NOTE — TELEPHONE ENCOUNTER
ORTEGA APPROVED    Medication: XTANDI 80 MG PO TABS  Amount: $ 2,000  [facility] Name: [facility] Effective Date: 1/1/2025  Foundation Expiration Date: 12/31/2025  Patient Notified: Yes        Thank you,  [facility] Oncology/Transplant Liaison  Phone: [phone]  Fax: [phone]

## 2024-12-09 NOTE — TELEPHONE ENCOUNTER
ORTEGA INITIATED    Medication: XTANDI 80 MG PO TABS  Foundation Name: [facility]  Date submitted: 12/9/2024 10:42 AM     Thank you,  Mary Todd Oncology/Transplant Liaison  Phone: [phone]  Fax: [phone]

## 2024-12-20 ENCOUNTER — TELEPHONE (OUTPATIENT)
Dept: ONCOLOGY | Facility: CLINIC | Age: 89
End: 2024-12-20
Payer: COMMERCIAL

## 2024-12-20 NOTE — TELEPHONE ENCOUNTER
Prior Authorization Approval    Medication: XTANDI 80 MG PO TABS  Authorization Effective Date: 12/20/2024  Authorization Expiration Date: 12/31/2025  Approved Dose/Quantity: 60/30d   Reference #: [id number]   Insurance Company: [employer] Part D - Phone [phone] Fax [phone]  Expected CoPay: $    CoPay Card Available:      Financial Assistance Needed:    Which Pharmacy is filling the prescription:    Pharmacy Notified:    Patient Notified:

## 2024-12-20 NOTE — TELEPHONE ENCOUNTER
PA Initiation    Medication: XTANDI 80 MG PO TABS  Insurance Company: [employer] Part D - Phone [phone] Fax [phone]  Pharmacy Filling the Rx:    Filling Pharmacy Phone:    Filling Pharmacy Fax:    Start Date: 12/20/2024

## 2024-12-23 ENCOUNTER — TELEPHONE (OUTPATIENT)
Dept: ONCOLOGY | Facility: CLINIC | Age: 89
End: 2024-12-23
Payer: COMMERCIAL

## 2024-12-23 NOTE — TELEPHONE ENCOUNTER
ORTEGA APPROVED    Medication: XTANDI 80 MG PO TABS  Amount: $ 8,000  Foundation Name: [facility] Phone: [phone]  [facility] Fax:    Member ID: [id number]  BIN: [id number]  PCN: [id number]  Group: [id number]  Foundation Effective Date: 11/23/2024  Foundation Expiration Date: 11/22/2025  Additional Information:    Patient Notified:

## 2024-12-31 ENCOUNTER — HOSPITAL ENCOUNTER (OUTPATIENT)
Dept: INFUSION THERAPY | Facility: OTHER | Age: 89
Discharge: HOME | End: 2024-12-31
Payer: COMMERCIAL

## 2024-12-31 ENCOUNTER — LAB (OUTPATIENT)
Dept: LAB | Facility: OTHER | Age: 89
End: 2024-12-31
Payer: COMMERCIAL

## 2024-12-31 VITALS
HEART RATE: 70 BPM | WEIGHT: 166.4 LBS | SYSTOLIC BLOOD PRESSURE: 150 MMHG | DIASTOLIC BLOOD PRESSURE: 84 MMHG | BODY MASS INDEX: 25.22 KG/M2 | TEMPERATURE: 97.1 F | HEIGHT: 68 IN | RESPIRATION RATE: 18 BRPM

## 2024-12-31 DIAGNOSIS — C79.51 PROSTATE CANCER METASTATIC TO BONE (H): Primary | ICD-10-CM

## 2024-12-31 DIAGNOSIS — C61 PROSTATE CANCER METASTATIC TO BONE (H): Primary | ICD-10-CM

## 2024-12-31 DIAGNOSIS — C79.51 PROSTATE CANCER METASTATIC TO BONE (H): ICD-10-CM

## 2024-12-31 DIAGNOSIS — C61 PROSTATE CANCER METASTATIC TO BONE (H): ICD-10-CM

## 2024-12-31 LAB
ALBUMIN SERPL BCG-MCNC: 4.2 G/DL (ref 3.5–5.2)
ALP SERPL-CCNC: 87 U/L (ref 40–150)
ALT SERPL W P-5'-P-CCNC: 12 U/L (ref 0–70)
ANION GAP SERPL CALCULATED.3IONS-SCNC: 11 MMOL/L (ref 7–15)
AST SERPL W P-5'-P-CCNC: 20 U/L (ref 0–45)
BASOPHILS # BLD AUTO: 0.1 10E3/UL (ref 0–0.2)
BASOPHILS NFR BLD AUTO: 1 %
BILIRUB SERPL-MCNC: 0.6 MG/DL
BUN SERPL-MCNC: 21.1 MG/DL (ref 8–23)
CALCIUM SERPL-MCNC: 10.3 MG/DL (ref 8.8–10.4)
CHLORIDE SERPL-SCNC: 102 MMOL/L (ref 98–107)
CREAT SERPL-MCNC: 1.17 MG/DL (ref 0.67–1.17)
EGFRCR SERPLBLD CKD-EPI 2021: 60 ML/MIN/1.73M2
EOSINOPHIL # BLD AUTO: 0.2 10E3/UL (ref 0–0.7)
EOSINOPHIL NFR BLD AUTO: 3 %
ERYTHROCYTE [DISTWIDTH] IN BLOOD BY AUTOMATED COUNT: 13.7 % (ref 10–15)
GLUCOSE SERPL-MCNC: 135 MG/DL (ref 70–99)
HCO3 SERPL-SCNC: 25 MMOL/L (ref 22–29)
HCT VFR BLD AUTO: 41 % (ref 40–53)
HGB BLD-MCNC: 13.9 G/DL (ref 13.3–17.7)
IMM GRANULOCYTES # BLD: 0 10E3/UL
IMM GRANULOCYTES NFR BLD: 0 %
LYMPHOCYTES # BLD AUTO: 1.8 10E3/UL (ref 0.8–5.3)
LYMPHOCYTES NFR BLD AUTO: 26 %
MCH RBC QN AUTO: 29.6 PG (ref 26.5–33)
MCHC RBC AUTO-ENTMCNC: 33.9 G/DL (ref 31.5–36.5)
MCV RBC AUTO: 87 FL (ref 78–100)
MONOCYTES # BLD AUTO: 0.6 10E3/UL (ref 0–1.3)
MONOCYTES NFR BLD AUTO: 8 %
NEUTROPHILS # BLD AUTO: 4.1 10E3/UL (ref 1.6–8.3)
NEUTROPHILS NFR BLD AUTO: 61 %
NRBC # BLD AUTO: 0 10E3/UL
NRBC BLD AUTO-RTO: 0 /100
PLATELET # BLD AUTO: 252 10E3/UL (ref 150–450)
POTASSIUM SERPL-SCNC: 4.6 MMOL/L (ref 3.4–5.3)
PROT SERPL-MCNC: 7.8 G/DL (ref 6.4–8.3)
PSA SERPL DL<=0.01 NG/ML-MCNC: <0.01 NG/ML
RBC # BLD AUTO: 4.7 10E6/UL (ref 4.4–5.9)
SODIUM SERPL-SCNC: 138 MMOL/L (ref 135–145)
WBC # BLD AUTO: 6.8 10E3/UL (ref 4–11)

## 2024-12-31 PROCEDURE — 36415 COLL VENOUS BLD VENIPUNCTURE: CPT | Mod: ZL

## 2024-12-31 PROCEDURE — 96402 CHEMO HORMON ANTINEOPL SQ/IM: CPT

## 2024-12-31 PROCEDURE — 80053 COMPREHEN METABOLIC PANEL: CPT | Mod: ZL

## 2024-12-31 PROCEDURE — 84153 ASSAY OF PSA TOTAL: CPT | Mod: ZL

## 2024-12-31 PROCEDURE — 250N000011 HC RX IP 250 OP 636: Mod: JZ | Performed by: INTERNAL MEDICINE

## 2024-12-31 PROCEDURE — 85018 HEMOGLOBIN: CPT | Mod: ZL

## 2024-12-31 PROCEDURE — 85004 AUTOMATED DIFF WBC COUNT: CPT | Mod: ZL

## 2024-12-31 PROCEDURE — 84403 ASSAY OF TOTAL TESTOSTERONE: CPT | Mod: ZL

## 2024-12-31 RX ORDER — DIPHENHYDRAMINE HYDROCHLORIDE 50 MG/ML
50 INJECTION INTRAMUSCULAR; INTRAVENOUS
Start: 2025-01-29

## 2024-12-31 RX ORDER — HEPARIN SODIUM,PORCINE 10 UNIT/ML
5-20 VIAL (ML) INTRAVENOUS DAILY PRN
OUTPATIENT
Start: 2025-01-29

## 2024-12-31 RX ORDER — HEPARIN SODIUM (PORCINE) LOCK FLUSH IV SOLN 100 UNIT/ML 100 UNIT/ML
5 SOLUTION INTRAVENOUS
OUTPATIENT
Start: 2025-01-29

## 2024-12-31 RX ORDER — MEPERIDINE HYDROCHLORIDE 25 MG/ML
25 INJECTION INTRAMUSCULAR; INTRAVENOUS; SUBCUTANEOUS EVERY 30 MIN PRN
OUTPATIENT
Start: 2025-01-29

## 2024-12-31 RX ORDER — ALBUTEROL SULFATE 0.83 MG/ML
2.5 SOLUTION RESPIRATORY (INHALATION)
OUTPATIENT
Start: 2025-01-29

## 2024-12-31 RX ORDER — ALBUTEROL SULFATE 90 UG/1
1-2 INHALANT RESPIRATORY (INHALATION)
Start: 2025-01-29

## 2024-12-31 RX ORDER — EPINEPHRINE 1 MG/ML
0.3 INJECTION, SOLUTION, CONCENTRATE INTRAVENOUS EVERY 5 MIN PRN
OUTPATIENT
Start: 2025-01-29

## 2024-12-31 RX ORDER — METHYLPREDNISOLONE SODIUM SUCCINATE 125 MG/2ML
125 INJECTION INTRAMUSCULAR; INTRAVENOUS
Start: 2025-01-29

## 2024-12-31 RX ORDER — ZOLEDRONIC ACID 0.04 MG/ML
4 INJECTION, SOLUTION INTRAVENOUS ONCE
Status: CANCELLED | OUTPATIENT
Start: 2025-01-29 | End: 2025-01-29

## 2024-12-31 RX ADMIN — LEUPROLIDE ACETATE 22.5 MG: KIT at 14:12

## 2024-12-31 NOTE — PHARMACY-CONSULT NOTE
Pharmacy- Renal Dose Adjustment    Patient Active Problem List   Diagnosis    Personal history of DVT (deep vein thrombosis)    History of pulmonary embolism    Abdominal aortic aneurysm without rupture (H)    Ischemic cardiomyopathy    History of four vessel coronary artery bypass graft    H/O transurethral resection of prostate    Anticoagulation monitoring, INR range 2-3    Long-term (current) use of anticoagulants [Z79.01]    Cataract    DJD (degenerative joint disease) of cervical spine    Diaphragmatic hernia    Lumbar disc disease    Osteoarthrosis involving lower leg    Primary osteoarthritis of right hand    S/P CABG x 4    History of transurethral resection of prostate    Spinal stenosis, lumbar    Lesion of ulnar nerve    History of Guillain-Simla syndrome    Gastroesophageal reflux disease, esophagitis presence not specified    Primary osteoarthritis of left knee    Mixed hyperlipidemia    Peripheral arterial disease (H)    OLIVIA (generalized anxiety disorder)    Cervical arthritis    Primary osteoarthritis involving multiple joints    Diabetes mellitus type 2, diet-controlled (H)    Benign prostatic hyperplasia without lower urinary tract symptoms    Pathological fracture of thoracic vertebra with routine healing, subsequent encounter    Closed wedge compression fracture of T8 vertebra with routine healing, subsequent encounter    Prostate cancer metastatic to bone (H)    History of gross hematuria    Chronic anticoagulation        Relevant Labs:  Recent Labs   Lab Test 12/31/24  1317 11/01/24  0922   WBC 6.8 5.9   HGB 13.9 12.8*    243        CrCl: 46.6 mL/min    No intake or output data in the 24 hours ending 12/31/24 1412       Per Renal Dose Adjustment Protocol, will adjust:  Zometa 3.3mg IV per CrCl 40-49 mL/min      Will continue to follow and make adjustments accordingly. Thank You.    Deb Casey [facility] ....................  12/31/2024   2:12 PM

## 2024-12-31 NOTE — NURSING NOTE
Infusion Nursing Note:  Michael BLUNT Juan presents today for Lupron.    Patient seen by provider today: No   present during visit today: Not Applicable.    Note: Pharmacy running behind Patient did not want to wait for Zometa to be made as he had another commitment he had to make it too. He will be retuning 230 on Thursday.     Intravenous Access:  Labs drawn by .  Peripheral IV placed.    Treatment Conditions:  Lab Results   Component Value Date    HGB 13.9 12/31/2024    WBC 6.8 12/31/2024    ANEU 5.3 05/24/2021    ANEUTAUTO 4.1 12/31/2024     12/31/2024        Lab Results   Component Value Date     12/31/2024    POTASSIUM 4.6 12/31/2024    MAG 1.7 11/20/2023    CR 1.17 12/31/2024    OLIVER 10.3 12/31/2024    BILITOTAL 0.6 12/31/2024    ALBUMIN 4.2 12/31/2024    ALT 12 12/31/2024    AST 20 12/31/2024     Results reviewed, labs MET treatment parameters, ok to proceed with treatment.    Post Infusion Assessment:    Patient tolerated injection without incident.  Blood return noted pre and post infusion.  Site patent and intact, free from redness, edema or discomfort.  No evidence of extravasations.  Access discontinued per protocol.     Discharge Plan:   Discharge instructions reviewed with: Patient.  Patient and/or family verbalized understanding of discharge instructions and all questions answered.  Copy of AVS reviewed with patient and/or family.  Patient will return 1/2/25 for next appointment.  AVS to patient via [employer].    Patient discharged in stable condition accompanied by: self and daughter.  Departure Mode: Ambulatory.      Alison Poe RN

## 2025-01-02 ENCOUNTER — HOSPITAL ENCOUNTER (OUTPATIENT)
Dept: INFUSION THERAPY | Facility: OTHER | Age: 89
End: 2025-01-02
Payer: COMMERCIAL

## 2025-01-02 VITALS
DIASTOLIC BLOOD PRESSURE: 89 MMHG | RESPIRATION RATE: 18 BRPM | TEMPERATURE: 96.9 F | HEART RATE: 63 BPM | SYSTOLIC BLOOD PRESSURE: 148 MMHG

## 2025-01-02 DIAGNOSIS — C61 PROSTATE CANCER METASTATIC TO BONE (H): Primary | ICD-10-CM

## 2025-01-02 DIAGNOSIS — C79.51 PROSTATE CANCER METASTATIC TO BONE (H): Primary | ICD-10-CM

## 2025-01-02 PROCEDURE — 250N000011 HC RX IP 250 OP 636: Performed by: INTERNAL MEDICINE

## 2025-01-02 PROCEDURE — 258N000003 HC RX IP 258 OP 636: Performed by: INTERNAL MEDICINE

## 2025-01-02 RX ORDER — HEPARIN SODIUM (PORCINE) LOCK FLUSH IV SOLN 100 UNIT/ML 100 UNIT/ML
5 SOLUTION INTRAVENOUS
OUTPATIENT
Start: 2025-01-29

## 2025-01-02 RX ORDER — DIPHENHYDRAMINE HYDROCHLORIDE 50 MG/ML
50 INJECTION INTRAMUSCULAR; INTRAVENOUS
Start: 2025-01-29

## 2025-01-02 RX ORDER — METHYLPREDNISOLONE SODIUM SUCCINATE 125 MG/2ML
125 INJECTION INTRAMUSCULAR; INTRAVENOUS
Start: 2025-01-29

## 2025-01-02 RX ORDER — HEPARIN SODIUM,PORCINE 10 UNIT/ML
5-20 VIAL (ML) INTRAVENOUS DAILY PRN
OUTPATIENT
Start: 2025-01-29

## 2025-01-02 RX ORDER — EPINEPHRINE 1 MG/ML
0.3 INJECTION, SOLUTION, CONCENTRATE INTRAVENOUS EVERY 5 MIN PRN
OUTPATIENT
Start: 2025-01-29

## 2025-01-02 RX ORDER — ALBUTEROL SULFATE 0.83 MG/ML
2.5 SOLUTION RESPIRATORY (INHALATION)
OUTPATIENT
Start: 2025-01-29

## 2025-01-02 RX ORDER — ALBUTEROL SULFATE 90 UG/1
1-2 INHALANT RESPIRATORY (INHALATION)
Start: 2025-01-29

## 2025-01-02 RX ORDER — MEPERIDINE HYDROCHLORIDE 25 MG/ML
25 INJECTION INTRAMUSCULAR; INTRAVENOUS; SUBCUTANEOUS EVERY 30 MIN PRN
OUTPATIENT
Start: 2025-01-29

## 2025-01-02 RX ORDER — ZOLEDRONIC ACID 0.04 MG/ML
4 INJECTION, SOLUTION INTRAVENOUS ONCE
OUTPATIENT
Start: 2025-01-29 | End: 2025-01-29

## 2025-01-02 RX ADMIN — ZOLEDRONIC ACID 3.3 MG: 4 INJECTION, SOLUTION, CONCENTRATE INTRAVENOUS at 14:38

## 2025-01-02 NOTE — NURSING NOTE
Infusion Nursing Note:  Michael Martinez presents today for Zometa.    Patient seen by provider today: No   present during visit today: Not Applicable.    Note: N/A.      Intravenous Access:  Peripheral IV placed.    Treatment Conditions:  Lab Results   Component Value Date     12/31/2024    POTASSIUM 4.6 12/31/2024    MAG 1.7 11/20/2023    CR 1.17 12/31/2024    OLIVER 10.3 12/31/2024    BILITOTAL 0.6 12/31/2024    ALBUMIN 4.2 12/31/2024    ALT 12 12/31/2024    AST 20 12/31/2024       Results reviewed, labs MET treatment parameters, ok to proceed with treatment.      Post Infusion Assessment:  Patient tolerated infusion without incident.  Blood return noted pre and post infusion.  Site patent and intact, free from redness, edema or discomfort.  No evidence of extravasations.  Access discontinued per protocol.       Discharge Plan:   Discharge instructions reviewed with: Patient.  Patient and/or family verbalized understanding of discharge instructions and all questions answered.  Copy of AVS reviewed with patient and/or family.  Patient will return as scheduled for next appointment.  Patient discharged in stable condition accompanied by: self.  Departure Mode: Ambulatory.      Ela Sanders RN

## 2025-01-07 LAB — TESTOST SERPL-MCNC: 8 NG/DL (ref 240–950)

## 2025-01-30 ENCOUNTER — HOSPITAL ENCOUNTER (OUTPATIENT)
Dept: INFUSION THERAPY | Facility: OTHER | Age: 89
End: 2025-01-30
Attending: INTERNAL MEDICINE
Payer: MEDICARE

## 2025-01-30 ENCOUNTER — LAB (OUTPATIENT)
Dept: LAB | Facility: OTHER | Age: 89
End: 2025-01-30
Attending: INTERNAL MEDICINE
Payer: MEDICARE

## 2025-01-30 VITALS
WEIGHT: 167.4 LBS | TEMPERATURE: 99 F | HEIGHT: 70 IN | SYSTOLIC BLOOD PRESSURE: 134 MMHG | BODY MASS INDEX: 23.96 KG/M2 | HEART RATE: 61 BPM | DIASTOLIC BLOOD PRESSURE: 72 MMHG | RESPIRATION RATE: 18 BRPM

## 2025-01-30 DIAGNOSIS — C61 PROSTATE CANCER METASTATIC TO BONE (H): Primary | ICD-10-CM

## 2025-01-30 DIAGNOSIS — C61 PROSTATE CANCER METASTATIC TO BONE (H): ICD-10-CM

## 2025-01-30 DIAGNOSIS — C79.51 PROSTATE CANCER METASTATIC TO BONE (H): Primary | ICD-10-CM

## 2025-01-30 DIAGNOSIS — C79.51 PROSTATE CANCER METASTATIC TO BONE (H): ICD-10-CM

## 2025-01-30 LAB
ALBUMIN SERPL BCG-MCNC: 4 G/DL (ref 3.5–5.2)
ALP SERPL-CCNC: 71 U/L (ref 40–150)
ALT SERPL W P-5'-P-CCNC: 12 U/L (ref 0–70)
ANION GAP SERPL CALCULATED.3IONS-SCNC: 12 MMOL/L (ref 7–15)
AST SERPL W P-5'-P-CCNC: 19 U/L (ref 0–45)
BASOPHILS # BLD AUTO: 0.1 10E3/UL (ref 0–0.2)
BASOPHILS NFR BLD AUTO: 1 %
BILIRUB SERPL-MCNC: 0.4 MG/DL
BUN SERPL-MCNC: 22.9 MG/DL (ref 8–23)
CALCIUM SERPL-MCNC: 9.8 MG/DL (ref 8.8–10.4)
CHLORIDE SERPL-SCNC: 103 MMOL/L (ref 98–107)
CREAT SERPL-MCNC: 1.03 MG/DL (ref 0.67–1.17)
EGFRCR SERPLBLD CKD-EPI 2021: 70 ML/MIN/1.73M2
EOSINOPHIL # BLD AUTO: 0.3 10E3/UL (ref 0–0.7)
EOSINOPHIL NFR BLD AUTO: 4 %
ERYTHROCYTE [DISTWIDTH] IN BLOOD BY AUTOMATED COUNT: 13.6 % (ref 10–15)
GLUCOSE SERPL-MCNC: 194 MG/DL (ref 70–99)
HCO3 SERPL-SCNC: 23 MMOL/L (ref 22–29)
HCT VFR BLD AUTO: 38.6 % (ref 40–53)
HGB BLD-MCNC: 13 G/DL (ref 13.3–17.7)
IMM GRANULOCYTES # BLD: 0 10E3/UL
IMM GRANULOCYTES NFR BLD: 0 %
LYMPHOCYTES # BLD AUTO: 2.3 10E3/UL (ref 0.8–5.3)
LYMPHOCYTES NFR BLD AUTO: 29 %
MCH RBC QN AUTO: 29.2 PG (ref 26.5–33)
MCHC RBC AUTO-ENTMCNC: 33.7 G/DL (ref 31.5–36.5)
MCV RBC AUTO: 87 FL (ref 78–100)
MONOCYTES # BLD AUTO: 0.6 10E3/UL (ref 0–1.3)
MONOCYTES NFR BLD AUTO: 7 %
NEUTROPHILS # BLD AUTO: 4.6 10E3/UL (ref 1.6–8.3)
NEUTROPHILS NFR BLD AUTO: 59 %
NRBC # BLD AUTO: 0 10E3/UL
NRBC BLD AUTO-RTO: 0 /100
PLATELET # BLD AUTO: 240 10E3/UL (ref 150–450)
POTASSIUM SERPL-SCNC: 4.3 MMOL/L (ref 3.4–5.3)
PROT SERPL-MCNC: 7.2 G/DL (ref 6.4–8.3)
PSA SERPL DL<=0.01 NG/ML-MCNC: <0.01 NG/ML
RBC # BLD AUTO: 4.45 10E6/UL (ref 4.4–5.9)
SODIUM SERPL-SCNC: 138 MMOL/L (ref 135–145)
WBC # BLD AUTO: 7.9 10E3/UL (ref 4–11)

## 2025-01-30 PROCEDURE — 84403 ASSAY OF TOTAL TESTOSTERONE: CPT | Mod: ZL

## 2025-01-30 PROCEDURE — 250N000011 HC RX IP 250 OP 636: Performed by: INTERNAL MEDICINE

## 2025-01-30 PROCEDURE — 82247 BILIRUBIN TOTAL: CPT | Mod: ZL

## 2025-01-30 PROCEDURE — 36415 COLL VENOUS BLD VENIPUNCTURE: CPT | Mod: ZL

## 2025-01-30 PROCEDURE — 84155 ASSAY OF PROTEIN SERUM: CPT | Mod: ZL

## 2025-01-30 PROCEDURE — 258N000003 HC RX IP 258 OP 636: Performed by: INTERNAL MEDICINE

## 2025-01-30 PROCEDURE — 84153 ASSAY OF PSA TOTAL: CPT | Mod: ZL

## 2025-01-30 PROCEDURE — 85025 COMPLETE CBC W/AUTO DIFF WBC: CPT | Mod: ZL

## 2025-01-30 RX ORDER — ALBUTEROL SULFATE 90 UG/1
1-2 INHALANT RESPIRATORY (INHALATION)
Start: 2025-02-28

## 2025-01-30 RX ORDER — METHYLPREDNISOLONE SODIUM SUCCINATE 125 MG/2ML
125 INJECTION INTRAMUSCULAR; INTRAVENOUS
Start: 2025-02-28

## 2025-01-30 RX ORDER — MEPERIDINE HYDROCHLORIDE 25 MG/ML
25 INJECTION INTRAMUSCULAR; INTRAVENOUS; SUBCUTANEOUS EVERY 30 MIN PRN
OUTPATIENT
Start: 2025-02-28

## 2025-01-30 RX ORDER — HEPARIN SODIUM (PORCINE) LOCK FLUSH IV SOLN 100 UNIT/ML 100 UNIT/ML
5 SOLUTION INTRAVENOUS
OUTPATIENT
Start: 2025-02-28

## 2025-01-30 RX ORDER — ZOLEDRONIC ACID 0.04 MG/ML
4 INJECTION, SOLUTION INTRAVENOUS ONCE
OUTPATIENT
Start: 2025-02-28 | End: 2025-02-28

## 2025-01-30 RX ORDER — HEPARIN SODIUM,PORCINE 10 UNIT/ML
5-20 VIAL (ML) INTRAVENOUS DAILY PRN
OUTPATIENT
Start: 2025-02-28

## 2025-01-30 RX ORDER — ALBUTEROL SULFATE 0.83 MG/ML
2.5 SOLUTION RESPIRATORY (INHALATION)
OUTPATIENT
Start: 2025-02-28

## 2025-01-30 RX ORDER — EPINEPHRINE 1 MG/ML
0.3 INJECTION, SOLUTION, CONCENTRATE INTRAVENOUS EVERY 5 MIN PRN
OUTPATIENT
Start: 2025-02-28

## 2025-01-30 RX ORDER — DIPHENHYDRAMINE HYDROCHLORIDE 50 MG/ML
50 INJECTION INTRAMUSCULAR; INTRAVENOUS
Start: 2025-02-28

## 2025-01-30 RX ADMIN — ZOLEDRONIC ACID 3.5 MG: 4 INJECTION, SOLUTION, CONCENTRATE INTRAVENOUS at 09:48

## 2025-01-30 NOTE — NURSING NOTE
Infusion Nursing Note:  Michael Martinez presents today for Zometa.    Patient seen by provider today: No   present during visit today: Not Applicable.    Note: N/A.      Intravenous Access:  Labs drawn without difficulty by lab.  Peripheral IV placed.    Treatment Conditions:  Lab Results   Component Value Date     01/30/2025    POTASSIUM 4.3 01/30/2025    MAG 1.7 11/20/2023    CR 1.03 01/30/2025    OLIVER 9.8 01/30/2025    BILITOTAL 0.4 01/30/2025    ALBUMIN 4.0 01/30/2025    ALT 12 01/30/2025    AST 19 01/30/2025         Post Infusion Assessment:  Patient tolerated infusion without incident.  Blood return noted pre and post infusion.  Site patent and intact, free from redness, edema or discomfort.  No evidence of extravasations.  Access discontinued per protocol.       Discharge Plan:   Discharge instructions reviewed with: Patient and Family.  Patient and/or family verbalized understanding of discharge instructions and all questions answered.  Copy of AVS reviewed with patient and/or family.  Patient will return 2-27-25 for next appointment.  Patient discharged in stable condition accompanied by: daughter.  Departure Mode: Ambulatory.      Elizabeth Weiner RN

## 2025-01-30 NOTE — PROGRESS NOTES
Previous standing orders .   NEED NEW ORDERS PLACED.    Replaced orders for today, but will address need for new standing orders at his visit with Dr Nj in March.    Margarita Damian CMA([facility])..................2025   9:00 AM

## 2025-02-01 LAB — TESTOST SERPL-MCNC: 6 NG/DL (ref 240–950)

## 2025-02-27 ENCOUNTER — HOSPITAL ENCOUNTER (OUTPATIENT)
Dept: INFUSION THERAPY | Facility: OTHER | Age: 89
End: 2025-02-27
Attending: INTERNAL MEDICINE
Payer: MEDICARE

## 2025-02-27 ENCOUNTER — LAB (OUTPATIENT)
Dept: LAB | Facility: OTHER | Age: 89
End: 2025-02-27
Attending: INTERNAL MEDICINE
Payer: MEDICARE

## 2025-02-27 VITALS
TEMPERATURE: 98.4 F | SYSTOLIC BLOOD PRESSURE: 127 MMHG | WEIGHT: 167.4 LBS | BODY MASS INDEX: 24.02 KG/M2 | DIASTOLIC BLOOD PRESSURE: 80 MMHG | HEART RATE: 62 BPM

## 2025-02-27 DIAGNOSIS — C61 PROSTATE CANCER METASTATIC TO BONE (H): ICD-10-CM

## 2025-02-27 DIAGNOSIS — C79.51 PROSTATE CANCER METASTATIC TO BONE (H): Primary | ICD-10-CM

## 2025-02-27 DIAGNOSIS — C61 PROSTATE CANCER METASTATIC TO BONE (H): Primary | ICD-10-CM

## 2025-02-27 DIAGNOSIS — C79.51 PROSTATE CANCER METASTATIC TO BONE (H): ICD-10-CM

## 2025-02-27 LAB
ALBUMIN SERPL BCG-MCNC: 4.1 G/DL (ref 3.5–5.2)
ALP SERPL-CCNC: 77 U/L (ref 40–150)
ALT SERPL W P-5'-P-CCNC: 11 U/L (ref 0–70)
ANION GAP SERPL CALCULATED.3IONS-SCNC: 11 MMOL/L (ref 7–15)
AST SERPL W P-5'-P-CCNC: 22 U/L (ref 0–45)
BASOPHILS # BLD AUTO: 0.1 10E3/UL (ref 0–0.2)
BASOPHILS NFR BLD AUTO: 1 %
BILIRUB SERPL-MCNC: 0.5 MG/DL
BUN SERPL-MCNC: 20.1 MG/DL (ref 8–23)
CALCIUM SERPL-MCNC: 9.8 MG/DL (ref 8.8–10.4)
CALCIUM SERPL-MCNC: 9.8 MG/DL (ref 8.8–10.4)
CHLORIDE SERPL-SCNC: 101 MMOL/L (ref 98–107)
CREAT SERPL-MCNC: 1.1 MG/DL (ref 0.67–1.17)
EGFRCR SERPLBLD CKD-EPI 2021: 65 ML/MIN/1.73M2
EOSINOPHIL # BLD AUTO: 0.3 10E3/UL (ref 0–0.7)
EOSINOPHIL NFR BLD AUTO: 4 %
ERYTHROCYTE [DISTWIDTH] IN BLOOD BY AUTOMATED COUNT: 13.9 % (ref 10–15)
GLUCOSE SERPL-MCNC: 137 MG/DL (ref 70–99)
HCO3 SERPL-SCNC: 24 MMOL/L (ref 22–29)
HCT VFR BLD AUTO: 38.6 % (ref 40–53)
HGB BLD-MCNC: 12.9 G/DL (ref 13.3–17.7)
IMM GRANULOCYTES # BLD: 0 10E3/UL
IMM GRANULOCYTES NFR BLD: 0 %
LYMPHOCYTES # BLD AUTO: 2.1 10E3/UL (ref 0.8–5.3)
LYMPHOCYTES NFR BLD AUTO: 29 %
MCH RBC QN AUTO: 29.1 PG (ref 26.5–33)
MCHC RBC AUTO-ENTMCNC: 33.4 G/DL (ref 31.5–36.5)
MCV RBC AUTO: 87 FL (ref 78–100)
MONOCYTES # BLD AUTO: 0.6 10E3/UL (ref 0–1.3)
MONOCYTES NFR BLD AUTO: 8 %
NEUTROPHILS # BLD AUTO: 4.2 10E3/UL (ref 1.6–8.3)
NEUTROPHILS NFR BLD AUTO: 58 %
NRBC # BLD AUTO: 0 10E3/UL
NRBC BLD AUTO-RTO: 0 /100
PLATELET # BLD AUTO: 239 10E3/UL (ref 150–450)
POTASSIUM SERPL-SCNC: 4.4 MMOL/L (ref 3.4–5.3)
PROT SERPL-MCNC: 7.5 G/DL (ref 6.4–8.3)
PSA SERPL DL<=0.01 NG/ML-MCNC: <0.01 NG/ML
RBC # BLD AUTO: 4.43 10E6/UL (ref 4.4–5.9)
SODIUM SERPL-SCNC: 136 MMOL/L (ref 135–145)
WBC # BLD AUTO: 7.2 10E3/UL (ref 4–11)

## 2025-02-27 PROCEDURE — 82310 ASSAY OF CALCIUM: CPT | Performed by: INTERNAL MEDICINE

## 2025-02-27 PROCEDURE — 84153 ASSAY OF PSA TOTAL: CPT | Mod: ZL

## 2025-02-27 PROCEDURE — 250N000011 HC RX IP 250 OP 636: Performed by: INTERNAL MEDICINE

## 2025-02-27 PROCEDURE — 80053 COMPREHEN METABOLIC PANEL: CPT | Performed by: INTERNAL MEDICINE

## 2025-02-27 PROCEDURE — 84403 ASSAY OF TOTAL TESTOSTERONE: CPT | Mod: ZL

## 2025-02-27 PROCEDURE — 85025 COMPLETE CBC W/AUTO DIFF WBC: CPT | Mod: ZL

## 2025-02-27 PROCEDURE — 82435 ASSAY OF BLOOD CHLORIDE: CPT | Mod: ZL

## 2025-02-27 PROCEDURE — 258N000003 HC RX IP 258 OP 636: Performed by: INTERNAL MEDICINE

## 2025-02-27 PROCEDURE — 36415 COLL VENOUS BLD VENIPUNCTURE: CPT | Mod: ZL

## 2025-02-27 RX ORDER — HEPARIN SODIUM (PORCINE) LOCK FLUSH IV SOLN 100 UNIT/ML 100 UNIT/ML
5 SOLUTION INTRAVENOUS
OUTPATIENT
Start: 2025-02-28

## 2025-02-27 RX ORDER — ALBUTEROL SULFATE 0.83 MG/ML
2.5 SOLUTION RESPIRATORY (INHALATION)
OUTPATIENT
Start: 2025-02-28

## 2025-02-27 RX ORDER — DIPHENHYDRAMINE HYDROCHLORIDE 50 MG/ML
50 INJECTION INTRAMUSCULAR; INTRAVENOUS
Start: 2025-02-28

## 2025-02-27 RX ORDER — ZOLEDRONIC ACID 0.04 MG/ML
4 INJECTION, SOLUTION INTRAVENOUS ONCE
OUTPATIENT
Start: 2025-02-28 | End: 2025-02-28

## 2025-02-27 RX ORDER — METHYLPREDNISOLONE SODIUM SUCCINATE 125 MG/2ML
125 INJECTION INTRAMUSCULAR; INTRAVENOUS
Start: 2025-02-28

## 2025-02-27 RX ORDER — ZOLEDRONIC ACID 0.04 MG/ML
4 INJECTION, SOLUTION INTRAVENOUS ONCE
Status: DISCONTINUED | OUTPATIENT
Start: 2025-02-27 | End: 2025-02-27 | Stop reason: DRUGHIGH

## 2025-02-27 RX ORDER — MEPERIDINE HYDROCHLORIDE 25 MG/ML
25 INJECTION INTRAMUSCULAR; INTRAVENOUS; SUBCUTANEOUS EVERY 30 MIN PRN
OUTPATIENT
Start: 2025-02-28

## 2025-02-27 RX ORDER — ALBUTEROL SULFATE 90 UG/1
1-2 INHALANT RESPIRATORY (INHALATION)
Start: 2025-02-28

## 2025-02-27 RX ORDER — HEPARIN SODIUM,PORCINE 10 UNIT/ML
5-20 VIAL (ML) INTRAVENOUS DAILY PRN
OUTPATIENT
Start: 2025-02-28

## 2025-02-27 RX ORDER — EPINEPHRINE 1 MG/ML
0.3 INJECTION, SOLUTION, CONCENTRATE INTRAVENOUS EVERY 5 MIN PRN
OUTPATIENT
Start: 2025-02-28

## 2025-02-27 RX ADMIN — ZOLEDRONIC ACID 3.5 MG: 4 INJECTION, SOLUTION, CONCENTRATE INTRAVENOUS at 10:03

## 2025-02-27 RX ADMIN — SODIUM CHLORIDE 250 ML: 0.9 INJECTION, SOLUTION INTRAVENOUS at 09:58

## 2025-02-27 NOTE — NURSING NOTE
Infusion Nursing Note:  Michael Martinez presents today for zometa.    Patient seen by provider today: No   present during visit today: Not Applicable.    Note: N/A.      Intravenous Access:  Peripheral IV placed.    Treatment Conditions:  Lab Results   Component Value Date     02/27/2025    POTASSIUM 4.4 02/27/2025    MAG 1.7 11/20/2023    CR 1.10 02/27/2025    OLIVER 9.8 02/27/2025    OLIVER 9.8 02/27/2025    BILITOTAL 0.5 02/27/2025    ALBUMIN 4.1 02/27/2025    ALT 11 02/27/2025    AST 22 02/27/2025       Results reviewed, labs MET treatment parameters, ok to proceed with treatment.      Post Infusion Assessment:  Patient tolerated infusion without incident.  Blood return noted pre and post infusion.  Site patent and intact, free from redness, edema or discomfort.  No evidence of extravasations.  Access discontinued per protocol.       Discharge Plan:   Patient and/or family verbalized understanding of discharge instructions and all questions answered.  Copy of AVS reviewed with patient and/or family.  Patient will return 3/25 for next appointment.  Patient discharged in stable condition accompanied by: daughter.  Departure Mode: Ambulatory.      Jean S. Hammann, RN

## 2025-02-27 NOTE — PHARMACY
Pharmacy- Renal Dose Adjustment    Patient Active Problem List   Diagnosis    Personal history of DVT (deep vein thrombosis)    History of pulmonary embolism    Abdominal aortic aneurysm without rupture    Ischemic cardiomyopathy    History of four vessel coronary artery bypass graft    H/O transurethral resection of prostate    Anticoagulation monitoring, INR range 2-3    Long-term (current) use of anticoagulants [Z79.01]    Cataract    DJD (degenerative joint disease) of cervical spine    Diaphragmatic hernia    Lumbar disc disease    Osteoarthrosis involving lower leg    Primary osteoarthritis of right hand    S/P CABG x 4    History of transurethral resection of prostate    Spinal stenosis, lumbar    Lesion of ulnar nerve    History of Guillain-[place] syndrome    Gastroesophageal reflux disease, esophagitis presence not specified    Primary osteoarthritis of left knee    Mixed hyperlipidemia    Peripheral arterial disease    OLIVIA (generalized anxiety disorder)    Cervical arthritis    Primary osteoarthritis involving multiple joints    Diabetes mellitus type 2, diet-controlled (H)    Benign prostatic hyperplasia without lower urinary tract symptoms    Pathological fracture of thoracic vertebra with routine healing, subsequent encounter    Closed wedge compression fracture of T8 vertebra with routine healing, subsequent encounter    Prostate cancer metastatic to bone (H)    History of gross hematuria    Chronic anticoagulation        Relevant Labs:  Recent Labs   Lab Test 02/27/25  0907 01/30/25  0852   WBC 7.2 7.9   HGB 12.9* 13.0*    240        CrCl: 49.8 mL/min    No intake or output data in the 24 hours ending 02/27/25 0951       Per Renal Dose Adjustment Protocol, will adjust:  -Zoledronic acid to 3.5 mg (from 4 mg) x1 (due to CrCl = 49.8 mL/min).      Will continue to follow and make adjustments accordingly. Thank You.    Musa Farmer [facility] ....................  2/27/2025   9:51 AM

## 2025-03-02 LAB — TESTOST SERPL-MCNC: 10 NG/DL (ref 240–950)

## 2025-03-17 ENCOUNTER — ONCOLOGY VISIT (OUTPATIENT)
Dept: ONCOLOGY | Facility: OTHER | Age: 89
End: 2025-03-17
Attending: INTERNAL MEDICINE
Payer: MEDICARE

## 2025-03-17 VITALS
BODY MASS INDEX: 23.93 KG/M2 | RESPIRATION RATE: 16 BRPM | HEART RATE: 63 BPM | WEIGHT: 166.8 LBS | DIASTOLIC BLOOD PRESSURE: 78 MMHG | OXYGEN SATURATION: 98 % | SYSTOLIC BLOOD PRESSURE: 132 MMHG | TEMPERATURE: 96.9 F

## 2025-03-17 DIAGNOSIS — C79.51 PROSTATE CANCER METASTATIC TO BONE (H): Primary | ICD-10-CM

## 2025-03-17 DIAGNOSIS — C61 PROSTATE CANCER METASTATIC TO BONE (H): Primary | ICD-10-CM

## 2025-03-17 LAB
ALBUMIN SERPL BCG-MCNC: 4.1 G/DL (ref 3.5–5.2)
CALCIUM SERPL-MCNC: 8.9 MG/DL (ref 8.8–10.4)
CREAT SERPL-MCNC: 1.16 MG/DL (ref 0.67–1.17)
EGFRCR SERPLBLD CKD-EPI 2021: 61 ML/MIN/1.73M2

## 2025-03-17 PROCEDURE — G0463 HOSPITAL OUTPT CLINIC VISIT: HCPCS

## 2025-03-17 PROCEDURE — 82040 ASSAY OF SERUM ALBUMIN: CPT | Performed by: INTERNAL MEDICINE

## 2025-03-17 PROCEDURE — 36415 COLL VENOUS BLD VENIPUNCTURE: CPT | Performed by: INTERNAL MEDICINE

## 2025-03-17 PROCEDURE — 82565 ASSAY OF CREATININE: CPT | Performed by: INTERNAL MEDICINE

## 2025-03-17 PROCEDURE — 82310 ASSAY OF CALCIUM: CPT | Performed by: INTERNAL MEDICINE

## 2025-03-17 RX ORDER — EPINEPHRINE 1 MG/ML
0.3 INJECTION, SOLUTION, CONCENTRATE INTRAVENOUS EVERY 5 MIN PRN
OUTPATIENT
Start: 2025-03-29

## 2025-03-17 RX ORDER — METHYLPREDNISOLONE SODIUM SUCCINATE 125 MG/2ML
125 INJECTION INTRAMUSCULAR; INTRAVENOUS
Start: 2025-03-29

## 2025-03-17 RX ORDER — DIPHENHYDRAMINE HYDROCHLORIDE 50 MG/ML
50 INJECTION, SOLUTION INTRAMUSCULAR; INTRAVENOUS
Start: 2025-03-29

## 2025-03-17 RX ORDER — HEPARIN SODIUM,PORCINE 10 UNIT/ML
5-20 VIAL (ML) INTRAVENOUS DAILY PRN
OUTPATIENT
Start: 2025-03-29

## 2025-03-17 RX ORDER — MEPERIDINE HYDROCHLORIDE 25 MG/ML
25 INJECTION INTRAMUSCULAR; INTRAVENOUS; SUBCUTANEOUS EVERY 30 MIN PRN
OUTPATIENT
Start: 2025-03-29

## 2025-03-17 RX ORDER — ALBUTEROL SULFATE 0.83 MG/ML
2.5 SOLUTION RESPIRATORY (INHALATION)
OUTPATIENT
Start: 2025-03-29

## 2025-03-17 RX ORDER — ALBUTEROL SULFATE 90 UG/1
1-2 INHALANT RESPIRATORY (INHALATION)
Start: 2025-03-29

## 2025-03-17 RX ORDER — HEPARIN SODIUM (PORCINE) LOCK FLUSH IV SOLN 100 UNIT/ML 100 UNIT/ML
5 SOLUTION INTRAVENOUS
OUTPATIENT
Start: 2025-03-29

## 2025-03-17 RX ORDER — ZOLEDRONIC ACID 0.04 MG/ML
4 INJECTION, SOLUTION INTRAVENOUS ONCE
OUTPATIENT
Start: 2025-03-29 | End: 2025-03-29

## 2025-03-17 ASSESSMENT — PAIN SCALES - GENERAL: PAINLEVEL_OUTOF10: NO PAIN (0)

## 2025-03-17 NOTE — NURSING NOTE
"Oncology Rooming Note    March 17, 2025 8:40 AM   Michael Martinez is a 89 year old male who presents for:    Chief Complaint   Patient presents with    Oncology Clinic Visit     Prostate CA     Initial Vitals: /78 (BP Location: Right arm, Patient Position: Sitting, Cuff Size: Adult Regular)   Pulse 63   Temp 96.9  F (36.1  C) (Tympanic)   Resp 16   Wt 75.7 kg (166 lb 12.8 oz)   SpO2 98%   BMI 23.93 kg/m   Estimated body mass index is 23.93 kg/m  as calculated from the following:    Height as of 1/30/25: 1.778 m (5' 10\").    Weight as of this encounter: 75.7 kg (166 lb 12.8 oz). Body surface area is 1.93 meters squared.  No Pain (0) Comment: Data Unavailable   No LMP for male patient.  Allergies reviewed: Yes  Medications reviewed: Yes    Medications: Medication refills not needed today.  Pharmacy name entered into [employer]:    [id number] - [place], [place] - [address]  [facility] - [place], [place] - [facility] - [place], [place] - [address]  [facility] - [place], [place] - [facility] [address]    Frailty Screening:   Is the patient here for a new oncology consult visit in cancer care? 2. No    PHQ9:  Did this patient require a PHQ9?: No      Clinical concerns: Lots of minor concerns but nothing specific to oncology.     Margarita Damian, [facility]              "

## 2025-03-17 NOTE — PROGRESS NOTES
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 Michael Martinez returns for follow-up metastatic hormone sensitive prostate cancer.  For details of history see previous notes.Briefly,the patient had been transferred to [facility] in [place] after he presented to the emergency department with a CT scan that revealed mixed lytic and sclerotic lesion involving the T8 vertebral body with adjacent soft tissue component extending into the posterior mediastinum. This was felt to be a pathologic fracture and the patient was transferred for appropriate care from [place] to use [facility] in [place]. MRI thoracic spine revealed multifocal osseous metastatic disease with a dominant lesion at T8. CT chest and CT abdomen/pelvis revealed T8 abnormality as well as enlarged prostate with potential bony pelvic metastatic disease as well as rib cage metastases. Neurosurgery was consulted and they felt this was a nonsurgical issue and recommended thoracic orthosis and was fitted for this by orthotics November 22. He underwent CT-guided biopsy of the T8 lesion came back consistent metastatic prostate carcinoma. Review of thoracic films indicated that he had essentially widespread osseous metastatic disease involving thoracic spine including T8 as well as the pelvis as well as the rib cage. Patient did complain of significant pain involving the thoracic spine as well as the pelvis as well as rib cage.. His PSA was extremely elevated at 121. Merrill the patient would be a candidate for treatment for metastatic prostate cancer with biopsy-proven T8 bone metastases. We  elected to treat the patient with Lupron 22.5 mg IM every 3 months in addition to enzalutamide which was indicated in the first-line setting for metastatic prostate cancer. I also recommended Xgeva monthly. Patient underwent a bone scan on 7/19/2023 and the findings were that there was multifocal bony metastatic disease with a large area of abnormal intense radiotracer uptake within the T8 vertebral body there was a focus of abnormal uptake in the right pelvis and the superior acetabular region measuring 2 to 3 cm dimension. There are 2 small foci of abnormal uptake in the left hemipelvis. There was uptake in the lateral left fifth sixth rib. There are small foci of uptake at T12, L2 and L3.There were problems with insurance coverage and the patient was unable to start this regimen immediately. His PSA had risen to 168 on December 12, 2023. He presented to the emergency room with gross hematuria on December 22, 2023. It was felt this was likely due to warfarin at patient was switched to Eliquis 2.5 mg p.o. twice daily 12/26/2023.A urine for cytology was sent by Dr. Shiraz Taylor and he was negative for malignancy. Patient was able to start treatment beginning on January 8, 2024.Insurance would not cover Xgeva and therefore we had to switch the patient to zoledro ping acid 4 mg IV q. 28 days. The patient received his first treatment on January 29, 2024. Since starting Lupron Xtandi and Zometa patient had had a remarkable response and as of today's PSA has dropped to 10.2. He says his symptoms of all resolved he has no further urinary symptoms no urgency or frequency. His back pain and hip pain and pelvic pain have all resolved overall he is doing well.He is PSA continues to drop and was down to 0.1 on May 29.  PSA tumor marker has continued to slowly drop and is currently 0.01 as of November 1.  He is here not received cycle 16 of Zometa.  He has no major complaints.  Does get occasional hot flashes with Lupron.   Otherwise he denies any shortness of breath cough or hemoptysis.  Denies any change in urinary habits.  Denies any bone pain.  Denies fevers, night sweats or weight loss.  Denies change in bowel habits.  Denies bright red blood per rectum, hematemesis or melena.  Overall he is doing well.  His PSA remains undetectable      ______________________________________________________________________________    Past History    Past Medical History:   Diagnosis Date    Cerebral infarction (H)     6/2014,left cerebellum--seen on MRI    Edema     11/4/2003,Edema & cramps legs, ? secondary to Norvasc([id number]) symptom, edema-pedal (icd 782.3)    Embolism and thrombosis of right tibial vein (H)     10/29/2016    Enlarged prostate with lower urinary tract symptoms (LUTS)     8/4/2011    Guillain Barré syndrome     Ischemic cardiomyopathy     7/9/2014    Osteoarthritis     9/5/2001    Other ill-defined and unknown causes of morbidity and mortality     see prob list    Urinary tract infection     No Comments Provided       Past Surgical History:   Procedure Laterality Date    ARTHROSCOPY SHOULDER ROTATOR CUFF REPAIR      1996    BIOPSY PROSTATE TRANSRECTAL      No Comments Provided    BYPASS GRAFT ARTERY CORONARY      December of 2013,four-vessel    COLONOSCOPY      10/31/2013,diverticulosis-no fu needed d/t age    CYSTOSCOPY, TRANSURETHRAL RESECTION (TUR) PROSTATE, COMBINED      January 2014,done in [place] -- excellent result    OTHER SURGICAL HISTORY      December 2013,98799.0,([place]) [place] INJ PROC SELECTIVE CORONARY ANGIOGRAPHY,LAD-95% papgsjyr-62-51% distal stenosis.  [place]amus-80% stenosis.  Right coronary-high-grade 90% stenosis.  EF-45%           Review of systems.  CNS: There are no headaches, no blurred vision, no change in mental status,   ENT: There is no hearing loss.  Respiratory: No cough shortness of breath or hemoptysis  Cardiac: There is no chest pain, orthopnea, PND, or ankle edema.  GI: There is no bright red blood per  rectum, no hematemesis, no reflux, no diarrhea or constipation  Musculoskeletal: There were no joint pains  : There is no urinary frequency, hematuria.  Constitutional: There is no fevers, night sweats, weight loss.  Endocrine: There is minimal fatigue  Neuro: There is no tingling or numbness in the hands or feet.  Hematologic: There is no gingival bleeding, epistaxis, or easy bruisability.  Dermatologic: There is no skin rash.  A 14 point review of systems is otherwise negative.          Physical Exam    /78 (BP Location: Right arm, Patient Position: Sitting, Cuff Size: Adult Regular)   Pulse 63   Temp 96.9  F (36.1  C) (Tympanic)   Resp 16   Wt 75.7 kg (166 lb 12.8 oz)   SpO2 98%   BMI 23.93 kg/m        GENERAL: Alert and oriented to time place and person. Seated comfortably. In no distress.    HEAD: Atraumatic and normocephalic.    EYES: REGINALDO, EOMI.  No pallor.  No icterus.    Oral cavity: no mucosal lesion or tonsillar enlargement.    NECK: supple. JVP normal.  No thyroid enlargement.    LYMPH NODES: There are no palpable cervical, supraclavicular, axillary, or inguinal nodes.    LUNGS: clear to auscultation bilaterally.  Resonant to percussion throughout bilaterally.  Symmetrical breath movements bilaterally.    HEART: S1 and S2 are heard. Regular rate and rhythm.  No murmur or gallop or rub heard.  No peripheral edema.    ABDOMEN: Soft. Not tender. Not distended.  No palpable hepatomegaly or splenomegaly.  No other mass palpable.  Bowel sounds heard.    EXTREMITIES: There is no ankle edema.  SKIN: no rash, or bruising or purpura.    NEURO: Grossly non-focal.    Lab Results  Component      Latest Ref [facility] 2/27/2025  9:07 AM 3/17/2025  9:09 AM   WBC      4.0 - 11.0 10e3/uL 7.2     RBC Count      4.40 - 5.90 10e6/uL 4.43     Hemoglobin      13.3 - 17.7 g/dL 12.9 (L)     Hematocrit      40.0 - 53.0 % 38.6 (L)     MCV      78 - 100 fL 87     MCH      26.5 - 33.0 pg 29.1     MCHC      31.5 - 36.5 g/dL  33.4     RDW      10.0 - 15.0 % 13.9     Platelet Count      150 - 450 10e3/uL 239     % Neutrophils      % 58     % Lymphocytes      % 29     % Monocytes      % 8     % Eosinophils      % 4     % Basophils      % 1     % Immature Granulocytes      % 0     NRBCs per 100 WBC      <1 /100 0     Absolute Neutrophils      1.6 - 8.3 10e3/uL 4.2     Absolute Lymphocytes      0.8 - 5.3 10e3/uL 2.1     Absolute Monocytes      0.0 - 1.3 10e3/uL 0.6     Absolute Eosinophils      0.0 - 0.7 10e3/uL 0.3     Absolute Basophils      0.0 - 0.2 10e3/uL 0.1     Absolute Immature Granulocytes      <=0.4 10e3/uL 0.0     Absolute NRBCs      10e3/uL 0.0     Sodium      135 - 145 mmol/L 136     Potassium      3.4 - 5.3 mmol/L 4.4     Carbon Dioxide (CO2)      22 - 29 mmol/L 24     Anion Gap      7 - 15 mmol/L 11     Urea Nitrogen      8.0 - 23.0 mg/dL 20.1     Creatinine      0.67 - 1.17 mg/dL 1.10  1.16    GFR Estimate      >60 mL/min/1.73m2 65  61    Calcium      8.8 - 10.4 mg/dL 9.8  8.9    Calcium       9.8     Chloride      98 - 107 mmol/L 101     Glucose      70 - 99 mg/dL 137 (H)     Alkaline Phosphatase      40 - 150 U/L 77     AST      0 - 45 U/L 22     ALT      0 - 70 U/L 11     Protein Total      6.4 - 8.3 g/dL 7.5     Albumin      3.5 - 5.2 g/dL 4.1  4.1    Bilirubin Total      <=1.2 mg/dL 0.5     PSA Tumor Marker      ng/mL <0.01     Testosterone Total      240 - 950 ng/dL 10 (L)        Legend:  (L) Low      Imaging    No results found.    Assessment and Plan: #1 :.  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  The plan is to continue enzalutamide 80 mg p.o. daily, Lupron 22.5 mg IM every 3 months, zoledronic acid 4 mg IV q. 28 days will otherwise see the patient 3 months obtain CBC CMP LDH PSA tumor marker  and testosterone level.  Time spent: 58 minutes was spent on this patient visit : we spent time reviewing lab results, discussing lab results with the patient, performing history/physical, documenting history/physical, and ordering zoledronic acid, enzalutamide, and Lupron as well as follow-up labs and appointments.  The longitudinal plan of care for the diagnosis(es)/condition(s) as documented were addressed during this visit. Due to the added complexity in care, I will continue to support Karl in the subsequent management and with ongoing continuity of care.    Cancer Staging   No matching staging information was found for the patient.        Signed by: Raj Nj MD    CC: Isma Martinez MD

## 2025-03-25 ENCOUNTER — HOSPITAL ENCOUNTER (OUTPATIENT)
Dept: INFUSION THERAPY | Facility: OTHER | Age: 89
End: 2025-03-25
Attending: INTERNAL MEDICINE
Payer: MEDICARE

## 2025-03-25 VITALS
RESPIRATION RATE: 16 BRPM | BODY MASS INDEX: 23.9 KG/M2 | HEART RATE: 60 BPM | TEMPERATURE: 97.2 F | WEIGHT: 166.6 LBS | DIASTOLIC BLOOD PRESSURE: 71 MMHG | SYSTOLIC BLOOD PRESSURE: 129 MMHG

## 2025-03-25 DIAGNOSIS — C79.51 PROSTATE CANCER METASTATIC TO BONE (H): Primary | ICD-10-CM

## 2025-03-25 DIAGNOSIS — C61 PROSTATE CANCER METASTATIC TO BONE (H): Primary | ICD-10-CM

## 2025-03-25 PROCEDURE — 96402 CHEMO HORMON ANTINEOPL SQ/IM: CPT

## 2025-03-25 PROCEDURE — 258N000003 HC RX IP 258 OP 636: Performed by: INTERNAL MEDICINE

## 2025-03-25 PROCEDURE — 96365 THER/PROPH/DIAG IV INF INIT: CPT

## 2025-03-25 PROCEDURE — 250N000011 HC RX IP 250 OP 636: Mod: JZ | Performed by: INTERNAL MEDICINE

## 2025-03-25 RX ORDER — HEPARIN SODIUM,PORCINE 10 UNIT/ML
5-20 VIAL (ML) INTRAVENOUS DAILY PRN
Status: DISCONTINUED | OUTPATIENT
Start: 2025-03-25 | End: 2025-03-26 | Stop reason: HOSPADM

## 2025-03-25 RX ORDER — METHYLPREDNISOLONE SODIUM SUCCINATE 125 MG/2ML
125 INJECTION INTRAMUSCULAR; INTRAVENOUS
Status: DISCONTINUED | OUTPATIENT
Start: 2025-03-25 | End: 2025-03-26 | Stop reason: HOSPADM

## 2025-03-25 RX ORDER — ALBUTEROL SULFATE 90 UG/1
1-2 INHALANT RESPIRATORY (INHALATION)
Status: DISCONTINUED | OUTPATIENT
Start: 2025-03-25 | End: 2025-03-26 | Stop reason: HOSPADM

## 2025-03-25 RX ORDER — ALBUTEROL SULFATE 0.83 MG/ML
2.5 SOLUTION RESPIRATORY (INHALATION)
Status: DISCONTINUED | OUTPATIENT
Start: 2025-03-25 | End: 2025-03-26 | Stop reason: HOSPADM

## 2025-03-25 RX ORDER — MEPERIDINE HYDROCHLORIDE 25 MG/ML
25 INJECTION INTRAMUSCULAR; INTRAVENOUS; SUBCUTANEOUS EVERY 30 MIN PRN
Status: DISCONTINUED | OUTPATIENT
Start: 2025-03-25 | End: 2025-03-26 | Stop reason: HOSPADM

## 2025-03-25 RX ORDER — HEPARIN SODIUM (PORCINE) LOCK FLUSH IV SOLN 100 UNIT/ML 100 UNIT/ML
5 SOLUTION INTRAVENOUS
Status: DISCONTINUED | OUTPATIENT
Start: 2025-03-25 | End: 2025-03-26 | Stop reason: HOSPADM

## 2025-03-25 RX ORDER — ZOLEDRONIC ACID 0.04 MG/ML
4 INJECTION, SOLUTION INTRAVENOUS ONCE
Status: DISCONTINUED | OUTPATIENT
Start: 2025-03-25 | End: 2025-03-25 | Stop reason: DRUGHIGH

## 2025-03-25 RX ORDER — DIPHENHYDRAMINE HYDROCHLORIDE 50 MG/ML
50 INJECTION, SOLUTION INTRAMUSCULAR; INTRAVENOUS
Status: DISCONTINUED | OUTPATIENT
Start: 2025-03-25 | End: 2025-03-26 | Stop reason: HOSPADM

## 2025-03-25 RX ORDER — EPINEPHRINE 1 MG/ML
0.3 INJECTION, SOLUTION, CONCENTRATE INTRAVENOUS EVERY 5 MIN PRN
Status: DISCONTINUED | OUTPATIENT
Start: 2025-03-25 | End: 2025-03-26 | Stop reason: HOSPADM

## 2025-03-25 RX ADMIN — LEUPROLIDE ACETATE 22.5 MG: KIT at 14:36

## 2025-03-25 RX ADMIN — ZOLEDRONIC ACID 3.3 MG: 4 INJECTION, SOLUTION, CONCENTRATE INTRAVENOUS at 13:47

## 2025-03-25 NOTE — NURSING NOTE
Infusion Nursing Note:  Michael Martinez presents today for Lupron/Zometa.    Patient seen by provider today: No   present during visit today: Not Applicable.    Note: N/A.      Intravenous Access:  Peripheral IV placed to right arm with blood return.    Treatment Conditions:  Lab Results   Component Value Date     02/27/2025    POTASSIUM 4.4 02/27/2025    MAG 1.7 11/20/2023    CR 1.16 03/17/2025    OLIVER 8.9 03/17/2025    BILITOTAL 0.5 02/27/2025    ALBUMIN 4.1 03/17/2025    ALT 11 02/27/2025    AST 22 02/27/2025       Results reviewed, labs MET treatment parameters, ok to proceed with treatment.      Post Infusion Assessment:  Patient tolerated infusion without incident.  Patient tolerated injection without incident to right outer buttock per protocol.       Discharge Plan:   Discharge instructions reviewed with: Patient.  Patient and/or family verbalized understanding of discharge instructions and all questions answered.  Copy of AVS reviewed with patient and/or family.  Patient will return as scheduled for next appointment.  Patient discharged in stable condition accompanied by: self and daughter  Departure Mode: Ambulatory.      Ela Sanders RN

## 2025-03-25 NOTE — PHARMACY
Pharmacy- Renal Dose Adjustment    Patient Active Problem List   Diagnosis    Personal history of DVT (deep vein thrombosis)    History of pulmonary embolism    Abdominal aortic aneurysm without rupture    Ischemic cardiomyopathy    History of four vessel coronary artery bypass graft    H/O transurethral resection of prostate    Anticoagulation monitoring, INR range 2-3    Long-term (current) use of anticoagulants [Z79.01]    Cataract    DJD (degenerative joint disease) of cervical spine    Diaphragmatic hernia    Lumbar disc disease    Osteoarthrosis involving lower leg    Primary osteoarthritis of right hand    S/P CABG x 4    History of transurethral resection of prostate    Spinal stenosis, lumbar    Lesion of ulnar nerve    History of Guillain-[place] syndrome    Gastroesophageal reflux disease, esophagitis presence not specified    Primary osteoarthritis of left knee    Mixed hyperlipidemia    Peripheral arterial disease    OLIVIA (generalized anxiety disorder)    Cervical arthritis    Primary osteoarthritis involving multiple joints    Diabetes mellitus type 2, diet-controlled (H)    Benign prostatic hyperplasia without lower urinary tract symptoms    Pathological fracture of thoracic vertebra with routine healing, subsequent encounter    Closed wedge compression fracture of T8 vertebra with routine healing, subsequent encounter    Prostate cancer metastatic to bone (H)    History of gross hematuria    Chronic anticoagulation        Relevant Labs:  Recent Labs   Lab Test 02/27/25  0907 01/30/25  0852   WBC 7.2 7.9   HGB 12.9* 13.0*    240        CrCl: 47.1 mL/min    No intake or output data in the 24 hours ending 03/25/25 1338       Per Renal Dose Adjustment Protocol, will adjust:  -Zoledronic acid to 3.3 mg (from 4 mg) x1 (due to CrCl 40-49 mL/min).      Will continue to follow and make adjustments accordingly. Thank You.    Musa Farmer [facility] ....................  3/25/2025   1:38 PM

## 2025-04-01 DIAGNOSIS — C61 PROSTATE CANCER METASTATIC TO BONE (H): Primary | ICD-10-CM

## 2025-04-01 DIAGNOSIS — C79.51 PROSTATE CANCER METASTATIC TO BONE (H): Primary | ICD-10-CM

## 2025-04-29 ENCOUNTER — APPOINTMENT (OUTPATIENT)
Dept: LAB | Facility: OTHER | Age: 89
End: 2025-04-29
Payer: MEDICARE

## 2025-04-29 ENCOUNTER — HOSPITAL ENCOUNTER (OUTPATIENT)
Dept: INFUSION THERAPY | Facility: OTHER | Age: 89
Discharge: HOME | End: 2025-04-29
Payer: MEDICARE

## 2025-04-29 VITALS
HEART RATE: 59 BPM | RESPIRATION RATE: 18 BRPM | BODY MASS INDEX: 23.82 KG/M2 | TEMPERATURE: 97.6 F | DIASTOLIC BLOOD PRESSURE: 76 MMHG | WEIGHT: 166 LBS | SYSTOLIC BLOOD PRESSURE: 134 MMHG

## 2025-04-29 DIAGNOSIS — C79.51 PROSTATE CANCER METASTATIC TO BONE (H): Primary | ICD-10-CM

## 2025-04-29 DIAGNOSIS — C61 PROSTATE CANCER METASTATIC TO BONE (H): Primary | ICD-10-CM

## 2025-04-29 LAB
ALBUMIN SERPL BCG-MCNC: 4.1 G/DL (ref 3.5–5.2)
CALCIUM SERPL-MCNC: 9.7 MG/DL (ref 8.8–10.4)
CREAT SERPL-MCNC: 1.07 MG/DL (ref 0.67–1.17)
EGFRCR SERPLBLD CKD-EPI 2021: 67 ML/MIN/1.73M2

## 2025-04-29 PROCEDURE — 36415 COLL VENOUS BLD VENIPUNCTURE: CPT | Performed by: INTERNAL MEDICINE

## 2025-04-29 PROCEDURE — 250N000011 HC RX IP 250 OP 636: Mod: JZ | Performed by: INTERNAL MEDICINE

## 2025-04-29 PROCEDURE — 82565 ASSAY OF CREATININE: CPT | Performed by: INTERNAL MEDICINE

## 2025-04-29 PROCEDURE — 82040 ASSAY OF SERUM ALBUMIN: CPT | Performed by: INTERNAL MEDICINE

## 2025-04-29 PROCEDURE — 82310 ASSAY OF CALCIUM: CPT | Performed by: INTERNAL MEDICINE

## 2025-04-29 RX ORDER — HEPARIN SODIUM (PORCINE) LOCK FLUSH IV SOLN 100 UNIT/ML 100 UNIT/ML
5 SOLUTION INTRAVENOUS
OUTPATIENT
Start: 2025-05-29

## 2025-04-29 RX ORDER — DIPHENHYDRAMINE HYDROCHLORIDE 50 MG/ML
50 INJECTION, SOLUTION INTRAMUSCULAR; INTRAVENOUS
Start: 2025-05-29

## 2025-04-29 RX ORDER — ALBUTEROL SULFATE 0.83 MG/ML
2.5 SOLUTION RESPIRATORY (INHALATION)
OUTPATIENT
Start: 2025-05-29

## 2025-04-29 RX ORDER — ZOLEDRONIC ACID 0.04 MG/ML
4 INJECTION, SOLUTION INTRAVENOUS ONCE
Status: COMPLETED | OUTPATIENT
Start: 2025-04-29 | End: 2025-04-29

## 2025-04-29 RX ORDER — EPINEPHRINE 1 MG/ML
0.3 INJECTION, SOLUTION, CONCENTRATE INTRAVENOUS EVERY 5 MIN PRN
OUTPATIENT
Start: 2025-05-29

## 2025-04-29 RX ORDER — METHYLPREDNISOLONE SODIUM SUCCINATE 125 MG/2ML
125 INJECTION INTRAMUSCULAR; INTRAVENOUS
Start: 2025-05-29

## 2025-04-29 RX ORDER — ALBUTEROL SULFATE 90 UG/1
1-2 INHALANT RESPIRATORY (INHALATION)
Start: 2025-05-29

## 2025-04-29 RX ORDER — HEPARIN SODIUM,PORCINE 10 UNIT/ML
5-20 VIAL (ML) INTRAVENOUS DAILY PRN
OUTPATIENT
Start: 2025-05-29

## 2025-04-29 RX ORDER — ZOLEDRONIC ACID 0.04 MG/ML
4 INJECTION, SOLUTION INTRAVENOUS ONCE
OUTPATIENT
Start: 2025-05-29 | End: 2025-05-29

## 2025-04-29 RX ORDER — MEPERIDINE HYDROCHLORIDE 25 MG/ML
25 INJECTION INTRAMUSCULAR; INTRAVENOUS; SUBCUTANEOUS EVERY 30 MIN PRN
OUTPATIENT
Start: 2025-05-29

## 2025-04-29 RX ADMIN — ZOLEDRONIC ACID 4 MG: 0.04 INJECTION, SOLUTION INTRAVENOUS at 14:39

## 2025-04-29 NOTE — NURSING NOTE
Infusion Nursing Note:  Michael Martinez presents today for Labs and Zometa.    Patient seen by provider today: No   present during visit today: Not Applicable.    Note: N/A.      Intravenous Access:  Labs drawn without difficulty.  Peripheral IV placed.    Treatment Conditions:  Lab Results   Component Value Date     02/27/2025    POTASSIUM 4.4 02/27/2025    MAG 1.7 11/20/2023    CR 1.07 04/29/2025    OLIVER 9.7 04/29/2025    BILITOTAL 0.5 02/27/2025    ALBUMIN 4.1 04/29/2025    ALT 11 02/27/2025    AST 22 02/27/2025       Results reviewed, labs MET treatment parameters, ok to proceed with treatment.      Post Infusion Assessment:  Patient tolerated infusion without incident.  Blood return noted pre and post infusion.  Site patent and intact, free from redness, edema or discomfort.  No evidence of extravasations.  Access discontinued per protocol.  Biologic Infusion Post Education: Call the triage nurse at your clinic or seek medical attention if you have chills and/or temperature greater than or equal to 100.5, uncontrolled nausea/vomiting, diarrhea, constipation, dizziness, shortness of breath, chest pain, heart palpitations, weakness or any other new or concerning symptoms, questions or concerns.  You cannot have any live virus vaccines prior to or during treatment or up to 6 months post infusion.  If you have an upcoming surgery, medical procedure or dental procedure during treatment, this should be discussed with your ordering physician and your surgeon/dentist.  If you are having any concerning symptom, if you are unsure if you should get your next infusion or wish to speak to a provider before your next infusion, please call your care coordinator or triage nurse at your clinic to notify them so we can adequately serve you.       Discharge Plan:   Discharge instructions reviewed with: Patient.  Patient and/or family verbalized understanding of discharge instructions and all questions  answered.  Declined Copy of AVS reviewed with patient and/or family.  Patient will return 1 month for next appointment.  Patient discharged in stable condition accompanied by: self and daughter.  Departure Mode: Ambulatory.      Ela Sanders RN

## 2025-05-01 DIAGNOSIS — C61 PROSTATE CANCER METASTATIC TO BONE (H): Primary | ICD-10-CM

## 2025-05-01 DIAGNOSIS — C79.51 PROSTATE CANCER METASTATIC TO BONE (H): Primary | ICD-10-CM

## 2025-05-27 ENCOUNTER — RESULTS FOLLOW-UP (OUTPATIENT)
Dept: ONCOLOGY | Facility: OTHER | Age: 89
End: 2025-05-27

## 2025-05-27 ENCOUNTER — APPOINTMENT (OUTPATIENT)
Dept: LAB | Facility: OTHER | Age: 89
End: 2025-05-27
Attending: INTERNAL MEDICINE
Payer: MEDICARE

## 2025-05-27 ENCOUNTER — HOSPITAL ENCOUNTER (OUTPATIENT)
Dept: INFUSION THERAPY | Facility: OTHER | Age: 89
Discharge: HOME | End: 2025-05-27
Payer: MEDICARE

## 2025-05-27 VITALS
TEMPERATURE: 97.9 F | RESPIRATION RATE: 18 BRPM | HEART RATE: 57 BPM | BODY MASS INDEX: 23.9 KG/M2 | DIASTOLIC BLOOD PRESSURE: 69 MMHG | SYSTOLIC BLOOD PRESSURE: 138 MMHG | WEIGHT: 166.6 LBS

## 2025-05-27 DIAGNOSIS — C79.51 PROSTATE CANCER METASTATIC TO BONE (H): Primary | ICD-10-CM

## 2025-05-27 DIAGNOSIS — C61 PROSTATE CANCER METASTATIC TO BONE (H): Primary | ICD-10-CM

## 2025-05-27 LAB
ALBUMIN SERPL BCG-MCNC: 4.1 G/DL (ref 3.5–5.2)
CALCIUM SERPL-MCNC: 9.8 MG/DL (ref 8.8–10.4)
CREAT SERPL-MCNC: 1.24 MG/DL (ref 0.67–1.17)
EGFRCR SERPLBLD CKD-EPI 2021: 56 ML/MIN/1.73M2

## 2025-05-27 PROCEDURE — 250N000011 HC RX IP 250 OP 636: Mod: JZ | Performed by: INTERNAL MEDICINE

## 2025-05-27 PROCEDURE — 96365 THER/PROPH/DIAG IV INF INIT: CPT

## 2025-05-27 PROCEDURE — 258N000003 HC RX IP 258 OP 636: Performed by: INTERNAL MEDICINE

## 2025-05-27 PROCEDURE — 82310 ASSAY OF CALCIUM: CPT | Performed by: INTERNAL MEDICINE

## 2025-05-27 PROCEDURE — 82040 ASSAY OF SERUM ALBUMIN: CPT | Performed by: INTERNAL MEDICINE

## 2025-05-27 PROCEDURE — 82565 ASSAY OF CREATININE: CPT | Performed by: INTERNAL MEDICINE

## 2025-05-27 PROCEDURE — 36415 COLL VENOUS BLD VENIPUNCTURE: CPT | Performed by: INTERNAL MEDICINE

## 2025-05-27 RX ORDER — EPINEPHRINE 1 MG/ML
0.3 INJECTION, SOLUTION, CONCENTRATE INTRAVENOUS EVERY 5 MIN PRN
OUTPATIENT
Start: 2025-05-29

## 2025-05-27 RX ORDER — HEPARIN SODIUM (PORCINE) LOCK FLUSH IV SOLN 100 UNIT/ML 100 UNIT/ML
5 SOLUTION INTRAVENOUS
OUTPATIENT
Start: 2025-05-29

## 2025-05-27 RX ORDER — ALBUTEROL SULFATE 0.83 MG/ML
2.5 SOLUTION RESPIRATORY (INHALATION)
OUTPATIENT
Start: 2025-05-29

## 2025-05-27 RX ORDER — ZOLEDRONIC ACID 0.04 MG/ML
4 INJECTION, SOLUTION INTRAVENOUS ONCE
OUTPATIENT
Start: 2025-05-29 | End: 2025-05-29

## 2025-05-27 RX ORDER — ALBUTEROL SULFATE 90 UG/1
1-2 INHALANT RESPIRATORY (INHALATION)
Start: 2025-05-29

## 2025-05-27 RX ORDER — DIPHENHYDRAMINE HYDROCHLORIDE 50 MG/ML
50 INJECTION, SOLUTION INTRAMUSCULAR; INTRAVENOUS
Start: 2025-05-29

## 2025-05-27 RX ORDER — HEPARIN SODIUM,PORCINE 10 UNIT/ML
5-20 VIAL (ML) INTRAVENOUS DAILY PRN
OUTPATIENT
Start: 2025-05-29

## 2025-05-27 RX ORDER — METHYLPREDNISOLONE SODIUM SUCCINATE 125 MG/2ML
125 INJECTION INTRAMUSCULAR; INTRAVENOUS
Start: 2025-05-29

## 2025-05-27 RX ORDER — MEPERIDINE HYDROCHLORIDE 25 MG/ML
25 INJECTION INTRAMUSCULAR; INTRAVENOUS; SUBCUTANEOUS EVERY 30 MIN PRN
OUTPATIENT
Start: 2025-05-29

## 2025-05-27 RX ADMIN — ZOLEDRONIC ACID 3.3 MG: 4 INJECTION, SOLUTION, CONCENTRATE INTRAVENOUS at 14:38

## 2025-05-27 NOTE — NURSING NOTE
Infusion Nursing Note:  Michael Martinez presents today for Zometa.    Patient seen by provider today: No   present during visit today: Not Applicable.    Note: N/A.      Intravenous Access:  Labs drawn without difficulty.  Peripheral IV placed.    Treatment Conditions:  Lab Results   Component Value Date     02/27/2025    POTASSIUM 4.4 02/27/2025    MAG 1.7 11/20/2023    CR 1.24 (H) 05/27/2025    OLIVER 9.8 05/27/2025    BILITOTAL 0.5 02/27/2025    ALBUMIN 4.1 05/27/2025    ALT 11 02/27/2025    AST 22 02/27/2025         Post Infusion Assessment:  Patient tolerated infusion without incident.  Blood return noted pre and post infusion.  Site patent and intact, free from redness, edema or discomfort.  No evidence of extravasations.  Access discontinued per protocol.       Discharge Plan:   Discharge instructions reviewed with: Patient.  Patient and/or family verbalized understanding of discharge instructions and all questions answered.  Copy of AVS reviewed with patient and/or family.  Patient will return 6/24/2025 for next appointment.  Patient discharged in stable condition accompanied by: daughter.  Departure Mode: Ambulatory.      Charlene Marmolejo RN

## 2025-06-02 DIAGNOSIS — C61 PROSTATE CANCER METASTATIC TO BONE (H): Primary | ICD-10-CM

## 2025-06-02 DIAGNOSIS — C79.51 PROSTATE CANCER METASTATIC TO BONE (H): Primary | ICD-10-CM

## 2025-06-16 ENCOUNTER — LAB (OUTPATIENT)
Dept: LAB | Facility: OTHER | Age: 89
End: 2025-06-16
Payer: MEDICARE

## 2025-06-16 DIAGNOSIS — C79.51 PROSTATE CANCER METASTATIC TO BONE (H): Primary | ICD-10-CM

## 2025-06-16 DIAGNOSIS — C61 PROSTATE CANCER METASTATIC TO BONE (H): Primary | ICD-10-CM

## 2025-06-16 LAB
ALBUMIN SERPL BCG-MCNC: 3.9 G/DL (ref 3.5–5.2)
ALP SERPL-CCNC: 63 U/L (ref 40–150)
ALT SERPL W P-5'-P-CCNC: 14 U/L (ref 0–70)
ANION GAP SERPL CALCULATED.3IONS-SCNC: 11 MMOL/L (ref 7–15)
AST SERPL W P-5'-P-CCNC: 22 U/L (ref 0–45)
BASOPHILS # BLD AUTO: 0.1 10E3/UL (ref 0–0.2)
BASOPHILS NFR BLD AUTO: 1 %
BILIRUB SERPL-MCNC: 0.4 MG/DL
BUN SERPL-MCNC: 19.2 MG/DL (ref 8–23)
CALCIUM SERPL-MCNC: 9.4 MG/DL (ref 8.8–10.4)
CHLORIDE SERPL-SCNC: 103 MMOL/L (ref 98–107)
CREAT SERPL-MCNC: 1.1 MG/DL (ref 0.67–1.17)
EGFRCR SERPLBLD CKD-EPI 2021: 65 ML/MIN/1.73M2
EOSINOPHIL # BLD AUTO: 0.2 10E3/UL (ref 0–0.7)
EOSINOPHIL NFR BLD AUTO: 3 %
ERYTHROCYTE [DISTWIDTH] IN BLOOD BY AUTOMATED COUNT: 13.8 % (ref 10–15)
GLUCOSE SERPL-MCNC: 159 MG/DL (ref 70–99)
HCO3 SERPL-SCNC: 24 MMOL/L (ref 22–29)
HCT VFR BLD AUTO: 36.6 % (ref 40–53)
HGB BLD-MCNC: 12.1 G/DL (ref 13.3–17.7)
IMM GRANULOCYTES # BLD: 0 10E3/UL
IMM GRANULOCYTES NFR BLD: 0 %
LDH SERPL L TO P-CCNC: 165 U/L (ref 0–250)
LYMPHOCYTES # BLD AUTO: 1.7 10E3/UL (ref 0.8–5.3)
LYMPHOCYTES NFR BLD AUTO: 30 %
MCH RBC QN AUTO: 29.2 PG (ref 26.5–33)
MCHC RBC AUTO-ENTMCNC: 33.1 G/DL (ref 31.5–36.5)
MCV RBC AUTO: 88 FL (ref 78–100)
MONOCYTES # BLD AUTO: 0.5 10E3/UL (ref 0–1.3)
MONOCYTES NFR BLD AUTO: 9 %
NEUTROPHILS # BLD AUTO: 3.2 10E3/UL (ref 1.6–8.3)
NEUTROPHILS NFR BLD AUTO: 56 %
NRBC # BLD AUTO: 0 10E3/UL
NRBC BLD AUTO-RTO: 0 /100
PLATELET # BLD AUTO: 225 10E3/UL (ref 150–450)
POTASSIUM SERPL-SCNC: 4.4 MMOL/L (ref 3.4–5.3)
PROT SERPL-MCNC: 6.9 G/DL (ref 6.4–8.3)
PSA SERPL DL<=0.01 NG/ML-MCNC: <0.01 NG/ML
RBC # BLD AUTO: 4.15 10E6/UL (ref 4.4–5.9)
SODIUM SERPL-SCNC: 138 MMOL/L (ref 135–145)
WBC # BLD AUTO: 5.7 10E3/UL (ref 4–11)

## 2025-06-16 PROCEDURE — 85025 COMPLETE CBC W/AUTO DIFF WBC: CPT | Mod: ZL

## 2025-06-16 PROCEDURE — 83615 LACTATE (LD) (LDH) ENZYME: CPT | Mod: ZL

## 2025-06-16 PROCEDURE — 84155 ASSAY OF PROTEIN SERUM: CPT | Mod: ZL

## 2025-06-16 PROCEDURE — 36415 COLL VENOUS BLD VENIPUNCTURE: CPT | Mod: ZL

## 2025-06-16 PROCEDURE — 84403 ASSAY OF TOTAL TESTOSTERONE: CPT | Mod: ZL

## 2025-06-16 PROCEDURE — 84153 ASSAY OF PSA TOTAL: CPT | Mod: ZL

## 2025-06-17 ENCOUNTER — TELEPHONE (OUTPATIENT)
Dept: ONCOLOGY | Facility: OTHER | Age: 89
End: 2025-06-17
Payer: COMMERCIAL

## 2025-06-17 DIAGNOSIS — C79.51 PROSTATE CANCER METASTATIC TO BONE (H): Primary | ICD-10-CM

## 2025-06-17 DIAGNOSIS — C61 PROSTATE CANCER METASTATIC TO BONE (H): Primary | ICD-10-CM

## 2025-06-17 NOTE — TELEPHONE ENCOUNTER
Reordered again as they went in under No Referring provider when I did them yesterday.  Margarita Damian CMA([facility])..................6/17/2025   2:07 PM

## 2025-06-17 NOTE — PROGRESS NOTES
Michael Martinez has an upcoming lab appointment and there are no orders available. Please review and place future orders, as appropriate.    Thanks  Lab

## 2025-06-18 LAB — TESTOST SERPL-MCNC: 6 NG/DL (ref 240–950)

## 2025-06-24 ENCOUNTER — HOSPITAL ENCOUNTER (OUTPATIENT)
Dept: INFUSION THERAPY | Facility: OTHER | Age: 89
Discharge: HOME | End: 2025-06-24
Attending: INTERNAL MEDICINE | Admitting: FAMILY MEDICINE
Payer: MEDICARE

## 2025-06-24 VITALS
BODY MASS INDEX: 23.62 KG/M2 | TEMPERATURE: 97.9 F | SYSTOLIC BLOOD PRESSURE: 150 MMHG | OXYGEN SATURATION: 98 % | DIASTOLIC BLOOD PRESSURE: 84 MMHG | RESPIRATION RATE: 18 BRPM | HEART RATE: 57 BPM | WEIGHT: 164.6 LBS

## 2025-06-24 DIAGNOSIS — C79.51 PROSTATE CANCER METASTATIC TO BONE (H): Primary | ICD-10-CM

## 2025-06-24 DIAGNOSIS — C61 PROSTATE CANCER METASTATIC TO BONE (H): Primary | ICD-10-CM

## 2025-06-24 PROCEDURE — 96402 CHEMO HORMON ANTINEOPL SQ/IM: CPT

## 2025-06-24 PROCEDURE — 258N000003 HC RX IP 258 OP 636: Performed by: INTERNAL MEDICINE

## 2025-06-24 PROCEDURE — 250N000011 HC RX IP 250 OP 636: Mod: JZ | Performed by: INTERNAL MEDICINE

## 2025-06-24 PROCEDURE — 250N000011 HC RX IP 250 OP 636: Performed by: INTERNAL MEDICINE

## 2025-06-24 PROCEDURE — 96365 THER/PROPH/DIAG IV INF INIT: CPT

## 2025-06-24 RX ORDER — HEPARIN SODIUM,PORCINE 10 UNIT/ML
5-20 VIAL (ML) INTRAVENOUS DAILY PRN
OUTPATIENT
Start: 2025-06-29

## 2025-06-24 RX ORDER — ALBUTEROL SULFATE 0.83 MG/ML
2.5 SOLUTION RESPIRATORY (INHALATION)
OUTPATIENT
Start: 2025-06-29

## 2025-06-24 RX ORDER — HEPARIN SODIUM (PORCINE) LOCK FLUSH IV SOLN 100 UNIT/ML 100 UNIT/ML
5 SOLUTION INTRAVENOUS
OUTPATIENT
Start: 2025-06-29

## 2025-06-24 RX ORDER — MEPERIDINE HYDROCHLORIDE 25 MG/ML
25 INJECTION INTRAMUSCULAR; INTRAVENOUS; SUBCUTANEOUS EVERY 30 MIN PRN
OUTPATIENT
Start: 2025-06-29

## 2025-06-24 RX ORDER — ALBUTEROL SULFATE 90 UG/1
1-2 INHALANT RESPIRATORY (INHALATION)
Start: 2025-06-29

## 2025-06-24 RX ORDER — EPINEPHRINE 1 MG/ML
0.3 INJECTION, SOLUTION, CONCENTRATE INTRAVENOUS EVERY 5 MIN PRN
OUTPATIENT
Start: 2025-06-29

## 2025-06-24 RX ORDER — METHYLPREDNISOLONE SODIUM SUCCINATE 125 MG/2ML
125 INJECTION INTRAMUSCULAR; INTRAVENOUS
Start: 2025-06-29

## 2025-06-24 RX ORDER — ZOLEDRONIC ACID 0.04 MG/ML
4 INJECTION, SOLUTION INTRAVENOUS ONCE
OUTPATIENT
Start: 2025-06-29 | End: 2025-06-29

## 2025-06-24 RX ORDER — DIPHENHYDRAMINE HYDROCHLORIDE 50 MG/ML
50 INJECTION, SOLUTION INTRAMUSCULAR; INTRAVENOUS
Start: 2025-06-29

## 2025-06-24 RX ADMIN — SODIUM CHLORIDE 250 ML: 0.9 INJECTION, SOLUTION INTRAVENOUS at 10:27

## 2025-06-24 RX ADMIN — ZOLEDRONIC ACID 3.3 MG: 4 INJECTION, SOLUTION, CONCENTRATE INTRAVENOUS at 10:37

## 2025-06-24 RX ADMIN — LEUPROLIDE ACETATE 22.5 MG: KIT at 11:09

## 2025-06-24 ASSESSMENT — PAIN SCALES - GENERAL: PAINLEVEL_OUTOF10: NO PAIN (0)

## 2025-06-24 NOTE — NURSING NOTE
Infusion Nursing Note:  Michael Martinez presents today for Zometa and Lupron.    Patient seen by provider today: No   present during visit today: Not Applicable.    Note: N/A.      Intravenous Access:  Peripheral IV placed.    Treatment Conditions:  Not Applicable.      Post Infusion Assessment:  Patient tolerated infusion without incident.  Patient tolerated injection without incident.  Blood return noted pre and post infusion.  Site patent and intact, free from redness, edema or discomfort.  No evidence of extravasations.  Access discontinued per protocol.       Discharge Plan:   Discharge instructions reviewed with: Patient.  Patient and/or family verbalized understanding of discharge instructions and all questions answered.  AVS to patient via [employer].  Patient will return 7/29/2025 for next appointment.   Patient discharged in stable condition accompanied by: self and daughter.  Departure Mode: Ambulatory.      Tarsha Chaves RN

## 2025-06-25 ENCOUNTER — TELEPHONE (OUTPATIENT)
Dept: ONCOLOGY | Facility: OTHER | Age: 89
End: 2025-06-25
Payer: COMMERCIAL

## 2025-06-25 NOTE — TELEPHONE ENCOUNTER
Call placed to daughter to see if patient could switch appointment to Wednesday 7/9 to see CLARIBELO instead of seeing Dr Duong on 7/8 she wanted to discuss his schedule going forward with his zometa possibly changing to every 3 months. She was willing to see him around 0930. Message left to call back to change appointment if able.  Jean S. Hammann, RN on 6/25/2025 at 3:55 PM

## 2025-07-01 DIAGNOSIS — C79.51 PROSTATE CANCER METASTATIC TO BONE (H): Primary | ICD-10-CM

## 2025-07-01 DIAGNOSIS — C61 PROSTATE CANCER METASTATIC TO BONE (H): Primary | ICD-10-CM

## 2025-07-08 ENCOUNTER — ONCOLOGY VISIT (OUTPATIENT)
Dept: ONCOLOGY | Facility: OTHER | Age: 89
End: 2025-07-08
Attending: INTERNAL MEDICINE
Payer: MEDICARE

## 2025-07-08 VITALS
WEIGHT: 163 LBS | HEART RATE: 73 BPM | DIASTOLIC BLOOD PRESSURE: 74 MMHG | SYSTOLIC BLOOD PRESSURE: 122 MMHG | OXYGEN SATURATION: 96 % | RESPIRATION RATE: 16 BRPM | TEMPERATURE: 98 F | BODY MASS INDEX: 23.39 KG/M2

## 2025-07-08 DIAGNOSIS — C79.51 PROSTATE CANCER METASTATIC TO BONE (H): Primary | ICD-10-CM

## 2025-07-08 DIAGNOSIS — C61 PROSTATE CANCER METASTATIC TO BONE (H): Primary | ICD-10-CM

## 2025-07-08 PROCEDURE — G0463 HOSPITAL OUTPT CLINIC VISIT: HCPCS

## 2025-07-08 RX ORDER — ZOLEDRONIC ACID 0.04 MG/ML
4 INJECTION, SOLUTION INTRAVENOUS ONCE
OUTPATIENT
Start: 2025-07-08 | End: 2025-07-08

## 2025-07-08 ASSESSMENT — PAIN SCALES - GENERAL: PAINLEVEL_OUTOF10: NO PAIN (0)

## 2025-07-08 NOTE — NURSING NOTE
"Oncology Rooming Note    July 8, 2025 8:23 AM   Michael Martinez is a 89 year old male who presents for:    Chief Complaint   Patient presents with    Oncology Clinic Visit     Prostate CA     Initial Vitals: /74 (BP Location: Right arm, Patient Position: Sitting, Cuff Size: Adult Regular)   Pulse 73   Temp 98  F (36.7  C) (Oral)   Resp 16   Wt 73.9 kg (163 lb)   SpO2 96%   BMI 23.39 kg/m   Estimated body mass index is 23.39 kg/m  as calculated from the following:    Height as of 1/30/25: 1.778 m (5' 10\").    Weight as of this encounter: 73.9 kg (163 lb). Body surface area is 1.91 meters squared.  No Pain (0) Comment: Data Unavailable   No LMP for male patient.  Allergies reviewed: Yes  Medications reviewed: Yes    Medications: Medication refills not needed today.  Pharmacy name entered into [email or web address]:    [id number] - [place], [place] - [address]  [facility] [place], [place] [facility]  [facility] - [place], [place] - [address]  [facility] - [place], [place] - [address]     Frailty Screening:   Is the patient here for a new oncology consult visit in cancer care? 2. No    PHQ9:  Did this patient require a PHQ9?: No      Clinical concerns: Wondering if the Zometa can be pushed out to every 3 months instead of monthly now.       Margarita Damian, [facility]              "

## 2025-07-31 DIAGNOSIS — C61 PROSTATE CANCER METASTATIC TO BONE (H): Primary | ICD-10-CM

## 2025-07-31 DIAGNOSIS — C79.51 PROSTATE CANCER METASTATIC TO BONE (H): Primary | ICD-10-CM

## (undated) RX ORDER — MORPHINE SULFATE 4 MG/ML
INJECTION, SOLUTION INTRAMUSCULAR; INTRAVENOUS
Status: DISPENSED
Start: 2023-08-27

## (undated) RX ORDER — HYDROMORPHONE HYDROCHLORIDE 1 MG/ML
INJECTION, SOLUTION INTRAMUSCULAR; INTRAVENOUS; SUBCUTANEOUS
Status: DISPENSED
Start: 2023-11-20

## (undated) RX ORDER — FENTANYL CITRATE 50 UG/ML
INJECTION, SOLUTION INTRAMUSCULAR; INTRAVENOUS
Status: DISPENSED
Start: 2023-11-20

## (undated) RX ORDER — MORPHINE SULFATE 2 MG/ML
INJECTION, SOLUTION INTRAMUSCULAR; INTRAVENOUS
Status: DISPENSED
Start: 2023-11-20

## (undated) RX ORDER — OXYCODONE HYDROCHLORIDE 5 MG/1
TABLET ORAL
Status: DISPENSED
Start: 2023-12-23

## (undated) RX ORDER — ACETAMINOPHEN 500 MG
TABLET ORAL
Status: DISPENSED
Start: 2023-08-27

## (undated) RX ORDER — HYDROMORPHONE HYDROCHLORIDE 1 MG/ML
INJECTION, SOLUTION INTRAMUSCULAR; INTRAVENOUS; SUBCUTANEOUS
Status: DISPENSED
Start: 2023-12-23

## (undated) RX ORDER — FENTANYL CITRATE 50 UG/ML
INJECTION, SOLUTION INTRAMUSCULAR; INTRAVENOUS
Status: DISPENSED
Start: 2023-12-22

## (undated) RX ORDER — ACETAMINOPHEN 325 MG/1
TABLET ORAL
Status: DISPENSED
Start: 2023-12-22

## (undated) RX ORDER — CEFTRIAXONE SODIUM 1 G
VIAL (EA) INJECTION
Status: DISPENSED
Start: 2023-12-23